# Patient Record
Sex: MALE | Race: WHITE | NOT HISPANIC OR LATINO | Employment: UNEMPLOYED | URBAN - METROPOLITAN AREA
[De-identification: names, ages, dates, MRNs, and addresses within clinical notes are randomized per-mention and may not be internally consistent; named-entity substitution may affect disease eponyms.]

---

## 2017-03-07 ENCOUNTER — TRANSCRIBE ORDERS (OUTPATIENT)
Dept: LAB | Facility: CLINIC | Age: 57
End: 2017-03-07

## 2017-03-07 ENCOUNTER — APPOINTMENT (OUTPATIENT)
Dept: LAB | Facility: CLINIC | Age: 57
End: 2017-03-07
Payer: COMMERCIAL

## 2017-03-07 DIAGNOSIS — R73.01 IMPAIRED FASTING GLUCOSE: ICD-10-CM

## 2017-03-07 DIAGNOSIS — I10 UNSPECIFIED ESSENTIAL HYPERTENSION: ICD-10-CM

## 2017-03-07 DIAGNOSIS — E78.5 HYPERLIPIDEMIA, UNSPECIFIED HYPERLIPIDEMIA TYPE: ICD-10-CM

## 2017-03-07 DIAGNOSIS — E78.5 HYPERLIPIDEMIA, UNSPECIFIED HYPERLIPIDEMIA TYPE: Primary | ICD-10-CM

## 2017-03-07 LAB
ALBUMIN SERPL BCP-MCNC: 3.6 G/DL (ref 3.5–5)
ALP SERPL-CCNC: 51 U/L (ref 46–116)
ALT SERPL W P-5'-P-CCNC: 33 U/L (ref 12–78)
ANION GAP SERPL CALCULATED.3IONS-SCNC: 8 MMOL/L (ref 4–13)
AST SERPL W P-5'-P-CCNC: 28 U/L (ref 5–45)
BILIRUB SERPL-MCNC: 0.56 MG/DL (ref 0.2–1)
BUN SERPL-MCNC: 14 MG/DL (ref 5–25)
CALCIUM SERPL-MCNC: 9.4 MG/DL (ref 8.3–10.1)
CHLORIDE SERPL-SCNC: 103 MMOL/L (ref 100–108)
CHOLEST SERPL-MCNC: 139 MG/DL (ref 50–200)
CO2 SERPL-SCNC: 31 MMOL/L (ref 21–32)
CREAT SERPL-MCNC: 0.99 MG/DL (ref 0.6–1.3)
GFR SERPL CREATININE-BSD FRML MDRD: >60 ML/MIN/1.73SQ M
GLUCOSE SERPL-MCNC: 99 MG/DL (ref 65–140)
HDLC SERPL-MCNC: 62 MG/DL (ref 40–60)
LDLC SERPL CALC-MCNC: 48 MG/DL (ref 0–100)
POTASSIUM SERPL-SCNC: 4.1 MMOL/L (ref 3.5–5.3)
PROT SERPL-MCNC: 7.2 G/DL (ref 6.4–8.2)
SODIUM SERPL-SCNC: 142 MMOL/L (ref 136–145)
TRIGL SERPL-MCNC: 147 MG/DL

## 2017-03-07 PROCEDURE — 36415 COLL VENOUS BLD VENIPUNCTURE: CPT

## 2017-03-07 PROCEDURE — 80053 COMPREHEN METABOLIC PANEL: CPT

## 2017-03-07 PROCEDURE — 80061 LIPID PANEL: CPT

## 2017-03-08 ENCOUNTER — GENERIC CONVERSION - ENCOUNTER (OUTPATIENT)
Dept: OTHER | Facility: OTHER | Age: 57
End: 2017-03-08

## 2017-03-22 ENCOUNTER — ALLSCRIPTS OFFICE VISIT (OUTPATIENT)
Dept: OTHER | Facility: OTHER | Age: 57
End: 2017-03-22

## 2017-09-07 ENCOUNTER — TRANSCRIBE ORDERS (OUTPATIENT)
Dept: LAB | Facility: CLINIC | Age: 57
End: 2017-09-07

## 2017-09-07 ENCOUNTER — APPOINTMENT (OUTPATIENT)
Dept: LAB | Facility: CLINIC | Age: 57
End: 2017-09-07
Payer: COMMERCIAL

## 2017-09-07 DIAGNOSIS — E78.1 PURE HYPERGLYCERIDEMIA: ICD-10-CM

## 2017-09-07 DIAGNOSIS — I10 UNSPECIFIED ESSENTIAL HYPERTENSION: ICD-10-CM

## 2017-09-07 DIAGNOSIS — I10 UNSPECIFIED ESSENTIAL HYPERTENSION: Primary | ICD-10-CM

## 2017-09-07 LAB
ALBUMIN SERPL BCP-MCNC: 3.4 G/DL (ref 3.5–5)
ALP SERPL-CCNC: 49 U/L (ref 46–116)
ALT SERPL W P-5'-P-CCNC: 35 U/L (ref 12–78)
ANION GAP SERPL CALCULATED.3IONS-SCNC: 9 MMOL/L (ref 4–13)
AST SERPL W P-5'-P-CCNC: 36 U/L (ref 5–45)
BASOPHILS # BLD AUTO: 0.02 THOUSANDS/ΜL (ref 0–0.1)
BASOPHILS NFR BLD AUTO: 0 % (ref 0–1)
BILIRUB SERPL-MCNC: 0.67 MG/DL (ref 0.2–1)
BUN SERPL-MCNC: 12 MG/DL (ref 5–25)
CALCIUM SERPL-MCNC: 8.2 MG/DL (ref 8.3–10.1)
CHLORIDE SERPL-SCNC: 105 MMOL/L (ref 100–108)
CO2 SERPL-SCNC: 28 MMOL/L (ref 21–32)
CREAT SERPL-MCNC: 0.78 MG/DL (ref 0.6–1.3)
EOSINOPHIL # BLD AUTO: 0.06 THOUSAND/ΜL (ref 0–0.61)
EOSINOPHIL NFR BLD AUTO: 1 % (ref 0–6)
ERYTHROCYTE [DISTWIDTH] IN BLOOD BY AUTOMATED COUNT: 13.4 % (ref 11.6–15.1)
GFR SERPL CREATININE-BSD FRML MDRD: 99 ML/MIN/1.73SQ M
GLUCOSE P FAST SERPL-MCNC: 96 MG/DL (ref 65–99)
HCT VFR BLD AUTO: 43.2 % (ref 36.5–49.3)
HGB BLD-MCNC: 14.5 G/DL (ref 12–17)
LYMPHOCYTES # BLD AUTO: 0.9 THOUSANDS/ΜL (ref 0.6–4.47)
LYMPHOCYTES NFR BLD AUTO: 20 % (ref 14–44)
MCH RBC QN AUTO: 33.4 PG (ref 26.8–34.3)
MCHC RBC AUTO-ENTMCNC: 33.6 G/DL (ref 31.4–37.4)
MCV RBC AUTO: 100 FL (ref 82–98)
MONOCYTES # BLD AUTO: 0.39 THOUSAND/ΜL (ref 0.17–1.22)
MONOCYTES NFR BLD AUTO: 8 % (ref 4–12)
NEUTROPHILS # BLD AUTO: 3.24 THOUSANDS/ΜL (ref 1.85–7.62)
NEUTS SEG NFR BLD AUTO: 71 % (ref 43–75)
NRBC BLD AUTO-RTO: 0 /100 WBCS
PLATELET # BLD AUTO: 146 THOUSANDS/UL (ref 149–390)
PMV BLD AUTO: 10.9 FL (ref 8.9–12.7)
POTASSIUM SERPL-SCNC: 3.6 MMOL/L (ref 3.5–5.3)
PROT SERPL-MCNC: 6.6 G/DL (ref 6.4–8.2)
RBC # BLD AUTO: 4.34 MILLION/UL (ref 3.88–5.62)
SODIUM SERPL-SCNC: 142 MMOL/L (ref 136–145)
TSH SERPL DL<=0.05 MIU/L-ACNC: 2.59 UIU/ML (ref 0.36–3.74)
VIT B12 SERPL-MCNC: 792 PG/ML (ref 100–900)
WBC # BLD AUTO: 4.62 THOUSAND/UL (ref 4.31–10.16)

## 2017-09-07 PROCEDURE — 36415 COLL VENOUS BLD VENIPUNCTURE: CPT

## 2017-09-07 PROCEDURE — 85025 COMPLETE CBC W/AUTO DIFF WBC: CPT

## 2017-09-07 PROCEDURE — 80053 COMPREHEN METABOLIC PANEL: CPT

## 2017-09-07 PROCEDURE — 84443 ASSAY THYROID STIM HORMONE: CPT

## 2017-09-07 PROCEDURE — 82607 VITAMIN B-12: CPT

## 2017-09-08 ENCOUNTER — GENERIC CONVERSION - ENCOUNTER (OUTPATIENT)
Dept: OTHER | Facility: OTHER | Age: 57
End: 2017-09-08

## 2017-09-22 ENCOUNTER — ALLSCRIPTS OFFICE VISIT (OUTPATIENT)
Dept: OTHER | Facility: OTHER | Age: 57
End: 2017-09-22

## 2017-10-03 ENCOUNTER — APPOINTMENT (OUTPATIENT)
Dept: LAB | Facility: CLINIC | Age: 57
End: 2017-10-03
Payer: COMMERCIAL

## 2017-10-03 ENCOUNTER — TRANSCRIBE ORDERS (OUTPATIENT)
Dept: LAB | Facility: CLINIC | Age: 57
End: 2017-10-03

## 2017-10-03 DIAGNOSIS — Z12.5 SPECIAL SCREENING FOR MALIGNANT NEOPLASM OF PROSTATE: ICD-10-CM

## 2017-10-03 DIAGNOSIS — R73.01 IMPAIRED FASTING GLUCOSE: ICD-10-CM

## 2017-10-03 DIAGNOSIS — D69.6 THROMBOCYTOPENIA, UNSPECIFIED (HCC): ICD-10-CM

## 2017-10-03 DIAGNOSIS — D69.6 THROMBOCYTOPENIA, UNSPECIFIED (HCC): Primary | ICD-10-CM

## 2017-10-03 LAB
EST. AVERAGE GLUCOSE BLD GHB EST-MCNC: 105 MG/DL
HBA1C MFR BLD: 5.3 % (ref 4.2–6.3)
PLATELET # BLD AUTO: 194 THOUSANDS/UL (ref 149–390)
PMV BLD AUTO: 10.7 FL (ref 8.9–12.7)
PSA SERPL-MCNC: 0.8 NG/ML (ref 0–4)

## 2017-10-03 PROCEDURE — 83516 IMMUNOASSAY NONANTIBODY: CPT

## 2017-10-03 PROCEDURE — 85049 AUTOMATED PLATELET COUNT: CPT

## 2017-10-03 PROCEDURE — 83036 HEMOGLOBIN GLYCOSYLATED A1C: CPT

## 2017-10-03 PROCEDURE — 82784 ASSAY IGA/IGD/IGG/IGM EACH: CPT

## 2017-10-03 PROCEDURE — 84153 ASSAY OF PSA TOTAL: CPT

## 2017-10-03 PROCEDURE — 36415 COLL VENOUS BLD VENIPUNCTURE: CPT

## 2017-10-03 PROCEDURE — 86255 FLUORESCENT ANTIBODY SCREEN: CPT

## 2017-10-04 ENCOUNTER — GENERIC CONVERSION - ENCOUNTER (OUTPATIENT)
Dept: OTHER | Facility: OTHER | Age: 57
End: 2017-10-04

## 2017-10-05 LAB
ENDOMYSIUM IGA SER QL: NEGATIVE
GLIADIN PEPTIDE IGA SER-ACNC: 4 UNITS (ref 0–19)
GLIADIN PEPTIDE IGG SER-ACNC: 2 UNITS (ref 0–19)
IGA SERPL-MCNC: 110 MG/DL (ref 90–386)
TTG IGA SER-ACNC: <2 U/ML (ref 0–3)
TTG IGG SER-ACNC: <2 U/ML (ref 0–5)

## 2017-10-06 ENCOUNTER — GENERIC CONVERSION - ENCOUNTER (OUTPATIENT)
Dept: OTHER | Facility: OTHER | Age: 57
End: 2017-10-06

## 2018-01-11 NOTE — RESULT NOTES
Verified Results  (1) CBC/PLT/DIFF [id number] 08:30AM Sarah Fiore     Test Name Result Flag Reference   WBC COUNT 4 62 Thousand/uL  4 31-10 16   RBC COUNT 4 34 Million/uL  3 88-5 62   HEMOGLOBIN 14 5 g/dL  12 0-17 0   HEMATOCRIT 43 2 %  36 5-49 3    fL H 82-98   MCH 33 4 pg  26 8-34 3   MCHC 33 6 g/dL  31 4-37 4   RDW 13 4 %  11 6-15 1   MPV 10 9 fL  8 9-12 7   PLATELET COUNT 035 Thousands/uL L 149-390   nRBC AUTOMATED 0 /100 WBCs     NEUTROPHILS RELATIVE PERCENT 71 %  43-75   LYMPHOCYTES RELATIVE PERCENT 20 %  14-44   MONOCYTES RELATIVE PERCENT 8 %  4-12   EOSINOPHILS RELATIVE PERCENT 1 %  0-6   BASOPHILS RELATIVE PERCENT 0 %  0-1   NEUTROPHILS ABSOLUTE COUNT 3 24 Thousands/? ??L  1 85-7 62   LYMPHOCYTES ABSOLUTE COUNT 0 90 Thousands/? ??L  0 60-4 47   MONOCYTES ABSOLUTE COUNT 0 39 Thousand/? ??L  0 17-1 22   EOSINOPHILS ABSOLUTE COUNT 0 06 Thousand/? ??L  0 00-0 61   BASOPHILS ABSOLUTE COUNT 0 02 Thousands/? ??L  0 00-0 10   This is a patient instruction: This test is non-fasting  Please drink two glasses of water morning of bloodwork  (1) COMPREHENSIVE METABOLIC PANEL [id number] 98:72EW Sarah Fiore     Test Name Result Flag Reference   SODIUM 142 mmol/L  136-145   POTASSIUM 3 6 mmol/L  3 5-5 3   CHLORIDE 105 mmol/L  100-108   CARBON DIOXIDE 28 mmol/L  21-32   ANION GAP (CALC) 9 mmol/L  4-13   BLOOD UREA NITROGEN 12 mg/dL  5-25   CREATININE 0 78 mg/dL  0 60-1 30   Standardized to IDMS reference method   CALCIUM 8 2 mg/dL L 8 3-10 1   BILI, TOTAL 0 67 mg/dL  0 20-1 00   ALK PHOSPHATAS 49 U/L     ALT (SGPT) 35 U/L  12-78   Specimen collection should occur prior to Sulfasalazine and/or Sulfapyridine administration due to the potential for falsely depressed results  AST(SGOT) 36 U/L  5-45   Specimen collection should occur prior to Sulfasalazine administration due to the potential for falsely depressed results     ALBUMIN 3 4 g/dL L 3 5-5 0   TOTAL PROTEIN 6 6 g/dL  6 4-8 2   eGFR 99 ml/min/1 73sq valeria [place] [employer] Disease Education Program recommendations are as follows:  GFR calculation is accurate only with a steady state creatinine  Chronic Kidney disease less than 60 ml/min/1 73 sq  meters  Kidney failure less than 15 ml/min/1 73 sq  meters  GLUCOSE FASTING 96 mg/dL  65-99   Specimen collection should occur prior to Sulfasalazine administration due to the potential for falsely depressed results  Specimen collection should occur prior to Sulfapyridine administration due to the potential for falsely elevated results  (1) TSH WITH FT4 REFLEX [id number] 08:30AM Sarah Fiore     Test Name Result Flag Reference   TSH 2 590 uIU/mL  0 358-3 740   Patients undergoing fluorescein dye angiography may retain small amounts of fluorescein in the body for 48-72 hours post procedure  Samples containing fluorescein can produce falsely depressed TSH values  If the patient had this procedure,a specimen should be resubmitted post fluorescein clearance       (1) VITAMIN B12 [id number] 08:30AM Angel Sarah Holloway     Test Name Result Flag Reference   VITAMIN B12 792 pg/mL  100-900

## 2018-01-12 NOTE — RESULT NOTES
Message   please have dr Naman Vallejo f/u with results  thanks     Verified Results  (1) LIPID PANEL, FASTING [id number] 07:23AM Vishnu Deluca     Test Name Result Flag Reference   CHOLESTEROL, TOTAL 154 mg/dL  125-200   HDL CHOLESTEROL 44 mg/dL  > OR = 40   TRIGLICERIDES 409 mg/dL H <150   LDL-CHOLESTEROL 35 mg/dL (calc)  <130   Desirable range <100 mg/dL for patients with CHD or  diabetes and <70 mg/dL for diabetic patients with  known heart disease  CHOL/HDLC RATIO 3 5 (calc)  < OR = 5 0   NON HDL CHOLESTEROL 110 mg/dL (calc)     Target for non-HDL cholesterol is 30 mg/dL higher than   LDL cholesterol target

## 2018-01-13 VITALS
TEMPERATURE: 97.4 F | HEART RATE: 108 BPM | SYSTOLIC BLOOD PRESSURE: 150 MMHG | WEIGHT: 230.25 LBS | RESPIRATION RATE: 15 BRPM | DIASTOLIC BLOOD PRESSURE: 80 MMHG | BODY MASS INDEX: 34.9 KG/M2 | HEIGHT: 68 IN

## 2018-01-13 VITALS
SYSTOLIC BLOOD PRESSURE: 142 MMHG | WEIGHT: 214 LBS | HEART RATE: 84 BPM | BODY MASS INDEX: 32.43 KG/M2 | DIASTOLIC BLOOD PRESSURE: 90 MMHG | HEIGHT: 68 IN | TEMPERATURE: 97.6 F | RESPIRATION RATE: 18 BRPM

## 2018-01-15 NOTE — RESULT NOTES
Verified Results  (1) CELIAC DISEASE AB PROFILE [id number] 11:53AM Adebayo Sarah     Test Name Result Flag Reference   tTG IGG <2 U/mL  0 - 5   Negative        0 - 5                                Weak Positive   6 - 9                                Positive           >9   tTG IGA <2 U/mL  0 - 3   Negative        0 -  3                                Weak Positive   4 - 10                                Positive           >10   Tissue Transglutaminase (tTG) has been identified   as the endomysial antigen  Studies have demonstr-   ated that endomysial IgA antibodies have over 99%   specificity for gluten sensitive enteropathy     GLIADA 4 units  0 - 19   Negative                   0 - 19                     Weak Positive             20 - 30                     Moderate to Strong Positive   >30   GLIADG 2 units  0 - 19   Negative                   0 - 19                     Weak Positive             20 - 30                     Moderate to Strong Positive   >30   ENDOMYSIAL AB IGA Negative  Negative   Performed at:  [id number] [address]  [id number]  : Jr Sampson MD, Phone:  [phone]    mg/dL  90 - 386

## 2018-01-15 NOTE — RESULT NOTES
Verified Results  (1) PLATELET COUNT [id number] 11:53AM Sarah Fiore     Test Name Result Flag Reference   PLATELET COUNT 496 Thousands/uL  149-390   MPV 10 7 fL  8 9-12 7     (1) PSA, DIAGNOSTIC (FOLLOW-UP) 03Oct2017 11:53AM Sarah Fiore     Test Name Result Flag Reference   PSA 0 8 ng/mL  0 0-4 0   American Urological Association Guidelines define biochemical recurrence of prostate cancer as a detectable or rising PSA value post-radical prostatectomy that is greater than or equal to 0 2 ng/mL with a second confirmatory level of greater than or equal to 0 2 ng/mL  (1) HEMOGLOBIN A1C 03Oct2017 11:53AM Sarah Fiore     Test Name Result Flag Reference   HEMOGLOBIN A1C 5 3 %  4 2-6 3   EST  AVG   GLUCOSE 105 mg/dl

## 2018-01-18 NOTE — RESULT NOTES
Message   Can you please a patient know that his blood work including blood sugar, liver, kidneys were within a normal range  His total cholesterol improved from 154 to 139, his triglycerides also improved from 377 to 147 and his good cholesterol also improved from 44 to 62  Thank you     Verified Results  (1) COMPREHENSIVE METABOLIC PANEL [id number] 73:67AB Sarah Fiore     Test Name Result Flag Reference   GLUCOSE,RANDM 99 mg/dL     If the patient is fasting, the ADA then defines impaired fasting glucose as > 100 mg/dL and diabetes as > or equal to 123 mg/dL  SODIUM 142 mmol/L  136-145   POTASSIUM 4 1 mmol/L  3 5-5 3   CHLORIDE 103 mmol/L  100-108   CARBON DIOXIDE 31 mmol/L  21-32   ANION GAP (CALC) 8 mmol/L  4-13   BLOOD UREA NITROGEN 14 mg/dL  5-25   CREATININE 0 99 mg/dL  0 60-1 30   Standardized to IDMS reference method   CALCIUM 9 4 mg/dL  8 3-10 1   BILI, TOTAL 0 56 mg/dL  0 20-1 00   ALK PHOSPHATAS 51 U/L     ALT (SGPT) 33 U/L  12-78   AST(SGOT) 28 U/L  5-45   ALBUMIN 3 6 g/dL  3 5-5 0   TOTAL PROTEIN 7 2 g/dL  6 4-8 2   eGFR Non-African American      >60 0 ml/min/1 73sq [facility] [employer] Disease Education Program recommendations are as follows:  GFR calculation is accurate only with a steady state creatinine  Chronic Kidney disease less than 60 ml/min/1 73 sq  meters  Kidney failure less than 15 ml/min/1 73 sq  meters       (1) LIPID PANEL FASTING W DIRECT LDL REFLEX [id number] 07:16AM Sarah Fiore     Test Name Result Flag Reference   CHOLESTEROL 139 mg/dL     LDL CHOLESTEROL CALCULATED 48 mg/dL  0-100   Triglyceride:         Normal              <150 mg/dl       Borderline High    150-199 mg/dl       High               200-499 mg/dl       Very High          >499 mg/dl  Cholesterol:         Desirable        <200 mg/dl      Borderline High  200-239 mg/dl      High             >239 mg/dl  HDL Cholesterol:        High    >59 mg/dL      Low     <41 mg/dL  LDL Cholesterol: Optimal          <100 mg/dl        Near Optimal     100-129 mg/dl        Above Optimal          Borderline High   130-159 mg/dl          High              160-189 mg/dl          Very High        >189 mg/dl  LDL CALCULATED:    This screening LDL is a calculated result  It does not have the accuracy of the Direct Measured LDL in the monitoring of patients with hyperlipidemia and/or statin therapy  Direct Measure LDL (IPS604) must be ordered separately in these patients  TRIGLYCERIDES 147 mg/dL  <=150   Specimen collection should occur prior to N-Acetylcysteine or Metamizole administration due to the potential for falsely depressed results  HDL,DIRECT 62 mg/dL H 40-60   Specimen collection should occur prior to Metamizole administration due to the potential for falsely depressed results

## 2018-01-30 DIAGNOSIS — F32.89 OTHER DEPRESSION: Primary | ICD-10-CM

## 2018-01-30 RX ORDER — FLUOXETINE HYDROCHLORIDE 40 MG/1
CAPSULE ORAL
Qty: 90 CAPSULE | Refills: 1 | Status: SHIPPED | OUTPATIENT
Start: 2018-01-30 | End: 2018-01-31 | Stop reason: SDUPTHER

## 2018-01-31 DIAGNOSIS — F32.89 OTHER DEPRESSION: ICD-10-CM

## 2018-01-31 RX ORDER — FLUOXETINE HYDROCHLORIDE 40 MG/1
CAPSULE ORAL
Qty: 90 CAPSULE | Refills: 1 | Status: SHIPPED | OUTPATIENT
Start: 2018-01-31 | End: 2018-07-27 | Stop reason: SDUPTHER

## 2018-02-01 DIAGNOSIS — E78.5 HYPERLIPIDEMIA, UNSPECIFIED HYPERLIPIDEMIA TYPE: Primary | ICD-10-CM

## 2018-02-01 RX ORDER — FENOFIBRATE 160 MG/1
TABLET ORAL
Qty: 90 TABLET | Refills: 3 | Status: SHIPPED | OUTPATIENT
Start: 2018-02-01 | End: 2019-01-28 | Stop reason: SDUPTHER

## 2018-03-03 DIAGNOSIS — I10 HYPERTENSION, UNSPECIFIED TYPE: Primary | ICD-10-CM

## 2018-03-04 RX ORDER — LISINOPRIL 30 MG/1
TABLET ORAL
Qty: 90 TABLET | Refills: 1 | Status: SHIPPED | OUTPATIENT
Start: 2018-03-04 | End: 2018-05-09 | Stop reason: SDUPTHER

## 2018-03-05 ENCOUNTER — TRANSCRIBE ORDERS (OUTPATIENT)
Dept: LAB | Facility: CLINIC | Age: 58
End: 2018-03-05

## 2018-03-05 ENCOUNTER — APPOINTMENT (OUTPATIENT)
Dept: LAB | Facility: CLINIC | Age: 58
End: 2018-03-05
Payer: COMMERCIAL

## 2018-03-05 DIAGNOSIS — E03.9 HYPOTHYROIDISM, UNSPECIFIED TYPE: ICD-10-CM

## 2018-03-05 DIAGNOSIS — E78.1 PURE HYPERGLYCERIDEMIA: Primary | ICD-10-CM

## 2018-03-05 DIAGNOSIS — E78.1 PURE HYPERGLYCERIDEMIA: ICD-10-CM

## 2018-03-05 LAB
ALBUMIN SERPL BCP-MCNC: 3.6 G/DL (ref 3.5–5)
ALP SERPL-CCNC: 48 U/L (ref 46–116)
ALT SERPL W P-5'-P-CCNC: 30 U/L (ref 12–78)
ANION GAP SERPL CALCULATED.3IONS-SCNC: 8 MMOL/L (ref 4–13)
AST SERPL W P-5'-P-CCNC: 30 U/L (ref 5–45)
BILIRUB SERPL-MCNC: 0.84 MG/DL (ref 0.2–1)
BUN SERPL-MCNC: 14 MG/DL (ref 5–25)
CALCIUM SERPL-MCNC: 8.8 MG/DL (ref 8.3–10.1)
CHLORIDE SERPL-SCNC: 101 MMOL/L (ref 100–108)
CHOLEST SERPL-MCNC: 145 MG/DL (ref 50–200)
CO2 SERPL-SCNC: 31 MMOL/L (ref 21–32)
CREAT SERPL-MCNC: 0.89 MG/DL (ref 0.6–1.3)
GFR SERPL CREATININE-BSD FRML MDRD: 94 ML/MIN/1.73SQ M
GLUCOSE P FAST SERPL-MCNC: 102 MG/DL (ref 65–99)
HDLC SERPL-MCNC: 50 MG/DL (ref 40–60)
LDLC SERPL CALC-MCNC: 19 MG/DL (ref 0–100)
POTASSIUM SERPL-SCNC: 3.5 MMOL/L (ref 3.5–5.3)
PROT SERPL-MCNC: 7.2 G/DL (ref 6.4–8.2)
SODIUM SERPL-SCNC: 140 MMOL/L (ref 136–145)
TRIGL SERPL-MCNC: 381 MG/DL
TSH SERPL DL<=0.05 MIU/L-ACNC: 3.34 UIU/ML (ref 0.36–3.74)

## 2018-03-05 PROCEDURE — 36415 COLL VENOUS BLD VENIPUNCTURE: CPT

## 2018-03-05 PROCEDURE — 80061 LIPID PANEL: CPT

## 2018-03-05 PROCEDURE — 84443 ASSAY THYROID STIM HORMONE: CPT

## 2018-03-05 PROCEDURE — 80053 COMPREHEN METABOLIC PANEL: CPT

## 2018-03-27 ENCOUNTER — OFFICE VISIT (OUTPATIENT)
Dept: FAMILY MEDICINE CLINIC | Facility: CLINIC | Age: 58
End: 2018-03-27
Payer: COMMERCIAL

## 2018-03-27 VITALS
HEART RATE: 96 BPM | BODY MASS INDEX: 33.1 KG/M2 | WEIGHT: 218.4 LBS | SYSTOLIC BLOOD PRESSURE: 140 MMHG | OXYGEN SATURATION: 98 % | HEIGHT: 68 IN | RESPIRATION RATE: 16 BRPM | TEMPERATURE: 97.3 F | DIASTOLIC BLOOD PRESSURE: 84 MMHG

## 2018-03-27 DIAGNOSIS — Z00.00 ROUTINE HEALTH MAINTENANCE: ICD-10-CM

## 2018-03-27 DIAGNOSIS — Z72.89 HABITUAL ALCOHOL USE: ICD-10-CM

## 2018-03-27 DIAGNOSIS — R53.83 OTHER FATIGUE: ICD-10-CM

## 2018-03-27 DIAGNOSIS — I10 HYPERTENSION, UNSPECIFIED TYPE: Primary | ICD-10-CM

## 2018-03-27 DIAGNOSIS — R73.01 IMPAIRED FASTING GLUCOSE: ICD-10-CM

## 2018-03-27 DIAGNOSIS — E03.9 HYPOTHYROIDISM, UNSPECIFIED TYPE: ICD-10-CM

## 2018-03-27 DIAGNOSIS — E78.5 HYPERLIPIDEMIA, UNSPECIFIED HYPERLIPIDEMIA TYPE: ICD-10-CM

## 2018-03-27 DIAGNOSIS — E78.1 HYPERTRIGLYCERIDEMIA: ICD-10-CM

## 2018-03-27 DIAGNOSIS — K21.9 GASTROESOPHAGEAL REFLUX DISEASE WITHOUT ESOPHAGITIS: ICD-10-CM

## 2018-03-27 DIAGNOSIS — Z12.5 SCREENING FOR PROSTATE CANCER: ICD-10-CM

## 2018-03-27 PROCEDURE — 99215 OFFICE O/P EST HI 40 MIN: CPT | Performed by: FAMILY MEDICINE

## 2018-03-27 RX ORDER — ATORVASTATIN CALCIUM 10 MG/1
10 TABLET, FILM COATED ORAL DAILY
Qty: 30 TABLET | Refills: 5 | Status: SHIPPED | OUTPATIENT
Start: 2018-03-27 | End: 2018-04-26

## 2018-03-27 NOTE — PROGRESS NOTES
FAMILY PRACTICE OFFICE VISIT       NAME: Corky Mott  AGE: 59 y o  SEX: male       : 1960        MRN: [id number]    DATE: 3/28/2018  TIME: 7:13 PM    Assessment and Plan     Problem List Items Addressed This Visit     Hypertension - Primary       BP initially elevated however upon recheck, decreased however still higher than I would like to be  Continue with lisinopril 30 mg daily  I would like patient to check his Bp  Return in 2 weeks for BP check  Continue with lifestyle modifications including limiting sodium in the diet, starting routine exercise regimen, limiting alcohol intake to 1-2 drinks nightly  Relevant Orders    Comprehensive metabolic panel    Hypertriglyceridemia      Discussed recent lipid panel which shows elevated triglycerides  Patient is on atorvastatin as well as fenofibrate  I would like patient to decrease atorvastatin by half the amount as LDL is lower  I would like patient to work on lifestyle modifications, limit saturated fried fatty foods from the diet, increase heart healthy fats as well as Mediterranean diet  I would like patient to incorporate fish oil to lower triglycerides as well  Will recheck in 6 months  Relevant Medications    atorvastatin (LIPITOR) 10 mg tablet    Impaired fasting glucose       Will check A1c with next blood work  Continue with limiting carbohydrates in the diet  Relevant Orders    HEMOGLOBIN A1C W/ EAG ESTIMATION    Hypothyroidism       This is overall stable  Continue with levothyroxine 75 mcg daily  Relevant Orders    TSH, 3rd generation with T4 reflex    Habitual alcohol use       Have strongly urged, advised on the importance of limiting alcohol  Have discussed routine alcohol intake and increased blood pressure as well  Patient states he will try  Routine health maintenance       Will check PSA  Patient will call Dr Belen Manuel to schedule screening colonoscopy           Gastroesophageal reflux disease without esophagitis       Patient takes omeprazole only as needed  Continue to avoid triggers that may cause reflux symptoms  Other Visit Diagnoses     Other fatigue        Relevant Orders    CBC and differential    Screening for prostate cancer        Relevant Orders    PSA, total and free            There are no Patient Instructions on file for this visit  Chief Complaint     Chief Complaint   Patient presents with    Follow-up         History of Present Illness     HPI   Patient is here for follow-up of chronic medical conditions and discuss recent blood work results  Patient recently discovered that he is allergic to amoxicillin after having a root canal last week  He developed lip swelling and had trouble breathing  Took lisinopril at 9 am this am  Still has Memeoey with diet gingerale, few drinks nightly  Will halve atorvastatin  Takes omperazole as needed  Will call dr Tr Cleveland to scheudule colonosocpy   Review of Systems   Review of Systems   Constitutional: Negative for unexpected weight change  HENT: Negative  Eyes: Negative for visual disturbance  Respiratory: Negative for shortness of breath  Cardiovascular: Negative  Negative for chest pain, palpitations and leg swelling  Gastrointestinal: Negative for abdominal pain  Genitourinary: Negative for dysuria  Skin: Negative for rash  Neurological: Negative for headaches  Psychiatric/Behavioral: Negative for dysphoric mood         Active Problem List     Patient Active Problem List   Diagnosis    Hypertension    Hypertriglyceridemia    Impaired fasting glucose    Hypothyroidism    Habitual alcohol use    Routine health maintenance    Gastroesophageal reflux disease without esophagitis       Past Medical History:  Past Medical History:   Diagnosis Date    Medical history unknown        Past Surgical History:  Past Surgical History:   Procedure Laterality Date    REPAIR / REINSERT BICEPS TENDON AT ELBOW      Last Assessed: 1/12/2016        Family History:  Family History   Problem Relation Age of Onset    Diabetes Mother        Social History:  Social History     Social History    Marital status: Single     Spouse name: N/A    Number of children: N/A    Years of education: N/A     Occupational History    Not on file  Social History Main Topics    Smoking status: Never Smoker    Smokeless tobacco: Never Used    Alcohol use Yes      Comment: social    Drug use: Yes     Types: Marijuana    Sexual activity: Not on file     Other Topics Concern    Not on file     Social History Narrative    No narrative on file     I have reviewed the patient's medical history in detail; there are no changes to the history as noted in the electronic medical record  Objective     Vitals:    03/27/18 1105   BP: 140/84   Pulse:    Resp:    Temp:    SpO2:      Wt Readings from Last 3 Encounters:   03/27/18 99 1 kg (218 lb 6 4 oz)   09/22/17 97 1 kg (214 lb)   03/22/17 104 kg (230 lb 4 oz)     Vitals:    03/27/18 1027 03/27/18 1105   BP: 152/98 140/84   BP Location: Left arm    Patient Position: Sitting    Cuff Size: Large    Pulse: 96    Resp: 16    Temp: (!) 97 3 °F (36 3 °C)    TempSrc: Tympanic    SpO2: 98%    Weight: 99 1 kg (218 lb 6 4 oz)    Height: 5' 8" (1 727 m)          Physical Exam   Constitutional: He is oriented to person, place, and time  He appears well-developed and well-nourished  HENT:   Head: Normocephalic and atraumatic  Mouth/Throat: Oropharynx is clear and moist      Tms intact and clear   Eyes: Conjunctivae and EOM are normal  Pupils are equal, round, and reactive to light  Neck: Normal range of motion  Neck supple  No thyromegaly present  Cardiovascular: Normal rate and regular rhythm  No murmur heard  Pulmonary/Chest: Effort normal and breath sounds normal    Abdominal: Soft  Bowel sounds are normal  There is no tenderness  There is no rebound     Musculoskeletal: Normal range of motion  He exhibits no edema  Lymphadenopathy:     He has no cervical adenopathy  Neurological: He is alert and oriented to person, place, and time  Skin: No rash noted  Psychiatric: He has a normal mood and affect  Nursing note and vitals reviewed        Pertinent Laboratory/Diagnostic Studies:  Lab Results   Component Value Date    GLUCOSE 99 03/07/2017    BUN 14 03/05/2018    CREATININE 0 89 03/05/2018    CALCIUM 8 8 03/05/2018     03/05/2018    K 3 5 03/05/2018    CO2 31 03/05/2018     03/05/2018     Lab Results   Component Value Date    ALT 30 03/05/2018    AST 30 03/05/2018    ALKPHOS 48 03/05/2018    BILITOT 0 84 03/05/2018       Lab Results   Component Value Date    WBC 4 62 09/07/2017    HGB 14 5 09/07/2017    HCT 43 2 09/07/2017     (H) 09/07/2017     10/03/2017       No results found for: TSH    Lab Results   Component Value Date    CHOL 145 03/05/2018     Lab Results   Component Value Date    TRIG 381 (H) 03/05/2018     Lab Results   Component Value Date    HDL 50 03/05/2018     Lab Results   Component Value Date    LDLCALC 19 03/05/2018     Lab Results   Component Value Date    HGBA1C 5 3 10/03/2017       Results for orders placed or performed in visit on 03/05/18   Lipid Panel with Direct LDL reflex   Result Value Ref Range    Cholesterol 145 50 - 200 mg/dL    Triglycerides 381 (H) <=150 mg/dL    HDL, Direct 50 40 - 60 mg/dL    LDL Calculated 19 0 - 100 mg/dL   TSH, 3rd generation with T4 reflex   Result Value Ref Range    TSH 3RD GENERATON 3 340 0 358 - 3 740 uIU/mL   Comprehensive metabolic panel   Result Value Ref Range    Sodium 140 136 - 145 mmol/L    Potassium 3 5 3 5 - 5 3 mmol/L    Chloride 101 100 - 108 mmol/L    CO2 31 21 - 32 mmol/L    Anion Gap 8 4 - 13 mmol/L    BUN 14 5 - 25 mg/dL    Creatinine 0 89 0 60 - 1 30 mg/dL    Glucose, Fasting 102 (H) 65 - 99 mg/dL    Calcium 8 8 8 3 - 10 1 mg/dL    AST 30 5 - 45 U/L    ALT 30 12 - 78 U/L    Alkaline Phosphatase 48 46 - 116 U/L    Total Protein 7 2 6 4 - 8 2 g/dL    Albumin 3 6 3 5 - 5 0 g/dL    Total Bilirubin 0 84 0 20 - 1 00 mg/dL    eGFR 94 ml/min/1 73sq m       Orders Placed This Encounter   Procedures    CBC and differential    Comprehensive metabolic panel    Lipid Panel with Direct LDL reflex    TSH, 3rd generation with T4 reflex    HEMOGLOBIN A1C W/ EAG ESTIMATION    PSA, total and free       ALLERGIES:  Allergies   Allergen Reactions    Amoxicillin Swelling and Fever     Other reaction(s): vertigo    Escitalopram      Other reaction(s): sweaty palms/hands, felt faint, passed out for a short time, had anxiety/panic attack    Gemfibrozil      Other reaction(s): Nausea and/or vomiting    Milk-Related Compounds      Other reaction(s): Other (See Comments)  abd cramping    Other      Other reaction(s): Other (See Comments)  rhinitis, itchy eyes       Current Medications     Current Outpatient Prescriptions   Medication Sig Dispense Refill    albuterol (PROVENTIL HFA,VENTOLIN HFA) 90 mcg/act inhaler Inhale 1-2 puffs      allopurinol (ZYLOPRIM) 300 mg tablet Take 1 tablet by mouth daily      atorvastatin (LIPITOR) 10 mg tablet Take 1 tablet (10 mg total) by mouth daily for 30 days 30 tablet 5    cetirizine (ZyrTEC) 10 mg tablet Take one tablet by mouth daily as needed for allergies/congestion       fenofibrate (TRIGLIDE) 160 MG tablet TAKE 1 TABLET DAILY  90 tablet 3    FLUoxetine (PROzac) 40 MG capsule TAKE ONE CAPSULE BY MOUTH EVERY DAY 90 capsule 1    levothyroxine 75 mcg tablet Take by mouth Daily      lisinopril (ZESTRIL) 30 mg tablet TAKE 1 TABLET EVERY DAY 90 tablet 1    Multiple Vitamin (DAILY VALUE MULTIVITAMIN) TABS Take 1 tablet by mouth daily      omeprazole (PriLOSEC) 20 mg delayed release capsule Take by mouth Daily       No current facility-administered medications for this visit            Health Maintenance     Health Maintenance   Topic Date Due    HIV SCREENING 1960    Hepatitis C Screening  1960    COLONOSCOPY  1960    DTaP,Tdap,and Td Vaccines (2 - Td) 07/27/2016    Depression Screening PHQ-9  03/27/2019    INFLUENZA VACCINE  Completed     Immunization History   Administered Date(s) Administered    Influenza 01/06/2010, 10/06/2011, 09/11/2012, 11/27/2013, 10/27/2015    Influenza Quadrivalent Preservative Free 3 years and older IM 11/15/2016, 09/22/2017    Tdap 07/27/2006       Will Magana MD

## 2018-03-28 PROBLEM — E78.5 HYPERLIPIDEMIA: Status: ACTIVE | Noted: 2018-03-28

## 2018-03-28 PROBLEM — F10.90 HABITUAL ALCOHOL USE: Status: ACTIVE | Noted: 2018-03-28

## 2018-03-28 PROBLEM — I10 HYPERTENSION: Status: ACTIVE | Noted: 2018-03-28

## 2018-03-28 PROBLEM — R73.01 IMPAIRED FASTING GLUCOSE: Status: ACTIVE | Noted: 2018-03-28

## 2018-03-28 PROBLEM — K21.9 GASTROESOPHAGEAL REFLUX DISEASE WITHOUT ESOPHAGITIS: Status: ACTIVE | Noted: 2018-03-28

## 2018-03-28 PROBLEM — Z72.89 HABITUAL ALCOHOL USE: Status: ACTIVE | Noted: 2018-03-28

## 2018-03-28 PROBLEM — E78.1 HYPERTRIGLYCERIDEMIA: Status: ACTIVE | Noted: 2018-03-28

## 2018-03-28 PROBLEM — Z00.00 ROUTINE HEALTH MAINTENANCE: Status: ACTIVE | Noted: 2018-03-28

## 2018-03-28 PROBLEM — E03.9 HYPOTHYROIDISM: Status: ACTIVE | Noted: 2018-03-28

## 2018-03-28 NOTE — ASSESSMENT & PLAN NOTE
Have strongly urged, advised on the importance of limiting alcohol  Have discussed routine alcohol intake and increased blood pressure as well  Patient states he will try

## 2018-03-28 NOTE — ASSESSMENT & PLAN NOTE
BP initially elevated however upon recheck, decreased however still higher than I would like to be  Continue with lisinopril 30 mg daily  I would like patient to check his Bp  Return in 2 weeks for BP check  Continue with lifestyle modifications including limiting sodium in the diet, starting routine exercise regimen, limiting alcohol intake to 1-2 drinks nightly

## 2018-03-28 NOTE — ASSESSMENT & PLAN NOTE
Discussed recent lipid panel which shows elevated triglycerides  Patient is on atorvastatin as well as fenofibrate  I would like patient to decrease atorvastatin by half the amount as LDL is lower  I would like patient to work on lifestyle modifications, limit saturated fried fatty foods from the diet, increase heart healthy fats as well as Mediterranean diet  I would like patient to incorporate fish oil to lower triglycerides as well  Will recheck in 6 months

## 2018-04-21 DIAGNOSIS — K21.9 GASTROESOPHAGEAL REFLUX DISEASE WITHOUT ESOPHAGITIS: Primary | ICD-10-CM

## 2018-04-22 RX ORDER — OMEPRAZOLE 20 MG/1
CAPSULE, DELAYED RELEASE ORAL
Qty: 30 CAPSULE | Refills: 2 | Status: SHIPPED | OUTPATIENT
Start: 2018-04-22

## 2018-04-30 DIAGNOSIS — E78.5 HYPERLIPIDEMIA, UNSPECIFIED HYPERLIPIDEMIA TYPE: Primary | ICD-10-CM

## 2018-04-30 RX ORDER — ATORVASTATIN CALCIUM 20 MG/1
TABLET, FILM COATED ORAL
Qty: 90 TABLET | Refills: 3 | Status: SHIPPED | OUTPATIENT
Start: 2018-04-30 | End: 2018-10-09

## 2018-05-04 LAB — HBA1C MFR BLD HPLC: 5.1 %

## 2018-05-08 ENCOUNTER — TRANSITIONAL CARE MANAGEMENT (OUTPATIENT)
Dept: FAMILY MEDICINE CLINIC | Facility: CLINIC | Age: 58
End: 2018-05-08

## 2018-05-09 ENCOUNTER — OFFICE VISIT (OUTPATIENT)
Dept: FAMILY MEDICINE CLINIC | Facility: CLINIC | Age: 58
End: 2018-05-09
Payer: COMMERCIAL

## 2018-05-09 VITALS
RESPIRATION RATE: 16 BRPM | DIASTOLIC BLOOD PRESSURE: 80 MMHG | BODY MASS INDEX: 33.62 KG/M2 | SYSTOLIC BLOOD PRESSURE: 120 MMHG | HEIGHT: 68 IN | WEIGHT: 221.8 LBS | HEART RATE: 90 BPM | TEMPERATURE: 97.5 F

## 2018-05-09 DIAGNOSIS — I10 HYPERTENSION, UNSPECIFIED TYPE: ICD-10-CM

## 2018-05-09 DIAGNOSIS — D69.6 PLATELETS DECREASED (HCC): ICD-10-CM

## 2018-05-09 DIAGNOSIS — IMO0001 TRANSITION OF CARE PERFORMED WITH SHARING OF CLINICAL SUMMARY: Primary | ICD-10-CM

## 2018-05-09 DIAGNOSIS — K85.20 ALCOHOL-INDUCED ACUTE PANCREATITIS, UNSPECIFIED COMPLICATION STATUS: ICD-10-CM

## 2018-05-09 DIAGNOSIS — F41.1 GENERALIZED ANXIETY DISORDER: ICD-10-CM

## 2018-05-09 DIAGNOSIS — D72.819 LEUKOPENIA, UNSPECIFIED TYPE: ICD-10-CM

## 2018-05-09 PROCEDURE — 99496 TRANSJ CARE MGMT HIGH F2F 7D: CPT | Performed by: FAMILY MEDICINE

## 2018-05-09 RX ORDER — LORAZEPAM 0.5 MG/1
0.5 TABLET ORAL 2 TIMES DAILY
Qty: 30 TABLET | Refills: 0 | Status: SHIPPED | OUTPATIENT
Start: 2018-05-09 | End: 2018-05-24 | Stop reason: SDUPTHER

## 2018-05-09 RX ORDER — OMEPRAZOLE 20 MG/1
20 CAPSULE, DELAYED RELEASE ORAL EVERY 24 HOURS
COMMUNITY
End: 2018-05-09 | Stop reason: SDUPTHER

## 2018-05-09 RX ORDER — LORAZEPAM 0.5 MG/1
TABLET ORAL
COMMUNITY
Start: 2018-05-06 | End: 2018-05-09 | Stop reason: SDUPTHER

## 2018-05-09 NOTE — PROGRESS NOTES
FAMILY PRACTICE OFFICE VISIT       NAME: Tammie Mccarty  AGE: 59 y o  SEX: male       : 1960        MRN: [id number]    DATE: 2018  TIME: 8:36 AM    Assessment and Plan     Problem List Items Addressed This Visit     Hypertension       BP is overall stable and controlled  Continue lisinopril and lifestyle modifications  Will continue to monitor  Transition of care performed with sharing of clinical summary - Primary    Alcohol-induced acute pancreatitis     Patient presents today for recent hospital admission from  to  for acute pancreatitis  Patient states he was consuming more drinks recently, started experiencing abdominal distention and epigastric pain  Patient states he has had pancreatitis once before approximately 5 years ago and felt similar to his pain 5 years ago  While in the hospital, patient was provided with IV fluids and advanced to clear liquid diet as tolerated  Patient states his pain is improved, does have occasional bloating however is much better  Patient states he is not going to drink anymore  He does have a roommate that he lives with who is supportive  Have discussed AA and have patient could benefit from attending these meetings  Patient states he will consider this  Patient is committed to not drinking anymore  He does experience anxiety however has been taking lorazepam as needed  I would like patient to be further evaluated by Gastroenterology and referral has been provided  Relevant Orders    Comprehensive metabolic panel    Ambulatory referral to Gastroenterology    Leukopenia       Unclear etiology  Will recheck WBC  Platelets decreased ([facility] [address] )       Will monitor platelet count and recheck  This is likely secondary to chronic alcohol use  Relevant Orders    CBC and differential    Generalized anxiety disorder       Patient continues take fluoxetine and lorazepam as needed    I feel he may benefit from cognitive behavioral therapy and have advised to schedule appointment with our therapist Tirston Weiss  Relevant Medications    LORazepam (ATIVAN) 0 5 mg tablet            There are no Patient Instructions on file for this visit  Chief Complaint     Chief Complaint   Patient presents with    Transition of Care Management     Patient presents today with a tcm for pancreatitis  History of Present Illness     HPI  Date and time hospital follow up call was made:  5/8/2018  3:51 PM  Hospital care reviewed:  Records reviewed  Patient was hopsitalized at:  [facility]  Date of admission:  5/4/18  Date of discharge:  5/6/18  Diagnosis:  Acute Pancreatitis  Were the patients medicaitons reviewed and updated:  Yes  Current symptoms:  (Comment: anxiety)  Should patient be enrolled in anticoag monitoring?:  No  Scheduled for follow up?:  Yes  Did you obtain your prescribed medications:  Yes  Do you need help managing your perscriptions or medications:  No  Is transportation to your appointments needed:  No  I have advised the patient to call PCP with any new or worsening symptoms (please type in name along with any credentials):  Chichi Rosales LPN  Living Arrangements:  Friends  Support System:  Friends  The type of support provided:  Physical, Emotional, Financial  Do you have social support:  Yes, as much as I need  Are you recieving outpatient services:  No  Are you recieving home care services:  No  Are you using any community resources:  No  Current waiver service:  No  Have you fallen in the last 12 months:  No  Interperter language line required?:  No  Counseling:  Patient  Counseling topics:  Importance of RX compliance, patient and family education     Patient presents today for recent hospital admission from 05/04 to 5/6 for acute pancreatitis  Patient states he was consuming more drinks recently, started experiencing abdominal distention and epigastric pain    Patient states he has had pancreatitis once before approximately 5 years ago and felt similar to his pain 5 years ago  While in the hospital, patient was provided with IV fluids and advanced to clear liquid diet as tolerated  Patient states his pain is improved, does have occasional bloating however is much better  Patient states he is not going to drink anymore  He does have a roommate that he lives with who is supportive  Review of Systems   Review of Systems   Constitutional: Negative for unexpected weight change  HENT: Negative  Respiratory: Negative for shortness of breath  Cardiovascular: Negative  Gastrointestinal: Negative for abdominal pain  Genitourinary: Negative for dysuria  Psychiatric/Behavioral: Negative for dysphoric mood  Active Problem List     Patient Active Problem List   Diagnosis    Hypertension    Hypertriglyceridemia    Impaired fasting glucose    Hypothyroidism    Habitual alcohol use    Routine health maintenance    Gastroesophageal reflux disease without esophagitis    Transition of care performed with sharing of clinical summary    Alcohol-induced acute pancreatitis    Leukopenia    Platelets decreased ([address] )    Generalized anxiety disorder       Past Medical History:  Past Medical History:   Diagnosis Date    Medical history unknown        Past Surgical History:  Past Surgical History:   Procedure Laterality Date    REPAIR / REINSERT BICEPS TENDON AT ELBOW      Last Assessed: 1/12/2016        Family History:  Family History   Problem Relation Age of Onset    Diabetes Mother        Social History:  Social History     Social History    Marital status: Single     Spouse name: N/A    Number of children: N/A    Years of education: N/A     Occupational History    Not on file       Social History Main Topics    Smoking status: Never Smoker    Smokeless tobacco: Never Used    Alcohol use Yes      Comment: social    Drug use: Yes     Types: Marijuana    Sexual activity: Not on file Other Topics Concern    Not on file     Social History Narrative    No narrative on file     I have reviewed the patient's medical history in detail; there are no changes to the history as noted in the electronic medical record  Objective     Vitals:    05/09/18 0856   BP: 120/80   Pulse: 90   Resp: 16   Temp: 97 5 °F (36 4 °C)     Wt Readings from Last 3 Encounters:   05/09/18 101 kg (221 lb 12 8 oz)   03/27/18 99 1 kg (218 lb 6 4 oz)   09/22/17 97 1 kg (214 lb)       Physical Exam   Constitutional: He is oriented to person, place, and time  He appears well-developed and well-nourished  HENT:   Head: Normocephalic and atraumatic  Mouth/Throat: Oropharynx is clear and moist    Eyes: Conjunctivae and EOM are normal  Pupils are equal, round, and reactive to light  Neck: Normal range of motion  Neck supple  No thyromegaly present  Cardiovascular: Normal rate, regular rhythm and normal heart sounds  Pulmonary/Chest: Effort normal and breath sounds normal    Abdominal: Soft  Bowel sounds are normal  He exhibits no distension  There is no tenderness  Musculoskeletal: Normal range of motion  He exhibits no edema  Lymphadenopathy:     He has no cervical adenopathy  Neurological: He is alert and oriented to person, place, and time  Psychiatric: He has a normal mood and affect  Nursing note and vitals reviewed        Pertinent Laboratory/Diagnostic Studies:  Lab Results   Component Value Date    GLUCOSE 99 03/07/2017    BUN 14 03/05/2018    CREATININE 0 89 03/05/2018    CALCIUM 8 8 03/05/2018     03/05/2018    K 3 5 03/05/2018    CO2 31 03/05/2018     03/05/2018     Lab Results   Component Value Date    ALT 30 03/05/2018    AST 30 03/05/2018    ALKPHOS 48 03/05/2018    BILITOT 0 84 03/05/2018       Lab Results   Component Value Date    WBC 4 62 09/07/2017    HGB 14 5 09/07/2017    HCT 43 2 09/07/2017     (H) 09/07/2017     10/03/2017       No results found for: TSH    Lab Results   Component Value Date    CHOL 145 03/05/2018     Lab Results   Component Value Date    TRIG 381 (H) 03/05/2018     Lab Results   Component Value Date    HDL 50 03/05/2018     Lab Results   Component Value Date    LDLCALC 19 03/05/2018     Lab Results   Component Value Date    HGBA1C 5 3 10/03/2017       Results for orders placed or performed in visit on 03/05/18   Lipid Panel with Direct LDL reflex   Result Value Ref Range    Cholesterol 145 50 - 200 mg/dL    Triglycerides 381 (H) <=150 mg/dL    HDL, Direct 50 40 - 60 mg/dL    LDL Calculated 19 0 - 100 mg/dL   TSH, 3rd generation with T4 reflex   Result Value Ref Range    TSH 3RD GENERATON 3 340 0 358 - 3 740 uIU/mL   Comprehensive metabolic panel   Result Value Ref Range    Sodium 140 136 - 145 mmol/L    Potassium 3 5 3 5 - 5 3 mmol/L    Chloride 101 100 - 108 mmol/L    CO2 31 21 - 32 mmol/L    Anion Gap 8 4 - 13 mmol/L    BUN 14 5 - 25 mg/dL    Creatinine 0 89 0 60 - 1 30 mg/dL    Glucose, Fasting 102 (H) 65 - 99 mg/dL    Calcium 8 8 8 3 - 10 1 mg/dL    AST 30 5 - 45 U/L    ALT 30 12 - 78 U/L    Alkaline Phosphatase 48 46 - 116 U/L    Total Protein 7 2 6 4 - 8 2 g/dL    Albumin 3 6 3 5 - 5 0 g/dL    Total Bilirubin 0 84 0 20 - 1 00 mg/dL    eGFR 94 ml/min/1 73sq m       Orders Placed This Encounter   Procedures    CBC and differential    Comprehensive metabolic panel    Ambulatory referral to Gastroenterology       ALLERGIES:  Allergies   Allergen Reactions    Amoxicillin Swelling and Fever     Other reaction(s): vertigo    Escitalopram      Other reaction(s): sweaty palms/hands, felt faint, passed out for a short time, had anxiety/panic attack    Gemfibrozil      Other reaction(s): Nausea and/or vomiting    Milk-Related Compounds      Other reaction(s): Other (See Comments)  abd cramping    Other      Other reaction(s):  Other (See Comments)  rhinitis, itchy eyes       Current Medications     Current Outpatient Prescriptions   Medication Sig Dispense Refill    albuterol (PROVENTIL HFA,VENTOLIN HFA) 90 mcg/act inhaler Inhale 1-2 puffs      allopurinol (ZYLOPRIM) 300 mg tablet Take 1 tablet by mouth daily      atorvastatin (LIPITOR) 20 mg tablet TAKE 1 TABLET DAILY AS DIRECTED  90 tablet 3    cetirizine (ZyrTEC) 10 mg tablet Take one tablet by mouth daily as needed for allergies/congestion       fenofibrate (TRIGLIDE) 160 MG tablet TAKE 1 TABLET DAILY  90 tablet 3    FLUoxetine (PROzac) 40 MG capsule TAKE ONE CAPSULE BY MOUTH EVERY DAY 90 capsule 1    levothyroxine 75 mcg tablet Take by mouth Daily      lisinopril (ZESTRIL) 30 mg tablet TAKE 1 TABLET EVERY DAY 90 tablet 1    LORazepam (ATIVAN) 0 5 mg tablet Take 1 tablet (0 5 mg total) by mouth 2 (two) times a day Take 1 tab twice daily as needed 30 tablet 0    Multiple Vitamin (DAILY VALUE MULTIVITAMIN) TABS Take 1 tablet by mouth daily      omeprazole (PriLOSEC) 20 mg delayed release capsule TAKE 1 CAPSULE DAILY FOR ACID REFLUX 30 capsule 2    atorvastatin (LIPITOR) 10 mg tablet Take 1 tablet (10 mg total) by mouth daily for 30 days 30 tablet 5     No current facility-administered medications for this visit            Health Maintenance     Health Maintenance   Topic Date Due    HIV SCREENING  1960    Hepatitis C Screening  1960    COLONOSCOPY  1960    DTaP,Tdap,and Td Vaccines (2 - Td) 07/27/2016    INFLUENZA VACCINE  09/01/2018    Depression Screening PHQ-9  03/27/2019     Immunization History   Administered Date(s) Administered    Influenza 01/06/2010, 10/06/2011, 09/11/2012, 11/27/2013, 10/27/2015    Influenza Quadrivalent Preservative Free 3 years and older IM 11/15/2016, 09/22/2017    Tdap 07/27/2006       Danisha Barreto MD

## 2018-05-11 PROBLEM — D69.6 PLATELETS DECREASED (HCC): Status: ACTIVE | Noted: 2018-05-11

## 2018-05-11 PROBLEM — K85.20 ALCOHOL-INDUCED ACUTE PANCREATITIS: Status: ACTIVE | Noted: 2018-05-11

## 2018-05-11 PROBLEM — Z78.9 TRANSITION OF CARE PERFORMED WITH SHARING OF CLINICAL SUMMARY: Status: ACTIVE | Noted: 2018-05-11

## 2018-05-11 PROBLEM — D72.819 LEUKOPENIA: Status: ACTIVE | Noted: 2018-05-11

## 2018-05-11 PROBLEM — F41.1 GENERALIZED ANXIETY DISORDER: Status: ACTIVE | Noted: 2018-05-11

## 2018-05-11 PROBLEM — IMO0001 TRANSITION OF CARE PERFORMED WITH SHARING OF CLINICAL SUMMARY: Status: ACTIVE | Noted: 2018-05-11

## 2018-05-11 RX ORDER — LISINOPRIL 30 MG/1
30 TABLET ORAL DAILY
Qty: 90 TABLET | Refills: 0 | Status: SHIPPED | OUTPATIENT
Start: 2018-05-11 | End: 2018-08-10 | Stop reason: SDUPTHER

## 2018-05-11 NOTE — ASSESSMENT & PLAN NOTE
Patient presents today for recent hospital admission from 05/04 to 5/6 for acute pancreatitis  Patient states he was consuming more drinks recently, started experiencing abdominal distention and epigastric pain  Patient states he has had pancreatitis once before approximately 5 years ago and felt similar to his pain 5 years ago  While in the hospital, patient was provided with IV fluids and advanced to clear liquid diet as tolerated  Patient states his pain is improved, does have occasional bloating however is much better  Patient states he is not going to drink anymore  He does have a roommate that he lives with who is supportive  Have discussed AA and have patient could benefit from attending these meetings  Patient states he will consider this  Patient is committed to not drinking anymore  He does experience anxiety however has been taking lorazepam as needed  I would like patient to be further evaluated by Gastroenterology and referral has been provided

## 2018-05-11 NOTE — ASSESSMENT & PLAN NOTE
Patient continues take fluoxetine and lorazepam as needed  I feel he may benefit from cognitive behavioral therapy and have advised to schedule appointment with our therapist Venice Dewitt

## 2018-05-11 NOTE — ASSESSMENT & PLAN NOTE
BP is overall stable and controlled  Continue lisinopril and lifestyle modifications  Will continue to monitor

## 2018-05-22 ENCOUNTER — LAB (OUTPATIENT)
Dept: LAB | Facility: CLINIC | Age: 58
End: 2018-05-22
Payer: COMMERCIAL

## 2018-05-22 DIAGNOSIS — K85.20 ALCOHOL-INDUCED ACUTE PANCREATITIS, UNSPECIFIED COMPLICATION STATUS: ICD-10-CM

## 2018-05-22 DIAGNOSIS — D69.6 PLATELETS DECREASED (HCC): ICD-10-CM

## 2018-05-22 LAB
ALBUMIN SERPL BCP-MCNC: 4.1 G/DL (ref 3.5–5)
ALP SERPL-CCNC: 48 U/L (ref 46–116)
ALT SERPL W P-5'-P-CCNC: 34 U/L (ref 12–78)
ANION GAP SERPL CALCULATED.3IONS-SCNC: 6 MMOL/L (ref 4–13)
AST SERPL W P-5'-P-CCNC: 21 U/L (ref 5–45)
BASOPHILS # BLD AUTO: 0.03 THOUSANDS/ΜL (ref 0–0.1)
BASOPHILS NFR BLD AUTO: 1 % (ref 0–1)
BILIRUB SERPL-MCNC: 0.49 MG/DL (ref 0.2–1)
BUN SERPL-MCNC: 19 MG/DL (ref 5–25)
CALCIUM SERPL-MCNC: 9.2 MG/DL (ref 8.3–10.1)
CHLORIDE SERPL-SCNC: 104 MMOL/L (ref 100–108)
CO2 SERPL-SCNC: 30 MMOL/L (ref 21–32)
CREAT SERPL-MCNC: 0.93 MG/DL (ref 0.6–1.3)
EOSINOPHIL # BLD AUTO: 0.13 THOUSAND/ΜL (ref 0–0.61)
EOSINOPHIL NFR BLD AUTO: 3 % (ref 0–6)
ERYTHROCYTE [DISTWIDTH] IN BLOOD BY AUTOMATED COUNT: 13.5 % (ref 11.6–15.1)
GFR SERPL CREATININE-BSD FRML MDRD: 89 ML/MIN/1.73SQ M
GLUCOSE P FAST SERPL-MCNC: 81 MG/DL (ref 65–99)
HCT VFR BLD AUTO: 44.6 % (ref 36.5–49.3)
HGB BLD-MCNC: 14.1 G/DL (ref 12–17)
LYMPHOCYTES # BLD AUTO: 1.28 THOUSANDS/ΜL (ref 0.6–4.47)
LYMPHOCYTES NFR BLD AUTO: 32 % (ref 14–44)
MCH RBC QN AUTO: 32.1 PG (ref 26.8–34.3)
MCHC RBC AUTO-ENTMCNC: 31.6 G/DL (ref 31.4–37.4)
MCV RBC AUTO: 102 FL (ref 82–98)
MONOCYTES # BLD AUTO: 0.42 THOUSAND/ΜL (ref 0.17–1.22)
MONOCYTES NFR BLD AUTO: 11 % (ref 4–12)
NEUTROPHILS # BLD AUTO: 2.12 THOUSANDS/ΜL (ref 1.85–7.62)
NEUTS SEG NFR BLD AUTO: 53 % (ref 43–75)
NRBC BLD AUTO-RTO: 0 /100 WBCS
PLATELET # BLD AUTO: 191 THOUSANDS/UL (ref 149–390)
PMV BLD AUTO: 10.5 FL (ref 8.9–12.7)
POTASSIUM SERPL-SCNC: 4 MMOL/L (ref 3.5–5.3)
PROT SERPL-MCNC: 7.3 G/DL (ref 6.4–8.2)
RBC # BLD AUTO: 4.39 MILLION/UL (ref 3.88–5.62)
SODIUM SERPL-SCNC: 140 MMOL/L (ref 136–145)
WBC # BLD AUTO: 3.98 THOUSAND/UL (ref 4.31–10.16)

## 2018-05-22 PROCEDURE — 85025 COMPLETE CBC W/AUTO DIFF WBC: CPT

## 2018-05-22 PROCEDURE — 80053 COMPREHEN METABOLIC PANEL: CPT

## 2018-05-22 PROCEDURE — 36415 COLL VENOUS BLD VENIPUNCTURE: CPT

## 2018-05-24 DIAGNOSIS — F41.1 GENERALIZED ANXIETY DISORDER: ICD-10-CM

## 2018-05-24 RX ORDER — LORAZEPAM 0.5 MG/1
TABLET ORAL
Qty: 60 TABLET | Refills: 0 | Status: SHIPPED | OUTPATIENT
Start: 2018-05-24 | End: 2018-06-12 | Stop reason: SDUPTHER

## 2018-05-25 ENCOUNTER — TELEPHONE (OUTPATIENT)
Dept: FAMILY MEDICINE CLINIC | Facility: CLINIC | Age: 58
End: 2018-05-25

## 2018-05-25 DIAGNOSIS — D72.819 LEUKOPENIA, UNSPECIFIED TYPE: ICD-10-CM

## 2018-05-25 DIAGNOSIS — R71.8 ELEVATED MCV: Primary | ICD-10-CM

## 2018-05-25 NOTE — TELEPHONE ENCOUNTER
----- Message from Luisa Decker MD sent at 5/25/2018 12:47 PM EDT -----  Can you please let patient know that his blood work including blood sugar, liver, kidneys was within normal range  His WBC was minimally decreased and would like to repeat this in 1 month  I will print this out  In addition, his platelet count was within normal range

## 2018-05-25 NOTE — PROGRESS NOTES
Can you please let patient know that his blood work including blood sugar, liver, kidneys was within normal range  His WBC was minimally decreased and would like to repeat this in 1 month  I will print this out  In addition, his platelet count was within normal range

## 2018-05-25 NOTE — TELEPHONE ENCOUNTER
----- Message from King Jackson MD sent at 5/25/2018 12:47 PM EDT -----  Can you please let patient know that his blood work including blood sugar, liver, kidneys was within normal range  His WBC was minimally decreased and would like to repeat this in 1 month  I will print this out  In addition, his platelet count was within normal range

## 2018-05-31 ENCOUNTER — TELEPHONE (OUTPATIENT)
Dept: FAMILY MEDICINE CLINIC | Facility: CLINIC | Age: 58
End: 2018-05-31

## 2018-05-31 NOTE — TELEPHONE ENCOUNTER
----- Message from Bela Christine MD sent at 5/31/2018  3:18 PM EDT -----  Would you mind reaching out to the patient 1 more time    Thank you

## 2018-06-01 ENCOUNTER — TELEPHONE (OUTPATIENT)
Dept: FAMILY MEDICINE CLINIC | Facility: CLINIC | Age: 58
End: 2018-06-01

## 2018-06-01 NOTE — TELEPHONE ENCOUNTER
----- Message from Dary Nance MD sent at 5/31/2018  3:18 PM EDT -----  Would you mind reaching out to the patient 1 more time    Thank you

## 2018-06-12 DIAGNOSIS — F41.1 GENERALIZED ANXIETY DISORDER: ICD-10-CM

## 2018-06-12 RX ORDER — LORAZEPAM 0.5 MG/1
TABLET ORAL
Qty: 60 TABLET | Refills: 0 | Status: SHIPPED | OUTPATIENT
Start: 2018-06-12 | End: 2018-08-20 | Stop reason: SDUPTHER

## 2018-06-12 NOTE — TELEPHONE ENCOUNTER
Spoke with Selvin Ag at [facility], verbal order given for this medication to be filled early

## 2018-06-12 NOTE — TELEPHONE ENCOUNTER
I want to authorize this medication for the patient but it cannot be filled until 6/22 as he filled on 05/24  Would you be able to call this in    Do I have to approve it first?

## 2018-06-18 DIAGNOSIS — E03.9 HYPOTHYROIDISM, UNSPECIFIED TYPE: Primary | ICD-10-CM

## 2018-06-18 RX ORDER — LEVOTHYROXINE SODIUM 0.07 MG/1
TABLET ORAL
Qty: 90 TABLET | Refills: 1 | Status: SHIPPED | OUTPATIENT
Start: 2018-06-18 | End: 2018-12-17 | Stop reason: SDUPTHER

## 2018-06-21 ENCOUNTER — LAB (OUTPATIENT)
Dept: LAB | Facility: CLINIC | Age: 58
End: 2018-06-21
Payer: COMMERCIAL

## 2018-06-21 ENCOUNTER — TRANSCRIBE ORDERS (OUTPATIENT)
Dept: LAB | Facility: CLINIC | Age: 58
End: 2018-06-21

## 2018-06-21 DIAGNOSIS — R71.8 ELEVATED MCV: ICD-10-CM

## 2018-06-21 DIAGNOSIS — D72.819 LEUKOPENIA, UNSPECIFIED TYPE: ICD-10-CM

## 2018-06-21 LAB
BASOPHILS # BLD AUTO: 0.02 THOUSANDS/ΜL (ref 0–0.1)
BASOPHILS NFR BLD AUTO: 1 % (ref 0–1)
EOSINOPHIL # BLD AUTO: 0.08 THOUSAND/ΜL (ref 0–0.61)
EOSINOPHIL NFR BLD AUTO: 2 % (ref 0–6)
ERYTHROCYTE [DISTWIDTH] IN BLOOD BY AUTOMATED COUNT: 13.6 % (ref 11.6–15.1)
HCT VFR BLD AUTO: 40.7 % (ref 36.5–49.3)
HGB BLD-MCNC: 13.2 G/DL (ref 12–17)
IMM GRANULOCYTES # BLD AUTO: 0.01 THOUSAND/UL (ref 0–0.2)
IMM GRANULOCYTES NFR BLD AUTO: 0 % (ref 0–2)
LYMPHOCYTES # BLD AUTO: 1.04 THOUSANDS/ΜL (ref 0.6–4.47)
LYMPHOCYTES NFR BLD AUTO: 26 % (ref 14–44)
MCH RBC QN AUTO: 31.9 PG (ref 26.8–34.3)
MCHC RBC AUTO-ENTMCNC: 32.4 G/DL (ref 31.4–37.4)
MCV RBC AUTO: 98 FL (ref 82–98)
MONOCYTES # BLD AUTO: 0.34 THOUSAND/ΜL (ref 0.17–1.22)
MONOCYTES NFR BLD AUTO: 9 % (ref 4–12)
NEUTROPHILS # BLD AUTO: 2.46 THOUSANDS/ΜL (ref 1.85–7.62)
NEUTS SEG NFR BLD AUTO: 62 % (ref 43–75)
NRBC BLD AUTO-RTO: 0 /100 WBCS
PLATELET # BLD AUTO: 152 THOUSANDS/UL (ref 149–390)
PMV BLD AUTO: 11.1 FL (ref 8.9–12.7)
RBC # BLD AUTO: 4.14 MILLION/UL (ref 3.88–5.62)
VIT B12 SERPL-MCNC: 716 PG/ML (ref 100–900)
WBC # BLD AUTO: 3.95 THOUSAND/UL (ref 4.31–10.16)

## 2018-06-21 PROCEDURE — 85025 COMPLETE CBC W/AUTO DIFF WBC: CPT

## 2018-06-21 PROCEDURE — 82607 VITAMIN B-12: CPT

## 2018-06-21 PROCEDURE — 36415 COLL VENOUS BLD VENIPUNCTURE: CPT

## 2018-06-22 NOTE — PROGRESS NOTES
Can you please let patient know that his vitamin B12 level was within normal range  His WBC remains in the lower side  I would like patient to be further evaluated by a specialist regarding his low white count  Can you please provide patient with number for hematology, Cheyenne Galindo?  Thank you

## 2018-06-25 ENCOUNTER — TELEPHONE (OUTPATIENT)
Dept: FAMILY MEDICINE CLINIC | Facility: CLINIC | Age: 58
End: 2018-06-25

## 2018-06-25 NOTE — TELEPHONE ENCOUNTER
----- Message from Josue Terrell MD sent at 6/22/2018 12:46 PM EDT -----  Can you please let patient know that his vitamin B12 level was within normal range  His WBC remains in the lower side  I would like patient to be further evaluated by a specialist regarding his low white count  Can you please provide patient with number for hematology, Tha Garcia?  Thank you

## 2018-07-27 DIAGNOSIS — F32.89 OTHER DEPRESSION: ICD-10-CM

## 2018-07-27 RX ORDER — FLUOXETINE HYDROCHLORIDE 40 MG/1
CAPSULE ORAL
Qty: 90 CAPSULE | Refills: 1 | Status: SHIPPED | OUTPATIENT
Start: 2018-07-27 | End: 2019-01-28 | Stop reason: SDUPTHER

## 2018-08-10 DIAGNOSIS — I10 HYPERTENSION, UNSPECIFIED TYPE: ICD-10-CM

## 2018-08-10 RX ORDER — LISINOPRIL 30 MG/1
TABLET ORAL
Qty: 90 TABLET | Refills: 1 | Status: SHIPPED | OUTPATIENT
Start: 2018-08-10 | End: 2019-04-27 | Stop reason: SDUPTHER

## 2018-08-19 DIAGNOSIS — M10.9 GOUT, UNSPECIFIED CAUSE, UNSPECIFIED CHRONICITY, UNSPECIFIED SITE: Primary | ICD-10-CM

## 2018-08-19 RX ORDER — ALLOPURINOL 300 MG/1
TABLET ORAL
Qty: 90 TABLET | Refills: 3 | Status: SHIPPED | OUTPATIENT
Start: 2018-08-19 | End: 2019-08-15 | Stop reason: SDUPTHER

## 2018-08-20 DIAGNOSIS — F41.1 GENERALIZED ANXIETY DISORDER: ICD-10-CM

## 2018-08-20 RX ORDER — LORAZEPAM 0.5 MG/1
TABLET ORAL
Qty: 60 TABLET | Refills: 0 | Status: SHIPPED | OUTPATIENT
Start: 2018-08-20 | End: 2018-11-02 | Stop reason: SDUPTHER

## 2018-10-01 ENCOUNTER — TRANSCRIBE ORDERS (OUTPATIENT)
Dept: LAB | Facility: CLINIC | Age: 58
End: 2018-10-01

## 2018-10-01 ENCOUNTER — LAB (OUTPATIENT)
Dept: LAB | Facility: CLINIC | Age: 58
End: 2018-10-01
Payer: COMMERCIAL

## 2018-10-01 DIAGNOSIS — I10 HYPERTENSION, UNSPECIFIED TYPE: ICD-10-CM

## 2018-10-01 DIAGNOSIS — Z12.5 SCREENING FOR PROSTATE CANCER: ICD-10-CM

## 2018-10-01 DIAGNOSIS — E03.9 HYPOTHYROIDISM, UNSPECIFIED TYPE: ICD-10-CM

## 2018-10-01 DIAGNOSIS — E78.5 HYPERLIPIDEMIA, UNSPECIFIED HYPERLIPIDEMIA TYPE: ICD-10-CM

## 2018-10-01 DIAGNOSIS — R53.83 OTHER FATIGUE: ICD-10-CM

## 2018-10-01 DIAGNOSIS — R73.01 IMPAIRED FASTING GLUCOSE: ICD-10-CM

## 2018-10-01 LAB
ALBUMIN SERPL BCP-MCNC: 3.7 G/DL (ref 3.5–5)
ALP SERPL-CCNC: 45 U/L (ref 46–116)
ALT SERPL W P-5'-P-CCNC: 29 U/L (ref 12–78)
ANION GAP SERPL CALCULATED.3IONS-SCNC: 5 MMOL/L (ref 4–13)
AST SERPL W P-5'-P-CCNC: 22 U/L (ref 5–45)
BASOPHILS # BLD AUTO: 0.03 THOUSANDS/ΜL (ref 0–0.1)
BASOPHILS NFR BLD AUTO: 1 % (ref 0–1)
BILIRUB SERPL-MCNC: 0.72 MG/DL (ref 0.2–1)
BUN SERPL-MCNC: 18 MG/DL (ref 5–25)
CALCIUM SERPL-MCNC: 8.6 MG/DL (ref 8.3–10.1)
CHLORIDE SERPL-SCNC: 103 MMOL/L (ref 100–108)
CHOLEST SERPL-MCNC: 127 MG/DL (ref 50–200)
CO2 SERPL-SCNC: 27 MMOL/L (ref 21–32)
CREAT SERPL-MCNC: 0.98 MG/DL (ref 0.6–1.3)
EOSINOPHIL # BLD AUTO: 0.16 THOUSAND/ΜL (ref 0–0.61)
EOSINOPHIL NFR BLD AUTO: 3 % (ref 0–6)
ERYTHROCYTE [DISTWIDTH] IN BLOOD BY AUTOMATED COUNT: 13.4 % (ref 11.6–15.1)
EST. AVERAGE GLUCOSE BLD GHB EST-MCNC: 105 MG/DL
GFR SERPL CREATININE-BSD FRML MDRD: 83 ML/MIN/1.73SQ M
GLUCOSE P FAST SERPL-MCNC: 97 MG/DL (ref 65–99)
HBA1C MFR BLD: 5.3 % (ref 4.2–6.3)
HCT VFR BLD AUTO: 46.2 % (ref 36.5–49.3)
HDLC SERPL-MCNC: 54 MG/DL (ref 40–60)
HGB BLD-MCNC: 15.3 G/DL (ref 12–17)
IMM GRANULOCYTES # BLD AUTO: 0.03 THOUSAND/UL (ref 0–0.2)
IMM GRANULOCYTES NFR BLD AUTO: 1 % (ref 0–2)
LDLC SERPL CALC-MCNC: 57 MG/DL (ref 0–100)
LYMPHOCYTES # BLD AUTO: 1.29 THOUSANDS/ΜL (ref 0.6–4.47)
LYMPHOCYTES NFR BLD AUTO: 22 % (ref 14–44)
MCH RBC QN AUTO: 31.9 PG (ref 26.8–34.3)
MCHC RBC AUTO-ENTMCNC: 33.1 G/DL (ref 31.4–37.4)
MCV RBC AUTO: 96 FL (ref 82–98)
MONOCYTES # BLD AUTO: 0.36 THOUSAND/ΜL (ref 0.17–1.22)
MONOCYTES NFR BLD AUTO: 6 % (ref 4–12)
NEUTROPHILS # BLD AUTO: 3.89 THOUSANDS/ΜL (ref 1.85–7.62)
NEUTS SEG NFR BLD AUTO: 67 % (ref 43–75)
NRBC BLD AUTO-RTO: 0 /100 WBCS
PLATELET # BLD AUTO: 166 THOUSANDS/UL (ref 149–390)
PMV BLD AUTO: 10.6 FL (ref 8.9–12.7)
POTASSIUM SERPL-SCNC: 4.1 MMOL/L (ref 3.5–5.3)
PROT SERPL-MCNC: 6.9 G/DL (ref 6.4–8.2)
RBC # BLD AUTO: 4.8 MILLION/UL (ref 3.88–5.62)
SODIUM SERPL-SCNC: 135 MMOL/L (ref 136–145)
TRIGL SERPL-MCNC: 79 MG/DL
TSH SERPL DL<=0.05 MIU/L-ACNC: 1.64 UIU/ML (ref 0.36–3.74)
WBC # BLD AUTO: 5.76 THOUSAND/UL (ref 4.31–10.16)

## 2018-10-01 PROCEDURE — 80061 LIPID PANEL: CPT

## 2018-10-01 PROCEDURE — 85025 COMPLETE CBC W/AUTO DIFF WBC: CPT

## 2018-10-01 PROCEDURE — 80053 COMPREHEN METABOLIC PANEL: CPT

## 2018-10-01 PROCEDURE — 84154 ASSAY OF PSA FREE: CPT

## 2018-10-01 PROCEDURE — 36415 COLL VENOUS BLD VENIPUNCTURE: CPT

## 2018-10-01 PROCEDURE — 84153 ASSAY OF PSA TOTAL: CPT

## 2018-10-01 PROCEDURE — 84443 ASSAY THYROID STIM HORMONE: CPT

## 2018-10-01 PROCEDURE — 83036 HEMOGLOBIN GLYCOSYLATED A1C: CPT

## 2018-10-02 LAB
PSA FREE MFR SERPL: 16.3 %
PSA FREE SERPL-MCNC: 0.13 NG/ML
PSA SERPL-MCNC: 0.8 NG/ML (ref 0–4)

## 2018-10-09 ENCOUNTER — OFFICE VISIT (OUTPATIENT)
Dept: FAMILY MEDICINE CLINIC | Facility: CLINIC | Age: 58
End: 2018-10-09
Payer: COMMERCIAL

## 2018-10-09 VITALS
WEIGHT: 225.8 LBS | BODY MASS INDEX: 34.22 KG/M2 | HEIGHT: 68 IN | RESPIRATION RATE: 16 BRPM | DIASTOLIC BLOOD PRESSURE: 80 MMHG | SYSTOLIC BLOOD PRESSURE: 130 MMHG | HEART RATE: 90 BPM | TEMPERATURE: 98.4 F

## 2018-10-09 DIAGNOSIS — E03.9 HYPOTHYROIDISM, UNSPECIFIED TYPE: ICD-10-CM

## 2018-10-09 DIAGNOSIS — Z23 NEED FOR INFLUENZA VACCINATION: ICD-10-CM

## 2018-10-09 DIAGNOSIS — F41.1 GENERALIZED ANXIETY DISORDER: ICD-10-CM

## 2018-10-09 DIAGNOSIS — Z00.00 ROUTINE HEALTH MAINTENANCE: ICD-10-CM

## 2018-10-09 DIAGNOSIS — K21.9 GASTROESOPHAGEAL REFLUX DISEASE WITHOUT ESOPHAGITIS: ICD-10-CM

## 2018-10-09 DIAGNOSIS — I10 HYPERTENSION, UNSPECIFIED TYPE: Primary | ICD-10-CM

## 2018-10-09 DIAGNOSIS — E78.5 HYPERLIPIDEMIA, UNSPECIFIED HYPERLIPIDEMIA TYPE: ICD-10-CM

## 2018-10-09 PROCEDURE — 99214 OFFICE O/P EST MOD 30 MIN: CPT | Performed by: FAMILY MEDICINE

## 2018-10-09 PROCEDURE — 1036F TOBACCO NON-USER: CPT | Performed by: FAMILY MEDICINE

## 2018-10-09 PROCEDURE — 90682 RIV4 VACC RECOMBINANT DNA IM: CPT

## 2018-10-09 PROCEDURE — 90471 IMMUNIZATION ADMIN: CPT

## 2018-10-09 NOTE — PROGRESS NOTES
FAMILY PRACTICE OFFICE VISIT       NAME: Marta Hernandez  AGE: 59 y o  SEX: male       : 1960        MRN: [id number]    DATE: 10/11/2018  TIME: 5:26 PM    Assessment and Plan     Problem List Items Addressed This Visit     Hypertension - Primary    Relevant Orders    Comprehensive metabolic panel    Hyperlipidemia    Relevant Orders    Comprehensive metabolic panel    Hypothyroidism    Relevant Orders    TSH, 3rd generation with Free T4 reflex    Routine health maintenance    Gastroesophageal reflux disease without esophagitis    Generalized anxiety disorder      Other Visit Diagnoses     Need for influenza vaccination        Relevant Orders    influenza vaccine, [id number], quadrivalent, recombinant, PF, 0 5 mL, for patients 18 yr+ (FLUBLOK) (Completed)         Hypertension:   BP overall stable and controlled  Continue lisinopril and lifestyle modifications  Hyperlipidemia:    Lipid panel overall stable  Continue with atorvastatin, fenofibrate and lifestyle modifications  Hypothyroidism:    Stable  Continue levothyroxine 75 mcg daily  GERD:    Takes omeprazole only as needed  Continue to avoid triggers that may cause reflux symptoms  Generalized anxiety disorder:    Mood is overall stable  Patient takes fluoxetine and lorazepam as needed  He does exercise overall routinely  Routine health maintenance: Will call to schedule screening colonoscopy  Flu vaccine administered today  There are no Patient Instructions on file for this visit  Chief Complaint     Chief Complaint   Patient presents with    Follow-up     Patient is here for a follow up visit  History of Present Illness     HPI     49-year-old male with a history of hypertension, hyperlipidemia, hypothyroidism, GERD, anxiety, history of pancreatitis here for routine follow-up of chronic conditions and discuss recent blood work results  Patient states he is doing well overall    He does have an upcoming appointment gastroenterologistChico Liat  Takes omeprazole as needed     No recent alcohol use  Patient does have an occasional drink however this is not regular  Review of Systems   Review of Systems   Constitutional: Negative for unexpected weight change  HENT: Negative  Eyes: Negative for visual disturbance  Respiratory: Negative for shortness of breath  Cardiovascular: Negative  Gastrointestinal: Negative for abdominal pain and constipation  Genitourinary: Negative for dysuria  Psychiatric/Behavioral: Negative for dysphoric mood  Active Problem List     Patient Active Problem List   Diagnosis    Hypertension    Hyperlipidemia    Impaired fasting glucose    Hypothyroidism    Habitual alcohol use    Routine health maintenance    Gastroesophageal reflux disease without esophagitis    Transition of care performed with sharing of clinical summary    Alcohol-induced acute pancreatitis    Leukopenia    Platelets decreased ([address] )    Generalized anxiety disorder       Past Medical History:  Past Medical History:   Diagnosis Date    Medical history unknown        Past Surgical History:  Past Surgical History:   Procedure Laterality Date    REPAIR / REINSERT BICEPS TENDON AT ELBOW      Last Assessed: 1/12/2016        Family History:  Family History   Problem Relation Age of Onset    Diabetes Mother        Social History:  Social History     Social History    Marital status: Single     Spouse name: N/A    Number of children: N/A    Years of education: N/A     Occupational History    Not on file       Social History Main Topics    Smoking status: Never Smoker    Smokeless tobacco: Never Used    Alcohol use Yes      Comment: social    Drug use: Yes     Types: Marijuana    Sexual activity: Not on file     Other Topics Concern    Not on file     Social History Narrative    No narrative on file     I have reviewed the patient's medical history in detail; there are no changes to the history as noted in the electronic medical record  Objective     Vitals:    10/09/18 1004   BP: 130/80   Pulse: 90   Resp: 16   Temp: 98 4 °F (36 9 °C)     Wt Readings from Last 3 Encounters:   10/09/18 102 kg (225 lb 12 8 oz)   05/09/18 101 kg (221 lb 12 8 oz)   03/27/18 99 1 kg (218 lb 6 4 oz)         Physical Exam   Constitutional: He is oriented to person, place, and time  He appears well-developed and well-nourished  HENT:   Head: Normocephalic and atraumatic  Mouth/Throat: Oropharynx is clear and moist      TMs intact and clear   Eyes: Pupils are equal, round, and reactive to light  Conjunctivae and EOM are normal    Neck: Normal range of motion  Neck supple  No thyromegaly present  Cardiovascular: Normal rate, regular rhythm and normal heart sounds  No carotid bruits auscultated   Pulmonary/Chest: Effort normal and breath sounds normal    Abdominal: Soft  Bowel sounds are normal  He exhibits no distension  There is no tenderness  Musculoskeletal: Normal range of motion  He exhibits no edema  Lymphadenopathy:     He has no cervical adenopathy  Neurological: He is alert and oriented to person, place, and time  Psychiatric: He has a normal mood and affect  Nursing note and vitals reviewed        Pertinent Laboratory/Diagnostic Studies:  Lab Results   Component Value Date    BUN 18 10/01/2018    CREATININE 0 98 10/01/2018    CALCIUM 8 6 10/01/2018     (L) 10/01/2018    K 4 1 10/01/2018    CO2 27 10/01/2018     10/01/2018     Lab Results   Component Value Date    ALT 29 10/01/2018    AST 22 10/01/2018    ALKPHOS 45 (L) 10/01/2018    BILITOT 0 5 11/09/2016       Lab Results   Component Value Date    WBC 5 76 10/01/2018    HGB 15 3 10/01/2018    HCT 46 2 10/01/2018    MCV 96 10/01/2018     10/01/2018       No results found for: TSH    Lab Results   Component Value Date    CHOL 154 11/09/2016     Lab Results   Component Value Date    TRIG 79 10/01/2018     Lab Results   Component Value Date    HDL 54 10/01/2018     Lab Results   Component Value Date    LDLCALC 57 10/01/2018     Lab Results   Component Value Date    HGBA1C 5 3 10/01/2018       Results for orders placed or performed in visit on 10/01/18   CBC and differential   Result Value Ref Range    WBC 5 76 4 31 - 10 16 Thousand/uL    RBC 4 80 3 88 - 5 62 Million/uL    Hemoglobin 15 3 12 0 - 17 0 g/dL    Hematocrit 46 2 36 5 - 49 3 %    MCV 96 82 - 98 fL    MCH 31 9 26 8 - 34 3 pg    MCHC 33 1 31 4 - 37 4 g/dL    RDW 13 4 11 6 - 15 1 %    MPV 10 6 8 9 - 12 7 fL    Platelets 524 981 - 767 Thousands/uL    nRBC 0 /100 WBCs    Neutrophils Relative 67 43 - 75 %    Immat GRANS % 1 0 - 2 %    Lymphocytes Relative 22 14 - 44 %    Monocytes Relative 6 4 - 12 %    Eosinophils Relative 3 0 - 6 %    Basophils Relative 1 0 - 1 %    Neutrophils Absolute 3 89 1 85 - 7 62 Thousands/µL    Immature Grans Absolute 0 03 0 00 - 0 20 Thousand/uL    Lymphocytes Absolute 1 29 0 60 - 4 47 Thousands/µL    Monocytes Absolute 0 36 0 17 - 1 22 Thousand/µL    Eosinophils Absolute 0 16 0 00 - 0 61 Thousand/µL    Basophils Absolute 0 03 0 00 - 0 10 Thousands/µL   Comprehensive metabolic panel   Result Value Ref Range    Sodium 135 (L) 136 - 145 mmol/L    Potassium 4 1 3 5 - 5 3 mmol/L    Chloride 103 100 - 108 mmol/L    CO2 27 21 - 32 mmol/L    ANION GAP 5 4 - 13 mmol/L    BUN 18 5 - 25 mg/dL    Creatinine 0 98 0 60 - 1 30 mg/dL    Glucose, Fasting 97 65 - 99 mg/dL    Calcium 8 6 8 3 - 10 1 mg/dL    AST 22 5 - 45 U/L    ALT 29 12 - 78 U/L    Alkaline Phosphatase 45 (L) 46 - 116 U/L    Total Protein 6 9 6 4 - 8 2 g/dL    Albumin 3 7 3 5 - 5 0 g/dL    Total Bilirubin 0 72 0 20 - 1 00 mg/dL    eGFR 83 ml/min/1 73sq m   Lipid Panel with Direct LDL reflex   Result Value Ref Range    Cholesterol 127 50 - 200 mg/dL    Triglycerides 79 <=150 mg/dL    HDL, Direct 54 40 - 60 mg/dL    LDL Calculated 57 0 - 100 mg/dL   TSH, 3rd generation with T4 reflex   Result Value Ref Range    TSH 3RD GENERATON 1 640 0 358 - 3 740 uIU/mL   HEMOGLOBIN A1C W/ EAG ESTIMATION   Result Value Ref Range    Hemoglobin A1C 5 3 4 2 - 6 3 %     mg/dl   PSA, total and free   Result Value Ref Range    Prostate Specific Antigen Total 0 8 0 0 - 4 0 ng/mL    PSA, Free 0 13 N/A ng/mL    PSA, Free Pct 16 3 %       Orders Placed This Encounter   Procedures    influenza vaccine, [id number], quadrivalent, recombinant, PF, 0 5 mL, for patients 18 yr+ (FLUBLOK)    Comprehensive metabolic panel    TSH, 3rd generation with Free T4 reflex       ALLERGIES:  Allergies   Allergen Reactions    Amoxicillin Swelling and Fever     Other reaction(s): vertigo    Escitalopram      Other reaction(s): sweaty palms/hands, felt faint, passed out for a short time, had anxiety/panic attack    Gemfibrozil      Other reaction(s): Nausea and/or vomiting  Other reaction(s): Nausea and/or vomiting    Milk-Related Compounds Other (See Comments)     abd cramping  Other reaction(s): Other (See Comments)  abd cramping    Other      Other reaction(s): Other (See Comments)  rhinitis, itchy eyes       Current Medications     Current Outpatient Prescriptions   Medication Sig Dispense Refill    albuterol (PROVENTIL HFA,VENTOLIN HFA) 90 mcg/act inhaler Inhale 1-2 puffs      allopurinol (ZYLOPRIM) 300 mg tablet TAKE 1 TABLET EVERY DAY 90 tablet 3    cetirizine (ZyrTEC) 10 mg tablet Take one tablet by mouth daily as needed for allergies/congestion       fenofibrate (TRIGLIDE) 160 MG tablet TAKE 1 TABLET DAILY   90 tablet 3    FLUoxetine (PROzac) 40 MG capsule TAKE ONE CAPSULE BY MOUTH EVERY DAY 90 capsule 1    levothyroxine 75 mcg tablet TAKE 1 TABLET IN THE MORNING ON AN EMPTY STOMACH FOR THYROID 90 tablet 1    lisinopril (ZESTRIL) 30 mg tablet TAKE 1 TABLET BY MOUTH EVERY DAY 90 tablet 1    LORazepam (ATIVAN) 0 5 mg tablet TAKE 1 TAB BY MOUTH TWICE A DAY 60 tablet 0    Multiple Vitamin (DAILY VALUE MULTIVITAMIN) TABS Take 1 tablet by mouth daily      omeprazole (PriLOSEC) 20 mg delayed release capsule TAKE 1 CAPSULE DAILY FOR ACID REFLUX 30 capsule 2    atorvastatin (LIPITOR) 10 mg tablet Take 1 tablet (10 mg total) by mouth daily for 30 days 30 tablet 5     No current facility-administered medications for this visit            Health Maintenance     Health Maintenance   Topic Date Due    CRC Screening: Colonoscopy  1960    DTaP,Tdap,and Td Vaccines (2 - Td) 07/27/2016    INFLUENZA VACCINE  Completed     Immunization History   Administered Date(s) Administered    Influenza 01/06/2010, 10/06/2011, 09/11/2012, 11/27/2013, 10/27/2015    Influenza Quadrivalent Preservative Free 3 years and older IM 11/15/2016, 09/22/2017    Influenza, recombinant, quadrivalent,injectable, preservative free 10/09/2018    Tdap 07/27/2006       Jose Antonio Ham MD

## 2018-11-02 DIAGNOSIS — F41.1 GENERALIZED ANXIETY DISORDER: ICD-10-CM

## 2018-11-02 RX ORDER — LORAZEPAM 0.5 MG/1
TABLET ORAL
Qty: 60 TABLET | Refills: 0 | Status: SHIPPED | OUTPATIENT
Start: 2018-11-02 | End: 2018-12-07 | Stop reason: SDUPTHER

## 2018-12-07 DIAGNOSIS — F41.1 GENERALIZED ANXIETY DISORDER: ICD-10-CM

## 2018-12-07 RX ORDER — LORAZEPAM 0.5 MG/1
TABLET ORAL
Qty: 60 TABLET | Refills: 0 | Status: SHIPPED | OUTPATIENT
Start: 2018-12-07 | End: 2019-01-17 | Stop reason: SDUPTHER

## 2018-12-17 DIAGNOSIS — E03.9 HYPOTHYROIDISM, UNSPECIFIED TYPE: ICD-10-CM

## 2018-12-17 RX ORDER — LEVOTHYROXINE SODIUM 0.07 MG/1
TABLET ORAL
Qty: 90 TABLET | Refills: 1 | Status: SHIPPED | OUTPATIENT
Start: 2018-12-17 | End: 2019-06-08 | Stop reason: SDUPTHER

## 2019-01-17 DIAGNOSIS — F41.1 GENERALIZED ANXIETY DISORDER: ICD-10-CM

## 2019-01-18 RX ORDER — LORAZEPAM 0.5 MG/1
TABLET ORAL
Qty: 60 TABLET | Refills: 0 | Status: SHIPPED | OUTPATIENT
Start: 2019-01-18 | End: 2019-02-27 | Stop reason: SDUPTHER

## 2019-01-28 DIAGNOSIS — E78.5 HYPERLIPIDEMIA, UNSPECIFIED HYPERLIPIDEMIA TYPE: ICD-10-CM

## 2019-01-28 DIAGNOSIS — F32.89 OTHER DEPRESSION: ICD-10-CM

## 2019-01-28 RX ORDER — FENOFIBRATE 160 MG/1
TABLET ORAL
Qty: 90 TABLET | Refills: 3 | Status: SHIPPED | OUTPATIENT
Start: 2019-01-28

## 2019-01-28 RX ORDER — FLUOXETINE HYDROCHLORIDE 40 MG/1
CAPSULE ORAL
Qty: 90 CAPSULE | Refills: 1 | Status: SHIPPED | OUTPATIENT
Start: 2019-01-28 | End: 2019-07-22 | Stop reason: SDUPTHER

## 2019-02-27 DIAGNOSIS — F41.1 GENERALIZED ANXIETY DISORDER: ICD-10-CM

## 2019-02-27 RX ORDER — LORAZEPAM 0.5 MG/1
TABLET ORAL
Qty: 60 TABLET | Refills: 0 | Status: SHIPPED | OUTPATIENT
Start: 2019-02-27 | End: 2019-03-17 | Stop reason: SDUPTHER

## 2019-03-01 DIAGNOSIS — F41.1 GENERALIZED ANXIETY DISORDER: ICD-10-CM

## 2019-03-04 RX ORDER — LORAZEPAM 0.5 MG/1
TABLET ORAL
Qty: 60 TABLET | Refills: 0 | OUTPATIENT
Start: 2019-03-04

## 2019-03-04 NOTE — TELEPHONE ENCOUNTER
I received a refill request for patient's lorazepam however I did fill it on 02/27  Can you please notify pharmacy    Thank you

## 2019-03-17 DIAGNOSIS — F41.1 GENERALIZED ANXIETY DISORDER: ICD-10-CM

## 2019-03-25 RX ORDER — LORAZEPAM 0.5 MG/1
TABLET ORAL
Qty: 60 TABLET | Refills: 0 | Status: SHIPPED | OUTPATIENT
Start: 2019-03-25 | End: 2019-05-13 | Stop reason: SDUPTHER

## 2019-04-27 DIAGNOSIS — I10 HYPERTENSION, UNSPECIFIED TYPE: ICD-10-CM

## 2019-04-27 DIAGNOSIS — E78.5 HYPERLIPIDEMIA, UNSPECIFIED HYPERLIPIDEMIA TYPE: ICD-10-CM

## 2019-04-28 RX ORDER — ATORVASTATIN CALCIUM 20 MG/1
TABLET, FILM COATED ORAL
Qty: 90 TABLET | Refills: 3 | Status: SHIPPED | OUTPATIENT
Start: 2019-04-28

## 2019-04-28 RX ORDER — LISINOPRIL 30 MG/1
TABLET ORAL
Qty: 90 TABLET | Refills: 1 | Status: SHIPPED | OUTPATIENT
Start: 2019-04-28 | End: 2019-10-26 | Stop reason: SDUPTHER

## 2019-05-13 DIAGNOSIS — F41.1 GENERALIZED ANXIETY DISORDER: ICD-10-CM

## 2019-05-13 RX ORDER — LORAZEPAM 0.5 MG/1
TABLET ORAL
Qty: 60 TABLET | Refills: 0 | Status: SHIPPED | OUTPATIENT
Start: 2019-05-13 | End: 2019-06-20 | Stop reason: SDUPTHER

## 2019-05-22 ENCOUNTER — TRANSITIONAL CARE MANAGEMENT (OUTPATIENT)
Dept: FAMILY MEDICINE CLINIC | Facility: CLINIC | Age: 59
End: 2019-05-22

## 2019-06-08 DIAGNOSIS — E03.9 HYPOTHYROIDISM, UNSPECIFIED TYPE: ICD-10-CM

## 2019-06-09 RX ORDER — LEVOTHYROXINE SODIUM 0.07 MG/1
TABLET ORAL
Qty: 90 TABLET | Refills: 1 | Status: SHIPPED | OUTPATIENT
Start: 2019-06-09

## 2019-06-20 DIAGNOSIS — F41.1 GENERALIZED ANXIETY DISORDER: ICD-10-CM

## 2019-06-20 RX ORDER — LORAZEPAM 0.5 MG/1
TABLET ORAL
Qty: 60 TABLET | Refills: 0 | Status: SHIPPED | OUTPATIENT
Start: 2019-06-20 | End: 2019-07-24 | Stop reason: SDUPTHER

## 2019-07-22 DIAGNOSIS — F32.89 OTHER DEPRESSION: ICD-10-CM

## 2019-07-22 RX ORDER — FLUOXETINE HYDROCHLORIDE 40 MG/1
CAPSULE ORAL
Qty: 90 CAPSULE | Refills: 1 | Status: SHIPPED | OUTPATIENT
Start: 2019-07-22

## 2019-07-24 DIAGNOSIS — F41.1 GENERALIZED ANXIETY DISORDER: ICD-10-CM

## 2019-07-24 RX ORDER — LORAZEPAM 0.5 MG/1
TABLET ORAL
Qty: 60 TABLET | Refills: 0 | Status: SHIPPED | OUTPATIENT
Start: 2019-07-24 | End: 2019-08-31 | Stop reason: SDUPTHER

## 2019-07-27 NOTE — MISCELLANEOUS
Message   Recorded as Task   Date: 11/10/2016 12:20 PM, Created By: Marilyn Rodriguez   Task Name: Call Patient with results   Assigned To: Florentino Montana   Regarding Patient: Iban Jernigan, Status: Active   CommentMaggy [facility] - 10 Nov 2016 12:20 PM     Patient Phone: [phone]      please have dr Vern Spears f/u with results   thanks   Tosha Nunes - 10 Nov 2016 1:21 PM     TASK REASSIGNED: Previously Assigned To Jessica Montiel - 10 Nov 2016 1:22 PM     TASK EDITED                 Pt has appt scheduled for 11/15/16 with Dr Delilah Love   Electronically signed by : Markel Caruso MD; Nov 10 2016  2:05PM EST                       (Author) negative - no fever

## 2019-08-15 DIAGNOSIS — M10.9 GOUT, UNSPECIFIED CAUSE, UNSPECIFIED CHRONICITY, UNSPECIFIED SITE: ICD-10-CM

## 2019-08-15 RX ORDER — ALLOPURINOL 300 MG/1
TABLET ORAL
Qty: 90 TABLET | Refills: 3 | Status: SHIPPED | OUTPATIENT
Start: 2019-08-15

## 2019-08-31 DIAGNOSIS — F41.1 GENERALIZED ANXIETY DISORDER: ICD-10-CM

## 2019-09-02 RX ORDER — LORAZEPAM 0.5 MG/1
TABLET ORAL
Qty: 60 TABLET | Refills: 0 | Status: SHIPPED | OUTPATIENT
Start: 2019-09-02 | End: 2019-10-03 | Stop reason: SDUPTHER

## 2019-10-03 DIAGNOSIS — F41.1 GENERALIZED ANXIETY DISORDER: ICD-10-CM

## 2019-10-03 RX ORDER — LORAZEPAM 0.5 MG/1
TABLET ORAL
Qty: 60 TABLET | Refills: 0 | Status: SHIPPED | OUTPATIENT
Start: 2019-10-03 | End: 2019-11-06 | Stop reason: SDUPTHER

## 2019-10-26 DIAGNOSIS — I10 HYPERTENSION, UNSPECIFIED TYPE: ICD-10-CM

## 2019-10-26 RX ORDER — LISINOPRIL 30 MG/1
TABLET ORAL
Qty: 90 TABLET | Refills: 1 | Status: SHIPPED | OUTPATIENT
Start: 2019-10-26

## 2019-11-06 DIAGNOSIS — F41.1 GENERALIZED ANXIETY DISORDER: ICD-10-CM

## 2019-11-06 RX ORDER — LORAZEPAM 0.5 MG/1
TABLET ORAL
Qty: 60 TABLET | Refills: 0 | Status: SHIPPED | OUTPATIENT
Start: 2019-11-06

## 2019-12-10 DIAGNOSIS — E03.9 HYPOTHYROIDISM, UNSPECIFIED TYPE: ICD-10-CM

## 2019-12-10 RX ORDER — LEVOTHYROXINE SODIUM 0.07 MG/1
TABLET ORAL
Qty: 90 TABLET | Refills: 1 | OUTPATIENT
Start: 2019-12-10

## 2019-12-13 DIAGNOSIS — F41.1 GENERALIZED ANXIETY DISORDER: ICD-10-CM

## 2019-12-14 RX ORDER — LORAZEPAM 0.5 MG/1
TABLET ORAL
Qty: 60 TABLET | Refills: 0 | OUTPATIENT
Start: 2019-12-14

## 2019-12-25 DIAGNOSIS — E03.9 HYPOTHYROIDISM, UNSPECIFIED TYPE: ICD-10-CM

## 2019-12-26 DIAGNOSIS — E03.9 HYPOTHYROIDISM, UNSPECIFIED TYPE: ICD-10-CM

## 2019-12-26 RX ORDER — LEVOTHYROXINE SODIUM 0.07 MG/1
TABLET ORAL
Qty: 90 TABLET | Refills: 1 | OUTPATIENT
Start: 2019-12-26

## 2019-12-30 DIAGNOSIS — E03.9 HYPOTHYROIDISM, UNSPECIFIED TYPE: ICD-10-CM

## 2019-12-30 RX ORDER — LEVOTHYROXINE SODIUM 0.07 MG/1
TABLET ORAL
Qty: 90 TABLET | Refills: 1 | OUTPATIENT
Start: 2019-12-30

## 2019-12-31 DIAGNOSIS — E03.9 HYPOTHYROIDISM, UNSPECIFIED TYPE: ICD-10-CM

## 2019-12-31 RX ORDER — LEVOTHYROXINE SODIUM 0.07 MG/1
TABLET ORAL
Qty: 90 TABLET | Refills: 0 | OUTPATIENT
Start: 2019-12-31

## 2019-12-31 NOTE — TELEPHONE ENCOUNTER
Can you please notify pharmacy that the patient is not part our care, he sees Dr Amy Garsia at [facility]    Thank you

## 2020-01-21 DIAGNOSIS — F32.89 OTHER DEPRESSION: ICD-10-CM

## 2020-01-21 RX ORDER — FLUOXETINE HYDROCHLORIDE 40 MG/1
CAPSULE ORAL
Qty: 90 CAPSULE | Refills: 0 | OUTPATIENT
Start: 2020-01-21

## 2020-05-01 DIAGNOSIS — F41.1 GENERALIZED ANXIETY DISORDER: ICD-10-CM

## 2020-05-01 RX ORDER — LORAZEPAM 0.5 MG/1
TABLET ORAL
Qty: 60 TABLET | Refills: 0 | OUTPATIENT
Start: 2020-05-01

## 2020-08-13 DIAGNOSIS — M10.9 GOUT, UNSPECIFIED CAUSE, UNSPECIFIED CHRONICITY, UNSPECIFIED SITE: ICD-10-CM

## 2020-08-13 RX ORDER — ALLOPURINOL 300 MG/1
TABLET ORAL
Qty: 90 TABLET | Refills: 3 | OUTPATIENT
Start: 2020-08-13

## 2020-09-10 DIAGNOSIS — M10.9 GOUT, UNSPECIFIED CAUSE, UNSPECIFIED CHRONICITY, UNSPECIFIED SITE: ICD-10-CM

## 2020-09-10 RX ORDER — ALLOPURINOL 300 MG/1
TABLET ORAL
Qty: 90 TABLET | Refills: 3 | OUTPATIENT
Start: 2020-09-10

## 2023-07-09 ENCOUNTER — HOSPITAL ENCOUNTER (INPATIENT)
Facility: HOSPITAL | Age: 63
LOS: 4 days | Discharge: HOME | DRG: 368 | End: 2023-07-13
Attending: EMERGENCY MEDICINE | Admitting: INTERNAL MEDICINE
Payer: MEDICARE

## 2023-07-09 ENCOUNTER — APPOINTMENT (INPATIENT)
Dept: RADIOLOGY | Facility: HOSPITAL | Age: 63
DRG: 368 | End: 2023-07-09
Payer: MEDICARE

## 2023-07-09 DIAGNOSIS — K21.9 GASTROESOPHAGEAL REFLUX DISEASE WITHOUT ESOPHAGITIS: ICD-10-CM

## 2023-07-09 DIAGNOSIS — K92.2 UPPER GI BLEED: Primary | ICD-10-CM

## 2023-07-09 DIAGNOSIS — D62 ACUTE BLOOD LOSS ANEMIA: ICD-10-CM

## 2023-07-09 DIAGNOSIS — E83.42 HYPOMAGNESEMIA: ICD-10-CM

## 2023-07-09 PROBLEM — R74.01 TRANSAMINITIS: Status: ACTIVE | Noted: 2023-07-09

## 2023-07-09 PROBLEM — F10.10 ALCOHOL ABUSE: Status: ACTIVE | Noted: 2023-07-09

## 2023-07-09 PROBLEM — R65.10 SIRS (SYSTEMIC INFLAMMATORY RESPONSE SYNDROME) (HCC): Status: ACTIVE | Noted: 2023-07-09

## 2023-07-09 LAB
ABO GROUP BLD: NORMAL
ABO GROUP BLD: NORMAL
ALBUMIN SERPL BCP-MCNC: 3.1 G/DL (ref 3.5–5)
ALP SERPL-CCNC: 92 U/L (ref 46–116)
ALT SERPL W P-5'-P-CCNC: 99 U/L (ref 12–78)
ANION GAP SERPL CALCULATED.3IONS-SCNC: 8 MMOL/L
APTT PPP: 24 SECONDS (ref 23–37)
AST SERPL W P-5'-P-CCNC: 137 U/L (ref 5–45)
ATRIAL RATE: 110 BPM
BASOPHILS # BLD AUTO: 0.1 THOUSANDS/ÂΜL (ref 0–0.1)
BASOPHILS NFR BLD AUTO: 1 % (ref 0–1)
BILIRUB SERPL-MCNC: 0.77 MG/DL (ref 0.2–1)
BLD GP AB SCN SERPL QL: NEGATIVE
BUN SERPL-MCNC: 18 MG/DL (ref 5–25)
CALCIUM ALBUM COR SERPL-MCNC: 9.6 MG/DL (ref 8.3–10.1)
CALCIUM SERPL-MCNC: 8.9 MG/DL (ref 8.3–10.1)
CHLORIDE SERPL-SCNC: 107 MMOL/L (ref 96–108)
CO2 SERPL-SCNC: 25 MMOL/L (ref 21–32)
CREAT SERPL-MCNC: 0.83 MG/DL (ref 0.6–1.3)
EOSINOPHIL # BLD AUTO: 0.06 THOUSAND/ÂΜL (ref 0–0.61)
EOSINOPHIL NFR BLD AUTO: 1 % (ref 0–6)
ERYTHROCYTE [DISTWIDTH] IN BLOOD BY AUTOMATED COUNT: 13.4 % (ref 11.6–15.1)
GFR SERPL CREATININE-BSD FRML MDRD: 92 ML/MIN/1.73SQ M
GLUCOSE SERPL-MCNC: 205 MG/DL (ref 65–140)
HCT VFR BLD AUTO: 28.5 % (ref 36.5–49.3)
HCT VFR BLD AUTO: 28.9 % (ref 36.5–49.3)
HCT VFR BLD AUTO: 29.7 % (ref 36.5–49.3)
HCT VFR BLD AUTO: 38.2 % (ref 36.5–49.3)
HGB BLD-MCNC: 12.9 G/DL (ref 12–17)
HGB BLD-MCNC: 8.9 G/DL (ref 12–17)
HGB BLD-MCNC: 9.3 G/DL (ref 12–17)
HGB BLD-MCNC: 9.8 G/DL (ref 12–17)
IMM GRANULOCYTES # BLD AUTO: 0.08 THOUSAND/UL (ref 0–0.2)
IMM GRANULOCYTES NFR BLD AUTO: 1 % (ref 0–2)
INR PPP: 1.1 (ref 0.84–1.19)
LYMPHOCYTES # BLD AUTO: 1.32 THOUSANDS/ÂΜL (ref 0.6–4.47)
LYMPHOCYTES NFR BLD AUTO: 15 % (ref 14–44)
MCH RBC QN AUTO: 34.1 PG (ref 26.8–34.3)
MCHC RBC AUTO-ENTMCNC: 33.8 G/DL (ref 31.4–37.4)
MCV RBC AUTO: 101 FL (ref 82–98)
MONOCYTES # BLD AUTO: 0.65 THOUSAND/ÂΜL (ref 0.17–1.22)
MONOCYTES NFR BLD AUTO: 7 % (ref 4–12)
NEUTROPHILS # BLD AUTO: 6.92 THOUSANDS/ÂΜL (ref 1.85–7.62)
NEUTS SEG NFR BLD AUTO: 75 % (ref 43–75)
NRBC BLD AUTO-RTO: 0 /100 WBCS
P AXIS: 36 DEGREES
PLATELET # BLD AUTO: 207 THOUSANDS/UL (ref 149–390)
PMV BLD AUTO: 10.1 FL (ref 8.9–12.7)
POTASSIUM SERPL-SCNC: 4.8 MMOL/L (ref 3.5–5.3)
PR INTERVAL: 162 MS
PROT SERPL-MCNC: 6.6 G/DL (ref 6.4–8.4)
PROTHROMBIN TIME: 14.4 SECONDS (ref 11.6–14.5)
QRS AXIS: 76 DEGREES
QRSD INTERVAL: 76 MS
QT INTERVAL: 316 MS
QTC INTERVAL: 427 MS
RBC # BLD AUTO: 3.78 MILLION/UL (ref 3.88–5.62)
RH BLD: POSITIVE
RH BLD: POSITIVE
SODIUM SERPL-SCNC: 140 MMOL/L (ref 135–147)
SPECIMEN EXPIRATION DATE: NORMAL
T WAVE AXIS: 40 DEGREES
VENTRICULAR RATE: 110 BPM
WBC # BLD AUTO: 9.13 THOUSAND/UL (ref 4.31–10.16)

## 2023-07-09 PROCEDURE — 93005 ELECTROCARDIOGRAM TRACING: CPT

## 2023-07-09 PROCEDURE — 85014 HEMATOCRIT: CPT | Performed by: INTERNAL MEDICINE

## 2023-07-09 PROCEDURE — G1004 CDSM NDSC: HCPCS

## 2023-07-09 PROCEDURE — 99223 1ST HOSP IP/OBS HIGH 75: CPT | Performed by: INTERNAL MEDICINE

## 2023-07-09 PROCEDURE — 74177 CT ABD & PELVIS W/CONTRAST: CPT

## 2023-07-09 PROCEDURE — C9113 INJ PANTOPRAZOLE SODIUM, VIA: HCPCS | Performed by: PHYSICIAN ASSISTANT

## 2023-07-09 PROCEDURE — 93010 ELECTROCARDIOGRAM REPORT: CPT | Performed by: INTERNAL MEDICINE

## 2023-07-09 PROCEDURE — 85610 PROTHROMBIN TIME: CPT | Performed by: PHYSICIAN ASSISTANT

## 2023-07-09 PROCEDURE — 80074 ACUTE HEPATITIS PANEL: CPT | Performed by: INTERNAL MEDICINE

## 2023-07-09 PROCEDURE — 85730 THROMBOPLASTIN TIME PARTIAL: CPT | Performed by: PHYSICIAN ASSISTANT

## 2023-07-09 PROCEDURE — 96367 TX/PROPH/DG ADDL SEQ IV INF: CPT

## 2023-07-09 PROCEDURE — 85018 HEMOGLOBIN: CPT | Performed by: INTERNAL MEDICINE

## 2023-07-09 PROCEDURE — 86850 RBC ANTIBODY SCREEN: CPT | Performed by: EMERGENCY MEDICINE

## 2023-07-09 PROCEDURE — 36415 COLL VENOUS BLD VENIPUNCTURE: CPT | Performed by: PHYSICIAN ASSISTANT

## 2023-07-09 PROCEDURE — 86900 BLOOD TYPING SEROLOGIC ABO: CPT | Performed by: EMERGENCY MEDICINE

## 2023-07-09 PROCEDURE — 96365 THER/PROPH/DIAG IV INF INIT: CPT

## 2023-07-09 PROCEDURE — 96376 TX/PRO/DX INJ SAME DRUG ADON: CPT

## 2023-07-09 PROCEDURE — 99222 1ST HOSP IP/OBS MODERATE 55: CPT | Performed by: INTERNAL MEDICINE

## 2023-07-09 PROCEDURE — 99285 EMERGENCY DEPT VISIT HI MDM: CPT | Performed by: EMERGENCY MEDICINE

## 2023-07-09 PROCEDURE — 86901 BLOOD TYPING SEROLOGIC RH(D): CPT | Performed by: EMERGENCY MEDICINE

## 2023-07-09 PROCEDURE — 85025 COMPLETE CBC W/AUTO DIFF WBC: CPT | Performed by: PHYSICIAN ASSISTANT

## 2023-07-09 PROCEDURE — 99285 EMERGENCY DEPT VISIT HI MDM: CPT

## 2023-07-09 PROCEDURE — 80053 COMPREHEN METABOLIC PANEL: CPT | Performed by: PHYSICIAN ASSISTANT

## 2023-07-09 RX ORDER — ONDANSETRON 2 MG/ML
4 INJECTION INTRAMUSCULAR; INTRAVENOUS EVERY 4 HOURS PRN
Status: DISCONTINUED | OUTPATIENT
Start: 2023-07-09 | End: 2023-07-13 | Stop reason: HOSPADM

## 2023-07-09 RX ORDER — FENOFIBRATE 145 MG/1
145 TABLET, COATED ORAL DAILY
Status: DISCONTINUED | OUTPATIENT
Start: 2023-07-10 | End: 2023-07-13 | Stop reason: HOSPADM

## 2023-07-09 RX ORDER — PANTOPRAZOLE SODIUM 40 MG/10ML
40 INJECTION, POWDER, LYOPHILIZED, FOR SOLUTION INTRAVENOUS ONCE
Status: COMPLETED | OUTPATIENT
Start: 2023-07-09 | End: 2023-07-09

## 2023-07-09 RX ORDER — ACETAMINOPHEN 325 MG/1
650 TABLET ORAL EVERY 6 HOURS PRN
Status: DISCONTINUED | OUTPATIENT
Start: 2023-07-09 | End: 2023-07-13 | Stop reason: HOSPADM

## 2023-07-09 RX ORDER — ATORVASTATIN CALCIUM 20 MG/1
20 TABLET, FILM COATED ORAL
Status: DISCONTINUED | OUTPATIENT
Start: 2023-07-10 | End: 2023-07-09

## 2023-07-09 RX ORDER — LORATADINE 10 MG/1
10 TABLET ORAL DAILY
Status: DISCONTINUED | OUTPATIENT
Start: 2023-07-10 | End: 2023-07-13 | Stop reason: HOSPADM

## 2023-07-09 RX ORDER — LEVOTHYROXINE SODIUM 0.07 MG/1
75 TABLET ORAL
Status: DISCONTINUED | OUTPATIENT
Start: 2023-07-10 | End: 2023-07-13 | Stop reason: HOSPADM

## 2023-07-09 RX ORDER — FLUOXETINE HYDROCHLORIDE 20 MG/1
40 CAPSULE ORAL DAILY
Status: DISCONTINUED | OUTPATIENT
Start: 2023-07-10 | End: 2023-07-13 | Stop reason: HOSPADM

## 2023-07-09 RX ORDER — LANOLIN ALCOHOL/MO/W.PET/CERES
3 CREAM (GRAM) TOPICAL
Status: DISCONTINUED | OUTPATIENT
Start: 2023-07-09 | End: 2023-07-13 | Stop reason: HOSPADM

## 2023-07-09 RX ORDER — SODIUM CHLORIDE 9 MG/ML
100 INJECTION, SOLUTION INTRAVENOUS CONTINUOUS
Status: DISCONTINUED | OUTPATIENT
Start: 2023-07-09 | End: 2023-07-10

## 2023-07-09 RX ORDER — LORAZEPAM 2 MG/ML
0.5 INJECTION INTRAMUSCULAR EVERY 12 HOURS
Status: DISCONTINUED | OUTPATIENT
Start: 2023-07-09 | End: 2023-07-13 | Stop reason: HOSPADM

## 2023-07-09 RX ORDER — ALBUTEROL SULFATE 90 UG/1
1 AEROSOL, METERED RESPIRATORY (INHALATION) EVERY 4 HOURS PRN
Status: DISCONTINUED | OUTPATIENT
Start: 2023-07-09 | End: 2023-07-13 | Stop reason: HOSPADM

## 2023-07-09 RX ADMIN — ACETAMINOPHEN 650 MG: 325 TABLET, FILM COATED ORAL at 12:24

## 2023-07-09 RX ADMIN — PANTOPRAZOLE SODIUM 40 MG: 40 INJECTION, POWDER, FOR SOLUTION INTRAVENOUS at 09:17

## 2023-07-09 RX ADMIN — PANTOPRAZOLE SODIUM 8 MG/HR: 40 INJECTION, POWDER, FOR SOLUTION INTRAVENOUS at 10:14

## 2023-07-09 RX ADMIN — PANTOPRAZOLE SODIUM 8 MG/HR: 40 INJECTION, POWDER, FOR SOLUTION INTRAVENOUS at 17:44

## 2023-07-09 RX ADMIN — SODIUM CHLORIDE 1000 ML: 0.9 INJECTION, SOLUTION INTRAVENOUS at 09:20

## 2023-07-09 RX ADMIN — CEFTRIAXONE SODIUM 1000 MG: 10 INJECTION, POWDER, FOR SOLUTION INTRAVENOUS at 09:26

## 2023-07-09 RX ADMIN — MELATONIN TAB 3 MG 3 MG: 3 TAB at 20:34

## 2023-07-09 RX ADMIN — SODIUM CHLORIDE 100 ML/HR: 0.9 INJECTION, SOLUTION INTRAVENOUS at 13:01

## 2023-07-09 RX ADMIN — IOHEXOL 100 ML: 350 INJECTION, SOLUTION INTRAVENOUS at 12:50

## 2023-07-09 RX ADMIN — LORAZEPAM 0.5 MG: 2 INJECTION INTRAMUSCULAR; INTRAVENOUS at 12:23

## 2023-07-09 RX ADMIN — FOLIC ACID: 5 INJECTION, SOLUTION INTRAMUSCULAR; INTRAVENOUS; SUBCUTANEOUS at 17:43

## 2023-07-09 NOTE — ASSESSMENT & PLAN NOTE
AST of 137 and ALT of 99 on presentation  No prior known history of cirrhosis -> await CT imaging  Encourage alcohol cessation  Limit/avoid hepatotoxins as possible - holding statin  Check hepatitis panel (less likely clinically)  Prior history of CBD stricture status post MRCP/ERCP with stenting and multiple revisions noted (see gastroenterology excerpt above and plan for GI bleed)

## 2023-07-09 NOTE — ED ATTENDING ATTESTATION
7/9/2023  ICara MD, saw and evaluated the patient. I have discussed the patient with the resident/non-physician practitioner and agree with the resident's/non-physician practitioner's findings, Plan of Care, and MDM as documented in the resident's/non-physician practitioner's note, except where noted. All available labs and Radiology studies were reviewed. I was present for key portions of any procedure(s) performed by the resident/non-physician practitioner and I was immediately available to provide assistance. At this point I agree with the current assessment done in the Emergency Department. I have conducted an independent evaluation of this patient a history and physical is as follows:    ED Course     25-year-old male presenting to the emergency department for evaluation of hematemesis and tarry stools. Patient has a history of alcohol use disorder, has had previous endoscopies, denies ever being told that he has esophageal varices. Patient denies significant abdominal pain, lightheadedness, chest pain, or shortness of breath. The patient is resting comfortably on a stretcher in no acute respiratory distress. The patient appears nontoxic. HEENT reveals moist mucous membranes. Head is normocephalic and atraumatic. Conjunctiva and sclera are normal. Neck is nontender and supple with full range of motion to flexion, extension, lateral rotation. No meningismus appreciated. No masses are appreciated. Lungs are clear to auscultation bilaterally without any wheezes, rales or rhonchi. Heart is tachycardic and regular without any murmurs, rubs or gallops. Abdomen is soft and nontender without any rebound or guarding. Extremities appear grossly normal without any significant arthropathy. Patient is awake, alert, and oriented x3. The patient has normal interaction. Cranial nerves grossly intact. Motor is 5 out of 5 bilateral upper and lower extremities.     MEDICAL DECISION MAKING    Number and Complexity of Problems  • Differential diagnosis: Upper GI bleed secondary to gastric ulcer, gastritis, gastric AVM. Less likely esophageal variceal bleed because the patient does not have any known previous varices. Medical Decision Making Data  • External documents reviewed: Last office visit available for review was from 2018. There are no endoscopy reports available for review. • My EKG interpretation: Sinus tachycardia. No acute ST or T wave changes. No orders to display       Labs Reviewed   CBC AND DIFFERENTIAL - Abnormal       Result Value Ref Range Status    WBC 9.13  4.31 - 10.16 Thousand/uL Final    RBC 3.78 (*) 3.88 - 5.62 Million/uL Final    Hemoglobin 12.9  12.0 - 17.0 g/dL Final    Hematocrit 38.2  36.5 - 49.3 % Final     (*) 82 - 98 fL Final    MCH 34.1  26.8 - 34.3 pg Final    MCHC 33.8  31.4 - 37.4 g/dL Final    RDW 13.4  11.6 - 15.1 % Final    MPV 10.1  8.9 - 12.7 fL Final    Platelets 337  313 - 390 Thousands/uL Final    nRBC 0  /100 WBCs Final    Neutrophils Relative 75  43 - 75 % Final    Immat GRANS % 1  0 - 2 % Final    Lymphocytes Relative 15  14 - 44 % Final    Monocytes Relative 7  4 - 12 % Final    Eosinophils Relative 1  0 - 6 % Final    Basophils Relative 1  0 - 1 % Final    Neutrophils Absolute 6.92  1.85 - 7.62 Thousands/µL Final    Immature Grans Absolute 0.08  0.00 - 0.20 Thousand/uL Final    Lymphocytes Absolute 1.32  0.60 - 4.47 Thousands/µL Final    Monocytes Absolute 0.65  0.17 - 1.22 Thousand/µL Final    Eosinophils Absolute 0.06  0.00 - 0.61 Thousand/µL Final    Basophils Absolute 0.10  0.00 - 0.10 Thousands/µL Final   COMPREHENSIVE METABOLIC PANEL - Abnormal    Sodium 140  135 - 147 mmol/L Final    Potassium 4.8  3.5 - 5.3 mmol/L Final    Comment: Slightly Hemolyzed;  Results May be Affected    Chloride 107  96 - 108 mmol/L Final    CO2 25  21 - 32 mmol/L Final    ANION GAP 8  mmol/L Final    BUN 18  5 - 25 mg/dL Final    Creatinine 0.83  0.60 - 1.30 mg/dL Final    Comment: Standardized to IDMS reference method    Glucose 205 (*) 65 - 140 mg/dL Final    Comment: If the patient is fasting, the ADA then defines impaired fasting glucose as > 100 mg/dL and diabetes as > or equal to 123 mg/dL. Specimen collection should occur prior to Sulfasalazine administration due to the potential for falsely depressed results. Specimen collection should occur prior to Sulfapyridine administration due to the potential for falsely elevated results. Calcium 8.9  8.3 - 10.1 mg/dL Final    Corrected Calcium 9.6  8.3 - 10.1 mg/dL Final     (*) 5 - 45 U/L Final    Comment: Slightly Hemolyzed; Results May be Affected  Specimen collection should occur prior to Sulfasalazine administration due to the potential for falsely depressed results. ALT 99 (*) 12 - 78 U/L Final    Comment: Specimen collection should occur prior to Sulfasalazine and/or Sulfapyridine administration due to the potential for falsely depressed results. Alkaline Phosphatase 92  46 - 116 U/L Final    Total Protein 6.6  6.4 - 8.4 g/dL Final    Albumin 3.1 (*) 3.5 - 5.0 g/dL Final    Total Bilirubin 0.77  0.20 - 1.00 mg/dL Final    Comment: Use of this assay is not recommended for patients undergoing treatment with eltrombopag due to the potential for falsely elevated results.     eGFR 92  ml/min/1.73sq m Final    Narrative:     [facility] guidelines for Chronic Kidney Disease (CKD):   •  Stage 1 with normal or high GFR (GFR > 90 mL/min/1.73 square meters)  •  Stage 2 Mild CKD (GFR = 60-89 mL/min/1.73 square meters)  •  Stage 3A Moderate CKD (GFR = 45-59 mL/min/1.73 square meters)  •  Stage 3B Moderate CKD (GFR = 30-44 mL/min/1.73 square meters)  •  Stage 4 Severe CKD (GFR = 15-29 mL/min/1.73 square meters)  •  Stage 5 End Stage CKD (GFR <15 mL/min/1.73 square meters)  Note: GFR calculation is accurate only with a steady state creatinine   PROTIME-INR - Normal    Protime 14.4  11.6 - 14.5 seconds Final    INR 1.10  0.84 - 1.19 Final   APTT - Normal    PTT 24  23 - 37 seconds Final    Comment: Therapeutic Heparin Range =  60-90 seconds   ABORH RECHECK    ABO Grouping A   Final    Rh Factor Positive   Final   TYPE AND SCREEN    ABO Grouping A   Final    Rh Factor Positive   Final    Antibody Screen Negative   Final    Specimen Expiration Date [id number]   Final       • Labs reviewed by me are significant for: Hemoglobin 12.9. INR 1.1.    • Discussed case with: Gastroenterology who saw the patient at the bedside. Plan is for endoscopy. • Considered admission for: Upper GI bleed. Treatment and Disposition  ED course: Patient had 2 large bore IVs established. Patient was placed on the monitor with continuous cardiac monitoring. Patient had a second episode of hematemesis in the emergency department. Shared decision making: Patient is agreeable to admission and endoscopy. Code status: Full code. Critical Care Time  CriticalCare Time    Date/Time: 7/9/2023 9:40 AM    Performed by: Carina Lira MD  Authorized by: Carina Lira MD    Critical care provider statement:     Critical care time (minutes):  32    Critical care time was exclusive of:  Separately billable procedures and treating other patients and teaching time    Critical care was necessary to treat or prevent imminent or life-threatening deterioration of the following conditions: Upper GI bleed.     Critical care was time spent personally by me on the following activities:  Obtaining history from patient or surrogate, development of treatment plan with patient or surrogate, discussions with consultants, examination of patient, ordering and performing treatments and interventions, ordering and review of laboratory studies, ordering and review of radiographic studies, re-evaluation of patient's condition and review of old charts

## 2023-07-09 NOTE — ASSESSMENT & PLAN NOTE
Acknowledges daily consumption of "a pint" of scotch for several months -> suspect even higher volume daily  Cessation counseling, although seems unlikely to comply -> not currently interested in alcohol rehab  CIWA protocol for withdrawal ordered  Initiate thiamine/multivitamin/folic acid supplementation  On a PPI infusion for presenting issue

## 2023-07-09 NOTE — ED PROVIDER NOTES
History  Chief Complaint   Patient presents with   • GI Bleeding     Pt arrived via EMS. Pt c/o tarry stools, hematemesis, dizziness and weakness starting this morning. Hx of GI bleed. -deandra Quick is a 69 yo who presents to the ED today with vomiting bright red blood and diarrhea with black tarry stools, that started this morning. Had something similar a few years ago, after having gallbladder stent. Patient had many endoscopies and colonoscopies, has hx of diverticulitis, no hx of esophageal varices. States might have liver damage from alcohol use. Patient states last few days has been drinking more as his sister has stage 4 cancer. Patient is not on any blood thinners. No known bleeding disorders. No recent falls. No fevers. Feels thirsty. No syncope but feels light headed. Denies abdominal pain. Denies rectal pain. No throat pain. No chest pain. Does feel heart racing. No sob. Prior to Admission Medications   Prescriptions Last Dose Informant Patient Reported? Taking? FLUoxetine (PROzac) 40 MG capsule   No No   Sig: TAKE ONE CAPSULE BY MOUTH EVERY DAY   LORazepam (ATIVAN) 0.5 mg tablet   No No   Sig: TAKE 1 TABLET BY MOUTH TWICE A DAY   Multiple Vitamin (DAILY VALUE MULTIVITAMIN) TABS   Yes No   Sig: Take 1 tablet by mouth daily   albuterol (PROVENTIL HFA,VENTOLIN HFA) 90 mcg/act inhaler   Yes No   Sig: Inhale 1-2 puffs   allopurinol (ZYLOPRIM) 300 mg tablet   No No   Sig: TAKE 1 TABLET EVERY DAY   atorvastatin (LIPITOR) 20 mg tablet   No No   Sig: TAKE 1 TABLET DAILY AS DIRECTED. cetirizine (ZyrTEC) 10 mg tablet   Yes No   Sig: Take one tablet by mouth daily as needed for allergies/congestion    fenofibrate (TRIGLIDE) 160 MG tablet   No No   Sig: TAKE 1 TABLET DAILY.    levothyroxine 75 mcg tablet   No No   Sig: TAKE 1 TABLET IN THE MORNING ON AN EMPTY STOMACH FOR THYROID   lisinopril (ZESTRIL) 30 mg tablet   No No   Sig: TAKE 1 TABLET BY MOUTH EVERY DAY   omeprazole (PriLOSEC) 20 mg delayed release capsule   No No   Sig: TAKE 1 CAPSULE DAILY FOR ACID REFLUX      Facility-Administered Medications: None       Past Medical History:   Diagnosis Date   • Medical history unknown        Past Surgical History:   Procedure Laterality Date   • REPAIR / REINSERT BICEPS TENDON AT ELBOW      Last Assessed: 1/12/2016        Family History   Problem Relation Age of Onset   • Diabetes Mother      I have reviewed and agree with the history as documented. E-Cigarette/Vaping     E-Cigarette/Vaping Substances     Social History     Tobacco Use   • Smoking status: Never   • Smokeless tobacco: Never   Substance Use Topics   • Alcohol use: Yes     Comment: social   • Drug use: Yes     Types: Marijuana       Review of Systems   Constitutional: Negative for fatigue and fever. Respiratory: Negative for shortness of breath. Cardiovascular: Positive for palpitations. Negative for chest pain. Gastrointestinal: Positive for blood in stool, diarrhea and vomiting. Negative for abdominal distention, abdominal pain and rectal pain. Musculoskeletal: Negative for back pain. Skin: Negative for rash and wound. Allergic/Immunologic: Negative for immunocompromised state. Neurological: Positive for dizziness and light-headedness. Negative for seizures and syncope. Hematological: Does not bruise/bleed easily. Psychiatric/Behavioral: Negative for confusion. All other systems reviewed and are negative. Physical Exam  Physical Exam  Vitals and nursing note reviewed. Constitutional:       General: He is in acute distress. Appearance: He is well-developed. He is not toxic-appearing or diaphoretic. HENT:      Head: Normocephalic and atraumatic. Nose: Nose normal.      Mouth/Throat:      Mouth: Mucous membranes are dry. Eyes:      Conjunctiva/sclera: Conjunctivae normal.      Pupils: Pupils are equal, round, and reactive to light. Cardiovascular:      Rate and Rhythm: Regular rhythm.  Tachycardia present. Heart sounds: Normal heart sounds. Pulmonary:      Effort: Pulmonary effort is normal. No respiratory distress. Breath sounds: Normal breath sounds. No wheezing. Abdominal:      General: Bowel sounds are normal.      Palpations: Abdomen is soft. There is no mass. Tenderness: There is no abdominal tenderness. There is no right CVA tenderness, left CVA tenderness, guarding or rebound. Hernia: No hernia is present. Comments: +abdominal rectus diastasis; +prior abdominal surgical scar vertical (remote, healed); bright red blood emesis. +hemoccult, black stools   Musculoskeletal:      Cervical back: Normal range of motion and neck supple. Skin:     General: Skin is warm and dry. Neurological:      Mental Status: He is alert and oriented to person, place, and time. Cranial Nerves: No cranial nerve deficit. Motor: No weakness.       Gait: Gait normal.      Comments: tremors   Psychiatric:         Mood and Affect: Mood normal.         Behavior: Behavior normal.         Vital Signs  ED Triage Vitals   Temperature Pulse Respirations Blood Pressure SpO2   07/09/23 0836 07/09/23 0836 07/09/23 0836 07/09/23 0836 07/09/23 0836   97.7 °F (36.5 °C) (!) 118 17 154/89 94 %      Temp Source Heart Rate Source Patient Position - Orthostatic VS BP Location FiO2 (%)   07/09/23 0836 07/09/23 0836 07/09/23 1000 07/09/23 1000 --   Oral Monitor Lying Left arm       Pain Score       --                  Vitals:    07/09/23 0836 07/09/23 0900 07/09/23 1000   BP: 154/89 140/82 150/88   Pulse: (!) 118 (!) 116 (!) 110   Patient Position - Orthostatic VS:   Lying         Visual Acuity      ED Medications  Medications   pantoprazole (PROTONIX) 80 mg in sodium chloride 0.9 % 100 mL infusion (8 mg/hr Intravenous New Bag 7/9/23 1014)   sodium chloride 0.9 % bolus 1,000 mL (1,000 mL Intravenous New Bag 7/9/23 0920)   pantoprazole (PROTONIX) injection 40 mg (40 mg Intravenous Given 7/9/23 0917) cefTRIAXone (ROCEPHIN) 1,000 mg in dextrose 5 % 50 mL IVPB (0 mg Intravenous Stopped 7/9/23 0956)       Diagnostic Studies  Results Reviewed     Procedure Component Value Units Date/Time    Comprehensive metabolic panel [[id number]]  (Abnormal) Collected: 07/09/23 0906    Lab Status: Final result Specimen: Blood from Arm, Right Updated: 07/09/23 0938     Sodium 140 mmol/L      Potassium 4.8 mmol/L      Chloride 107 mmol/L      CO2 25 mmol/L      ANION GAP 8 mmol/L      BUN 18 mg/dL      Creatinine 0.83 mg/dL      Glucose 205 mg/dL      Calcium 8.9 mg/dL      Corrected Calcium 9.6 mg/dL       U/L      ALT 99 U/L      Alkaline Phosphatase 92 U/L      Total Protein 6.6 g/dL      Albumin 3.1 g/dL      Total Bilirubin 0.77 mg/dL      eGFR 92 ml/min/1.73sq m     Narrative:      [place] guidelines for Chronic Kidney Disease (CKD):   •  Stage 1 with normal or high GFR (GFR > 90 mL/min/1.73 square meters)  •  Stage 2 Mild CKD (GFR = 60-89 mL/min/1.73 square meters)  •  Stage 3A Moderate CKD (GFR = 45-59 mL/min/1.73 square meters)  •  Stage 3B Moderate CKD (GFR = 30-44 mL/min/1.73 square meters)  •  Stage 4 Severe CKD (GFR = 15-29 mL/min/1.73 square meters)  •  Stage 5 End Stage CKD (GFR <15 mL/min/1.73 square meters)  Note: GFR calculation is accurate only with a steady state creatinine    Protime-INR [[id number]]  (Normal) Collected: 07/09/23 0906    Lab Status: Final result Specimen: Blood from Arm, Right Updated: 07/09/23 0932     Protime 14.4 seconds      INR 1.10    APTT [[id number]]  (Normal) Collected: 07/09/23 0906    Lab Status: Final result Specimen: Blood from Arm, Right Updated: 07/09/23 0932     PTT 24 seconds     CBC and differential [[id number]]  (Abnormal) Collected: 07/09/23 0906    Lab Status: Final result Specimen: Blood from Arm, Right Updated: 07/09/23 0920     WBC 9.13 Thousand/uL      RBC 3.78 Million/uL      Hemoglobin 12.9 g/dL      Hematocrit 38.2 %       fL MCH 34.1 pg      MCHC 33.8 g/dL      RDW 13.4 %      MPV 10.1 fL      Platelets 139 Thousands/uL      nRBC 0 /100 WBCs      Neutrophils Relative 75 %      Immat GRANS % 1 %      Lymphocytes Relative 15 %      Monocytes Relative 7 %      Eosinophils Relative 1 %      Basophils Relative 1 %      Neutrophils Absolute 6.92 Thousands/µL      Immature Grans Absolute 0.08 Thousand/uL      Lymphocytes Absolute 1.32 Thousands/µL      Monocytes Absolute 0.65 Thousand/µL      Eosinophils Absolute 0.06 Thousand/µL      Basophils Absolute 0.10 Thousands/µL                  No orders to display              Procedures  Procedures         ED Course  ED Course as of 07/09/23 1037   Sun Jul 09, 2023   1036 Patient only had 1 episode of bright red emesis while in ED, after which I called GI for eval. Has been stable the last 1 hr with good bp, HR better, feeling better. Will admit to step down. GI will scope later today. Patient aware and okay with this plan. Dr. Alyx Finney () saw patient, will get scoped today. Will monitor patient. SBIRT 22yo+    Flowsheet Row Most Recent Value   Initial Alcohol Screen: US AUDIT-C     1. How often do you have a drink containing alcohol? 0 Filed at: 07/09/2023 0904   2. How many drinks containing alcohol do you have on a typical day you are drinking? 0 Filed at: 07/09/2023 0904   3b. FEMALE Any Age, or MALE 65+: How often do you have 4 or more drinks on one occassion? 0 Filed at: 07/09/2023 0904   Audit-C Score 0 Filed at: 07/09/2023 6121   JAZZ: How many times in the past year have you. .. Used an illegal drug or used a prescription medication for non-medical reasons?  Never Filed at: 07/09/2023 0904                    MDM    Disposition  Final diagnoses:   Upper GI bleed     Time reflects when diagnosis was documented in both MDM as applicable and the Disposition within this note     Time User Action Codes Description Comment    7/9/2023  9:30 AM [facility] Add [K92.2] Upper GI bleed       ED Disposition     ED Disposition   Admit    Condition   Stable    Date/Time   Sun Jul 9, 2023 10:35 AM    Comment   Case was discussed with dr. Jacoby Pérez, sd2         Follow-up Information    None         Patient's Medications   Discharge Prescriptions    No medications on file       No discharge procedures on file.     PDMP Review     None          ED Provider  Electronically Signed by           Santiago Rainey PA-C  07/09/23 1037

## 2023-07-09 NOTE — ASSESSMENT & PLAN NOTE
Hemoglobin dropped from 12.9 -> 9.3 in the ED although given a liter of IV fluids (contributory dilutional effect) -> patient had another episode of hematemesis in the ED  Continue H/H monitoring Q6h -> transfuse if necessary  Monitor vitals and maintain hemodynamics  Not on chronic antiplatelet agents or anticoagulation

## 2023-07-09 NOTE — ASSESSMENT & PLAN NOTE
Presents with persistent tachycardia and intermittent tachypnea -> remains afebrile without leukocytosis, however  Likely reactive secondary to GI bleed along with possible early alcohol withdrawal  Monitor vitals and maintain hemodynamics  Continue IV fluids

## 2023-07-09 NOTE — CONSULTS
CONSULT: GASTROENTEROLOGY    Inpatient consult to gastroenterology  Consult performed by: Jimmie Ferrara MD  Consult ordered by: Ashlee Boswell PA-C        PATIENT INFORMATION      Anay Augustine 70 y.o. male MRN: [id number]  Unit/Bed#: ED 23 Encounter: [id number]  PCP: Abby Reed DO  Date of Admission:  7/9/2023  Date of Consultation: 07/09/23  Requesting Physician: Philip Lauren MD    ASSESSMENTS & PLAN   Anay Augustine is a 70 y.o. old male with PMH of alcohol use, CBD stricture requiring CBD stents in the past with last ERCP in May 2022, hypertension, obesity who presents with hematemesis and melena. Gastroenterology team has been consulted for assistance with management of hematemesis and melena. 1.  Hematemesis  2. Melena  Tachycardic but otherwise blood pressure stable. Hemoglobin 12.9, BUN/creatinine normal.  No blood thinner or NSAID use reported. No prior belly surgeries except for multiple ERCPs and prior cholecystectomy in the past.  Does have heavy alcohol use history but no documented diagnosis of cirrhosis. Blood work showing normal PC and normal INR as well. AST/ALT mildly elevated but bilirubin normal.  No clear appreciable fluid thrill on exam.  --Agree with IV PPI drip, empiric antibiotics  --Recheck hemoglobin now, will decide on timing of endoscopy, today or early tomorrow depending on his clinical progress  --will consider CT abdomen as no recent abdominal imaging available  --Keep n.p.o., okay for ice chips  --If patient has further witnessed hematemesis or any hemodynamic compromise we will plan for urgent endoscopy    3. CBD stricture  Known history underwent multiple stent placement and brushing with ERCPs from 2019 through 2022. Brushings have not shown malignancy in the past.    4. Screening colonoscopy  Outpatient GI follow-up. GI will follow. HISTORY OF PRESENT ILLNESS      Anay Augustine is a 70 y.o. male who is originally admitted for hematemesis on 7/9/2023. GI team is consulted for the same. 25-year-old male with past medical history including but not limited to obesity, prior CBD stricture requiring ERCPs with [facility]; prior cholecystectomy, heavy alcohol use history who presents today with melena and few episodes of hematemesis which started this morning. Patient reports that yesterday was in usual state of health without any issues. This morning woke up with tarry black-colored bowel movements which was soon followed by nausea and having bright red blood in vomitus. He came to the ER thereafter. Patient was noted to have 1 episode of bright red blood in vomitus in the ER. Patient was evaluated by myself in the ER. Hemodynamically tachycardic, blood pressure within acceptable range. Blood work suggestive of normal WBC, hemoglobin 12.9, platelets 262, INR 1.1, , ALT 99, T. bili 0.7, BUN 18, creatinine 0.8. No recent abdominal imaging available. Patient's last EGD upon chart review noted to be on 5/28/21 which was done for hematemesis, EGD showed thickened gastric folds in the cardia and the fundus, small patchy snake like skin in proximal gastric body consistent with possible portal hypertensive gastropathy, in August 2019 he was admitted to [facility] with hyperbilirubinemia, intrahepatic ductal dilation, for which she underwent MRCP followed by ERCP which showed 3 cm stricture in the distal CBD which was brushed and stented, on brushing negative for malignancy. He underwent repeat ERCP on 8/12/2021 with removal of previous stent, repeat brushings were obtained, sluggish flow was seen and subsequently another CBD plastic stent was placed. This was repeated on 10/20/2021. The most recent ERCP performed was on 5/26/2022 with removal of previously placed PD stent. REVIEW OF SYSTEMS     A thorough 12-point review of systems has been conducted. Pertinent positives and negatives are mentioned in the history of present illness.      PAST MEDICAL & SURGICAL HISTORY      Past Medical History:   Diagnosis Date   • Medical history unknown        Past Surgical History:   Procedure Laterality Date   • REPAIR / REINSERT BICEPS TENDON AT ELBOW      Last Assessed: 1/12/2016        MEDICATIONS & ALLERGIES       Medications:   Prior to Admission medications    Medication Sig Start Date End Date Taking? Authorizing Provider   albuterol (PROVENTIL HFA,VENTOLIN HFA) 90 mcg/act inhaler Inhale 1-2 puffs    Historical Provider, MD   allopurinol (ZYLOPRIM) 300 mg tablet TAKE 1 TABLET EVERY DAY 8/15/19   Clayton Roach MD   atorvastatin (LIPITOR) 20 mg tablet TAKE 1 TABLET DAILY AS DIRECTED. 4/28/19   Clayton Roach MD   cetirizine (ZyrTEC) 10 mg tablet Take one tablet by mouth daily as needed for allergies/congestion  5/2/14   Historical Provider, MD   fenofibrate (TRIGLIDE) 160 MG tablet TAKE 1 TABLET DAILY. 1/28/19   Clayton Roach MD   FLUoxetine (PROzac) 40 MG capsule TAKE ONE CAPSULE BY MOUTH EVERY DAY 7/22/19   Clayton Roach MD   levothyroxine 75 mcg tablet TAKE 1 TABLET IN THE MORNING ON AN EMPTY STOMACH FOR THYROID 6/9/19   Clayton Roach MD   lisinopril (ZESTRIL) 30 mg tablet TAKE 1 TABLET BY MOUTH EVERY DAY 10/26/19   Clayton Roach MD   LORazepam (ATIVAN) 0.5 mg tablet TAKE 1 TABLET BY MOUTH TWICE A DAY 11/6/19   Clayton Roach MD   Multiple Vitamin (DAILY VALUE MULTIVITAMIN) TABS Take 1 tablet by mouth daily    Historical Provider, MD   omeprazole (PriLOSEC) 20 mg delayed release capsule TAKE 1 CAPSULE DAILY FOR ACID REFLUX 4/22/18   Clayton Roach MD       Allergies:    Allergies   Allergen Reactions   • Amoxicillin Swelling and Fever     Other reaction(s): vertigo   • Escitalopram      Other reaction(s): sweaty palms/hands, felt faint, passed out for a short time, had anxiety/panic attack   • Gemfibrozil      Other reaction(s): Nausea and/or vomiting  Other reaction(s): Nausea and/or vomiting   • Milk-Related Compounds - Food Allergy Other (See Comments) abd cramping  Other reaction(s): Other (See Comments)  abd cramping   • Other      Other reaction(s): Other (See Comments)  rhinitis, itchy eyes       SOCIAL HISTORY      Substance Use History:   Social History     Substance and Sexual Activity   Alcohol Use Yes    Comment: social     Social History     Tobacco Use   Smoking Status Never   Smokeless Tobacco Never     Social History     Substance and Sexual Activity   Drug Use Yes   • Types: Marijuana       FAMILY HISTORY      As in the HPI. PHYSICAL EXAM     Vitals:   Blood Pressure: 158/79 (07/09/23 1030)  Pulse: (!) 112 (07/09/23 1030)  Temperature: 97.7 °F (36.5 °C) (07/09/23 0836)  Temp Source: Oral (07/09/23 0836)  Respirations: (!) 24 (07/09/23 1030)  SpO2: 97 % (07/09/23 1030)    Physical Exam:   GENERAL: NAD  HEENT:  Atraumatic  CARDIAC:  RRR on palpation  PULMONARY:  non-labored breathing  ABDOMEN: Non tender  RECTAL:  deferred  NEUROLOGIC:  Alert/oriented x3. EXTREMITIES: chronic changes  SKIN:  chronic changes    ADDITIONAL DATA     Lab Results:     Results from last 7 days   Lab Units 07/09/23  0906   WBC Thousand/uL 9.13   HEMOGLOBIN g/dL 12.9   HEMATOCRIT % 38.2   PLATELETS Thousands/uL 207   NEUTROS PCT % 75   LYMPHS PCT % 15   MONOS PCT % 7   EOS PCT % 1     Results from last 7 days   Lab Units 07/09/23  0906   POTASSIUM mmol/L 4.8   CHLORIDE mmol/L 107   CO2 mmol/L 25   BUN mg/dL 18   CREATININE mg/dL 0.83   CALCIUM mg/dL 8.9   ALK PHOS U/L 92   ALT U/L 99*   AST U/L 137*     Results from last 7 days   Lab Units 07/09/23  0906   INR  1.10       Imaging:    No results found. EKG, Pathology, and Other Studies Reviewed on Admission:   · EKG: Reviewed    Counseling / Coordination of Care Time: 30 total mins spent n consult. Greater than 50% of total time spent on patient counseling and coordination of care.     Kaiser Liao MD   PGY-4, Department of Gastroenterology. ...............................................................................................................................................  ** Please Note: This note is constructed using a voice recognition dictation system.  **

## 2023-07-09 NOTE — PLAN OF CARE
Problem: PAIN - ADULT  Goal: Verbalizes/displays adequate comfort level or baseline comfort level  Description: Interventions:  - Encourage patient to monitor pain and request assistance  - Assess pain using appropriate pain scale  - Administer analgesics based on type and severity of pain and evaluate response  - Implement non-pharmacological measures as appropriate and evaluate response  - Consider cultural and social influences on pain and pain management  - Notify physician/advanced practitioner if interventions unsuccessful or patient reports new pain  Outcome: Progressing     Problem: INFECTION - ADULT  Goal: Absence or prevention of progression during hospitalization  Description: INTERVENTIONS:  - Assess and monitor for signs and symptoms of infection  - Monitor lab/diagnostic results  - Monitor all insertion sites, i.e. indwelling lines, tubes, and drains  - Monitor endotracheal if appropriate and nasal secretions for changes in amount and color  - [place] appropriate cooling/warming therapies per order  - Administer medications as ordered  - Instruct and encourage patient and family to use good hand hygiene technique  - Identify and instruct in appropriate isolation precautions for identified infection/condition  Outcome: Progressing  Goal: Absence of fever/infection during neutropenic period  Description: INTERVENTIONS:  - Monitor WBC    Outcome: Progressing

## 2023-07-09 NOTE — H&P
[address] [facility]  H&P  Name: Brianna Sainz 70 y.o. male I MRN: [id number]  Unit/Bed#: [facility] 815-01 I Date of Admission: 7/9/2023   Date of Service: 7/9/2023 I Hospital Day: 0      Assessment & Plan:     * GI bleed  Assessment & Plan  Mixed melena and hematemesis since morning -> had an episode of black tarry stools earlier accompanied by an episode of hematemesis prior to presentation, and another episode of hematemesis in the ED -> denies prior episodes before this morning  Denies antiplatelet agents or anticoagulant use - denies recent use of bismuth containing agents (i.e. Pepto-Bismol) or chronic iron supplementation  Longstanding history of alcohol abuse noted   Excerpt from gastroenterology consultation note today: "Patient's last EGD upon chart review noted to be on 5/28/21 which was done for hematemesis, EGD showed thickened gastric folds in the cardia and the fundus, small patchy snake like skin in proximal gastric body consistent with possible portal hypertensive gastropathy, in August 2019 he was admitted to [facility] with hyperbilirubinemia, intrahepatic ductal dilation, for which she underwent MRCP followed by ERCP which showed 3 cm stricture in the distal CBD which was brushed and stented, on brushing negative for malignancy. He underwent repeat ERCP on 8/12/2021 with removal of previous stent, repeat brushings were obtained, sluggish flow was seen and subsequently another CBD plastic stent was placed. This was repeated on 10/20/2021.   The most recent ERCP performed was on 5/26/2022 with removal of previously placed PD stent."  Remains NPO on a continuous IV PPI infusion and IV fluids   Monitor vitals and maintain hemodynamics  Monitor hemoglobin and transfuse if necessary  Await CT of abdomen/pelvis  Initiated on empiric IV Rocephin in the ED -> will continue contingent on results of EGD and/or colonoscopy -> timing of scoping to be determined by gastroenterology    Acute blood loss anemia  Assessment & Plan  Hemoglobin dropped from 12.9 -> 9.3 in the ED although given a liter of IV fluids (contributory dilutional effect) -> patient had another episode of hematemesis in the ED  Continue H/H monitoring Q6h -> transfuse if necessary  Monitor vitals and maintain hemodynamics  Not on chronic antiplatelet agents or anticoagulation    SIRS (systemic inflammatory response syndrome)  Assessment & Plan  Presents with persistent tachycardia and intermittent tachypnea -> remains afebrile without leukocytosis, however  Likely reactive secondary to GI bleed along with possible early alcohol withdrawal  Monitor vitals and maintain hemodynamics  Continue IV fluids    Alcohol abuse  Assessment & Plan  Acknowledges daily consumption of "a pint" of scotch for several months -> suspect even higher volume daily  Cessation counseling, although seems unlikely to comply -> not currently interested in alcohol rehab  [facility] protocol for withdrawal ordered  Initiate thiamine/multivitamin/folic acid supplementation  On a PPI infusion for presenting issue    Transaminitis  Assessment & Plan  AST of 137 and ALT of 99 on presentation  No prior known history of cirrhosis -> await CT imaging  Encourage alcohol cessation  Limit/avoid hepatotoxins as possible - holding statin  Check hepatitis panel (less likely clinically)  Prior history of CBD stricture status post MRCP/ERCP with stenting and multiple revisions noted (see gastroenterology excerpt above and plan for GI bleed)    Hyperlipidemia  Assessment & Plan  On Tricor/Lipitor at home  Hold statin in the setting of transaminitis    Hypothyroidism  Assessment & Plan  Continue Synthroid      DVT Prophylaxis: SCDs    Code Status:  Full code    Discussion with:  Patient at bedside    Anticipated Length of Stay:  Patient will be admitted on an Inpatient basis with an anticipated length of stay of greater than 2 midnights.    Justification for Hospital Stay: Acute blood loss anemia secondary to combined melena/hematemesis requiring gastroenterology evaluation/intervention, IV fluid hydration, and hemodynamic/hemoglobin monitoring. Total Time for Visit, including Counseling / Coordination of Care: 75 minutes. More than 50% of total time spent on counseling and coordination of care, on one or more of the following: performing physical exam; counseling and coordination of care; obtaining or reviewing history; documenting in the medical record; reviewing/ordering tests, medications or procedures; communicating with other healthcare professionals and discussing with patient's family/caregivers. Chief Complaint:  Bloody vomit and black stools      History of Present Illness:    Cholo Sumner is a 70 y.o. male who presents with complaints of dark tarry stools shortly upon waking up accompanied by bright red bloody vomiting, this morning. Of note, patient denied any recent history of similar episodes, however records indicate back in May 2021, he had an episode of hematemesis, and underwent EGD which was negative for evidence of acute bleeding, but did reveal signs of portal hypertensive gastropathy. He states he was in his usual state of health up until this morning when the symptoms arose. He has a longstanding history of alcohol abuse, stating that he drinks approximately a pint of scotch daily over the last several months, and initially started drinking while still in high school. He currently denies any abdominal pain at this time. Denies any antiplatelet agents or anticoagulant use. Denies any recent use of bismuth containing compounds or chronic iron supplementation. At the time of my encounter, he is resting in bed mildly anxious due to current symptoms, but overall remains in hopeful spirits. Review of Systems:    Review of Systems - A thorough 12 point review systems was conducted.   Pertinent positives and negatives are mentioned in the history of present illness. Past Medical and Surgical History:     Past Medical History:   Diagnosis Date   • Medical history unknown        Past Surgical History:   Procedure Laterality Date   • REPAIR / REINSERT BICEPS TENDON AT ELBOW      Last Assessed: 1/12/2016          Medications & Allergies:    Prior to Admission medications    Medication Sig Start Date End Date Taking? Authorizing Provider   albuterol (PROVENTIL HFA,VENTOLIN HFA) 90 mcg/act inhaler Inhale 1-2 puffs    Historical Provider, MD   allopurinol (ZYLOPRIM) 300 mg tablet TAKE 1 TABLET EVERY DAY 8/15/19   Cecilia Stephens MD   atorvastatin (LIPITOR) 20 mg tablet TAKE 1 TABLET DAILY AS DIRECTED. 4/28/19   Cecilia Stephens MD   cetirizine (ZyrTEC) 10 mg tablet Take one tablet by mouth daily as needed for allergies/congestion  5/2/14   Historical Provider, MD   fenofibrate (TRIGLIDE) 160 MG tablet TAKE 1 TABLET DAILY. 1/28/19   Cecilia Stephens MD   FLUoxetine (PROzac) 40 MG capsule TAKE ONE CAPSULE BY MOUTH EVERY DAY 7/22/19   Cecilia Stephens MD   levothyroxine 75 mcg tablet TAKE 1 TABLET IN THE MORNING ON AN EMPTY STOMACH FOR THYROID 6/9/19   Cecilia Stephens MD   lisinopril (ZESTRIL) 30 mg tablet TAKE 1 TABLET BY MOUTH EVERY DAY 10/26/19   Cecilia Stephens MD   LORazepam (ATIVAN) 0.5 mg tablet TAKE 1 TABLET BY MOUTH TWICE A DAY 11/6/19   Cecilia Stephens MD   Multiple Vitamin (DAILY VALUE MULTIVITAMIN) TABS Take 1 tablet by mouth daily    Historical Provider, MD   omeprazole (PriLOSEC) 20 mg delayed release capsule TAKE 1 CAPSULE DAILY FOR ACID REFLUX 4/22/18   Cecilia Stephens MD         Allergies:    Allergies   Allergen Reactions   • Amoxicillin Swelling and Fever     Other reaction(s): vertigo   • Escitalopram      Other reaction(s): sweaty palms/hands, felt faint, passed out for a short time, had anxiety/panic attack   • Gemfibrozil      Other reaction(s): Nausea and/or vomiting  Other reaction(s): Nausea and/or vomiting   • Milk-Related Compounds - Food Allergy Other (See Comments)     abd cramping  Other reaction(s): Other (See Comments)  abd cramping   • Other      Other reaction(s): Other (See Comments)  rhinitis, itchy eyes         Social History:    Substance Use History:   Social History     Substance and Sexual Activity   Alcohol Use Yes    Comment: social     Social History     Tobacco Use   Smoking Status Never   Smokeless Tobacco Never     Social History     Substance and Sexual Activity   Drug Use Yes   • Types: Marijuana         Family History:    Per medical chart, significant for diabetes mellitus in mother.       Physical Exam:     Vitals:   Blood Pressure: 136/89 (07/09/23 1131)  Pulse: 105 (07/09/23 1220)  Temperature: 99.8 °F (37.7 °C) (07/09/23 1220)  Temp Source: Oral (07/09/23 1131)  Respirations: 16 (07/09/23 1220)  Height: 5' 8" (172.7 cm) (07/09/23 1125)  Weight - Scale: 101 kg (223 lb 12.3 oz) (07/09/23 1125)  SpO2: 96 % (07/09/23 1220)      GENERAL  mildly weak/fatigued   HEAD   Normocephalic - atraumatic   MOUTH   Mucosa moist   NECK   Supple - full range of motion   CARDIAC  tachycardic- S1/S2 positive   PULMONARY    Clear breath sounds bilaterally - nonlabored respirations   ABDOMEN   Soft - currently nontender but mildly distended - active bowel sounds   MUSCULOSKELETAL   Motor strength/range of motion intact   NEUROLOGIC   Alert/oriented at baseline - fine bilateral hand extension tremors   SKIN   Chronic wrinkles/blemishes - no current diaphoresis   PSYCHIATRIC   Mood/affect mildly anxious         Additional Data:     Labs & Recent Cultures:    Results from last 7 days   Lab Units 07/09/23  1057 07/09/23  0906   WBC Thousand/uL  --  9.13   HEMOGLOBIN g/dL 9.3* 12.9   HEMATOCRIT % 28.5* 38.2   PLATELETS Thousands/uL  --  207   NEUTROS PCT %  --  75   LYMPHS PCT %  --  15   MONOS PCT %  --  7   EOS PCT %  --  1     Results from last 7 days   Lab Units 07/09/23  0906   SODIUM mmol/L 140   POTASSIUM mmol/L 4.8   CHLORIDE mmol/L 107   CO2 mmol/L 25   BUN mg/dL 18   CREATININE mg/dL 0.83   ANION GAP mmol/L 8   CALCIUM mg/dL 8.9   ALBUMIN g/dL 3.1*   TOTAL BILIRUBIN mg/dL 0.77   ALK PHOS U/L 92   ALT U/L 99*   AST U/L 137*   GLUCOSE RANDOM mg/dL 205*     Results from last 7 days   Lab Units 07/09/23  0906   INR  1.10                             Lines/Drains:  Invasive Devices     Peripheral Intravenous Line  Duration           Peripheral IV 07/09/23 Left Antecubital <1 day    Peripheral IV 07/09/23 Right Antecubital <1 day                            ** Please Note: This note is constructed using a voice recognition dictation system. An occasional wrong word/phrase or “sound-a-like” substitution may have been picked up by dictation device due to the inherent limitations of voice recognition software. Read the chart carefully and recognize, using reasonable context, where substitutions may have occurred. **

## 2023-07-09 NOTE — ED NOTES
Dr. Kennedy Means from GI at bedside as pt is having active hematemesis.              Malinda Meza RN  07/09/23 8882

## 2023-07-09 NOTE — ASSESSMENT & PLAN NOTE
Mixed melena and hematemesis since morning -> had an episode of black tarry stools earlier accompanied by an episode of hematemesis prior to presentation, and another episode of hematemesis in the ED -> denies prior episodes before this morning  Denies antiplatelet agents or anticoagulant use - denies recent use of bismuth containing agents (i.e. Pepto-Bismol) or chronic iron supplementation  Longstanding history of alcohol abuse noted   Excerpt from gastroenterology consultation note today: "Patient's last EGD upon chart review noted to be on 5/28/21 which was done for hematemesis, EGD showed thickened gastric folds in the cardia and the fundus, small patchy snake like skin in proximal gastric body consistent with possible portal hypertensive gastropathy, in August 2019 he was admitted to [facility] with hyperbilirubinemia, intrahepatic ductal dilation, for which she underwent MRCP followed by ERCP which showed 3 cm stricture in the distal CBD which was brushed and stented, on brushing negative for malignancy. He underwent repeat ERCP on 8/12/2021 with removal of previous stent, repeat brushings were obtained, sluggish flow was seen and subsequently another CBD plastic stent was placed. This was repeated on 10/20/2021.   The most recent ERCP performed was on 5/26/2022 with removal of previously placed PD stent."  Remains NPO on a continuous IV PPI infusion and IV fluids   Monitor vitals and maintain hemodynamics  Monitor hemoglobin and transfuse if necessary  Await CT of abdomen/pelvis  Initiated on empiric IV Rocephin in the ED -> will continue contingent on results of EGD and/or colonoscopy -> timing of scoping to be determined by gastroenterology

## 2023-07-10 ENCOUNTER — ANESTHESIA (INPATIENT)
Dept: GASTROENTEROLOGY | Facility: HOSPITAL | Age: 63
DRG: 368 | End: 2023-07-10
Payer: MEDICARE

## 2023-07-10 ENCOUNTER — ANESTHESIA EVENT (INPATIENT)
Dept: GASTROENTEROLOGY | Facility: HOSPITAL | Age: 63
DRG: 368 | End: 2023-07-10
Payer: MEDICARE

## 2023-07-10 ENCOUNTER — APPOINTMENT (INPATIENT)
Dept: GASTROENTEROLOGY | Facility: HOSPITAL | Age: 63
DRG: 368 | End: 2023-07-10
Payer: MEDICARE

## 2023-07-10 PROBLEM — E87.0 HYPERNATREMIA: Status: ACTIVE | Noted: 2023-07-10

## 2023-07-10 LAB
ALBUMIN SERPL BCP-MCNC: 2.8 G/DL (ref 3.5–5)
ALP SERPL-CCNC: 63 U/L (ref 46–116)
ALT SERPL W P-5'-P-CCNC: 61 U/L (ref 12–78)
ANION GAP SERPL CALCULATED.3IONS-SCNC: 6 MMOL/L
ANION GAP SERPL CALCULATED.3IONS-SCNC: 8 MMOL/L
AST SERPL W P-5'-P-CCNC: 55 U/L (ref 5–45)
BILIRUB SERPL-MCNC: 0.6 MG/DL (ref 0.2–1)
BUN SERPL-MCNC: 18 MG/DL (ref 5–25)
BUN SERPL-MCNC: 24 MG/DL (ref 5–25)
CALCIUM ALBUM COR SERPL-MCNC: 8.9 MG/DL (ref 8.3–10.1)
CALCIUM SERPL-MCNC: 7.9 MG/DL (ref 8.3–10.1)
CALCIUM SERPL-MCNC: 7.9 MG/DL (ref 8.3–10.1)
CHLORIDE SERPL-SCNC: 109 MMOL/L (ref 96–108)
CHLORIDE SERPL-SCNC: 116 MMOL/L (ref 96–108)
CO2 SERPL-SCNC: 24 MMOL/L (ref 21–32)
CO2 SERPL-SCNC: 27 MMOL/L (ref 21–32)
CREAT SERPL-MCNC: 0.67 MG/DL (ref 0.6–1.3)
CREAT SERPL-MCNC: 1.04 MG/DL (ref 0.6–1.3)
GFR SERPL CREATININE-BSD FRML MDRD: 100 ML/MIN/1.73SQ M
GFR SERPL CREATININE-BSD FRML MDRD: 74 ML/MIN/1.73SQ M
GLUCOSE SERPL-MCNC: 134 MG/DL (ref 65–140)
GLUCOSE SERPL-MCNC: 160 MG/DL (ref 65–140)
HAV IGM SER QL: NORMAL
HBV CORE IGM SER QL: NORMAL
HBV SURFACE AG SER QL: NORMAL
HCT VFR BLD AUTO: 24.8 % (ref 36.5–49.3)
HCV AB SER QL: NORMAL
HGB BLD-MCNC: 7.9 G/DL (ref 12–17)
HGB BLD-MCNC: 9.3 G/DL (ref 12–17)
POTASSIUM SERPL-SCNC: 3.2 MMOL/L (ref 3.5–5.3)
POTASSIUM SERPL-SCNC: 3.5 MMOL/L (ref 3.5–5.3)
PROT SERPL-MCNC: 5.5 G/DL (ref 6.4–8.4)
SODIUM SERPL-SCNC: 141 MMOL/L (ref 135–147)
SODIUM SERPL-SCNC: 149 MMOL/L (ref 135–147)

## 2023-07-10 PROCEDURE — 85014 HEMATOCRIT: CPT | Performed by: INTERNAL MEDICINE

## 2023-07-10 PROCEDURE — 80053 COMPREHEN METABOLIC PANEL: CPT | Performed by: INTERNAL MEDICINE

## 2023-07-10 PROCEDURE — 80048 BASIC METABOLIC PNL TOTAL CA: CPT | Performed by: INTERNAL MEDICINE

## 2023-07-10 PROCEDURE — 88305 TISSUE EXAM BY PATHOLOGIST: CPT | Performed by: STUDENT IN AN ORGANIZED HEALTH CARE EDUCATION/TRAINING PROGRAM

## 2023-07-10 PROCEDURE — C9113 INJ PANTOPRAZOLE SODIUM, VIA: HCPCS | Performed by: PHYSICIAN ASSISTANT

## 2023-07-10 PROCEDURE — 85018 HEMOGLOBIN: CPT | Performed by: INTERNAL MEDICINE

## 2023-07-10 PROCEDURE — 0DB78ZX EXCISION OF STOMACH, PYLORUS, VIA NATURAL OR ARTIFICIAL OPENING ENDOSCOPIC, DIAGNOSTIC: ICD-10-PCS | Performed by: INTERNAL MEDICINE

## 2023-07-10 PROCEDURE — 99232 SBSQ HOSP IP/OBS MODERATE 35: CPT | Performed by: INTERNAL MEDICINE

## 2023-07-10 PROCEDURE — 83036 HEMOGLOBIN GLYCOSYLATED A1C: CPT | Performed by: INTERNAL MEDICINE

## 2023-07-10 RX ORDER — POTASSIUM CHLORIDE 14.9 MG/ML
20 INJECTION INTRAVENOUS ONCE
Status: COMPLETED | OUTPATIENT
Start: 2023-07-10 | End: 2023-07-11

## 2023-07-10 RX ORDER — SODIUM CHLORIDE 450 MG/100ML
50 INJECTION, SOLUTION INTRAVENOUS CONTINUOUS
Status: DISCONTINUED | OUTPATIENT
Start: 2023-07-10 | End: 2023-07-11

## 2023-07-10 RX ORDER — METOCLOPRAMIDE HYDROCHLORIDE 5 MG/ML
10 INJECTION INTRAMUSCULAR; INTRAVENOUS ONCE
Status: COMPLETED | OUTPATIENT
Start: 2023-07-10 | End: 2023-07-10

## 2023-07-10 RX ORDER — BISACODYL 5 MG/1
20 TABLET, DELAYED RELEASE ORAL ONCE
Status: COMPLETED | OUTPATIENT
Start: 2023-07-11 | End: 2023-07-11

## 2023-07-10 RX ORDER — PROPOFOL 10 MG/ML
INJECTION, EMULSION INTRAVENOUS CONTINUOUS PRN
Status: DISCONTINUED | OUTPATIENT
Start: 2023-07-10 | End: 2023-07-10

## 2023-07-10 RX ORDER — PANTOPRAZOLE SODIUM 40 MG/1
40 TABLET, DELAYED RELEASE ORAL
Status: DISCONTINUED | OUTPATIENT
Start: 2023-07-10 | End: 2023-07-13 | Stop reason: HOSPADM

## 2023-07-10 RX ORDER — PROPOFOL 10 MG/ML
INJECTION, EMULSION INTRAVENOUS AS NEEDED
Status: DISCONTINUED | OUTPATIENT
Start: 2023-07-10 | End: 2023-07-10

## 2023-07-10 RX ORDER — BISACODYL 5 MG/1
20 TABLET, DELAYED RELEASE ORAL ONCE
Status: COMPLETED | OUTPATIENT
Start: 2023-07-10 | End: 2023-07-10

## 2023-07-10 RX ORDER — LIDOCAINE HYDROCHLORIDE 10 MG/ML
INJECTION, SOLUTION EPIDURAL; INFILTRATION; INTRACAUDAL; PERINEURAL AS NEEDED
Status: DISCONTINUED | OUTPATIENT
Start: 2023-07-10 | End: 2023-07-10

## 2023-07-10 RX ORDER — FENTANYL CITRATE 50 UG/ML
INJECTION, SOLUTION INTRAMUSCULAR; INTRAVENOUS AS NEEDED
Status: DISCONTINUED | OUTPATIENT
Start: 2023-07-10 | End: 2023-07-10

## 2023-07-10 RX ADMIN — PANTOPRAZOLE SODIUM 40 MG: 40 TABLET, DELAYED RELEASE ORAL at 10:31

## 2023-07-10 RX ADMIN — POLYETHYLENE GLYCOL 3350, SODIUM SULFATE ANHYDROUS, SODIUM BICARBONATE, SODIUM CHLORIDE, POTASSIUM CHLORIDE 4000 ML: 236; 22.74; 6.74; 5.86; 2.97 POWDER, FOR SOLUTION ORAL at 15:55

## 2023-07-10 RX ADMIN — LORATADINE 10 MG: 10 TABLET ORAL at 07:51

## 2023-07-10 RX ADMIN — SODIUM CHLORIDE 50 ML/HR: 0.45 INJECTION, SOLUTION INTRAVENOUS at 10:31

## 2023-07-10 RX ADMIN — B-COMPLEX W/ C & FOLIC ACID TAB 1 TABLET: TAB at 07:50

## 2023-07-10 RX ADMIN — PANTOPRAZOLE SODIUM 8 MG/HR: 40 INJECTION, POWDER, FOR SOLUTION INTRAVENOUS at 04:45

## 2023-07-10 RX ADMIN — FOLIC ACID: 5 INJECTION, SOLUTION INTRAMUSCULAR; INTRAVENOUS; SUBCUTANEOUS at 07:49

## 2023-07-10 RX ADMIN — CEFTRIAXONE SODIUM 1000 MG: 10 INJECTION, POWDER, FOR SOLUTION INTRAVENOUS at 07:49

## 2023-07-10 RX ADMIN — LORAZEPAM 0.5 MG: 2 INJECTION INTRAMUSCULAR; INTRAVENOUS at 11:57

## 2023-07-10 RX ADMIN — SODIUM CHLORIDE 100 ML/HR: 0.9 INJECTION, SOLUTION INTRAVENOUS at 07:55

## 2023-07-10 RX ADMIN — LORAZEPAM 0.5 MG: 2 INJECTION INTRAMUSCULAR; INTRAVENOUS at 23:44

## 2023-07-10 RX ADMIN — LISINOPRIL 30 MG: 20 TABLET ORAL at 07:50

## 2023-07-10 RX ADMIN — PROPOFOL 150 MCG/KG/MIN: 10 INJECTION, EMULSION INTRAVENOUS at 09:30

## 2023-07-10 RX ADMIN — POTASSIUM CHLORIDE 20 MEQ: 14.9 INJECTION, SOLUTION INTRAVENOUS at 10:31

## 2023-07-10 RX ADMIN — BISACODYL 20 MG: 5 TABLET, COATED ORAL at 15:52

## 2023-07-10 RX ADMIN — FENTANYL CITRATE 50 MCG: 50 INJECTION INTRAMUSCULAR; INTRAVENOUS at 09:30

## 2023-07-10 RX ADMIN — LIDOCAINE HYDROCHLORIDE 50 MG: 10 INJECTION, SOLUTION EPIDURAL; INFILTRATION; INTRACAUDAL at 09:30

## 2023-07-10 RX ADMIN — METOCLOPRAMIDE 10 MG: 5 INJECTION, SOLUTION INTRAMUSCULAR; INTRAVENOUS at 08:10

## 2023-07-10 RX ADMIN — FENOFIBRATE 145 MG: 145 TABLET ORAL at 07:50

## 2023-07-10 RX ADMIN — LORAZEPAM 0.5 MG: 2 INJECTION INTRAMUSCULAR; INTRAVENOUS at 01:22

## 2023-07-10 RX ADMIN — LEVOTHYROXINE SODIUM 75 MCG: 75 TABLET ORAL at 04:46

## 2023-07-10 RX ADMIN — MELATONIN TAB 3 MG 3 MG: 3 TAB at 22:39

## 2023-07-10 RX ADMIN — PROPOFOL 100 MG: 10 INJECTION, EMULSION INTRAVENOUS at 09:30

## 2023-07-10 RX ADMIN — FLUOXETINE 40 MG: 20 CAPSULE ORAL at 07:50

## 2023-07-10 RX ADMIN — IRON SUCROSE 200 MG: 20 INJECTION, SOLUTION INTRAVENOUS at 11:57

## 2023-07-10 NOTE — PLAN OF CARE
Problem: PAIN - ADULT  Goal: Verbalizes/displays adequate comfort level or baseline comfort level  Description: Interventions:  - Encourage patient to monitor pain and request assistance  - Assess pain using appropriate pain scale  - Administer analgesics based on type and severity of pain and evaluate response  - Implement non-pharmacological measures as appropriate and evaluate response  - Consider cultural and social influences on pain and pain management  - Notify physician/advanced practitioner if interventions unsuccessful or patient reports new pain  Outcome: Progressing     Problem: INFECTION - ADULT  Goal: Absence or prevention of progression during hospitalization  Description: INTERVENTIONS:  - Assess and monitor for signs and symptoms of infection  - Monitor lab/diagnostic results  - Monitor all insertion sites, i.e. indwelling lines, tubes, and drains  - Monitor endotracheal if appropriate and nasal secretions for changes in amount and color  - [place] appropriate cooling/warming therapies per order  - Administer medications as ordered  - Instruct and encourage patient and family to use good hand hygiene technique  - Identify and instruct in appropriate isolation precautions for identified infection/condition  Outcome: Progressing     Problem: DISCHARGE PLANNING  Goal: Discharge to home or other facility with appropriate resources  Description: INTERVENTIONS:  - Identify barriers to discharge w/patient and caregiver  - Arrange for needed discharge resources and transportation as appropriate  - Identify discharge learning needs (meds, wound care, etc.)  - Arrange for interpretive services to assist at discharge as needed  - Refer to Case Management Department for coordinating discharge planning if the patient needs post-hospital services based on physician/advanced practitioner order or complex needs related to functional status, cognitive ability, or social support system  Outcome: Progressing Problem: Knowledge Deficit  Goal: Patient/family/caregiver demonstrates understanding of disease process, treatment plan, medications, and discharge instructions  Description: Complete learning assessment and assess knowledge base.   Interventions:  - Provide teaching at level of understanding  - Provide teaching via preferred learning methods  Outcome: Progressing     Problem: CARDIOVASCULAR - ADULT  Goal: Maintains optimal cardiac output and hemodynamic stability  Description: INTERVENTIONS:  - Monitor I/O, vital signs and rhythm  - Monitor for S/S and trends of decreased cardiac output  - Administer and titrate ordered vasoactive medications to optimize hemodynamic stability  - Assess quality of pulses, skin color and temperature  - Assess for signs of decreased coronary artery perfusion  - Instruct patient to report change in severity of symptoms  Outcome: Progressing  Goal: Absence of cardiac dysrhythmias or at baseline rhythm  Description: INTERVENTIONS:  - Continuous cardiac monitoring, vital signs, obtain 12 lead EKG if ordered  - Administer antiarrhythmic and heart rate control medications as ordered  - Monitor electrolytes and administer replacement therapy as ordered  Outcome: Progressing     Problem: GASTROINTESTINAL - ADULT  Goal: Maintains or returns to baseline bowel function  Description: INTERVENTIONS:  - Assess bowel function  - Encourage oral fluids to ensure adequate hydration  - Administer IV fluids if ordered to ensure adequate hydration  - Administer ordered medications as needed  - Encourage mobilization and activity  - Consider nutritional services referral to assist patient with adequate nutrition and appropriate food choices  Outcome: Progressing  Goal: Maintains adequate nutritional intake  Description: INTERVENTIONS:  - Monitor percentage of each meal consumed  - Identify factors contributing to decreased intake, treat as appropriate  - Assist with meals as needed  - Monitor I&O, weight, and lab values if indicated  - Obtain nutrition services referral as needed  Outcome: Progressing     Problem: METABOLIC, FLUID AND ELECTROLYTES - ADULT  Goal: Electrolytes maintained within normal limits  Description: INTERVENTIONS:  - Monitor labs and assess patient for signs and symptoms of electrolyte imbalances  - Administer electrolyte replacement as ordered  - Monitor response to electrolyte replacements, including repeat lab results as appropriate  - Instruct patient on fluid and nutrition as appropriate  Outcome: Progressing     Problem: HEMATOLOGIC - ADULT  Goal: Maintains hematologic stability  Description: INTERVENTIONS  - Assess for signs and symptoms of bleeding or hemorrhage  - Monitor labs  - Administer supportive blood products/factors as ordered and appropriate  Outcome: Progressing

## 2023-07-10 NOTE — ANESTHESIA POSTPROCEDURE EVALUATION
Post-Op Assessment Note    CV Status:  Stable  Pain Score: 0    Pain management: adequate     Mental Status:  Alert and awake   Hydration Status:  Euvolemic   PONV Controlled:  Controlled   Airway Patency:  Patent      Post Op Vitals Reviewed: Yes      Staff: CRNA         No notable events documented.     BP   117/56   Temp 98.8   Pulse 94   Resp 20   SpO2 97%

## 2023-07-10 NOTE — PROGRESS NOTES
[facility]  Progress Note  Name: Darrell Mitchell  MRN: [id number]  Unit/Bed#: [facility] 509-24 I Date of Admission: 7/9/2023   Date of Service: 7/10/2023 I Hospital Day: 1    Assessment/Plan   * GI bleed  Assessment & Plan  History of GI bleed  Upper endoscopy reveals esophagitis, gastritis  Continue IV pantoprazole  GI plans for colonoscopy noted  Monitor hemoglobin      Hypernatremia  Assessment & Plan  Likely due to poor p.o. intake dehydration  We will provide hypotonic IV fluids  Monitor closely    Transaminitis  Assessment & Plan  Transaminitis noted  Alcohol abstinence encouraged  GI inputs noted    SIRS (systemic inflammatory response syndrome)  Assessment & Plan  SIRS present on admission  Monitor    Alcohol abuse  Assessment & Plan  History of alcohol abuse  Alcohol abstinence encouraged  Transfer protocol  Thiamine folic acid  Supportive cares      Acute blood loss anemia  Assessment & Plan  Acute blood loss anemia likely due to GI blood loss  Monitor hemoglobin  S/p EGD with plans for colonoscopy with GI tomorrow      Hypothyroidism  Assessment & Plan  Continue Levothyroxine    Hyperlipidemia  Assessment & Plan  On Tricor/Lipitor at home  Resume statins when LFTs improve                   VTE Pharmacologic Prophylaxis: VTE Score: 2 Low Risk (Score 0-2) - Encourage Ambulation. Patient Centered Rounds: I performed bedside rounds with nursing staff today. Discussions with Specialists or Other Care Team Provider: GI, case management    Education and Discussions with Family / Patient: Cussed with the patient, reports he is keeping his family updated.      Total Time Spent on Date of Encounter in care of patient: 35 minutes This time was spent on one or more of the following: performing physical exam; counseling and coordination of care; obtaining or reviewing history; documenting in the medical record; reviewing/ordering tests, medications or procedures; communicating with other healthcare professionals and discussing with patient's family/caregivers. Current Length of Stay: 1 day(s)  Current Patient Status: Inpatient   Certification Statement: The patient will continue to require additional inpatient hospital stay due to As outlined  Discharge Plan: Awaiting clinical and symptomatic improvement, GI plans for colonoscopy noted    Code Status: Level 1 - Full Code    Subjective:     Comfortably in bed  Back from GI suite  Reports feeling okay  History chart labs medications reviewed    Objective:     Vitals:   Temp (24hrs), Av.7 °F (37.1 °C), Min:97.6 °F (36.4 °C), Max:99.5 °F (37.5 °C)    Temp:  [97.6 °F (36.4 °C)-99.5 °F (37.5 °C)] 99 °F (37.2 °C)  HR:  [] 87  Resp:  [16-20] 20  BP: (109-152)/(58-85) 111/61  SpO2:  [93 %-98 %] 98 %  Body mass index is 34.02 kg/m². Input and Output Summary (last 24 hours): Intake/Output Summary (Last 24 hours) at 7/10/2023 1715  Last data filed at 7/10/2023 1300  Gross per 24 hour   Intake 2210 ml   Output 351 ml   Net 1859 ml       Physical Exam:   Physical Exam       Comfortably in bed  Obese  Short thick neck  Lungs diminished breath sounds bilateral  Heart sounds S1 and S2 noted  Abdomen soft nontender  Awake obey simple commands  No edema  No rash    Additional Data:     Labs:  Results from last 7 days   Lab Units 07/10/23  0447 23  1057 23  0906   WBC Thousand/uL  --   --  9.13   HEMOGLOBIN g/dL 7.9*   < > 12.9   HEMATOCRIT % 24.8*   < > 38.2   PLATELETS Thousands/uL  --   --  207   NEUTROS PCT %  --   --  75   LYMPHS PCT %  --   --  15   MONOS PCT %  --   --  7   EOS PCT %  --   --  1    < > = values in this interval not displayed.      Results from last 7 days   Lab Units 07/10/23  0447   SODIUM mmol/L 149*   POTASSIUM mmol/L 3.2*   CHLORIDE mmol/L 116*   CO2 mmol/L 27   BUN mg/dL 24   CREATININE mg/dL 0.67   ANION GAP mmol/L 6   CALCIUM mg/dL 7.9*   ALBUMIN g/dL 2.8*   TOTAL BILIRUBIN mg/dL 0.60   ALK PHOS U/L 63   ALT U/L 61   AST U/L 55*   GLUCOSE RANDOM mg/dL 134     Results from last 7 days   Lab Units 07/09/23  0906   INR  1.10                   Lines/Drains:  Invasive Devices     Peripheral Intravenous Line  Duration           Peripheral IV 07/10/23 Dorsal (posterior); Right Hand <1 day    Peripheral IV 07/10/23 Left;Upper;Ventral (anterior) Arm <1 day                      Imaging: Reviewed radiology reports from this admission including: abdominal/pelvic CT and procedure reports    Recent Cultures (last 7 days):         Last 24 Hours Medication List:   Current Facility-Administered Medications   Medication Dose Route Frequency Provider Last Rate   • acetaminophen  650 mg Oral Q6H PRN Larissa Almendarez MD     • albuterol  1 puff Inhalation Q4H PRN Larissa Almendarez MD     • [START ON 7/11/2023] bisacodyl  20 mg Oral Once Sunday MD Lianna     • fenofibrate  145 mg Oral Daily Larissa Almendarez MD     • FLUoxetine  40 mg Oral Daily Larissa Almendarez MD     • folic acid 1 mg, thiamine (VITAMIN B1) 100 mg in sodium chloride 0.9 % 100 mL IV piggyback   Intravenous Daily Larissa Almendarez MD     • iron sucrose  200 mg Intravenous Daily Anneliese Freire MD     • levothyroxine  75 mcg Oral Early Morning Larissa Almendarez MD     • lisinopril  30 mg Oral Daily Larissa Almendarez MD     • loratadine  10 mg Oral Daily Larissa Almendarez MD     • LORazepam  0.5 mg Intravenous Q12H Larissa Almendarez MD     • melatonin  3 mg Oral HS Daisy Martinez PA-C     • multivitamin stress formula  1 tablet Oral Daily Larissa Almendarez MD     • ondansetron  4 mg Intravenous Q4H PRN Larissa Almendarez MD     • pantoprazole  40 mg Oral Early Morning Sunday MD Lianna     • sodium chloride  50 mL/hr Intravenous Continuous Anneliese Freire MD 50 mL/hr (07/10/23 1031)        Today, Patient Was Seen By: Anneliese Freire MD    **Please Note: This note may have been constructed using a voice recognition system. **

## 2023-07-10 NOTE — ASSESSMENT & PLAN NOTE
Acute blood loss anemia likely due to GI blood loss  Monitor hemoglobin  S/p EGD with plans for colonoscopy with GI tomorrow

## 2023-07-10 NOTE — ASSESSMENT & PLAN NOTE
History of alcohol abuse  Alcohol abstinence encouraged  Transfer protocol  Thiamine folic acid  Supportive cares

## 2023-07-10 NOTE — PLAN OF CARE
Problem: Potential for Falls  Goal: Patient will remain free of falls  Description: INTERVENTIONS:  - Educate patient/family on patient safety including physical limitations  - Instruct patient to call for assistance with activity   - Consult OT/PT to assist with strengthening/mobility   - Keep Call bell within reach  - Keep bed low and locked with side rails adjusted as appropriate  - Keep care items and personal belongings within reach  - Initiate and maintain comfort rounds  - Make Fall Risk Sign visible to staff  - Offer Toileting every  Hours, in advance of need  - Initiate/Maintain alarm  - Obtain necessary fall risk management equipment:   - Apply yellow socks and bracelet for high fall risk patients  - Consider moving patient to room near nurses station  Outcome: Progressing     Problem: PAIN - ADULT  Goal: Verbalizes/displays adequate comfort level or baseline comfort level  Description: Interventions:  - Encourage patient to monitor pain and request assistance  - Assess pain using appropriate pain scale  - Administer analgesics based on type and severity of pain and evaluate response  - Implement non-pharmacological measures as appropriate and evaluate response  - Consider cultural and social influences on pain and pain management  - Notify physician/advanced practitioner if interventions unsuccessful or patient reports new pain  Outcome: Progressing     Problem: INFECTION - ADULT  Goal: Absence or prevention of progression during hospitalization  Description: INTERVENTIONS:  - Assess and monitor for signs and symptoms of infection  - Monitor lab/diagnostic results  - Monitor all insertion sites, i.e. indwelling lines, tubes, and drains  - Monitor endotracheal if appropriate and nasal secretions for changes in amount and color  - [place] appropriate cooling/warming therapies per order  - Administer medications as ordered  - Instruct and encourage patient and family to use good hand hygiene technique  - Identify and instruct in appropriate isolation precautions for identified infection/condition  Outcome: Progressing  Goal: Absence of fever/infection during neutropenic period  Description: INTERVENTIONS:  - Monitor WBC    Outcome: Progressing     Problem: SAFETY ADULT  Goal: Patient will remain free of falls  Description: INTERVENTIONS:  - Educate patient/family on patient safety including physical limitations  - Instruct patient to call for assistance with activity   - Consult OT/PT to assist with strengthening/mobility   - Keep Call bell within reach  - Keep bed low and locked with side rails adjusted as appropriate  - Keep care items and personal belongings within reach  - Initiate and maintain comfort rounds  - Make Fall Risk Sign visible to staff  - Offer Toileting every  Hours, in advance of need  - Initiate/Maintain alarm  - Obtain necessary fall risk management equipment:   - Apply yellow socks and bracelet for high fall risk patients  - Consider moving patient to room near nurses station  Outcome: Progressing  Goal: Maintain or return to baseline ADL function  Description: INTERVENTIONS:  -  Assess patient's ability to carry out ADLs; assess patient's baseline for ADL function and identify physical deficits which impact ability to perform ADLs (bathing, care of mouth/teeth, toileting, grooming, dressing, etc.)  - Assess/evaluate cause of self-care deficits   - Assess range of motion  - Assess patient's mobility; develop plan if impaired  - Assess patient's need for assistive devices and provide as appropriate  - Encourage maximum independence but intervene and supervise when necessary  - Involve family in performance of ADLs  - Assess for home care needs following discharge   - Consider OT consult to assist with ADL evaluation and planning for discharge  - Provide patient education as appropriate  Outcome: Progressing  Goal: Maintains/Returns to pre admission functional level  Description: INTERVENTIONS:  - Perform BMAT or MOVE assessment daily.   - Set and communicate daily mobility goal to care team and patient/family/caregiver. - Collaborate with rehabilitation services on mobility goals if consulted  - Perform Range of Motion  times a day. - Reposition patient every  hours. - Dangle patient  times a day  - Stand patient  times a day  - Ambulate patient  times a day  - Out of bed to chair  times a day   - Out of bed for meals times a day  - Out of bed for toileting  - Record patient progress and toleration of activity level   Outcome: Progressing     Problem: DISCHARGE PLANNING  Goal: Discharge to home or other facility with appropriate resources  Description: INTERVENTIONS:  - Identify barriers to discharge w/patient and caregiver  - Arrange for needed discharge resources and transportation as appropriate  - Identify discharge learning needs (meds, wound care, etc.)  - Arrange for interpretive services to assist at discharge as needed  - Refer to Case Management Department for coordinating discharge planning if the patient needs post-hospital services based on physician/advanced practitioner order or complex needs related to functional status, cognitive ability, or social support system  Outcome: Progressing     Problem: Knowledge Deficit  Goal: Patient/family/caregiver demonstrates understanding of disease process, treatment plan, medications, and discharge instructions  Description: Complete learning assessment and assess knowledge base.   Interventions:  - Provide teaching at level of understanding  - Provide teaching via preferred learning methods  Outcome: Progressing

## 2023-07-10 NOTE — CASE MANAGEMENT
Case Management Assessment & Discharge Planning Note    Patient name Sal Damian  [facility] [address] MRN [id number]  : 1960 Date 7/10/2023       Current Admission Date: 2023  Current Admission Diagnosis:GI bleed   Patient Active Problem List    Diagnosis Date Noted   • GI bleed 2023   • Acute blood loss anemia 2023   • Alcohol abuse 2023   • SIRS (systemic inflammatory response syndrome) 2023   • Transaminitis 2023   • Transition of care performed with sharing of clinical summary 2018   • Alcohol-induced acute pancreatitis 2018   • Leukopenia 2018   • Platelets decreased ([address]) 2018   • Generalized anxiety disorder 2018   • Hypertension 2018   • Hyperlipidemia 2018   • Impaired fasting glucose 2018   • Hypothyroidism 2018   • Habitual alcohol use 2018   • Routine health maintenance 2018   • Gastroesophageal reflux disease without esophagitis 2018      LOS (days): 1  Geometric Mean LOS (GMLOS) (days): 3.00  Days to GMLOS:1.9     OBJECTIVE:    Risk of Unplanned Readmission Score: 11.76         Current admission status: Inpatient       Preferred Pharmacy:   [address] [place] [id number]  Phone: [phone] Fax: [phone]    Primary Care Provider: Lili Vela DO    Primary Insurance: MEDICARE  Secondary Insurance: AARP    ASSESSMENT:  Patricia Proxies    There are no active Health Care Proxies on file. Readmission Root Cause  30 Day Readmission: No    Patient Information  Admitted from[de-identified] Home  Mental Status: Alert  During Assessment patient was accompanied by: Not accompanied during assessment  Assessment information provided by[de-identified] Patient  Primary Caregiver: Self  Support Systems: Michael Tillman Degree of Residence: [address] do you live in?: [address] entry access options. Select all that apply.: Stairs  Number of steps to enter home.: 3  Type of Current Residence: [place]  In the last 12 months, was there a time when you were not able to pay the mortgage or rent on time?: No  In the last 12 months, was there a time when you did not have a steady place to sleep or slept in a shelter (including now)?: No  Homeless/housing insecurity resource given?: N/A  Living Arrangements: Lives w/ Friend  Is patient a ?: No    Activities of Daily Living Prior to Admission  Functional Status: Independent  Completes ADLs independently?: Yes  Ambulates independently?: Yes  Does patient use assisted devices?: No  Does patient currently own DME?: No  Does patient have a history of Outpatient Therapy (PT/OT)?: No  Does the patient have a history of Short-Term Rehab?: No  Does patient have a history of HHC?: No  Does patient currently have [address]?: No         Patient Information Continued  Income Source: Unemployed  Does patient have prescription coverage?: Yes  Within the past 12 months, you worried that your food would run out before you got the money to buy more.: Never true  Within the past 12 months, the food you bought just didn't last and you didn't have money to get more.: Never true  Food insecurity resource given?: N/A  Does patient receive dialysis treatments?: No  Does patient have a history of substance abuse?: Yes  Historical substance use preference: Alcohol/ETOH  Is patient currently in treatment for substance abuse?: No. Patient declined treatment information.   Does patient have a history of Mental Health Diagnosis?: No         Means of Transportation  In the past 12 months, has lack of transportation kept you from medical appointments or from getting medications?: No  In the past 12 months, has lack of transportation kept you from meetings, work, or from getting things needed for daily living?: No  Was application for public transport provided?: N/A        DISCHARGE DETAILS:    Discharge planning discussed with[de-identified] patient at bedside  Freedom of Choice: Yes     CM contacted family/caregiver?: No- see comments (patient's significant other is currently in rehab)  Were Treatment Team discharge recommendations reviewed with patient/caregiver?: Yes  Did patient/caregiver verbalize understanding of patient care needs?: Yes  Were patient/caregiver advised of the risks associated with not following Treatment Team discharge recommendations?: Yes    Contacts  Patient Contacts: Enoc Bhatt, friend  Relationship to Patient[de-identified] Family  Contact Method: Phone  Phone Number: [phone]                        Treatment Team Recommendation: Other (TBD)  Discharge Destination Plan[de-identified] Other (TBD -- awaiting recommendations)  Transport at Discharge : [address] Sen received discharge checklist.  Content reviewed. Patient/caregiver encouraged to participate in discharge plan of care prior to discharge home. CM reviewed d/c planning process including the following: identifying help at home, patient preference for d/c planning needs, Discharge Lounge, [facility] Meds to Bed program, availability of treatment team to discuss questions or concerns patient and/or family may have regarding understanding medications and recognizing signs and symptoms once discharged. CM also encouraged patient to follow up with all recommended appointments after discharge. Patient advised of importance for patient and family to participate in managing patient’s medical well being. Additional Comments: Introduced self and role to patient at bedside. Patient independent at baseline, did acknowledge alcohol use but denied the need for substance abuse evaluation or treatment. Patient's partner Adalid Lasbradford is currently at [facility] for rehab, assisted patient in [address] to notify him of his admission to the hospital so Adalid John is able to contact him.   Patient will need a Lyft at discharge. CM to follow.

## 2023-07-10 NOTE — ANESTHESIA PREPROCEDURE EVALUATION
Procedure:  EGD    Relevant Problems   CARDIO   (+) Hyperlipidemia   (+) Hypertension      ENDO   (+) Hypothyroidism      GI/HEPATIC   (+) Alcohol-induced acute pancreatitis   (+) GI bleed   (+) Gastroesophageal reflux disease without esophagitis      HEMATOLOGY   (+) Acute blood loss anemia      NEURO/PSYCH   (+) Generalized anxiety disorder        Physical Exam    Airway    Mallampati score: II  TM Distance: >3 FB  Neck ROM: full     Dental       Cardiovascular      Pulmonary      Other Findings        Anesthesia Plan  ASA Score- 3     Anesthesia Type- IV sedation with anesthesia with ASA Monitors. Additional Monitors:   Airway Plan:     Comment: Saturday last drink. Denies withdrawal symptoms. Denies N/V. Last emesis > 24 hrs. .       Plan Factors-Exercise tolerance (METS): >4 METS. Chart reviewed. Patient is not a current smoker. Patient did not smoke on day of surgery. Obstructive sleep apnea risk education given perioperatively. Induction- intravenous. Postoperative Plan-     Informed Consent- Anesthetic plan and risks discussed with patient. I personally reviewed this patient with the CRNA. Discussed and agreed on the Anesthesia Plan with the CRNA. .        NPO appropriate. Discussed benefits/risks of monitored anesthetic care and discussed providing a dynamic level of mild to deep sedation. Risks include awareness, airway obstruction, aspiration which may necessitate conversion to general anesthesia. All questions answered. Patient understands and wishes to proceed. Anesthesia plan and consent discussed with Lynnea Essex who expressed understanding and agreement. Risks/benefits and alternatives discussed with patient including possible PONV, sore throat, damage to teeth/lips/gums and possibility of rare anesthetic and surgical emergencies.

## 2023-07-10 NOTE — ASSESSMENT & PLAN NOTE
History of GI bleed  Upper endoscopy reveals esophagitis, gastritis  Continue IV pantoprazole  GI plans for colonoscopy noted  Monitor hemoglobin

## 2023-07-11 ENCOUNTER — APPOINTMENT (INPATIENT)
Dept: GASTROENTEROLOGY | Facility: HOSPITAL | Age: 63
DRG: 368 | End: 2023-07-11
Payer: MEDICARE

## 2023-07-11 ENCOUNTER — ANESTHESIA EVENT (INPATIENT)
Dept: GASTROENTEROLOGY | Facility: HOSPITAL | Age: 63
DRG: 368 | End: 2023-07-11
Payer: MEDICARE

## 2023-07-11 ENCOUNTER — ANESTHESIA (INPATIENT)
Dept: GASTROENTEROLOGY | Facility: HOSPITAL | Age: 63
DRG: 368 | End: 2023-07-11
Payer: MEDICARE

## 2023-07-11 PROBLEM — E83.42 HYPOMAGNESEMIA: Status: ACTIVE | Noted: 2023-07-11

## 2023-07-11 PROBLEM — E66.9 OBESE: Status: ACTIVE | Noted: 2023-07-11

## 2023-07-11 PROBLEM — R42 LIGHTHEADED: Status: ACTIVE | Noted: 2023-07-11

## 2023-07-11 LAB
ALBUMIN SERPL BCP-MCNC: 2.5 G/DL (ref 3.5–5)
ALP SERPL-CCNC: 50 U/L (ref 46–116)
ALT SERPL W P-5'-P-CCNC: 47 U/L (ref 12–78)
ANION GAP SERPL CALCULATED.3IONS-SCNC: 5 MMOL/L
ANION GAP SERPL CALCULATED.3IONS-SCNC: 7 MMOL/L
AST SERPL W P-5'-P-CCNC: 55 U/L (ref 5–45)
ATRIAL RATE: 119 BPM
BASOPHILS # BLD AUTO: 0.02 THOUSANDS/ÂΜL (ref 0–0.1)
BASOPHILS NFR BLD AUTO: 1 % (ref 0–1)
BILIRUB SERPL-MCNC: 0.41 MG/DL (ref 0.2–1)
BUN SERPL-MCNC: 15 MG/DL (ref 5–25)
BUN SERPL-MCNC: 19 MG/DL (ref 5–25)
CALCIUM ALBUM COR SERPL-MCNC: 8.2 MG/DL (ref 8.3–10.1)
CALCIUM SERPL-MCNC: 7 MG/DL (ref 8.3–10.1)
CALCIUM SERPL-MCNC: 7.3 MG/DL (ref 8.3–10.1)
CHLORIDE SERPL-SCNC: 109 MMOL/L (ref 96–108)
CHLORIDE SERPL-SCNC: 112 MMOL/L (ref 96–108)
CO2 SERPL-SCNC: 23 MMOL/L (ref 21–32)
CO2 SERPL-SCNC: 25 MMOL/L (ref 21–32)
CREAT SERPL-MCNC: 1.1 MG/DL (ref 0.6–1.3)
CREAT SERPL-MCNC: 1.16 MG/DL (ref 0.6–1.3)
EOSINOPHIL # BLD AUTO: 0.09 THOUSAND/ÂΜL (ref 0–0.61)
EOSINOPHIL NFR BLD AUTO: 3 % (ref 0–6)
ERYTHROCYTE [DISTWIDTH] IN BLOOD BY AUTOMATED COUNT: 13.9 % (ref 11.6–15.1)
EST. AVERAGE GLUCOSE BLD GHB EST-MCNC: 97 MG/DL
FOLATE SERPL-MCNC: >22.3 NG/ML
GFR SERPL CREATININE-BSD FRML MDRD: 65 ML/MIN/1.73SQ M
GFR SERPL CREATININE-BSD FRML MDRD: 70 ML/MIN/1.73SQ M
GLUCOSE SERPL-MCNC: 134 MG/DL (ref 65–140)
GLUCOSE SERPL-MCNC: 180 MG/DL (ref 65–140)
HBA1C MFR BLD: 5 %
HCT VFR BLD AUTO: 22 % (ref 36.5–49.3)
HGB BLD-MCNC: 7 G/DL (ref 12–17)
HGB BLD-MCNC: 7.7 G/DL (ref 12–17)
HGB BLD-MCNC: 8 G/DL (ref 12–17)
IMM GRANULOCYTES # BLD AUTO: 0.02 THOUSAND/UL (ref 0–0.2)
IMM GRANULOCYTES NFR BLD AUTO: 1 % (ref 0–2)
LYMPHOCYTES # BLD AUTO: 0.77 THOUSANDS/ÂΜL (ref 0.6–4.47)
LYMPHOCYTES NFR BLD AUTO: 25 % (ref 14–44)
MAGNESIUM SERPL-MCNC: 1.3 MG/DL (ref 1.6–2.6)
MCH RBC QN AUTO: 33.5 PG (ref 26.8–34.3)
MCHC RBC AUTO-ENTMCNC: 31.8 G/DL (ref 31.4–37.4)
MCV RBC AUTO: 105 FL (ref 82–98)
MONOCYTES # BLD AUTO: 0.34 THOUSAND/ÂΜL (ref 0.17–1.22)
MONOCYTES NFR BLD AUTO: 11 % (ref 4–12)
NEUTROPHILS # BLD AUTO: 1.81 THOUSANDS/ÂΜL (ref 1.85–7.62)
NEUTS SEG NFR BLD AUTO: 59 % (ref 43–75)
NRBC BLD AUTO-RTO: 0 /100 WBCS
P AXIS: 49 DEGREES
PLATELET # BLD AUTO: 89 THOUSANDS/UL (ref 149–390)
PMV BLD AUTO: 10.2 FL (ref 8.9–12.7)
POTASSIUM SERPL-SCNC: 3.3 MMOL/L (ref 3.5–5.3)
POTASSIUM SERPL-SCNC: 3.6 MMOL/L (ref 3.5–5.3)
PR INTERVAL: 152 MS
PROT SERPL-MCNC: 4.8 G/DL (ref 6.4–8.4)
QRS AXIS: 84 DEGREES
QRSD INTERVAL: 80 MS
QT INTERVAL: 288 MS
QTC INTERVAL: 405 MS
RBC # BLD AUTO: 2.09 MILLION/UL (ref 3.88–5.62)
SODIUM SERPL-SCNC: 137 MMOL/L (ref 135–147)
SODIUM SERPL-SCNC: 144 MMOL/L (ref 135–147)
T WAVE AXIS: 20 DEGREES
VENTRICULAR RATE: 119 BPM
VIT B12 SERPL-MCNC: 840 PG/ML (ref 180–914)
WBC # BLD AUTO: 3.05 THOUSAND/UL (ref 4.31–10.16)

## 2023-07-11 PROCEDURE — 85025 COMPLETE CBC W/AUTO DIFF WBC: CPT | Performed by: INTERNAL MEDICINE

## 2023-07-11 PROCEDURE — 82746 ASSAY OF FOLIC ACID SERUM: CPT | Performed by: INTERNAL MEDICINE

## 2023-07-11 PROCEDURE — 45378 DIAGNOSTIC COLONOSCOPY: CPT | Performed by: INTERNAL MEDICINE

## 2023-07-11 PROCEDURE — 88305 TISSUE EXAM BY PATHOLOGIST: CPT | Performed by: STUDENT IN AN ORGANIZED HEALTH CARE EDUCATION/TRAINING PROGRAM

## 2023-07-11 PROCEDURE — 93010 ELECTROCARDIOGRAM REPORT: CPT | Performed by: INTERNAL MEDICINE

## 2023-07-11 PROCEDURE — 99232 SBSQ HOSP IP/OBS MODERATE 35: CPT | Performed by: INTERNAL MEDICINE

## 2023-07-11 PROCEDURE — 80053 COMPREHEN METABOLIC PANEL: CPT | Performed by: INTERNAL MEDICINE

## 2023-07-11 PROCEDURE — 83735 ASSAY OF MAGNESIUM: CPT | Performed by: INTERNAL MEDICINE

## 2023-07-11 PROCEDURE — 0DJD8ZZ INSPECTION OF LOWER INTESTINAL TRACT, VIA NATURAL OR ARTIFICIAL OPENING ENDOSCOPIC: ICD-10-PCS | Performed by: INTERNAL MEDICINE

## 2023-07-11 PROCEDURE — 85018 HEMOGLOBIN: CPT | Performed by: INTERNAL MEDICINE

## 2023-07-11 PROCEDURE — 80048 BASIC METABOLIC PNL TOTAL CA: CPT | Performed by: INTERNAL MEDICINE

## 2023-07-11 PROCEDURE — 82607 VITAMIN B-12: CPT | Performed by: INTERNAL MEDICINE

## 2023-07-11 RX ORDER — SODIUM CHLORIDE 450 MG/100ML
50 INJECTION, SOLUTION INTRAVENOUS CONTINUOUS
Status: DISPENSED | OUTPATIENT
Start: 2023-07-11 | End: 2023-07-11

## 2023-07-11 RX ORDER — PROPOFOL 10 MG/ML
INJECTION, EMULSION INTRAVENOUS AS NEEDED
Status: DISCONTINUED | OUTPATIENT
Start: 2023-07-11 | End: 2023-07-11

## 2023-07-11 RX ORDER — MAGNESIUM SULFATE HEPTAHYDRATE 40 MG/ML
2 INJECTION, SOLUTION INTRAVENOUS ONCE
Status: COMPLETED | OUTPATIENT
Start: 2023-07-11 | End: 2023-07-11

## 2023-07-11 RX ORDER — POTASSIUM CHLORIDE 20 MEQ/1
40 TABLET, EXTENDED RELEASE ORAL ONCE
Status: COMPLETED | OUTPATIENT
Start: 2023-07-11 | End: 2023-07-11

## 2023-07-11 RX ORDER — ATORVASTATIN CALCIUM 20 MG/1
20 TABLET, FILM COATED ORAL
Status: DISCONTINUED | OUTPATIENT
Start: 2023-07-12 | End: 2023-07-13 | Stop reason: HOSPADM

## 2023-07-11 RX ORDER — LANOLIN ALCOHOL/MO/W.PET/CERES
400 CREAM (GRAM) TOPICAL 2 TIMES DAILY
Status: DISCONTINUED | OUTPATIENT
Start: 2023-07-11 | End: 2023-07-13 | Stop reason: HOSPADM

## 2023-07-11 RX ADMIN — IRON SUCROSE 200 MG: 20 INJECTION, SOLUTION INTRAVENOUS at 09:23

## 2023-07-11 RX ADMIN — MAGNESIUM OXIDE TAB 400 MG (241.3 MG ELEMENTAL MG) 400 MG: 400 (241.3 MG) TAB at 09:27

## 2023-07-11 RX ADMIN — PROPOFOL 120 MG: 10 INJECTION, EMULSION INTRAVENOUS at 14:01

## 2023-07-11 RX ADMIN — SODIUM CHLORIDE 50 ML/HR: 0.45 INJECTION, SOLUTION INTRAVENOUS at 05:11

## 2023-07-11 RX ADMIN — LISINOPRIL 30 MG: 20 TABLET ORAL at 08:03

## 2023-07-11 RX ADMIN — PROPOFOL 40 MG: 10 INJECTION, EMULSION INTRAVENOUS at 14:07

## 2023-07-11 RX ADMIN — FLUOXETINE 40 MG: 20 CAPSULE ORAL at 08:03

## 2023-07-11 RX ADMIN — PANTOPRAZOLE SODIUM 40 MG: 40 TABLET, DELAYED RELEASE ORAL at 05:12

## 2023-07-11 RX ADMIN — SODIUM CHLORIDE: 0.45 INJECTION, SOLUTION INTRAVENOUS at 13:56

## 2023-07-11 RX ADMIN — PROPOFOL 40 MG: 10 INJECTION, EMULSION INTRAVENOUS at 14:04

## 2023-07-11 RX ADMIN — POTASSIUM CHLORIDE 40 MEQ: 1500 TABLET, EXTENDED RELEASE ORAL at 08:03

## 2023-07-11 RX ADMIN — MAGNESIUM OXIDE TAB 400 MG (241.3 MG ELEMENTAL MG) 400 MG: 400 (241.3 MG) TAB at 17:26

## 2023-07-11 RX ADMIN — PROPOFOL 40 MG: 10 INJECTION, EMULSION INTRAVENOUS at 14:12

## 2023-07-11 RX ADMIN — ONDANSETRON 4 MG: 2 INJECTION INTRAMUSCULAR; INTRAVENOUS at 07:57

## 2023-07-11 RX ADMIN — B-COMPLEX W/ C & FOLIC ACID TAB 1 TABLET: TAB at 08:03

## 2023-07-11 RX ADMIN — FOLIC ACID: 5 INJECTION, SOLUTION INTRAMUSCULAR; INTRAVENOUS; SUBCUTANEOUS at 09:23

## 2023-07-11 RX ADMIN — LORAZEPAM 0.5 MG: 2 INJECTION INTRAMUSCULAR; INTRAVENOUS at 23:19

## 2023-07-11 RX ADMIN — MAGNESIUM SULFATE HEPTAHYDRATE 2 G: 40 INJECTION, SOLUTION INTRAVENOUS at 08:06

## 2023-07-11 RX ADMIN — LEVOTHYROXINE SODIUM 75 MCG: 75 TABLET ORAL at 05:12

## 2023-07-11 RX ADMIN — MELATONIN TAB 3 MG 3 MG: 3 TAB at 23:19

## 2023-07-11 RX ADMIN — FENOFIBRATE 145 MG: 145 TABLET ORAL at 08:03

## 2023-07-11 RX ADMIN — BISACODYL 20 MG: 5 TABLET, COATED ORAL at 05:12

## 2023-07-11 RX ADMIN — LORATADINE 10 MG: 10 TABLET ORAL at 08:03

## 2023-07-11 NOTE — PROGRESS NOTES
[facility]  Progress Note  Name: Annemarie Gaucher  MRN: [id number]  Unit/Bed#: [facility] 023-67 I Date of Admission: 7/9/2023   Date of Service: 7/11/2023 I Hospital Day: 2    Assessment/Plan   * GI bleed  Assessment & Plan  Presents with GI bleed  Upper endoscopy revealed esophagitis, gastritis  GI plans for colonoscopy today noted  IV pantoprazole  Monitor hemoglobin  GI following      Lightheaded  Assessment & Plan  Reported feeling lightheaded on standing up  We will check orthostatics  Continue IV fluid hydration  Monitor hemoglobin  Encourage hydration when able  Discussed with RN      Hypomagnesemia  Assessment & Plan  Replete  Monitor    Obese  Assessment & Plan  Therapeutic lifestyle modification encouraged  Outpatient sleep study recommended    Hypernatremia  Assessment & Plan  Hyponatremia likely due to poor p.o. intake dehydration  Sodium levels improving with hypotonic IV fluids  Monitor sodium levels with hypotonic fluids  Encourage hydration when able  Monitor sodium levels closely    Transaminitis  Assessment & Plan  Transaminitis noted improving  Alcohol abstinence encouraged  GI following    SIRS (systemic inflammatory response syndrome)  Assessment & Plan  SIRS present on admission  Monitor    Alcohol abuse  Assessment & Plan  History of alcohol abuse  Alcohol abstinence encouraged  CIWA protocol  Thiamine folic acid  Supportive cares        Acute blood loss anemia  Assessment & Plan  Acute blood loss anemia likely due to GI blood loss  Iron supplementation  Monitor hemoglobin      Hypothyroidism  Assessment & Plan  Continue levothyroxine    Hyperlipidemia  Assessment & Plan  On Tricor/Lipitor at home  Resume statin as LFTs improve             VTE Pharmacologic Prophylaxis:  Moderate Risk (Score 3-4) - Pharmacological DVT Prophylaxis Contraindicated. Sequential Compression Devices Ordered.     Patient Centered Rounds: I performed bedside rounds with nursing staff today.  Discussions with Specialists or Other Care Team Provider: GI    Education and Discussions with Family / Patient: Discussed with the patient, reports he is keeping his family updated. Total Time Spent on Date of Encounter in care of patient: 32  min This time was spent on one or more of the following: performing physical exam; counseling and coordination of care; obtaining or reviewing history; documenting in the medical record; reviewing/ordering tests, medications or procedures; communicating with other healthcare professionals and discussing with patient's family/caregivers. Current Length of Stay: 2 day(s)  Current Patient Status: Inpatient   Certification Statement: The patient will continue to require additional inpatient hospital stay due to As outlined  Discharge Plan: For colonoscopy with GI today    Code Status: Level 1 - Full Code    Subjective:     Comfortably in bed  Reports feeling okay  No more episodes of active GI bleed  Discussed with RN patient is scheduled for colonoscopy today  Encourage out of bed into chair as able  He reports feeling lightheaded on standing up discussed with RN to check orthostatics  Continue IV fluid hydration encourage hydration post colonoscopy when able to take orally  Monitor orthostatics, monitor hemoglobin    Objective:     Vitals:   Temp (24hrs), Av.8 °F (37.1 °C), Min:97.6 °F (36.4 °C), Max:99.4 °F (37.4 °C)    Temp:  [97.6 °F (36.4 °C)-99.4 °F (37.4 °C)] 98.9 °F (37.2 °C)  HR:  [83-92] 83  Resp:  [17-20] 17  BP: (101-111)/(56-61) 101/60  SpO2:  [96 %-100 %] 98 %  Body mass index is 34.02 kg/m². Input and Output Summary (last 24 hours):      Intake/Output Summary (Last 24 hours) at 2023 1221  Last data filed at 2023 0511  Gross per 24 hour   Intake 5573.33 ml   Output 200 ml   Net 5373.33 ml       Physical Exam:   Physical Exam     Comfortably sitting up in bed  Obese  Short thick neck  Lungs diminished breath sounds bilateral  Heart sounds S1-S2 noted  Heart sounds are distant  Abdomen soft nontender  Abdominal obesity noted  Awake alert obey simple commands  No pedal edema  No rash    Additional Data:     Labs:  Results from last 7 days   Lab Units 07/11/23  0819 07/11/23  0511   WBC Thousand/uL  --  3.05*   HEMOGLOBIN g/dL 8.0* 7.0*   HEMATOCRIT %  --  22.0*   PLATELETS Thousands/uL  --  89*   NEUTROS PCT %  --  59   LYMPHS PCT %  --  25   MONOS PCT %  --  11   EOS PCT %  --  3     Results from last 7 days   Lab Units 07/11/23  0511   SODIUM mmol/L 144   POTASSIUM mmol/L 3.3*   CHLORIDE mmol/L 112*   CO2 mmol/L 25   BUN mg/dL 19   CREATININE mg/dL 1.16   ANION GAP mmol/L 7   CALCIUM mg/dL 7.0*   ALBUMIN g/dL 2.5*   TOTAL BILIRUBIN mg/dL 0.41   ALK PHOS U/L 50   ALT U/L 47   AST U/L 55*   GLUCOSE RANDOM mg/dL 134     Results from last 7 days   Lab Units 07/09/23  0906   INR  1.10                   Lines/Drains:  Invasive Devices     Peripheral Intravenous Line  Duration           Peripheral IV 07/10/23 Dorsal (posterior); Right Hand 1 day    Peripheral IV 07/10/23 Left;Upper;Ventral (anterior) Arm 1 day                      Imaging: Reviewed radiology reports from this admission including: abdominal/pelvic CT and procedure reports    Recent Cultures (last 7 days):         Last 24 Hours Medication List:   Current Facility-Administered Medications   Medication Dose Route Frequency Provider Last Rate   • acetaminophen  650 mg Oral Q6H PRN Lily Clark MD     • albuterol  1 puff Inhalation Q4H PRN Lily Clark MD     • [START ON 7/12/2023] atorvastatin  20 mg Oral Daily With Naila Peraza MD     • fenofibrate  145 mg Oral Daily Lily Clark MD     • FLUoxetine  40 mg Oral Daily Lily Clark MD     • folic acid 1 mg, thiamine (VITAMIN B1) 100 mg in sodium chloride 0.9 % 100 mL IV piggyback   Intravenous Daily Lily Clark MD     • iron sucrose  200 mg Intravenous Daily Elly Boyd MD     • levothyroxine  75 mcg Oral Early Morning Luana Tuttle MD     • lisinopril  30 mg Oral Daily Luana Tuttle MD     • loratadine  10 mg Oral Daily Luana Tuttle MD     • LORazepam  0.5 mg Intravenous Q12H Luana Tuttle MD     • magnesium Oxide  400 mg Oral BID Elio Barth MD     • melatonin  3 mg Oral HS Daisy Farfan PA-C     • multivitamin stress formula  1 tablet Oral Daily Luana Tuttle MD     • ondansetron  4 mg Intravenous Q4H PRN Luana Tuttle MD     • pantoprazole  40 mg Oral Early Morning Florencio Kulkarni MD     • sodium chloride  50 mL/hr Intravenous Continuous Elio Barth MD          Today, Patient Was Seen By: Elio Barth MD    **Please Note: This note may have been constructed using a voice recognition system. **

## 2023-07-11 NOTE — ASSESSMENT & PLAN NOTE
Hyponatremia likely due to poor p.o. intake dehydration  Sodium levels improving with hypotonic IV fluids  Monitor sodium levels with hypotonic fluids  Encourage hydration when able  Monitor sodium levels closely

## 2023-07-11 NOTE — PLAN OF CARE
Problem: PAIN - ADULT  Goal: Verbalizes/displays adequate comfort level or baseline comfort level  Description: Interventions:  - Encourage patient to monitor pain and request assistance  - Assess pain using appropriate pain scale  - Administer analgesics based on type and severity of pain and evaluate response  - Implement non-pharmacological measures as appropriate and evaluate response  - Consider cultural and social influences on pain and pain management  - Notify physician/advanced practitioner if interventions unsuccessful or patient reports new pain  Outcome: Progressing     Problem: INFECTION - ADULT  Goal: Absence or prevention of progression during hospitalization  Description: INTERVENTIONS:  - Assess and monitor for signs and symptoms of infection  - Monitor lab/diagnostic results  - Monitor all insertion sites, i.e. indwelling lines, tubes, and drains  - Monitor endotracheal if appropriate and nasal secretions for changes in amount and color  - [place] appropriate cooling/warming therapies per order  - Administer medications as ordered  - Instruct and encourage patient and family to use good hand hygiene technique  - Identify and instruct in appropriate isolation precautions for identified infection/condition  Outcome: Progressing     Problem: DISCHARGE PLANNING  Goal: Discharge to home or other facility with appropriate resources  Description: INTERVENTIONS:  - Identify barriers to discharge w/patient and caregiver  - Arrange for needed discharge resources and transportation as appropriate  - Identify discharge learning needs (meds, wound care, etc.)  - Arrange for interpretive services to assist at discharge as needed  - Refer to Case Management Department for coordinating discharge planning if the patient needs post-hospital services based on physician/advanced practitioner order or complex needs related to functional status, cognitive ability, or social support system  Outcome: Progressing Problem: Knowledge Deficit  Goal: Patient/family/caregiver demonstrates understanding of disease process, treatment plan, medications, and discharge instructions  Description: Complete learning assessment and assess knowledge base.   Interventions:  - Provide teaching at level of understanding  - Provide teaching via preferred learning methods  Outcome: Progressing     Problem: CARDIOVASCULAR - ADULT  Goal: Maintains optimal cardiac output and hemodynamic stability  Description: INTERVENTIONS:  - Monitor I/O, vital signs and rhythm  - Monitor for S/S and trends of decreased cardiac output  - Administer and titrate ordered vasoactive medications to optimize hemodynamic stability  - Assess quality of pulses, skin color and temperature  - Assess for signs of decreased coronary artery perfusion  - Instruct patient to report change in severity of symptoms  Outcome: Progressing

## 2023-07-11 NOTE — PLAN OF CARE
Problem: INFECTION - ADULT  Goal: Absence or prevention of progression during hospitalization  Description: INTERVENTIONS:  - Assess and monitor for signs and symptoms of infection  - Monitor lab/diagnostic results  - Monitor all insertion sites, i.e. indwelling lines, tubes, and drains  - Monitor endotracheal if appropriate and nasal secretions for changes in amount and color  - [place] appropriate cooling/warming therapies per order  - Administer medications as ordered  - Instruct and encourage patient and family to use good hand hygiene technique  - Identify and instruct in appropriate isolation precautions for identified infection/condition  Outcome: Progressing

## 2023-07-11 NOTE — ANESTHESIA POSTPROCEDURE EVALUATION
Post-Op Assessment Note    CV Status:  Stable    Pain management: adequate     Mental Status:  Alert and awake   Hydration Status:  Euvolemic   PONV Controlled:  Controlled   Airway Patency:  Patent      Post Op Vitals Reviewed: Yes            No notable events documented.     BP 96/54 (07/11/23 1420)    Temp 97.8 °F (36.6 °C) (07/11/23 1420)    Pulse 88 (07/11/23 1420)   Resp 18 (07/11/23 1420)    SpO2 98 % (07/11/23 1420)

## 2023-07-11 NOTE — ASSESSMENT & PLAN NOTE
History of alcohol abuse  Alcohol abstinence encouraged  [facility] protocol  Thiamine folic acid  Supportive cares

## 2023-07-11 NOTE — RESULT ENCOUNTER NOTE
Dr. Phillip Shell DO,  Bernard Evin Alejandra results were normal and he has been notified via [address] [place] [address].

## 2023-07-11 NOTE — ASSESSMENT & PLAN NOTE
Reported feeling lightheaded on standing up  We will check orthostatics  Continue IV fluid hydration  Monitor hemoglobin  Encourage hydration when able  Discussed with RN

## 2023-07-12 PROBLEM — D61.818 PANCYTOPENIA (HCC): Status: ACTIVE | Noted: 2023-07-12

## 2023-07-12 LAB
ABO GROUP BLD: NORMAL
ALBUMIN SERPL BCP-MCNC: 2.5 G/DL (ref 3.5–5)
ALP SERPL-CCNC: 51 U/L (ref 46–116)
ALT SERPL W P-5'-P-CCNC: 51 U/L (ref 12–78)
ANION GAP SERPL CALCULATED.3IONS-SCNC: 5 MMOL/L
AST SERPL W P-5'-P-CCNC: 69 U/L (ref 5–45)
BASOPHILS # BLD AUTO: 0.02 THOUSANDS/ÂΜL (ref 0–0.1)
BASOPHILS # BLD AUTO: 0.03 THOUSANDS/ÂΜL (ref 0–0.1)
BASOPHILS NFR BLD AUTO: 1 % (ref 0–1)
BASOPHILS NFR BLD AUTO: 1 % (ref 0–1)
BILIRUB SERPL-MCNC: 0.43 MG/DL (ref 0.2–1)
BLD GP AB SCN SERPL QL: NEGATIVE
BUN SERPL-MCNC: 14 MG/DL (ref 5–25)
CALCIUM ALBUM COR SERPL-MCNC: 8.2 MG/DL (ref 8.3–10.1)
CALCIUM SERPL-MCNC: 7 MG/DL (ref 8.3–10.1)
CHLORIDE SERPL-SCNC: 112 MMOL/L (ref 96–108)
CO2 SERPL-SCNC: 26 MMOL/L (ref 21–32)
CREAT SERPL-MCNC: 0.85 MG/DL (ref 0.6–1.3)
EOSINOPHIL # BLD AUTO: 0.06 THOUSAND/ÂΜL (ref 0–0.61)
EOSINOPHIL # BLD AUTO: 0.07 THOUSAND/ÂΜL (ref 0–0.61)
EOSINOPHIL NFR BLD AUTO: 2 % (ref 0–6)
EOSINOPHIL NFR BLD AUTO: 2 % (ref 0–6)
ERYTHROCYTE [DISTWIDTH] IN BLOOD BY AUTOMATED COUNT: 14.1 % (ref 11.6–15.1)
ERYTHROCYTE [DISTWIDTH] IN BLOOD BY AUTOMATED COUNT: 14.5 % (ref 11.6–15.1)
GFR SERPL CREATININE-BSD FRML MDRD: 91 ML/MIN/1.73SQ M
GLUCOSE SERPL-MCNC: 131 MG/DL (ref 65–140)
HCT VFR BLD AUTO: 22.9 % (ref 36.5–49.3)
HCT VFR BLD AUTO: 25.3 % (ref 36.5–49.3)
HGB BLD-MCNC: 7.2 G/DL (ref 12–17)
HGB BLD-MCNC: 7.5 G/DL (ref 12–17)
HGB BLD-MCNC: 8.2 G/DL (ref 12–17)
IMM GRANULOCYTES # BLD AUTO: 0.02 THOUSAND/UL (ref 0–0.2)
IMM GRANULOCYTES # BLD AUTO: 0.03 THOUSAND/UL (ref 0–0.2)
IMM GRANULOCYTES NFR BLD AUTO: 1 % (ref 0–2)
IMM GRANULOCYTES NFR BLD AUTO: 1 % (ref 0–2)
LYMPHOCYTES # BLD AUTO: 0.78 THOUSANDS/ÂΜL (ref 0.6–4.47)
LYMPHOCYTES # BLD AUTO: 0.78 THOUSANDS/ÂΜL (ref 0.6–4.47)
LYMPHOCYTES NFR BLD AUTO: 25 % (ref 14–44)
LYMPHOCYTES NFR BLD AUTO: 27 % (ref 14–44)
MAGNESIUM SERPL-MCNC: 1.7 MG/DL (ref 1.6–2.6)
MCH RBC QN AUTO: 34 PG (ref 26.8–34.3)
MCH RBC QN AUTO: 35 PG (ref 26.8–34.3)
MCHC RBC AUTO-ENTMCNC: 31.4 G/DL (ref 31.4–37.4)
MCHC RBC AUTO-ENTMCNC: 32.4 G/DL (ref 31.4–37.4)
MCV RBC AUTO: 108 FL (ref 82–98)
MCV RBC AUTO: 108 FL (ref 82–98)
MONOCYTES # BLD AUTO: 0.26 THOUSAND/ÂΜL (ref 0.17–1.22)
MONOCYTES # BLD AUTO: 0.31 THOUSAND/ÂΜL (ref 0.17–1.22)
MONOCYTES NFR BLD AUTO: 11 % (ref 4–12)
MONOCYTES NFR BLD AUTO: 8 % (ref 4–12)
NEUTROPHILS # BLD AUTO: 1.71 THOUSANDS/ÂΜL (ref 1.85–7.62)
NEUTROPHILS # BLD AUTO: 2.02 THOUSANDS/ÂΜL (ref 1.85–7.62)
NEUTS SEG NFR BLD AUTO: 58 % (ref 43–75)
NEUTS SEG NFR BLD AUTO: 63 % (ref 43–75)
NRBC BLD AUTO-RTO: 0 /100 WBCS
NRBC BLD AUTO-RTO: 0 /100 WBCS
PLATELET # BLD AUTO: 123 THOUSANDS/UL (ref 149–390)
PLATELET # BLD AUTO: 95 THOUSANDS/UL (ref 149–390)
PMV BLD AUTO: 10.5 FL (ref 8.9–12.7)
PMV BLD AUTO: 10.8 FL (ref 8.9–12.7)
POTASSIUM SERPL-SCNC: 3.6 MMOL/L (ref 3.5–5.3)
PROT SERPL-MCNC: 4.8 G/DL (ref 6.4–8.4)
RBC # BLD AUTO: 2.12 MILLION/UL (ref 3.88–5.62)
RBC # BLD AUTO: 2.34 MILLION/UL (ref 3.88–5.62)
RH BLD: POSITIVE
SODIUM SERPL-SCNC: 143 MMOL/L (ref 135–147)
SPECIMEN EXPIRATION DATE: NORMAL
WBC # BLD AUTO: 2.92 THOUSAND/UL (ref 4.31–10.16)
WBC # BLD AUTO: 3.17 THOUSAND/UL (ref 4.31–10.16)

## 2023-07-12 PROCEDURE — 86850 RBC ANTIBODY SCREEN: CPT | Performed by: PHYSICIAN ASSISTANT

## 2023-07-12 PROCEDURE — 85025 COMPLETE CBC W/AUTO DIFF WBC: CPT | Performed by: INTERNAL MEDICINE

## 2023-07-12 PROCEDURE — 86901 BLOOD TYPING SEROLOGIC RH(D): CPT | Performed by: PHYSICIAN ASSISTANT

## 2023-07-12 PROCEDURE — 80053 COMPREHEN METABOLIC PANEL: CPT | Performed by: INTERNAL MEDICINE

## 2023-07-12 PROCEDURE — 83735 ASSAY OF MAGNESIUM: CPT | Performed by: INTERNAL MEDICINE

## 2023-07-12 PROCEDURE — 85018 HEMOGLOBIN: CPT | Performed by: INTERNAL MEDICINE

## 2023-07-12 PROCEDURE — 85025 COMPLETE CBC W/AUTO DIFF WBC: CPT | Performed by: STUDENT IN AN ORGANIZED HEALTH CARE EDUCATION/TRAINING PROGRAM

## 2023-07-12 PROCEDURE — 99232 SBSQ HOSP IP/OBS MODERATE 35: CPT | Performed by: STUDENT IN AN ORGANIZED HEALTH CARE EDUCATION/TRAINING PROGRAM

## 2023-07-12 PROCEDURE — 86900 BLOOD TYPING SEROLOGIC ABO: CPT | Performed by: PHYSICIAN ASSISTANT

## 2023-07-12 RX ADMIN — LORATADINE 10 MG: 10 TABLET ORAL at 08:00

## 2023-07-12 RX ADMIN — LORAZEPAM 0.5 MG: 2 INJECTION INTRAMUSCULAR; INTRAVENOUS at 12:29

## 2023-07-12 RX ADMIN — FOLIC ACID: 5 INJECTION, SOLUTION INTRAMUSCULAR; INTRAVENOUS; SUBCUTANEOUS at 09:06

## 2023-07-12 RX ADMIN — B-COMPLEX W/ C & FOLIC ACID TAB 1 TABLET: TAB at 08:00

## 2023-07-12 RX ADMIN — PANTOPRAZOLE SODIUM 40 MG: 40 TABLET, DELAYED RELEASE ORAL at 05:47

## 2023-07-12 RX ADMIN — MAGNESIUM OXIDE TAB 400 MG (241.3 MG ELEMENTAL MG) 400 MG: 400 (241.3 MG) TAB at 17:52

## 2023-07-12 RX ADMIN — FENOFIBRATE 145 MG: 145 TABLET ORAL at 08:00

## 2023-07-12 RX ADMIN — ATORVASTATIN CALCIUM 20 MG: 20 TABLET, FILM COATED ORAL at 17:52

## 2023-07-12 RX ADMIN — MELATONIN TAB 3 MG 3 MG: 3 TAB at 21:30

## 2023-07-12 RX ADMIN — MAGNESIUM OXIDE TAB 400 MG (241.3 MG ELEMENTAL MG) 400 MG: 400 (241.3 MG) TAB at 08:00

## 2023-07-12 RX ADMIN — LEVOTHYROXINE SODIUM 75 MCG: 75 TABLET ORAL at 05:47

## 2023-07-12 RX ADMIN — FLUOXETINE 40 MG: 20 CAPSULE ORAL at 08:00

## 2023-07-12 RX ADMIN — IRON SUCROSE 200 MG: 20 INJECTION, SOLUTION INTRAVENOUS at 09:06

## 2023-07-12 NOTE — PLAN OF CARE
Problem: INFECTION - ADULT  Goal: Absence or prevention of progression during hospitalization  Description: INTERVENTIONS:  - Assess and monitor for signs and symptoms of infection  - Monitor lab/diagnostic results  - Monitor all insertion sites, i.e. indwelling lines, tubes, and drains  - Monitor endotracheal if appropriate and nasal secretions for changes in amount and color  - [place] appropriate cooling/warming therapies per order  - Administer medications as ordered  - Instruct and encourage patient and family to use good hand hygiene technique  - Identify and instruct in appropriate isolation precautions for identified infection/condition  Outcome: Progressing

## 2023-07-12 NOTE — ASSESSMENT & PLAN NOTE
· On Tricor/Lipitor at home. · Statins were held initially, that have been resumed as LFTs improved.

## 2023-07-12 NOTE — ASSESSMENT & PLAN NOTE
· Patient noted to have pancytopenia as evidenced by WBC count of 2.92, platelet 95, hemoglobin 7.5. · Likely in setting of underlying liver disease versus medication induced as he had anesthesia twice in the last week. · CT abdomen pelvis remarkable for hepatomegaly and diffuse hepatic steatosis with hypodense lesions. · Continue to monitor. Patient is afebrile.

## 2023-07-12 NOTE — ASSESSMENT & PLAN NOTE
· Transaminitis noted improving. · Alcohol abstinence encouraged. · Will need to follow-up with GI in outpatient setting.

## 2023-07-12 NOTE — PLAN OF CARE
Problem: PAIN - ADULT  Goal: Verbalizes/displays adequate comfort level or baseline comfort level  Description: Interventions:  - Encourage patient to monitor pain and request assistance  - Assess pain using appropriate pain scale  - Administer analgesics based on type and severity of pain and evaluate response  - Implement non-pharmacological measures as appropriate and evaluate response  - Consider cultural and social influences on pain and pain management  - Notify physician/advanced practitioner if interventions unsuccessful or patient reports new pain  Outcome: Progressing     Problem: INFECTION - ADULT  Goal: Absence or prevention of progression during hospitalization  Description: INTERVENTIONS:  - Assess and monitor for signs and symptoms of infection  - Monitor lab/diagnostic results  - Monitor all insertion sites, i.e. indwelling lines, tubes, and drains  - Monitor endotracheal if appropriate and nasal secretions for changes in amount and color  - [place] appropriate cooling/warming therapies per order  - Administer medications as ordered  - Instruct and encourage patient and family to use good hand hygiene technique  - Identify and instruct in appropriate isolation precautions for identified infection/condition  Outcome: Progressing     Problem: DISCHARGE PLANNING  Goal: Discharge to home or other facility with appropriate resources  Description: INTERVENTIONS:  - Identify barriers to discharge w/patient and caregiver  - Arrange for needed discharge resources and transportation as appropriate  - Identify discharge learning needs (meds, wound care, etc.)  - Arrange for interpretive services to assist at discharge as needed  - Refer to Case Management Department for coordinating discharge planning if the patient needs post-hospital services based on physician/advanced practitioner order or complex needs related to functional status, cognitive ability, or social support system  Outcome: Progressing Problem: Knowledge Deficit  Goal: Patient/family/caregiver demonstrates understanding of disease process, treatment plan, medications, and discharge instructions  Description: Complete learning assessment and assess knowledge base.   Interventions:  - Provide teaching at level of understanding  - Provide teaching via preferred learning methods  Outcome: Progressing     Problem: CARDIOVASCULAR - ADULT  Goal: Maintains optimal cardiac output and hemodynamic stability  Description: INTERVENTIONS:  - Monitor I/O, vital signs and rhythm  - Monitor for S/S and trends of decreased cardiac output  - Administer and titrate ordered vasoactive medications to optimize hemodynamic stability  - Assess quality of pulses, skin color and temperature  - Assess for signs of decreased coronary artery perfusion  - Instruct patient to report change in severity of symptoms  Outcome: Progressing  Goal: Absence of cardiac dysrhythmias or at baseline rhythm  Description: INTERVENTIONS:  - Continuous cardiac monitoring, vital signs, obtain 12 lead EKG if ordered  - Administer antiarrhythmic and heart rate control medications as ordered  - Monitor electrolytes and administer replacement therapy as ordered  Outcome: Progressing     Problem: GASTROINTESTINAL - ADULT  Goal: Maintains or returns to baseline bowel function  Description: INTERVENTIONS:  - Assess bowel function  - Encourage oral fluids to ensure adequate hydration  - Administer IV fluids if ordered to ensure adequate hydration  - Administer ordered medications as needed  - Encourage mobilization and activity  - Consider nutritional services referral to assist patient with adequate nutrition and appropriate food choices  Outcome: Progressing  Goal: Maintains adequate nutritional intake  Description: INTERVENTIONS:  - Monitor percentage of each meal consumed  - Identify factors contributing to decreased intake, treat as appropriate  - Assist with meals as needed  - Monitor I&O, weight, and lab values if indicated  - Obtain nutrition services referral as needed  Outcome: Progressing     Problem: METABOLIC, FLUID AND ELECTROLYTES - ADULT  Goal: Electrolytes maintained within normal limits  Description: INTERVENTIONS:  - Monitor labs and assess patient for signs and symptoms of electrolyte imbalances  - Administer electrolyte replacement as ordered  - Monitor response to electrolyte replacements, including repeat lab results as appropriate  - Instruct patient on fluid and nutrition as appropriate  Outcome: Progressing     Problem: HEMATOLOGIC - ADULT  Goal: Maintains hematologic stability  Description: INTERVENTIONS  - Assess for signs and symptoms of bleeding or hemorrhage  - Monitor labs  - Administer supportive blood products/factors as ordered and appropriate  Outcome: Progressing     Problem: Nutrition/Hydration-ADULT  Goal: Nutrient/Hydration intake appropriate for improving, restoring or maintaining nutritional needs  Description: Monitor and assess patient's nutrition/hydration status for malnutrition. Collaborate with interdisciplinary team and initiate plan and interventions as ordered. Monitor patient's weight and dietary intake as ordered or per policy. Utilize nutrition screening tool and intervene as necessary. Determine patient's food preferences and provide high-protein, high-caloric foods as appropriate.      INTERVENTIONS:  - Monitor oral intake, urinary output, labs, and treatment plans  - Assess nutrition and hydration status and recommend course of action  - Evaluate amount of meals eaten  - Assist patient with eating if necessary   - Allow adequate time for meals  - Recommend/ encourage appropriate diets, oral nutritional supplements, and vitamin/mineral supplements  - Order, calculate, and assess calorie counts as needed  - Recommend, monitor, and adjust tube feedings and TPN/PPN based on assessed needs  - Assess need for intravenous fluids  - Provide specific nutrition/hydration education as appropriate  - Include patient/family/caregiver in decisions related to nutrition  Outcome: Progressing

## 2023-07-12 NOTE — ASSESSMENT & PLAN NOTE
· Presents with GI bleed  · Upper endoscopy revealed esophagitis, gastritis  · Patient had colonoscopy on 7/11, noted to have polyps but they were not removed. · Discussed with GI, patient will continue pantoprazole and follow-up with GI in outpatient setting. · No further GI bleed noted.

## 2023-07-12 NOTE — CASE MANAGEMENT
Case Management Discharge Planning Note    Patient name Maddi Hernandes  Location [address] [place], [place] [address]/[facility] [id number] MRN [id number]  : 1960 Date 2023       Current Admission Date: 2023  Current Admission Diagnosis:GI bleed   Patient Active Problem List    Diagnosis Date Noted   • Pancytopenia ([address]) 2023   • Obese 2023   • Hypomagnesemia 2023   • Lightheaded 2023   • Hypernatremia 07/10/2023   • GI bleed 2023   • Acute blood loss anemia 2023   • Alcohol abuse 2023   • SIRS (systemic inflammatory response syndrome) 2023   • Transaminitis 2023   • Transition of care performed with sharing of clinical summary 2018   • Alcohol-induced acute pancreatitis 2018   • Leukopenia 2018   • Platelets decreased ([address]) 2018   • Generalized anxiety disorder 2018   • Hypertension 2018   • Hyperlipidemia 2018   • Impaired fasting glucose 2018   • Hypothyroidism 2018   • Habitual alcohol use 2018   • Routine health maintenance 2018   • Gastroesophageal reflux disease without esophagitis 2018      LOS (days): 3  Geometric Mean LOS (GMLOS) (days): 3.00  Days to GMLOS:-0.2     OBJECTIVE:  Risk of Unplanned Readmission Score: 12.95         Current admission status: Inpatient   Preferred Pharmacy:   [facility]/pharmacy #[id number] [address] [address]  Phone: [phone] Fax: [phone]    Primary Care Provider: Mariluz Gann DO    Primary Insurance: MEDICARE  Secondary Insurance: AARP    DISCHARGE DETAILS:                    Treatment Team Recommendation: Home  Discharge Destination Plan[de-identified] Home                  IMM Given (Date):: 23  IMM Given to[de-identified] Patient     Additional Comments: Patient may be cleared for discharge later today or tomorrow. Reviewed IMM, patient signed [employer] waiver, as he will need transportation at discharge.

## 2023-07-12 NOTE — ASSESSMENT & PLAN NOTE
· Acute blood loss anemia likely due to GI blood loss. · Patient is s/p 3 doses of IV Venofer, last dose on 7/12. · Hemoglobin has been fluctuating, 7.2 this morning, repeat 7.5. · Continue to monitor CBC.

## 2023-07-12 NOTE — ASSESSMENT & PLAN NOTE
· Reported feeling lightheaded, dehydration versus anesthesia effect. · Orthostatic vitals negative. · Reports improvement with IV fluids. · Will stop IV fluids, encouraged p.o. intake.

## 2023-07-12 NOTE — PROGRESS NOTES
[facility]  Progress Note  Name: Denise Collado  MRN: [id number]  Unit/Bed#: [facility] 388-73 I Date of Admission: 7/9/2023   Date of Service: 7/12/2023 I Hospital Day: 3    Assessment/Plan   Acute blood loss anemia  Assessment & Plan  · Acute blood loss anemia likely due to GI blood loss. · Patient is s/p 3 doses of IV Venofer, last dose on 7/12. · Hemoglobin has been fluctuating, 7.2 this morning, repeat 7.5. · Continue to monitor CBC. Pancytopenia ([address])  Assessment & Plan  · Patient noted to have pancytopenia as evidenced by WBC count of 2.92, platelet 95, hemoglobin 7.5. · Likely in setting of underlying liver disease versus medication induced as he had anesthesia twice in the last week. · CT abdomen pelvis remarkable for hepatomegaly and diffuse hepatic steatosis with hypodense lesions. · Continue to monitor. Patient is afebrile. Transaminitis  Assessment & Plan  · Transaminitis noted improving. · Alcohol abstinence encouraged. · Will need to follow-up with GI in outpatient setting. Lightheaded  Assessment & Plan  · Reported feeling lightheaded, dehydration versus anesthesia effect. · Orthostatic vitals negative. · Reports improvement with IV fluids. · Will stop IV fluids, encouraged p.o. intake. Obese  Assessment & Plan  Therapeutic lifestyle modification encouraged  Outpatient sleep study recommended    SIRS (systemic inflammatory response syndrome)  Assessment & Plan  SIRS present on admission  Monitor    Alcohol abuse  Assessment & Plan  · History of alcohol abuse. Encouraged on abstinence. · Continue thiamine and folate supplements. Hypothyroidism  Assessment & Plan  Continue levothyroxine at home dosage. Hyperlipidemia  Assessment & Plan  · On Tricor/Lipitor at home. · Statins were held initially, that have been resumed as LFTs improved.     * GI bleed  Assessment & Plan  · Presents with GI bleed  · Upper endoscopy revealed esophagitis, gastritis  · Patient had colonoscopy on , noted to have polyps but they were not removed. · Discussed with GI, patient will continue pantoprazole and follow-up with GI in outpatient setting. · No further GI bleed noted. VTE Pharmacologic Prophylaxis: VTE Score: 2 Low Risk (Score 0-2) - Encourage Ambulation. Patient Centered Rounds: I performed bedside rounds with nursing staff today. Discussions with Specialists or Other Care Team Provider: GI, CM    Education and Discussions with Family / Patient: Patient declined call to . Total Time Spent on Date of Encounter in care of patient: 35 minutes This time was spent on one or more of the following: performing physical exam; counseling and coordination of care; obtaining or reviewing history; documenting in the medical record; reviewing/ordering tests, medications or procedures; communicating with other healthcare professionals and discussing with patient's family/caregivers. Current Length of Stay: 3 day(s)  Current Patient Status: Inpatient   Certification Statement: The patient will continue to require additional inpatient hospital stay due to Ongoing pancytopenia and worsening anemia. Discharge Plan: Anticipate discharge in 24-48 hrs to home. Code Status: Level 1 - Full Code    Subjective:   Patient examined at bedside, reports improvement lightheadedness but still not back to baseline. Discussed lab results with him, given concerns for pancytopenia and worsening anemia, will need to monitor his hemoglobin closely. Also discussed about transfusion if needed, patient agreeable for same. Objective:     Vitals:   Temp (24hrs), Av °F (37.2 °C), Min:97.9 °F (36.6 °C), Max:99.6 °F (37.6 °C)    Temp:  [97.9 °F (36.6 °C)-99.6 °F (37.6 °C)] 98.8 °F (37.1 °C)  HR:  [] 84  Resp:  [16-18] 18  BP: ()/(51-81) 112/67  SpO2:  [95 %-99 %] 97 %  Body mass index is 34.02 kg/m².      Input and Output Summary (last 24 hours): Intake/Output Summary (Last 24 hours) at 7/12/2023 1555  Last data filed at 7/12/2023 1233  Gross per 24 hour   Intake 960 ml   Output --   Net 960 ml       Physical Exam:   Physical Exam  Constitutional:       Appearance: Normal appearance. HENT:      Head: Normocephalic and atraumatic. Mouth/Throat:      Mouth: Mucous membranes are moist.      Pharynx: Oropharynx is clear. Eyes:      Conjunctiva/sclera: Conjunctivae normal.      Pupils: Pupils are equal, round, and reactive to light. Cardiovascular:      Rate and Rhythm: Normal rate. Pulses: Normal pulses. Heart sounds: Normal heart sounds. Pulmonary:      Effort: Pulmonary effort is normal.      Breath sounds: Normal breath sounds. Abdominal:      General: Abdomen is flat. Bowel sounds are normal.   Musculoskeletal:         General: Normal range of motion. Skin:     Coloration: Skin is pale. Neurological:      General: No focal deficit present. Mental Status: He is alert and oriented to person, place, and time.    Psychiatric:         Behavior: Behavior normal.          Additional Data:     Labs:  Results from last 7 days   Lab Units 07/12/23  0843 07/12/23  0511   WBC Thousand/uL  --  2.92*   HEMOGLOBIN g/dL 7.5* 7.2*   HEMATOCRIT %  --  22.9*   PLATELETS Thousands/uL  --  95*   NEUTROS PCT %  --  58   LYMPHS PCT %  --  27   MONOS PCT %  --  11   EOS PCT %  --  2     Results from last 7 days   Lab Units 07/12/23  0511   SODIUM mmol/L 143   POTASSIUM mmol/L 3.6   CHLORIDE mmol/L 112*   CO2 mmol/L 26   BUN mg/dL 14   CREATININE mg/dL 0.85   ANION GAP mmol/L 5   CALCIUM mg/dL 7.0*   ALBUMIN g/dL 2.5*   TOTAL BILIRUBIN mg/dL 0.43   ALK PHOS U/L 51   ALT U/L 51   AST U/L 69*   GLUCOSE RANDOM mg/dL 131     Results from last 7 days   Lab Units 07/09/23  0906   INR  1.10         Results from last 7 days   Lab Units 07/10/23  0447   HEMOGLOBIN A1C % 5.0           Lines/Drains:  Invasive Devices     Peripheral Intravenous Line  Duration           Peripheral IV 07/10/23 Dorsal (posterior); Right Hand 2 days    Peripheral IV 07/10/23 Left;Upper;Ventral (anterior) Arm 2 days                      Imaging: Reviewed radiology reports from this admission including: procedure reports    Recent Cultures (last 7 days):         Last 24 Hours Medication List:   Current Facility-Administered Medications   Medication Dose Route Frequency Provider Last Rate   • acetaminophen  650 mg Oral Q6H PRN Vini Bautista MD     • albuterol  1 puff Inhalation Q4H PRN Vini Bautista MD     • atorvastatin  20 mg Oral Daily With Jer Garcia MD     • fenofibrate  145 mg Oral Daily Vini Bautista MD     • FLUoxetine  40 mg Oral Daily Vini Bautista MD     • folic acid 1 mg, thiamine (VITAMIN B1) 100 mg in sodium chloride 0.9 % 100 mL IV piggyback   Intravenous Daily Vini Bautista MD     • levothyroxine  75 mcg Oral Early Morning Vini Bautista MD     • lisinopril  30 mg Oral Daily Vini Bautista MD     • loratadine  10 mg Oral Daily Vini Bautista MD     • LORazepam  0.5 mg Intravenous Q12H Vini Bautista MD     • magnesium Oxide  400 mg Oral BID Adi Tillman MD     • melatonin  3 mg Oral HS Daisy Martinez PA-C     • multivitamin stress formula  1 tablet Oral Daily Vini Bautista MD     • ondansetron  4 mg Intravenous Q4H PRN Vini Bautista MD     • pantoprazole  40 mg Oral Early Morning Lyndsay Dowd MD          Today, Patient Was Seen By: Mary Mi MD    **Please Note: This note may have been constructed using a voice recognition system. **

## 2023-07-13 VITALS
HEIGHT: 68 IN | OXYGEN SATURATION: 97 % | DIASTOLIC BLOOD PRESSURE: 63 MMHG | SYSTOLIC BLOOD PRESSURE: 120 MMHG | TEMPERATURE: 98.5 F | HEART RATE: 80 BPM | WEIGHT: 223.77 LBS | RESPIRATION RATE: 18 BRPM | BODY MASS INDEX: 33.91 KG/M2

## 2023-07-13 PROBLEM — E83.51 HYPOCALCEMIA: Status: ACTIVE | Noted: 2023-07-13

## 2023-07-13 LAB
ANION GAP SERPL CALCULATED.3IONS-SCNC: 3 MMOL/L
BUN SERPL-MCNC: 9 MG/DL (ref 5–25)
CALCIUM SERPL-MCNC: 7.2 MG/DL (ref 8.3–10.1)
CHLORIDE SERPL-SCNC: 114 MMOL/L (ref 96–108)
CO2 SERPL-SCNC: 21 MMOL/L (ref 21–32)
CREAT SERPL-MCNC: 0.67 MG/DL (ref 0.6–1.3)
GFR SERPL CREATININE-BSD FRML MDRD: 100 ML/MIN/1.73SQ M
GLUCOSE SERPL-MCNC: 128 MG/DL (ref 65–140)
MAGNESIUM SERPL-MCNC: 2.2 MG/DL (ref 1.6–2.6)
POTASSIUM SERPL-SCNC: 4.1 MMOL/L (ref 3.5–5.3)
SODIUM SERPL-SCNC: 138 MMOL/L (ref 135–147)

## 2023-07-13 PROCEDURE — 83735 ASSAY OF MAGNESIUM: CPT | Performed by: STUDENT IN AN ORGANIZED HEALTH CARE EDUCATION/TRAINING PROGRAM

## 2023-07-13 PROCEDURE — 80048 BASIC METABOLIC PNL TOTAL CA: CPT | Performed by: STUDENT IN AN ORGANIZED HEALTH CARE EDUCATION/TRAINING PROGRAM

## 2023-07-13 PROCEDURE — 99239 HOSP IP/OBS DSCHRG MGMT >30: CPT | Performed by: STUDENT IN AN ORGANIZED HEALTH CARE EDUCATION/TRAINING PROGRAM

## 2023-07-13 RX ORDER — CALCIUM GLUCONATE 20 MG/ML
1 INJECTION, SOLUTION INTRAVENOUS ONCE
Status: COMPLETED | OUTPATIENT
Start: 2023-07-13 | End: 2023-07-13

## 2023-07-13 RX ORDER — PANTOPRAZOLE SODIUM 40 MG/1
40 TABLET, DELAYED RELEASE ORAL
Qty: 30 TABLET | Refills: 0 | Status: SHIPPED | OUTPATIENT
Start: 2023-07-14 | End: 2023-08-13

## 2023-07-13 RX ORDER — LANOLIN ALCOHOL/MO/W.PET/CERES
400 CREAM (GRAM) TOPICAL DAILY
Qty: 15 TABLET | Refills: 0 | Status: SHIPPED | OUTPATIENT
Start: 2023-07-13 | End: 2023-07-28

## 2023-07-13 RX ADMIN — CALCIUM GLUCONATE 1 G: 20 INJECTION, SOLUTION INTRAVENOUS at 13:09

## 2023-07-13 RX ADMIN — MAGNESIUM OXIDE TAB 400 MG (241.3 MG ELEMENTAL MG) 400 MG: 400 (241.3 MG) TAB at 08:29

## 2023-07-13 RX ADMIN — FLUOXETINE 40 MG: 20 CAPSULE ORAL at 08:29

## 2023-07-13 RX ADMIN — LORAZEPAM 0.5 MG: 2 INJECTION INTRAMUSCULAR; INTRAVENOUS at 00:01

## 2023-07-13 RX ADMIN — B-COMPLEX W/ C & FOLIC ACID TAB 1 TABLET: TAB at 08:29

## 2023-07-13 RX ADMIN — FENOFIBRATE 145 MG: 145 TABLET ORAL at 08:29

## 2023-07-13 RX ADMIN — FOLIC ACID: 5 INJECTION, SOLUTION INTRAMUSCULAR; INTRAVENOUS; SUBCUTANEOUS at 10:27

## 2023-07-13 RX ADMIN — LORATADINE 10 MG: 10 TABLET ORAL at 08:29

## 2023-07-13 RX ADMIN — PANTOPRAZOLE SODIUM 40 MG: 40 TABLET, DELAYED RELEASE ORAL at 05:06

## 2023-07-13 RX ADMIN — LEVOTHYROXINE SODIUM 75 MCG: 75 TABLET ORAL at 05:05

## 2023-07-13 RX ADMIN — LORAZEPAM 0.5 MG: 2 INJECTION INTRAMUSCULAR; INTRAVENOUS at 13:09

## 2023-07-13 NOTE — DISCHARGE SUMMARY
[address] [facility]  Discharge- Ernst Leaver 1960, 70 y.o. male MRN: [id number]  Unit/Bed#: [facility] 815-01 Encounter: [id number]  Primary Care Provider: Kitty Fabry, DO   Date and time admitted to hospital: 7/9/2023  8:26 AM    Acute blood loss anemia  Assessment & Plan  · Acute blood loss anemia likely due to GI blood loss. · Patient is s/p 3 doses of IV Venofer, last dose on 7/12. · Hemoglobin has been fluctuating, 7.2 this morning, repeat 7.5. · Hemoglobin stable at 8.2 on discharge. Patient to get CBC in 1 week at PCPs office. Pancytopenia ([address])  Assessment & Plan  · Patient noted to have pancytopenia as evidenced by WBC count of 2.92, platelet 95, hemoglobin 7.5. · Likely in setting of underlying liver disease versus medication induced as he had anesthesia twice in the last week. · CT abdomen pelvis remarkable for hepatomegaly and diffuse hepatic steatosis with hypodense lesions. · Continue to monitor. Patient is afebrile. · Counts improving. · Recommended to get repeat CBC in 1 week postdischarge. Transaminitis  Assessment & Plan  · Transaminitis noted improving. · Alcohol abstinence encouraged. · Will need to follow-up with GI in outpatient setting. Hypocalcemia  Assessment & Plan  Calcium of 7.2, repleted with calcium gluconate. Patient was encouraged to get CMP in 1 week postdischarge. Lightheaded  Assessment & Plan  · Reported feeling lightheaded, dehydration versus anesthesia effect. · Orthostatic vitals negative. · Reports improvement with IV fluids, IV fluids stopped on 7/12. · This morning reports feeling much better. Obese  Assessment & Plan  Therapeutic lifestyle modification encouraged  Outpatient sleep study recommended    SIRS (systemic inflammatory response syndrome)  Assessment & Plan  SIRS present on admission  Remained afebrile. Alcohol abuse  Assessment & Plan  · History of alcohol abuse.   Encouraged on abstinence. · Continue thiamine and folate supplements. Hypothyroidism  Assessment & Plan  Continue levothyroxine at home dosage. Hyperlipidemia  Assessment & Plan  · On Tricor/Lipitor at home. · Statins were held initially, that have been resumed as LFTs improved. * GI bleed  Assessment & Plan  · Presents with GI bleed  · Upper endoscopy revealed esophagitis, gastritis  · Patient had colonoscopy on 7/11, noted to have polyps but they were not removed. · Discussed with GI, patient will continue pantoprazole and follow-up with GI in outpatient setting. · No further GI bleed noted. Medical Problems     Resolved Problems  Date Reviewed: 7/13/2023   None       Discharging Physician / Practitioner: Moi Garvin MD  PCP: Kitty Fabry, DO  Admission Date:   Admission Orders (From admission, onward)     Ordered        07/09/23 1036  Inpatient Admission  Once                      Discharge Date: 07/13/23    Consultations During Hospital Stay:  · GI    Procedures Performed:   · EGD and colonoscopy. Significant Findings / Test Results:    CT abdomen pelvis, 7/9/23: IMPRESSION:     1.  Multiple gastric varices secondary to portal hypertension. This could be related to the presentation of GI bleed.     2. Hepatomegaly and diffuse hepatic steatosis with hypodense lesions, one of which appears to represent an involuting cyst with another either new or enlarged from the prior study. While this may represent more focal fatty change, given underlying   hepatocellular disease, further evaluation with MRI is recommended.     3.  Long segment of pancreatic duct dilatation with abrupt cut off though no visualized pancreatic head mass and no common bile duct dilatation. This is stable from 2020, mildly increased from 2019 and likely secondary to a stricture. No mass is   visualized and there is no common bile duct dilatation.     4.  Stable hypodense lesion in the pancreatic tail, likely cystic.  This is stable from 2020, not present in 2019. Continued annual MRI/MRCP follow-up is recommended     5. Diverticulosis coli. Incidental Findings:   · CT abdomen finding for gastroparesis and hepatomegaly, will need to followup with GI. · I reviewed the above mentioned incidental findings with the patient and/or family and they expressed understanding. Test Results Pending at Discharge (will require follow up):   · no     Outpatient Tests Requested:  · no    Complications:  no    Reason for Admission: GI bleed. Hospital Course:   Dino Noble is a 70 y.o. male patient who originally presented to the hospital on 7/9/2023 due to GI bleed as he presented with bright red vomiting. Patient evaluated by GI, underwent EGD found to have gastritis. He also underwent colonoscopy that was remarkable for multiple polyps but none of them was removed. Post colonoscopy he complained of lightheadedness, and was found to be hypotensive that improved with IV fluid resuscitation. Patient's hemoglobin remained stable, patient started on pantoprazole 40 mg and recommended to continue on discharge, he will need to follow-up with GI in outpatient setting. Please see above list of diagnoses and related plan for additional information. Condition at Discharge: stable    Discharge Day Visit / Exam:   Subjective: Patient examined at bedside, is looking forward to be discharged home today. Vitals: Blood Pressure: 120/63 (07/13/23 1535)  Pulse: 80 (07/13/23 1535)  Temperature: 98.5 °F (36.9 °C) (07/13/23 1535)  Temp Source: Oral (07/11/23 1633)  Respirations: 18 (07/13/23 1535)  Height: 5' 8" (172.7 cm) (07/09/23 1125)  Weight - Scale: 101 kg (223 lb 12.3 oz) (07/09/23 1125)  SpO2: 97 % (07/13/23 1535)  Exam:   Physical Exam  Constitutional:       Appearance: Normal appearance. HENT:      Head: Normocephalic. Mouth/Throat:      Mouth: Mucous membranes are moist.      Pharynx: Oropharynx is clear.    Eyes: Conjunctiva/sclera: Conjunctivae normal.      Pupils: Pupils are equal, round, and reactive to light. Cardiovascular:      Rate and Rhythm: Normal rate and regular rhythm. Abdominal:      General: Abdomen is flat. Bowel sounds are normal.   Musculoskeletal:         General: Normal range of motion. Cervical back: Normal range of motion and neck supple. Skin:     General: Skin is warm and dry. Neurological:      General: No focal deficit present. Mental Status: He is alert and oriented to person, place, and time. Psychiatric:         Mood and Affect: Mood normal.         Behavior: Behavior normal.          Discussion with Family: Patient declined call to . Discharge instructions/Information to patient and family:   See after visit summary for information provided to patient and family. Provisions for Follow-Up Care:  See after visit summary for information related to follow-up care and any pertinent home health orders. Disposition:   Home    Planned Readmission: no     Discharge Statement:  I spent 41 minutes discharging the patient. This time was spent on the day of discharge. I had direct contact with the patient on the day of discharge. Greater than 50% of the total time was spent examining patient, answering all patient questions, arranging and discussing plan of care with patient as well as directly providing post-discharge instructions. Additional time then spent on discharge activities. Discharge Medications:  See after visit summary for reconciled discharge medications provided to patient and/or family.       **Please Note: This note may have been constructed using a voice recognition system**

## 2023-07-13 NOTE — INCIDENTAL FINDINGS
The following findings require follow up:  Radiographic finding   Finding: Multiple gastric varices, hepatomegaly and diffuse hepatic steatosis with hypodense lesions.    Follow up required: yes   Follow up should be done within 2 week(s)    Please notify the following clinician to assist with the follow up:   Dr. Sara Bingham

## 2023-07-13 NOTE — ASSESSMENT & PLAN NOTE
· Acute blood loss anemia likely due to GI blood loss. · Patient is s/p 3 doses of IV Venofer, last dose on 7/12. · Hemoglobin has been fluctuating, 7.2 this morning, repeat 7.5. · Hemoglobin stable at 8.2 on discharge. Patient to get CBC in 1 week at PCPs office.

## 2023-07-13 NOTE — ASSESSMENT & PLAN NOTE
Calcium of 7.2, repleted with calcium gluconate. Patient was encouraged to get CMP in 1 week postdischarge.

## 2023-07-13 NOTE — PLAN OF CARE
Problem: HEMATOLOGIC - ADULT  Goal: Maintains hematologic stability  Description: INTERVENTIONS  - Assess for signs and symptoms of bleeding or hemorrhage  - Monitor labs  - Administer supportive blood products/factors as ordered and appropriate  7/12/2023 2236 by Rihcy Pineda  Outcome: Progressing    Problem: GASTROINTESTINAL - ADULT  Goal: Maintains or returns to baseline bowel function  Description: INTERVENTIONS:  - Assess bowel function  - Encourage oral fluids to ensure adequate hydration  - Administer IV fluids if ordered to ensure adequate hydration  - Administer ordered medications as needed  - Encourage mobilization and activity  - Consider nutritional services referral to assist patient with adequate nutrition and appropriate food choices  7/12/2023 2236 by Richy Pineda  Outcome: Progressing    Problem: INFECTION - ADULT  Goal: Absence or prevention of progression during hospitalization  Description: INTERVENTIONS:  - Assess and monitor for signs and symptoms of infection  - Monitor lab/diagnostic results  - Monitor all insertion sites, i.e. indwelling lines, tubes, and drains  - [place] appropriate cooling/warming therapies per order  - Administer medications as ordered  - Instruct and encourage patient and family to use good hand hygiene technique  - Identify and instruct in appropriate isolation precautions for identified infection/condition  7/12/2023 2236 by Richy Pineda  Outcome: Progressing    Problem: DISCHARGE PLANNING  Goal: Discharge to home or other facility with appropriate resources  Description: INTERVENTIONS:  - Identify barriers to discharge w/patient and caregiver  - Arrange for needed discharge resources and transportation as appropriate  - Identify discharge learning needs (meds, wound care, etc.)  - Arrange for interpretive services to assist at discharge as needed  - Refer to Case Management Department for coordinating discharge planning if the patient needs post-hospital services based on physician/advanced practitioner order or complex needs related to functional status, cognitive ability, or social support system  7/12/2023 2236 by Precious York  Outcome: Progressing  7/12/2023 2235 by Precious York  Outcome: Progressing

## 2023-07-13 NOTE — DISCHARGE INSTR - AVS FIRST PAGE
Please get repeat CBC and CMP in 1 week post discharge, at your family [facility] office. Thanks.

## 2023-07-13 NOTE — ASSESSMENT & PLAN NOTE
· Patient noted to have pancytopenia as evidenced by WBC count of 2.92, platelet 95, hemoglobin 7.5. · Likely in setting of underlying liver disease versus medication induced as he had anesthesia twice in the last week. · CT abdomen pelvis remarkable for hepatomegaly and diffuse hepatic steatosis with hypodense lesions. · Continue to monitor. Patient is afebrile. · Counts improving. · Recommended to get repeat CBC in 1 week postdischarge.

## 2023-07-13 NOTE — ASSESSMENT & PLAN NOTE
· Reported feeling lightheaded, dehydration versus anesthesia effect. · Orthostatic vitals negative. · Reports improvement with IV fluids, IV fluids stopped on 7/12. · This morning reports feeling much better.

## 2023-07-14 ENCOUNTER — TELEPHONE (OUTPATIENT)
Dept: GASTROENTEROLOGY | Facility: CLINIC | Age: 63
End: 2023-07-14

## 2023-07-14 NOTE — TELEPHONE ENCOUNTER
Called patient, lvm to call and schedule ov with hepatology.  (hepatic steatosis with hypodense lesions)

## 2023-07-14 NOTE — TELEPHONE ENCOUNTER
----- Message from Jevon Babin RN sent at 7/13/2023  8:14 AM EDT -----  Flakito Dykes, I do not remember if I sent this one.  thanks  ----- Message -----  From: Arnaud Jefferson MD  Sent: 7/11/2023   4:42 PM EDT  To: [address],    Can you please help the patient set up a follow up appointment with any provider in 6-8 weeks. Thank you.     Chris

## 2023-07-17 ENCOUNTER — TELEPHONE (OUTPATIENT)
Dept: GASTROENTEROLOGY | Facility: CLINIC | Age: 63
End: 2023-07-17

## 2023-07-17 NOTE — TELEPHONE ENCOUNTER
----- Message from Mert Hernandez MD sent at 7/17/2023  9:45 AM EDT -----  David Camilo,    Can you please call patient with results/message/questions as outlined in message to patient. I sent my chart message and it went unviewed for 5 days. Let me know if patient has any additional questions. Thank you.     V

## 2023-07-20 ENCOUNTER — TELEPHONE (OUTPATIENT)
Age: 63
End: 2023-07-20

## 2023-10-17 ENCOUNTER — HOSPITAL ENCOUNTER (EMERGENCY)
Facility: HOSPITAL | Age: 63
Discharge: HOME | DRG: 897 | End: 2023-10-17
Attending: EMERGENCY MEDICINE
Payer: MEDICARE

## 2023-10-17 ENCOUNTER — APPOINTMENT (EMERGENCY)
Dept: RADIOLOGY | Facility: HOSPITAL | Age: 63
DRG: 897 | End: 2023-10-17
Payer: MEDICARE

## 2023-10-17 ENCOUNTER — HOSPITAL ENCOUNTER (INPATIENT)
Facility: HOSPITAL | Age: 63
LOS: 1 days | Discharge: HOME | DRG: 897 | End: 2023-10-18
Attending: EMERGENCY MEDICINE | Admitting: EMERGENCY MEDICINE
Payer: MEDICARE

## 2023-10-17 VITALS
TEMPERATURE: 99.3 F | OXYGEN SATURATION: 94 % | HEART RATE: 88 BPM | DIASTOLIC BLOOD PRESSURE: 94 MMHG | RESPIRATION RATE: 17 BRPM | SYSTOLIC BLOOD PRESSURE: 165 MMHG

## 2023-10-17 DIAGNOSIS — F10.20 ALCOHOL USE DISORDER, SEVERE, DEPENDENCE (HCC): Primary | ICD-10-CM

## 2023-10-17 DIAGNOSIS — F10.939 ALCOHOL WITHDRAWAL (HCC): Primary | ICD-10-CM

## 2023-10-17 PROBLEM — M62.08 DIASTASIS RECTI: Status: ACTIVE | Noted: 2023-10-17

## 2023-10-17 PROBLEM — F32.A DEPRESSION: Status: ACTIVE | Noted: 2023-10-17

## 2023-10-17 LAB
ALBUMIN SERPL BCP-MCNC: 3.7 G/DL (ref 3.5–5)
ALP SERPL-CCNC: 102 U/L (ref 34–104)
ALT SERPL W P-5'-P-CCNC: 45 U/L (ref 7–52)
AMMONIA PLAS-SCNC: 49 UMOL/L (ref 18–72)
ANION GAP SERPL CALCULATED.3IONS-SCNC: 11 MMOL/L
APAP SERPL-MCNC: <10 UG/ML (ref 10–20)
AST SERPL W P-5'-P-CCNC: 81 U/L (ref 13–39)
ATRIAL RATE: 108 BPM
BASOPHILS # BLD AUTO: 0.06 THOUSANDS/ÂΜL (ref 0–0.1)
BASOPHILS NFR BLD AUTO: 1 % (ref 0–1)
BILIRUB SERPL-MCNC: 1.22 MG/DL (ref 0.2–1)
BUN SERPL-MCNC: 7 MG/DL (ref 5–25)
CALCIUM SERPL-MCNC: 8.4 MG/DL (ref 8.4–10.2)
CARDIAC TROPONIN I PNL SERPL HS: 6 NG/L
CHLORIDE SERPL-SCNC: 100 MMOL/L (ref 96–108)
CO2 SERPL-SCNC: 27 MMOL/L (ref 21–32)
CREAT SERPL-MCNC: 0.52 MG/DL (ref 0.6–1.3)
EOSINOPHIL # BLD AUTO: 0.02 THOUSAND/ÂΜL (ref 0–0.61)
EOSINOPHIL NFR BLD AUTO: 0 % (ref 0–6)
ERYTHROCYTE [DISTWIDTH] IN BLOOD BY AUTOMATED COUNT: 15.4 % (ref 11.6–15.1)
ETHANOL SERPL-MCNC: <10 MG/DL
FLUAV RNA RESP QL NAA+PROBE: NEGATIVE
FLUBV RNA RESP QL NAA+PROBE: NEGATIVE
GFR SERPL CREATININE-BSD FRML MDRD: 111 ML/MIN/1.73SQ M
GLUCOSE SERPL-MCNC: 132 MG/DL (ref 65–140)
HCT VFR BLD AUTO: 42.9 % (ref 36.5–49.3)
HGB BLD-MCNC: 15.2 G/DL (ref 12–17)
IMM GRANULOCYTES # BLD AUTO: 0.03 THOUSAND/UL (ref 0–0.2)
IMM GRANULOCYTES NFR BLD AUTO: 1 % (ref 0–2)
LIPASE SERPL-CCNC: 47 U/L (ref 11–82)
LYMPHOCYTES # BLD AUTO: 1.06 THOUSANDS/ÂΜL (ref 0.6–4.47)
LYMPHOCYTES NFR BLD AUTO: 21 % (ref 14–44)
MAGNESIUM SERPL-MCNC: 1.3 MG/DL (ref 1.9–2.7)
MCH RBC QN AUTO: 34.8 PG (ref 26.8–34.3)
MCHC RBC AUTO-ENTMCNC: 35.4 G/DL (ref 31.4–37.4)
MCV RBC AUTO: 98 FL (ref 82–98)
MONOCYTES # BLD AUTO: 0.46 THOUSAND/ÂΜL (ref 0.17–1.22)
MONOCYTES NFR BLD AUTO: 9 % (ref 4–12)
NEUTROPHILS # BLD AUTO: 3.51 THOUSANDS/ÂΜL (ref 1.85–7.62)
NEUTS SEG NFR BLD AUTO: 68 % (ref 43–75)
NRBC BLD AUTO-RTO: 0 /100 WBCS
P AXIS: 42 DEGREES
PHOSPHATE SERPL-MCNC: 4 MG/DL (ref 2.3–4.1)
PLATELET # BLD AUTO: 114 THOUSANDS/UL (ref 149–390)
PLATELET # BLD AUTO: 125 THOUSANDS/UL (ref 149–390)
PMV BLD AUTO: 9 FL (ref 8.9–12.7)
PMV BLD AUTO: 9.9 FL (ref 8.9–12.7)
POTASSIUM SERPL-SCNC: 3.7 MMOL/L (ref 3.5–5.3)
PR INTERVAL: 166 MS
PROT SERPL-MCNC: 6.2 G/DL (ref 6.4–8.4)
QRS AXIS: 90 DEGREES
QRSD INTERVAL: 76 MS
QT INTERVAL: 302 MS
QTC INTERVAL: 404 MS
RBC # BLD AUTO: 4.37 MILLION/UL (ref 3.88–5.62)
RSV RNA RESP QL NAA+PROBE: NEGATIVE
SALICYLATES SERPL-MCNC: <5 MG/DL (ref 3–20)
SARS-COV-2 RNA RESP QL NAA+PROBE: NEGATIVE
SODIUM SERPL-SCNC: 138 MMOL/L (ref 135–147)
T WAVE AXIS: 39 DEGREES
VENTRICULAR RATE: 108 BPM
WBC # BLD AUTO: 5.14 THOUSAND/UL (ref 4.31–10.16)

## 2023-10-17 PROCEDURE — 0241U HB NFCT DS VIR RESP RNA 4 TRGT: CPT

## 2023-10-17 PROCEDURE — 93005 ELECTROCARDIOGRAM TRACING: CPT

## 2023-10-17 PROCEDURE — 96365 THER/PROPH/DIAG IV INF INIT: CPT

## 2023-10-17 PROCEDURE — 85049 AUTOMATED PLATELET COUNT: CPT

## 2023-10-17 PROCEDURE — 84100 ASSAY OF PHOSPHORUS: CPT

## 2023-10-17 PROCEDURE — C9113 INJ PANTOPRAZOLE SODIUM, VIA: HCPCS

## 2023-10-17 PROCEDURE — HZ2ZZZZ DETOXIFICATION SERVICES FOR SUBSTANCE ABUSE TREATMENT: ICD-10-PCS | Performed by: EMERGENCY MEDICINE

## 2023-10-17 PROCEDURE — 93010 ELECTROCARDIOGRAM REPORT: CPT | Performed by: INTERNAL MEDICINE

## 2023-10-17 PROCEDURE — 70450 CT HEAD/BRAIN W/O DYE: CPT

## 2023-10-17 PROCEDURE — 82077 ASSAY SPEC XCP UR&BREATH IA: CPT

## 2023-10-17 PROCEDURE — 74177 CT ABD & PELVIS W/CONTRAST: CPT

## 2023-10-17 PROCEDURE — G1004 CDSM NDSC: HCPCS

## 2023-10-17 PROCEDURE — 99291 CRITICAL CARE FIRST HOUR: CPT | Performed by: EMERGENCY MEDICINE

## 2023-10-17 PROCEDURE — 84484 ASSAY OF TROPONIN QUANT: CPT

## 2023-10-17 PROCEDURE — 96376 TX/PRO/DX INJ SAME DRUG ADON: CPT

## 2023-10-17 PROCEDURE — 99284 EMERGENCY DEPT VISIT MOD MDM: CPT

## 2023-10-17 PROCEDURE — 85025 COMPLETE CBC W/AUTO DIFF WBC: CPT

## 2023-10-17 PROCEDURE — 83690 ASSAY OF LIPASE: CPT

## 2023-10-17 PROCEDURE — 80053 COMPREHEN METABOLIC PANEL: CPT

## 2023-10-17 PROCEDURE — 96361 HYDRATE IV INFUSION ADD-ON: CPT

## 2023-10-17 PROCEDURE — 83735 ASSAY OF MAGNESIUM: CPT

## 2023-10-17 PROCEDURE — 80179 DRUG ASSAY SALICYLATE: CPT

## 2023-10-17 PROCEDURE — 99223 1ST HOSP IP/OBS HIGH 75: CPT

## 2023-10-17 PROCEDURE — 80143 DRUG ASSAY ACETAMINOPHEN: CPT

## 2023-10-17 PROCEDURE — 82140 ASSAY OF AMMONIA: CPT

## 2023-10-17 PROCEDURE — 36415 COLL VENOUS BLD VENIPUNCTURE: CPT

## 2023-10-17 PROCEDURE — 96375 TX/PRO/DX INJ NEW DRUG ADDON: CPT

## 2023-10-17 RX ORDER — ACETAMINOPHEN 325 MG/1
650 TABLET ORAL EVERY 6 HOURS PRN
Status: DISCONTINUED | OUTPATIENT
Start: 2023-10-17 | End: 2023-10-18 | Stop reason: HOSPADM

## 2023-10-17 RX ORDER — FLUOXETINE HYDROCHLORIDE 20 MG/1
40 CAPSULE ORAL DAILY
Status: DISCONTINUED | OUTPATIENT
Start: 2023-10-18 | End: 2023-10-18 | Stop reason: HOSPADM

## 2023-10-17 RX ORDER — PHENOBARBITAL SODIUM 65 MG/ML
130 INJECTION INTRAMUSCULAR ONCE
Status: COMPLETED | OUTPATIENT
Start: 2023-10-17 | End: 2023-10-17

## 2023-10-17 RX ORDER — TRAZODONE HYDROCHLORIDE 50 MG/1
50 TABLET ORAL
Status: DISCONTINUED | OUTPATIENT
Start: 2023-10-17 | End: 2023-10-18 | Stop reason: HOSPADM

## 2023-10-17 RX ORDER — FOLIC ACID 1 MG/1
1 TABLET ORAL DAILY
Status: DISCONTINUED | OUTPATIENT
Start: 2023-10-18 | End: 2023-10-18 | Stop reason: HOSPADM

## 2023-10-17 RX ORDER — PANTOPRAZOLE SODIUM 40 MG/1
40 TABLET, DELAYED RELEASE ORAL
Status: DISCONTINUED | OUTPATIENT
Start: 2023-10-18 | End: 2023-10-18 | Stop reason: HOSPADM

## 2023-10-17 RX ORDER — ONDANSETRON 2 MG/ML
4 INJECTION INTRAMUSCULAR; INTRAVENOUS EVERY 6 HOURS PRN
Status: DISCONTINUED | OUTPATIENT
Start: 2023-10-17 | End: 2023-10-18 | Stop reason: HOSPADM

## 2023-10-17 RX ORDER — LANOLIN ALCOHOL/MO/W.PET/CERES
100 CREAM (GRAM) TOPICAL DAILY
Status: DISCONTINUED | OUTPATIENT
Start: 2023-10-18 | End: 2023-10-18 | Stop reason: HOSPADM

## 2023-10-17 RX ORDER — LORATADINE 10 MG/1
10 TABLET ORAL DAILY
Status: DISCONTINUED | OUTPATIENT
Start: 2023-10-18 | End: 2023-10-18 | Stop reason: HOSPADM

## 2023-10-17 RX ORDER — MAGNESIUM SULFATE HEPTAHYDRATE 40 MG/ML
2 INJECTION, SOLUTION INTRAVENOUS ONCE
Status: COMPLETED | OUTPATIENT
Start: 2023-10-17 | End: 2023-10-17

## 2023-10-17 RX ORDER — PANTOPRAZOLE SODIUM 40 MG/10ML
40 INJECTION, POWDER, LYOPHILIZED, FOR SOLUTION INTRAVENOUS ONCE
Status: COMPLETED | OUTPATIENT
Start: 2023-10-17 | End: 2023-10-17

## 2023-10-17 RX ORDER — ALBUTEROL SULFATE 90 UG/1
2 AEROSOL, METERED RESPIRATORY (INHALATION) EVERY 4 HOURS PRN
Status: DISCONTINUED | OUTPATIENT
Start: 2023-10-17 | End: 2023-10-18 | Stop reason: HOSPADM

## 2023-10-17 RX ORDER — ENOXAPARIN SODIUM 100 MG/ML
40 INJECTION SUBCUTANEOUS DAILY
Status: DISCONTINUED | OUTPATIENT
Start: 2023-10-18 | End: 2023-10-18 | Stop reason: HOSPADM

## 2023-10-17 RX ORDER — LEVOTHYROXINE SODIUM 0.07 MG/1
75 TABLET ORAL EVERY MORNING
Status: DISCONTINUED | OUTPATIENT
Start: 2023-10-18 | End: 2023-10-18 | Stop reason: HOSPADM

## 2023-10-17 RX ORDER — SODIUM CHLORIDE 9 MG/ML
100 INJECTION, SOLUTION INTRAVENOUS CONTINUOUS
Status: DISCONTINUED | OUTPATIENT
Start: 2023-10-17 | End: 2023-10-18

## 2023-10-17 RX ORDER — ALLOPURINOL 100 MG/1
300 TABLET ORAL DAILY
Status: DISCONTINUED | OUTPATIENT
Start: 2023-10-18 | End: 2023-10-18 | Stop reason: HOSPADM

## 2023-10-17 RX ADMIN — IOHEXOL 100 ML: 350 INJECTION, SOLUTION INTRAVENOUS at 15:31

## 2023-10-17 RX ADMIN — PHENOBARBITAL SODIUM 130 MG: 65 INJECTION INTRAMUSCULAR at 13:13

## 2023-10-17 RX ADMIN — TRAZODONE HYDROCHLORIDE 50 MG: 50 TABLET ORAL at 23:17

## 2023-10-17 RX ADMIN — PANTOPRAZOLE SODIUM 40 MG: 40 INJECTION, POWDER, FOR SOLUTION INTRAVENOUS at 17:38

## 2023-10-17 RX ADMIN — SODIUM CHLORIDE 100 ML/HR: 0.9 INJECTION, SOLUTION INTRAVENOUS at 21:22

## 2023-10-17 RX ADMIN — SODIUM CHLORIDE 1000 ML: 0.9 INJECTION, SOLUTION INTRAVENOUS at 12:19

## 2023-10-17 RX ADMIN — MAGNESIUM SULFATE HEPTAHYDRATE 2 G: 40 INJECTION, SOLUTION INTRAVENOUS at 13:11

## 2023-10-17 RX ADMIN — PHENOBARBITAL SODIUM 130 MG: 65 INJECTION INTRAMUSCULAR at 17:38

## 2023-10-17 NOTE — ED PROVIDER NOTES
History  Chief Complaint   Patient presents with    Lethargy     Pt complaining of feeling tired, weak and having no appetitie. Pt also complains of having a "hernia that pops out with movement" in his abd and a mass in his right pectoral.      Patient is a 49-year-old male with a significant past medical history of GI bleeding, pancreatitis, alcohol abuse, presenting for evaluation of generalized weakness. He reports that over the last several weeks he has been having decreased appetite, he believes that this is causing him to be generally weak. He is unsure what is causing him to have a decreased appetite. He does note some increased depression lately secondary to some stresses at home. He denies any SI/HI/AVH. He does admit to ongoing alcohol use that he says is decreased from previous. He admits to drinking 3 mixed drinks with whiskey per night. He says that currently he is feeling some anxiousness, hot flashes, and tremors, all similar to past episodes of alcohol withdrawal.  He denies any history of seizures or delirium tremens. He says that several days ago he had an episode of dry heaving and there was a small amount of blood in the mucus. He has some ongoing nausea with some minimal, generalized abdominal pain. He denies any fevers. He denies any chest pain or difficulty breathing. He denies any episodes of syncope. Prior to Admission Medications   Prescriptions Last Dose Informant Patient Reported? Taking?    FLUoxetine (PROzac) 40 MG capsule   No No   Sig: TAKE ONE CAPSULE BY MOUTH EVERY DAY   Multiple Vitamin (DAILY VALUE MULTIVITAMIN) TABS   Yes No   Sig: Take 1 tablet by mouth daily   albuterol (PROVENTIL HFA,VENTOLIN HFA) 90 mcg/act inhaler   Yes No   Sig: Inhale 1-2 puffs   allopurinol (ZYLOPRIM) 300 mg tablet   No No   Sig: TAKE 1 TABLET EVERY DAY   cetirizine (ZyrTEC) 10 mg tablet   Yes No   Sig: Take one tablet by mouth daily as needed for allergies/congestion    levothyroxine 75 mcg tablet   No No   Sig: TAKE 1 TABLET IN THE MORNING ON AN EMPTY STOMACH FOR THYROID      Facility-Administered Medications: None       Past Medical History:   Diagnosis Date    Medical history unknown        Past Surgical History:   Procedure Laterality Date    REPAIR / REINSERT BICEPS TENDON AT ELBOW      Last Assessed: 1/12/2016        Family History   Problem Relation Age of Onset    Diabetes Mother      I have reviewed and agree with the history as documented. E-Cigarette/Vaping     E-Cigarette/Vaping Substances     Social History     Tobacco Use    Smoking status: Never    Smokeless tobacco: Never   Substance Use Topics    Alcohol use: Yes     Comment: social    Drug use: Yes     Types: Marijuana        Review of Systems   Constitutional:  Positive for appetite change and unexpected weight change. Respiratory:  Negative for shortness of breath. Cardiovascular:  Negative for chest pain. Gastrointestinal:  Positive for vomiting. Neurological:  Positive for weakness (generalized). Psychiatric/Behavioral:  The patient is nervous/anxious. All other systems reviewed and are negative. Physical Exam  ED Triage Vitals [10/17/23 1051]   Temperature Pulse Respirations Blood Pressure SpO2   99.3 °F (37.4 °C) (!) 107 20 169/90 97 %      Temp Source Heart Rate Source Patient Position - Orthostatic VS BP Location FiO2 (%)   Oral Monitor Sitting Left arm --      Pain Score       --             Orthostatic Vital Signs  Vitals:    10/17/23 1310 10/17/23 1315 10/17/23 1650 10/17/23 1800   BP: 137/83 137/83 170/95 165/94   Pulse: 94 96 88 88   Patient Position - Orthostatic VS:  Sitting  Lying       Physical Exam  Vitals and nursing note reviewed. Constitutional:       General: He is not in acute distress. Appearance: Normal appearance. He is not ill-appearing or toxic-appearing. HENT:      Head: Normocephalic and atraumatic.       Right Ear: External ear normal.      Left Ear: External ear normal.      Nose: Nose normal.      Mouth/Throat:      Comments: Tongue fasciculations  Eyes:      General: No scleral icterus. Right eye: No discharge. Left eye: No discharge. Extraocular Movements: Extraocular movements intact. Conjunctiva/sclera: Conjunctivae normal.   Cardiovascular:      Rate and Rhythm: Tachycardia present. Heart sounds: Normal heart sounds. No murmur heard. No friction rub. No gallop. Pulmonary:      Effort: Pulmonary effort is normal. No respiratory distress. Breath sounds: Normal breath sounds. Abdominal:      General: Abdomen is flat. There is no distension. Palpations: Abdomen is soft. There is no mass. Tenderness: There is no abdominal tenderness. Genitourinary:     Comments: Deferred  Skin:     General: Skin is warm and dry. Neurological:      General: No focal deficit present. Mental Status: He is alert. Comments: Intention tremor   Psychiatric:         Mood and Affect: Mood is anxious.          ED Medications  Medications   sodium chloride 0.9 % bolus 1,000 mL (0 mL Intravenous Stopped 10/17/23 1658)   PHENobarbital injection 130 mg (130 mg Intravenous Given 10/17/23 1313)   magnesium sulfate 2 g/50 mL IVPB (premix) 2 g (0 g Intravenous Stopped 10/17/23 1412)   iohexol (OMNIPAQUE) 350 MG/ML injection (MULTI-DOSE) 100 mL (100 mL Intravenous Given 10/17/23 1531)   PHENobarbital injection 130 mg (130 mg Intravenous Given 10/17/23 1738)   pantoprazole (PROTONIX) injection 40 mg (40 mg Intravenous Given 10/17/23 1738)       Diagnostic Studies  Results Reviewed       Procedure Component Value Units Date/Time    FLU/RSV/COVID - if FLU/RSV clinically relevant [[id number]]  (Normal) Collected: 10/17/23 1218    Lab Status: Final result Specimen: Nares from Nose Updated: 10/17/23 1317     SARS-CoV-2 Negative     INFLUENZA A PCR Negative     INFLUENZA B PCR Negative     RSV PCR Negative    Narrative:      FOR PEDIATRIC PATIENTS - copy/paste COVID Guidelines URL to browser: [email or web address]    SARS-CoV-2 assay is a Nucleic Acid Amplification assay intended for the  qualitative detection of nucleic acid from SARS-CoV-2 in nasopharyngeal  swabs. Results are for the presumptive identification of SARS-CoV-2 RNA. Positive results are indicative of infection with SARS-CoV-2, the virus  causing COVID-19, but do not rule out bacterial infection or co-infection  with other viruses. Laboratories within the [facility] and its  territories are required to report all positive results to the appropriate  public health authorities. Negative results do not preclude SARS-CoV-2  infection and should not be used as the sole basis for treatment or other  patient management decisions. Negative results must be combined with  clinical observations, patient history, and epidemiological information. This test has not been FDA cleared or approved. This test has been authorized by FDA under an Emergency Use Authorization  (EUA). This test is only authorized for the duration of time the  declaration that circumstances exist justifying the authorization of the  emergency use of an in vitro diagnostic tests for detection of SARS-CoV-2  virus and/or diagnosis of COVID-19 infection under section 564(b)(1) of  the Act, 21 U. S.C. 877BQH-7(C)(4), unless the authorization is terminated  or revoked sooner. The test has been validated but independent review by FDA  and CLIA is pending. Test performed using [employer] Gene[employer]pert: This RT-PCR assay targets N2,  a region unique to SARS-CoV-2. A conserved region in the E-gene was chosen  for pan-Sarbecovirus detection which includes SARS-CoV-2. According to CMS-2020-01-R, this platform meets the definition of high-throughput technology.     HS Troponin 0hr (reflex protocol) [[id number]]  (Normal) Collected: 10/17/23 1218    Lab Status: Final result Specimen: Blood from Arm, Left Updated: 10/17/23 1313     hs TnI 0hr 6 ng/L     Ethanol [[id number]]  (Normal) Collected: 10/17/23 1218    Lab Status: Final result Specimen: Blood from Arm, Left Updated: 10/17/23 1304     Ethanol Lvl <10 mg/dL     CMP [[id number]]  (Abnormal) Collected: 10/17/23 1218    Lab Status: Final result Specimen: Blood from Arm, Left Updated: 10/17/23 1304     Sodium 138 mmol/L      Potassium 3.7 mmol/L      Chloride 100 mmol/L      CO2 27 mmol/L      ANION GAP 11 mmol/L      BUN 7 mg/dL      Creatinine 0.52 mg/dL      Glucose 132 mg/dL      Calcium 8.4 mg/dL      AST 81 U/L      ALT 45 U/L      Alkaline Phosphatase 102 U/L      Total Protein 6.2 g/dL      Albumin 3.7 g/dL      Total Bilirubin 1.22 mg/dL      eGFR 111 ml/min/1.73sq m     Narrative:      [facility] guidelines for Chronic Kidney Disease (CKD):     Stage 1 with normal or high GFR (GFR > 90 mL/min/1.73 square meters)    Stage 2 Mild CKD (GFR = 60-89 mL/min/1.73 square meters)    Stage 3A Moderate CKD (GFR = 45-59 mL/min/1.73 square meters)    Stage 3B Moderate CKD (GFR = 30-44 mL/min/1.73 square meters)    Stage 4 Severe CKD (GFR = 15-29 mL/min/1.73 square meters)    Stage 5 End Stage CKD (GFR <15 mL/min/1.73 square meters)  Note: GFR calculation is accurate only with a steady state creatinine    Lipase [[id number]]  (Normal) Collected: 10/17/23 1218    Lab Status: Final result Specimen: Blood from Arm, Left Updated: 10/17/23 1304     Lipase 47 u/L     Magnesium [[id number]]  (Abnormal) Collected: 10/17/23 1218    Lab Status: Final result Specimen: Blood from Arm, Left Updated: 10/17/23 1304     Magnesium 1.3 mg/dL     Phosphorus [[id number]]  (Normal) Collected: 10/17/23 1218    Lab Status: Final result Specimen: Blood from Arm, Left Updated: 10/17/23 1304     Phosphorus 4.0 mg/dL     Salicylate level [[id number]]  (Normal) Collected: 10/17/23 1218    Lab Status: Final result Specimen: Blood from Arm, Left Updated: 10/17/23 2475     Salicylate Lvl <5 mg/dL     Acetaminophen level-If concentration is detectable, please discuss with medical  on call. [[id number]]  (Abnormal) Collected: 10/17/23 1218    Lab Status: Final result Specimen: Blood from Arm, Left Updated: 10/17/23 1304     Acetaminophen Level <10 ug/mL     Ammonia [[id number]]  (Normal) Collected: 10/17/23 1218    Lab Status: Final result Specimen: Blood from Arm, Left Updated: 10/17/23 1302     Ammonia 49 umol/L     CBC and differential [[id number]]  (Abnormal) Collected: 10/17/23 1218    Lab Status: Final result Specimen: Blood from Arm, Left Updated: 10/17/23 1229     WBC 5.14 Thousand/uL      RBC 4.37 Million/uL      Hemoglobin 15.2 g/dL      Hematocrit 42.9 %      MCV 98 fL      MCH 34.8 pg      MCHC 35.4 g/dL      RDW 15.4 %      MPV 9.0 fL      Platelets 036 Thousands/uL      nRBC 0 /100 WBCs      Neutrophils Relative 68 %      Immat GRANS % 1 %      Lymphocytes Relative 21 %      Monocytes Relative 9 %      Eosinophils Relative 0 %      Basophils Relative 1 %      Neutrophils Absolute 3.51 Thousands/µL      Immature Grans Absolute 0.03 Thousand/uL      Lymphocytes Absolute 1.06 Thousands/µL      Monocytes Absolute 0.46 Thousand/µL      Eosinophils Absolute 0.02 Thousand/µL      Basophils Absolute 0.06 Thousands/µL                    CT head without contrast   Final Result by Benita Pina MD (10/17 4928)      No acute intracranial abnormality. Workstation performed: [id number]         CT abdomen pelvis with contrast   Final Result by Idalmis Hernandez MD (54/97 [id number])      Increased diffuse mild mesenteric edema with more focal edema surrounding the first portion of the duodenum and interposed between the duodenum and head of the pancreas. Findings may represent duodenitis or paraduodenal pancreatitis. Mild increase in circumferential bladder wall thickening compared to 9/9/2023, favored to be due to under distention.  Correlate for cystitis/urinary tract infection. Unchanged hepatic hypodensities compared to 7/9/2023. See CT of the abdomen and pelvis 7/9/2023 for recommendation for abdominal MRI. Unchanged pancreatic duct dilatation with abrupt cut off, no pancreatic head mass, no bile duct dilatation. Unchanged hypoattenuating cystic lesion in the pancreatic tail. These findings are unchanged since 2020. Recommend attention on MRI of the    abdomen. Similar appearance of multiple gastric varices secondary to portal hypertension. Workstation performed: [id number]               Procedures  Procedures      ED Course  ED Course as of 10/18/23 1428   Tue Oct 17, 2023   1204 Procedure Note: EKG  Date/Time: 10/17/23 12:04 PM   Interpreted by: Agustín Murphy   Indications / Diagnosis: weakness  ECG reviewed by me, the ED Provider: yes   The EKG demonstrates:  Rhythm: sinus tachycardia  Intervals: normal intervals  Axis: borderline rightward axis  QRS/Blocks: normal QRS  ST Changes: No acute ST Changes, no STD/RAMÓN.   1306 MEDICAL ALCOHOL: <10   1820 EMTALA signed and in chart. Going to [place] for medical detox. Medical Decision Making  Patient is a 55-year-old male presenting for evaluation of generalized weakness. Based on history and evaluation, differential diagnosis includes but is not limited to: Acute alcohol withdrawal, alcohol intoxication, anemia, electrolyte disturbance, pancreatitis, subdural hemorrhage, hyperammonemia, arrhythmia. Plan: CBC, CMP, lipase, troponin, coma panel, magnesium, phosphorus, ECG, CT head, CT abdomen pelvis, will discuss with toxicology    Labs remarkable for hypomagnesemia which was repleted. Otherwise unremarkable. ECG without active ischemia, STEMI, or concerning arrhythmia. CT head without emergent findings. CT abdomen pelvis with mesenteric edema and duodenitis otherwise unremarkable.   Discussed with toxicology, patient given 130 of phenobarbital with improvement in symptoms. Patient accepted to the medical detox unit at [facility]. Patient given another 130 of phenobarbital for ongoing symptoms prior to transfer over to [address] [place] [address] detox. Amount and/or Complexity of Data Reviewed  Labs: ordered. Decision-making details documented in ED Course. Radiology: ordered. Risk  Prescription drug management. Disposition  Final diagnoses:   Alcohol withdrawal ([address])     Time reflects when diagnosis was documented in both MDM as applicable and the Disposition within this note       Time User Action Codes Description Comment    10/17/2023  5:05 PM Belgica Kramer Add [W97.981] Alcohol withdrawal [facility])           ED Disposition       ED Disposition   Transfer to Another Facility-In Network    Condition   --    Date/Time   Tue Oct 17, 2023  5:04 PM    Comment   Ana Flores should be transferred out to [facility].                MD Documentation      Pascale Avila Most Recent Value   Patient Condition The patient has been stabilized such that within reasonable medical probability, no material deterioration of the patient condition or the condition of the unborn child(raymond) is likely to result from the transfer   Reason for Transfer Level of Care needed not available at this facility   Benefits of Transfer Specialized equipment and/or services available at the receiving facility (Include comment)________________________   Accepting Physician [address] Name, [address] [facility]   Sending MD Lopez   Provider Certification General risk, such as traffic hazards, adverse weather conditions, rough terrain or turbulence, possible failure of equipment (including vehicle or aircraft), or consequences of actions of persons outside the control of the transport personnel          RN Documentation      Flowsheet Row Most [address]  Name, [address]  Heart   Level of Care Basic life support   Transfer Date 10/17/23          Follow-up Information    None         Discharge Medication List as of 10/17/2023  7:23 PM        CONTINUE these medications which have NOT CHANGED    Details   albuterol (PROVENTIL HFA,VENTOLIN HFA) 90 mcg/act inhaler Inhale 1-2 puffs, Historical Med      allopurinol (ZYLOPRIM) 300 mg tablet TAKE 1 TABLET EVERY DAY, Normal      cetirizine (ZyrTEC) 10 mg tablet Take one tablet by mouth daily as needed for allergies/congestion , Historical Med      FLUoxetine (PROzac) 40 MG capsule TAKE ONE CAPSULE BY MOUTH EVERY DAY, Normal      levothyroxine 75 mcg tablet TAKE 1 TABLET IN THE MORNING ON AN EMPTY STOMACH FOR THYROID, Normal      Multiple Vitamin (DAILY VALUE MULTIVITAMIN) TABS Take 1 tablet by mouth daily, Historical Med      atorvastatin (LIPITOR) 20 mg tablet TAKE 1 TABLET DAILY AS DIRECTED., Normal      fenofibrate (TRIGLIDE) 160 MG tablet TAKE 1 TABLET DAILY., Normal      lisinopril (ZESTRIL) 30 mg tablet TAKE 1 TABLET BY MOUTH EVERY DAY, Normal      LORazepam (ATIVAN) 0.5 mg tablet TAKE 1 TABLET BY MOUTH TWICE A DAY, Normal      magnesium Oxide (MAG-OX) 400 mg TABS Take 1 tablet (400 mg total) by mouth daily for 15 days, Starting Thu 7/13/2023, Until Fri 7/28/2023, Normal      pantoprazole (PROTONIX) 40 mg tablet Take 1 tablet (40 mg total) by mouth daily in the early morning Do not start before July 14, 2023., Starting Fri 7/14/2023, Until Sun 8/13/2023, Normal           No discharge procedures on file. PDMP Review         Value Time User    PDMP Reviewed  Yes 7/9/2023  8:07 PM Ankit Aden PA-C             ED Provider  Attending physically available and evaluated Romel Amanda. I managed the patient along with the ED Attending.     Electronically Signed by           Gem Thomas DO  10/18/23 5142

## 2023-10-17 NOTE — EMTALA/ACUTE CARE TRANSFER
[address] [facility] [address]  Dept: [phone]      VGPQEM TRANSFER CONSENT    NAME Luis Eduardo Borrero                                         1960                              MRN [id number]    I have been informed of my rights regarding examination, treatment, and transfer   by Dr. Olivia Kan MD    Benefits: Specialized equipment and/or services available at the receiving facility (Include comment)________________________    Risks:        Transfer Request   I acknowledge that my medical condition has been evaluated and explained to me by the emergency department physician or other qualified medical person and/or my attending physician who has recommended and offered to me further medical examination and treatment. I understand the Hospital's obligation with respect to the treatment and stabilization of my emergency medical condition. I nevertheless request to be transferred. I release the Hospital, the doctor, and any other persons caring for me from all responsibility or liability for any injury or ill effects that may result from my transfer and agree to accept all responsibility for the consequences of my choice to transfer, rather than receive stabilizing treatment at the Hospital. I understand that because the transfer is my request, my insurance may not provide reimbursement for the services. The Hospital will assist and direct me and my family in how to make arrangements for transfer, but the hospital is not liable for any fees charged by the transport service. In spite of this understanding, I refuse to consent to further medical examination and treatment which has been offered to me, and request transfer to [address] [place] [address] [address] Name, [address] [place] [address] : [facility]. I authorize the performance of emergency medical procedures and treatments upon me in both transit and upon arrival at the receiving facility.   Additionally, I authorize the release of any and all medical records to the receiving facility and request they be transported with me, if possible. I authorize the performance of emergency medical procedures and treatments upon me in both transit and upon arrival at the receiving facility. Additionally, I authorize the release of any and all medical records to the receiving facility and request they be transported with me, if possible. I understand that the safest mode of transportation during a medical emergency is an ambulance and that the Hospital advocates the use of this mode of transport. Risks of traveling to the receiving facility by car, including absence of medical control, life sustaining equipment, such as oxygen, and medical personnel has been explained to me and I fully understand them. (DARYL CORRECT BOX BELOW)  [  ]  I consent to the stated transfer and to be transported by ambulance/helicopter. [  ]  I consent to the stated transfer, but refuse transportation by ambulance and accept full responsibility for my transportation by car. I understand the risks of non-ambulance transfers and I exonerate the Hospital and its staff from any deterioration in my condition that results from this refusal.    X___________________________________________    DATE  10/17/23  TIME________  Signature of patient or legally responsible individual signing on patient behalf           RELATIONSHIP TO PATIENT_________________________          Provider Certification    NAME Sole Hong                                         1960                              MRN [id number]    A medical screening exam was performed on the above named patient. Based on the examination:    Condition Necessitating Transfer The encounter diagnosis was Alcohol withdrawal ([address]).     Patient Condition: The patient has been stabilized such that within reasonable medical probability, no material deterioration of the patient condition or the condition of the unborn child(raymond) is likely to result from the transfer    Reason for Transfer: Level of Care needed not available at this facility    Transfer Requirements: [address] available and qualified personnel available for treatment as acknowledged by    Zoe to accept transfer and to provide appropriate medical treatment as acknowledged by       Cielo  Appropriate medical records of the examination and treatment of the patient are provided at the time of transfer   [address] _______  Transfer will be performed by qualified personnel from    and appropriate transfer equipment as required, including the use of necessary and appropriate life support measures. Provider Certification: I have examined the patient and explained the following risks and benefits of being transferred/refusing transfer to the patient/family:  General risk, such as traffic hazards, adverse weather conditions, rough terrain or turbulence, possible failure of equipment (including vehicle or aircraft), or consequences of actions of persons outside the control of the transport personnel      Based on these reasonable risks and benefits to the patient and/or the unborn child(raymond), and based upon the information available at the time of the patient’s examination, I certify that the medical benefits reasonably to be expected from the provision of appropriate medical treatments at another medical facility outweigh the increasing risks, if any, to the individual’s medical condition, and in the case of labor to the unborn child, from effecting the transfer.     X____________________________________________ DATE 10/17/23        TIME_______      ORIGINAL - SEND TO MEDICAL RECORDS   COPY - SEND WITH PATIENT DURING TRANSFER

## 2023-10-18 VITALS
DIASTOLIC BLOOD PRESSURE: 96 MMHG | SYSTOLIC BLOOD PRESSURE: 146 MMHG | OXYGEN SATURATION: 97 % | TEMPERATURE: 98.1 F | HEIGHT: 70 IN | HEART RATE: 78 BPM | RESPIRATION RATE: 18 BRPM | BODY MASS INDEX: 30.92 KG/M2 | WEIGHT: 216 LBS

## 2023-10-18 PROBLEM — E87.5 HYPERKALEMIA: Status: ACTIVE | Noted: 2023-10-18

## 2023-10-18 LAB
ALBUMIN SERPL BCP-MCNC: 3.4 G/DL (ref 3.5–5)
ALP SERPL-CCNC: 75 U/L (ref 34–104)
ALT SERPL W P-5'-P-CCNC: 34 U/L (ref 7–52)
ANION GAP SERPL CALCULATED.3IONS-SCNC: 10 MMOL/L
ANION GAP SERPL CALCULATED.3IONS-SCNC: 10 MMOL/L
AST SERPL W P-5'-P-CCNC: 47 U/L (ref 13–39)
BILIRUB SERPL-MCNC: 1.38 MG/DL (ref 0.2–1)
BUN SERPL-MCNC: 4 MG/DL (ref 5–25)
BUN SERPL-MCNC: 5 MG/DL (ref 5–25)
CALCIUM ALBUM COR SERPL-MCNC: 8 MG/DL (ref 8.3–10.1)
CALCIUM SERPL-MCNC: 7.5 MG/DL (ref 8.4–10.2)
CALCIUM SERPL-MCNC: 7.9 MG/DL (ref 8.4–10.2)
CHLORIDE SERPL-SCNC: 103 MMOL/L (ref 96–108)
CHLORIDE SERPL-SCNC: 103 MMOL/L (ref 96–108)
CO2 SERPL-SCNC: 24 MMOL/L (ref 21–32)
CO2 SERPL-SCNC: 26 MMOL/L (ref 21–32)
CREAT SERPL-MCNC: 0.51 MG/DL (ref 0.6–1.3)
CREAT SERPL-MCNC: 0.59 MG/DL (ref 0.6–1.3)
ERYTHROCYTE [DISTWIDTH] IN BLOOD BY AUTOMATED COUNT: 15 % (ref 11.6–15.1)
GFR SERPL CREATININE-BSD FRML MDRD: 106 ML/MIN/1.73SQ M
GFR SERPL CREATININE-BSD FRML MDRD: 112 ML/MIN/1.73SQ M
GLUCOSE SERPL-MCNC: 101 MG/DL (ref 65–140)
GLUCOSE SERPL-MCNC: 133 MG/DL (ref 65–140)
HCT VFR BLD AUTO: 40.7 % (ref 36.5–49.3)
HGB BLD-MCNC: 13.6 G/DL (ref 12–17)
MAGNESIUM SERPL-MCNC: 1.6 MG/DL (ref 1.9–2.7)
MAGNESIUM SERPL-MCNC: 1.9 MG/DL (ref 1.9–2.7)
MCH RBC QN AUTO: 33.6 PG (ref 26.8–34.3)
MCHC RBC AUTO-ENTMCNC: 33.4 G/DL (ref 31.4–37.4)
MCV RBC AUTO: 101 FL (ref 82–98)
PLATELET # BLD AUTO: 94 THOUSANDS/UL (ref 149–390)
PMV BLD AUTO: 10.5 FL (ref 8.9–12.7)
POTASSIUM SERPL-SCNC: 2.9 MMOL/L (ref 3.5–5.3)
POTASSIUM SERPL-SCNC: 3.7 MMOL/L (ref 3.5–5.3)
PROT SERPL-MCNC: 5.4 G/DL (ref 6.4–8.4)
RBC # BLD AUTO: 4.05 MILLION/UL (ref 3.88–5.62)
SODIUM SERPL-SCNC: 137 MMOL/L (ref 135–147)
SODIUM SERPL-SCNC: 139 MMOL/L (ref 135–147)
T4 FREE SERPL-MCNC: 0.59 NG/DL (ref 0.61–1.12)
TSH SERPL DL<=0.05 MIU/L-ACNC: 8.44 UIU/ML (ref 0.45–4.5)
WBC # BLD AUTO: 2.94 THOUSAND/UL (ref 4.31–10.16)

## 2023-10-18 PROCEDURE — 84443 ASSAY THYROID STIM HORMONE: CPT

## 2023-10-18 PROCEDURE — 80053 COMPREHEN METABOLIC PANEL: CPT

## 2023-10-18 PROCEDURE — 84439 ASSAY OF FREE THYROXINE: CPT

## 2023-10-18 PROCEDURE — 80048 BASIC METABOLIC PNL TOTAL CA: CPT | Performed by: PHYSICIAN ASSISTANT

## 2023-10-18 PROCEDURE — 83735 ASSAY OF MAGNESIUM: CPT | Performed by: PHYSICIAN ASSISTANT

## 2023-10-18 PROCEDURE — 83735 ASSAY OF MAGNESIUM: CPT

## 2023-10-18 PROCEDURE — 85027 COMPLETE CBC AUTOMATED: CPT

## 2023-10-18 RX ORDER — FOLIC ACID 1 MG/1
1 TABLET ORAL DAILY
Qty: 7 TABLET | Refills: 0 | Status: SHIPPED | OUTPATIENT
Start: 2023-10-19 | End: 2023-10-26

## 2023-10-18 RX ORDER — POTASSIUM CHLORIDE 14.9 MG/ML
20 INJECTION INTRAVENOUS
Status: DISPENSED | OUTPATIENT
Start: 2023-10-18 | End: 2023-10-18

## 2023-10-18 RX ORDER — MAGNESIUM SULFATE HEPTAHYDRATE 40 MG/ML
2 INJECTION, SOLUTION INTRAVENOUS ONCE
Status: DISCONTINUED | OUTPATIENT
Start: 2023-10-18 | End: 2023-10-18

## 2023-10-18 RX ORDER — PANTOPRAZOLE SODIUM 40 MG/1
40 TABLET, DELAYED RELEASE ORAL
Qty: 7 TABLET | Refills: 0 | Status: SHIPPED | OUTPATIENT
Start: 2023-10-19 | End: 2023-10-26

## 2023-10-18 RX ORDER — LANOLIN ALCOHOL/MO/W.PET/CERES
100 CREAM (GRAM) TOPICAL DAILY
Qty: 5 TABLET | Refills: 0 | Status: SHIPPED | OUTPATIENT
Start: 2023-10-19 | End: 2023-10-24

## 2023-10-18 RX ORDER — POTASSIUM CHLORIDE 20 MEQ/1
40 TABLET, EXTENDED RELEASE ORAL ONCE
Status: COMPLETED | OUTPATIENT
Start: 2023-10-18 | End: 2023-10-18

## 2023-10-18 RX ORDER — MAGNESIUM SULFATE HEPTAHYDRATE 40 MG/ML
2 INJECTION, SOLUTION INTRAVENOUS ONCE
Status: COMPLETED | OUTPATIENT
Start: 2023-10-18 | End: 2023-10-18

## 2023-10-18 RX ADMIN — LORATADINE 10 MG: 10 TABLET ORAL at 08:22

## 2023-10-18 RX ADMIN — LEVOTHYROXINE SODIUM 75 MCG: 75 TABLET ORAL at 05:57

## 2023-10-18 RX ADMIN — THIAMINE HCL TAB 100 MG 100 MG: 100 TAB at 08:22

## 2023-10-18 RX ADMIN — FLUOXETINE 40 MG: 20 CAPSULE ORAL at 08:22

## 2023-10-18 RX ADMIN — PANTOPRAZOLE SODIUM 40 MG: 40 TABLET, DELAYED RELEASE ORAL at 08:22

## 2023-10-18 RX ADMIN — POTASSIUM CHLORIDE 20 MEQ: 14.9 INJECTION, SOLUTION INTRAVENOUS at 14:14

## 2023-10-18 RX ADMIN — FOLIC ACID 1 MG: 1 TABLET ORAL at 08:22

## 2023-10-18 RX ADMIN — MAGNESIUM SULFATE HEPTAHYDRATE 2 G: 40 INJECTION, SOLUTION INTRAVENOUS at 08:17

## 2023-10-18 RX ADMIN — ALLOPURINOL 300 MG: 100 TABLET ORAL at 08:22

## 2023-10-18 RX ADMIN — POTASSIUM CHLORIDE 20 MEQ: 14.9 INJECTION, SOLUTION INTRAVENOUS at 11:06

## 2023-10-18 RX ADMIN — ENOXAPARIN SODIUM 40 MG: 40 INJECTION SUBCUTANEOUS at 08:22

## 2023-10-18 RX ADMIN — POTASSIUM CHLORIDE 40 MEQ: 1500 TABLET, EXTENDED RELEASE ORAL at 08:22

## 2023-10-18 RX ADMIN — MULTIPLE VITAMINS W/ MINERALS TAB 1 TABLET: TAB ORAL at 08:22

## 2023-10-18 NOTE — PROGRESS NOTES
10/18/23 1101   Referral Data   Referral Source Patient   Referral Name Pt presented to the [facility] ED   Referral Reason Drug/Alcohol [address] of Residence [place]   Readmission Root Cause   30 Day Readmission No   Patient Information   Mental Status Alert   Primary Caregiver Self   Support System Extended family;Friends   Jehovah's witness/Cultural Requests Faith   Legal Information   Legal Issues Pt declined any current legal issues. Activities of Daily Living Prior to Admission   Functional Status Independent   Assistive Device No device needed   Living Arrangement House  (Pt lives in friend's sister's home. The friend is currently placed in a SNF and Pt is currently living alone.)   Ambulation Independent   Access to Firearms   Access to Firearms No  (Pt declines any access to firearms)   [address] Pension/alf  (Pt is retired.)   [employer] of Transport to Appts: Drives Self        45/59/82 1111   Substance Abuse Addendum Details   History of Withdrawal Symptoms Other withdrawal symptoms (specify in comment)  (Tired, fatigue)   Medical Complications Hypothyroidism   Sober Supports family, sister and brother, friend Dakota Victor)   Present Treatment Pt denies any present treatment. Substance Abuse Treatment Hx Denies past history   Stage of Change   Stage of Change Precontemplation     Questions Responses   Problems Due to Past Use of Alcohol? Yes   Problems Due to Past Use of Substances?  No   Substance Use Assessment Substance use within the past 12 months   Alcohol Use Frequency Daily   Cannabis frequency Daily   Comment:  Daily on 10/18/2023    Alcohol Drink of Choice Whiskey   1st Use of Alcohol High School   Cannabis 1st Use High School   Last Use of Alcohol & Amount 10/16- 2 shots in mixed drink   Cannabis last use 10/16/23   Comment:  10/16/2023 on 10/18/2023    Longest Abstinence from Alcohol 1 month   Cannabis Longest Abstinence 3 weeks     Pt is a 70year old male who was admitted to withdrawal management unit for alcohol withdrawal. Pt reported that everything at home was becoming to overhwelming and unmanageable. [facility] Pt's name, date of birth, home address, and telephone number were verified. Pt was informed of case management role and purpose of the completion of intake with case management. Pt presented as cooperative. Pt reports his first drink of alcohol was in high school. Pt reports his last use of alcohol was on 10/16. Pt reports he has been drinking approximately 2 shots of whiskey in a mixed drink daily. Pt reports he has been drinking daily for about 10 years. Pt reported his longest period of sobriety is 1 month and it was in 2013. Pt reports to smoke a pipe of marijuana daily. Pt reported his first use of marijuana was in high school. Pt reported his last use was 10/16. Pt reports that he has been using marijuana for 47 years and his longest period of sobriety is 3 weeks. Pt denied nicotine use. Pt denied any prior D&A treatment. Pt denied attending any AA/NA meetings. Pt denied any withdrawal symptoms other than feeling tired. Pt denied history of any OD/ blackouts or withdrawal seizures. Pt denied any family history of substance use disorder or mental health diagnosis. UDS: Not Completed  Audit: Not Completed   PAWSS: 1  Ethanol: Normal    Pt has a diagnosis of Depression and generalized anxiety. Pt denied any history of mental health outpatient or inpatient treatment. Pt denied any family history of mental health or substance use disorder diagnosis. Pt denied any history of abuse or trauma, denied recent loses. Pt denied having SI or HI and denied AVH. Pt denied access to any firearms. Pt has current chronic medical condition of  Hypothyroidism. Pt signed KACY for PCP Dr. Manuel Hardwick from [facility] practice located at [address] [place] [address], [id number].  The phone number is [phone]. Cm called PCP and informed them of client's presence in treatment with [facility]. Pt declined any follow up appointment needed. Pt has current health insurance with Medicare/ Medicare A and B. Pt also has a secondary of [employer]. Pt signed KACY for both insurance companies. Pt reported his preferred pharmacy is [facility] at [address]- [phone]. Pt denied any legal issues and denied probation and parole. Pt reports he is unemployed and retired. Pt reports his highest level of education is some college at [employer]. Pt denied any  history. Pt identified as Hinduism and declined any accommodations necessary at this time. Pt reported he drives himself and owns his own vehicle and has a valid 's license. Pt reported his vehicle is not located at [facility] and will need a ride home upon discharge from detox. Pt reported there is no one that is able to pick him up. Pt reports to live at [address] [place], [place], [address]. Pt reports that this is his friend's sister's home. Pt reported that the owner of the home lives out of state. Pt reports he used to live in the home with his friend "Loreto Brown" but Loreto Brown is now in a nursing home and Pt lives in the home alone. Pt reported he visits Loreto Brown often in the nursing home. Pt reported he is able to return home after discharge from [facility] detox. Pt reported his main supports are his sister and brother and Loreto Brown. Pt declined any KACY for supports. Pt reported a good phone number for him is [phone] and email is [email or web address]. Pt reported that his marital status is single. Pt declined to complete the relapse prevention plan. Pt stated he feels moments of depression because of all the responsibilities that he carries in the home.  Pt stated he is responsible for the bills and the upkeep of the home along with feeding the stray cats outside of his home. Pt reports that he does not usually reach out to anyone when he is feeling overwhelmed. Cm encouraged Pt to reach out to supports. Cm offered the option for transitioning to an Inpatient treatment facility. Pt declined. Cm presented the option of Outpatient treatment and Pt declined. Pt reported he cannot leave the home for a long period of time. Pt also reported he has the outpatient resources from his PCP and does not need any help. Pt reported " I will call those numbers when I feel I need too". Pt was pleasant and calm when interacting with Cm. Pt is in the pre-contemplation stage of change due top refusing to address AUD. Pt's additional barrier is lacking external supports and preferring to isolate. Pt declined all resources. Cm will continue to encourage Pt. Pt's goals for detox are to successfully complete medical withdrawal and to develop a discharge plan that includes relapse prevention education.

## 2023-10-18 NOTE — DISCHARGE SUMMARY
Discharge summary  [facility] 4 MEDICAL DETOX UNIT  Francisco Villar 70 y.o. male MRN: [id number]  Unit/Bed#: 5T DETOX 507-01 Encounter: [id number]      Reason for Admission/Principal Problem: Ethanol withdrawal, Ethanol use disorder  Admitting Provider: Sydnie Saucedo PA-C  Attending Provider: Mariza Holder DO   10/17/2023  7:31 PM        Alcohol use disorder, moderate, dependence ([address])  Assessment & Plan  Patient reports remote history of binge drinking  Currently endorses drinking 2-3 whiskey containing drinks per day   Last drink the evening of 10/16  Reports previous withdrawal episodes involving tremors, diaphoresis, and anxiety  Denies that he currently feels like he's in withdrawal, stating he came to the ED for evaluation of his abdomen   Denies rodrigue naltrexone   Patient requesting outpatient services  See Alcohol Withdrawal    * Alcohol withdrawal syndrome with complication [facility])  Assessment & Plan  Patient reports daily alcohol use   Last drink the evening of 10/16  EtOH < 10 on initial labs   Patient denies history of withdrawal seizures   Patient currently endorses anxiety as only withdrawal symptoms     [facility] symptom triggered [facility]obar[facility]   On admission, phenobarbital held due to lack of withdrawal symptoms, continue to monitor  Patient received 130 mg phenobarbital in the ED prior to transfer   As of 1017 has remained asymptomatic no additional phenobarbital needed currently  Symptomatic and supportive management   Thiamine and folic acid supplementation  Electrolyte repletion as needed  Pulse oximetry and telemetry monitoring      Hyperkalemia  Assessment & Plan  Recent Labs     10/17/23  1218 10/18/23  0520 10/18/23  1633   K 3.7 2.9* 3.7         - Resolved  - Noted this morning  - Conjunction with hypomagnesia  - Correction placed times 2K riders  - IV fluids have been held  - Repeat labs at 1900    Hypomagnesemia  Assessment & Plan  Recent Labs     10/17/23  1218 10/18/23  0520 10/18/23  1633   MG 1.3* 1.6* 1.9     - Resolved  Repleted - additional ordered  Conjunction with hypokalemia  No acute EKG abnormalities  Continue to monitor and replete as needed    Diastasis recti  Assessment & Plan  Patient reports presenting to the ED due to bulging in his abdomen   Notes it is present when he sits up but disappears when laying down   On exam, patient has vertical bulging to his center abdomen without associated pain or tenderness to palpation with lifting head  Self reduces when laying flat   CT A/P without acute changes pathology   Noted mild abdominal pain this morning but has since resolved     Educated patient about diastasis recti and gave reassurance about the condition  Recommend follow up with PCP outpatient     Depression  Assessment & Plan  Patient reports history of depression controlled on fluoxetine 40 mg daily   Notes worsening symptoms lately including poor appetite, lack of interest in activities, decreased mood, and increased fatigue  Notes recent stressors of his sister being diagnosed with lung cancer, his friend being admitted to a short term rehab facility, and needing to maintain both his and his friend's houses   Patient does not currently follow with psychiatry or a therapist   Spoke to patient about the benefits of CBT and he voiced interest in pursuing outpatient   Denies SI/HI   No continual observation indicated at this time     Continue home medication   Patient declining psychiatric evaluation at this time   Recommend follow up with psychiatry/therapy outpatient    Transaminitis  Assessment & Plan  Recent Labs     10/17/23  1218 10/18/23  0520   AST 81* 47*   ALT 45 34   ALKPHOS 102 75     Mildly elevated AST on initial labs  Trending down  In the setting of daily alcohol use  Encourage cessation  Continue to monitor    Generalized anxiety disorder  Assessment & Plan  See Depression for management  Continue home medication    Gastroesophageal reflux disease without esophagitis  Assessment & Plan  No signs of acute bleed   Continue home pantoprazole       Hypothyroidism  Assessment & Plan  Continue home medications  TSH level pending    Hypertension  Assessment & Plan  History of HTN, no current medications  Vitals:    10/18/23 0710   BP: 123/69   Pulse: 78   Resp: 18   Temp: 97.7 °F (36.5 °C)   SpO2: 93%     Continue to monitor   Consider initiation of antihypertensives if hypertension persists after acute withdrawal managed               VTE Prophylaxis: Enoxaparin (Lovenox)  / sequential compression device   Code Status: FULL      Anticipated Length of Stay:  Patient will be admitted on an Inpatient basis with an anticipated length of stay of  1  midnights. Justification for Hospital Stay: Alocohol detox    For any questions or concerns, please Tiger Text the advanced practitioner in the role of [facility]-DETOX-AP On Call      This patient qualifies for Level IV medically managed intensive inpatient services under the criteria set by the American Society of Addiction Medicine, including dimensions 1-3. The patient is in withdrawal (or is intoxicated with high risk of withdrawal), with severe and unstable medical and/or psychiatric (dual diagnosis) problems, requiring requires 24-hour medical and nursing care and the full resources of a licensed hospital.          [facility] patient to medical detox unit and continue supportive care and stabilization of acute ethanol withdrawal per medical toxicology/detox treatment pathway. Monitor ethanol withdrawal severity via the Severity of Ethanol Withdrawal Scale (SEWS) Q4 hours and then hourly if/when SEWS > 6. Treat withdrawal per pathway and reassess Q30-60 minutes. Mild SEWS Score 1-6  Administer medications* (IV or PO; PO preferred):   If initial SEWS score: diazepam 10mg PO/IV x 1 AND phenobarbital 65 mg PO/IV x 1  If repeat SEWS score 1-6: phenobarbital 65 mg PO/IV q1 hour x 5 doses maximum   Reassessment:   SEWS q1 hour after each dose until SEWS 0 x 2 hours  VS q1 hours (until SEWS 0, then q4 hours)  Notify provider for bedside evaluation if 5-dose maximum is reached, RASS of -3 to -5, or SEWS score escalates to moderate or severe. Moderate SEWS Score 7-12  Administer medications* (IV):  If initial SEWS score: diazepam 10mg IV x 1 AND phenobarbital 260 mg IV x 1  If repeat SEWS score 7-12 or score escalated from mild: phenobarbital 130 mg IV q30 minutes x 5 doses maximum   Reassessment:  SEWS q30 minutes after each dose until SEWS < 7 (then hourly until SEWS 0 x 2 hours)  VS q30 minutes until SEWS < 7 (then hourly until SEWS 0, then q4 hours)  Notify provider for bedside evaluation if 5-dose maximum is reached, RASS of -3 to -5, or SEWS score escalates to severe. Severe SEWS Score ? 13  Administer medications* (IV):  If initial SEWS score: Diazepam 10 mg IV x 1 AND phenobarbital 650 mg IV piggyback x 1 over 15-30 minutes  If repeat SEWS score ? 13 or score escalated from mild or moderate: phenobarbital 130 mg IV q30 minutes x 5 doses maximum   Reassessment:  SEWS q30 minutes after each dose until SEWS < 7 (then hourly until SEWS 0 x 2 hours)   VS q30 minutes until SEWS < 7 (then hourly until SEWS 0, then q4 hours)  Notify provider for bedside evaluation if 5-dose maximum is reached or RASS of -3 to -5   *Hold medications and notify provider if CNS depression, respirations < 10/min, or RASS of -3 to -5.          Medications to be administered adjunctively if more than 2 grams of phenobarbital is needed for stabilization of withdrawal; require attending approval.   Dexmedetomidine infusion 0.1-1mcg/kg/hr IV infusion, titratable to reduced agitation (Goal: RASS -2)  Ketamine   Acute agitated delirium: 1-2 mg/kg IV or 4-5 mg/kg IM  Refractory withdrawal: 0.1-1mg/kg/hr IV infusion, titratable to reduced agitation (Goal: RASS -2)    Further evaluation, screening and treatment:  Evaluate complete metabolic panel, transaminases, INR, and lipase. Assess hepatic ultrasound for any sign of alcoholic liver disease or cirrhosis, and ultimately refer for further hepatic evaluation and care as/if indicated. Additional medications for ethanol associated malnutrition: Thiamine 100 mg IV daily, increase to 500 mg TID for signs/symptoms of Wernicke's Encephalopathy or Wernicke Korsakoff Syndrome   Folic acid 1 mg IV daily   Multivitamin PO daily      Will offer first monthly injection of Naltrexone 380 mg IM, once patient is stabilized, as it has been shown to assist in decreasing cravings for ethanol. Evaluate and treat for coexisting substance use, such as opioids and nicotine. Discuss risk factors for infectious disease, such as history of intravenous drug abuse, and offer hepatitis and HIV screening if indicated. Case management consultation to assist with coordination of subsequent treatment after discharge. Hx and PE limited by: N/A    HPI: Beti Manuel is a 70y.o. year old male who presents with alcohol withdrawal, and abdominal pain. He was admitted to our service overnight, and has not had withdrawal symptoms. He has remained off of our scoring system SEWS. He has not used any phenobarbital for withdrawal symptoms. Patient seen and examined bedside today he is doing well asymptomatic having no difficulties. He had a decrease in his electrolytes today which were corrected, and he has resolved at this point he is ready for discharge.     Preferred alcoholic beverage(s): Beer/Liquor   Quantity and frequency of alcohol intake: Randomly  Use of any ethanol substitutes (toxic alcohols): no  Current signs and symptoms of ethanol withdrawal: None    SEWS Total Score: 0 (10/18/2023  8:20 AM)      Social History     Substance and Sexual Activity   Alcohol Use Yes    Comment: social     Social History     Substance and Sexual Activity   Drug Use Yes    Types: Marijuana     Social History     Tobacco Use   Smoking Status Never   Smokeless Tobacco Never       Review of Systems    Historical Information   Past Medical History:   Diagnosis Date    Medical history unknown      Past Surgical History:   Procedure Laterality Date    REPAIR / REINSERT BICEPS TENDON AT ELBOW      Last Assessed: 1/12/2016      Family History   Problem Relation Age of Onset    Diabetes Mother        Allergies   Allergen Reactions    Amoxicillin Swelling and Fever     Other reaction(s): vertigo    Escitalopram      Other reaction(s): sweaty palms/hands, felt faint, passed out for a short time, had anxiety/panic attack    Gemfibrozil      Other reaction(s): Nausea and/or vomiting  Other reaction(s): Nausea and/or vomiting    Other      Other reaction(s): Other (See Comments)  rhinitis, itchy eyes       Prior to Admission medications    Medication Sig Start Date End Date Taking?  Authorizing Provider   albuterol (PROVENTIL HFA,VENTOLIN HFA) 90 mcg/act inhaler Inhale 1-2 puffs   Yes Historical Provider, MD   allopurinol (ZYLOPRIM) 300 mg tablet TAKE 1 TABLET EVERY DAY 8/15/19  Yes Alfreda Gambino MD   cetirizine (ZyrTEC) 10 mg tablet Take one tablet by mouth daily as needed for allergies/congestion  5/2/14  Yes Historical Provider, MD   FLUoxetine (PROzac) 40 MG capsule TAKE ONE CAPSULE BY MOUTH EVERY DAY 7/22/19  Yes Alfreda Gambino MD   folic acid (FOLVITE) 1 mg tablet Take 1 tablet (1 mg total) by mouth daily for 7 days Do not start before October 19, 2023. 10/19/23 10/26/23 Yes Anthony Gurrola PA-C   levothyroxine 75 mcg tablet TAKE 1 TABLET IN THE MORNING ON AN EMPTY STOMACH FOR THYROID 6/9/19  Yes Alfreda Gambino MD   Multiple Vitamin (DAILY VALUE MULTIVITAMIN) TABS Take 1 tablet by mouth daily   Yes Historical Provider, MD   pantoprazole (PROTONIX) 40 mg tablet Take 1 tablet (40 mg total) by mouth daily in the early morning for 7 days Do not start before October 19, 2023. 10/19/23 10/26/23 Yes Ronny Brewster PA-C   thiamine 100 MG tablet Take 1 tablet (100 mg total) by mouth daily for 5 days Do not start before October 19, 2023. 10/19/23 10/24/23 Yes Makeda Hughes PA-C       Current Facility-Administered Medications   Medication Dose Route Frequency    acetaminophen (TYLENOL) tablet 650 mg  650 mg Oral Q6H PRN    albuterol (PROVENTIL HFA,VENTOLIN HFA) inhaler 2 puff  2 puff Inhalation Q4H PRN    allopurinol (ZYLOPRIM) tablet 300 mg  300 mg Oral Daily    enoxaparin (LOVENOX) subcutaneous injection 40 mg  40 mg Subcutaneous Daily    FLUoxetine (PROzac) capsule 40 mg  40 mg Oral Daily    folic acid (FOLVITE) tablet 1 mg  1 mg Oral Daily    levothyroxine tablet 75 mcg  75 mcg Oral QAM    loratadine (CLARITIN) tablet 10 mg  10 mg Oral Daily    multivitamin-minerals (CENTRUM) tablet 1 tablet  1 tablet Oral Daily    ondansetron (ZOFRAN) injection 4 mg  4 mg Intravenous Q6H PRN    pantoprazole (PROTONIX) EC tablet 40 mg  40 mg Oral Early Morning    potassium chloride 20 mEq IVPB (premix)  20 mEq Intravenous Q2H    thiamine tablet 100 mg  100 mg Oral Daily    traZODone (DESYREL) tablet 50 mg  50 mg Oral HS PRN       Continuous Infusions:          Objective       Intake/Output Summary (Last 24 hours) at 10/18/2023 1812  Last data filed at 10/18/2023 1300  Gross per 24 hour   Intake 480 ml   Output --   Net 480 ml       Invasive Devices:   Peripheral IV 10/17/23 Distal;Left;Upper;Ventral (anterior) Arm (Active)   Site Assessment WDL; Clean;Dry; Intact 10/18/23 0820   Dressing Type Transparent 10/18/23 0820   Line Status Flushed & Clamped 10/18/23 0820   Dressing Status Clean;Dry; Intact 10/18/23 0820   Dressing Intervention Dressing reinforced 10/18/23 0820   Dressing Change Due 10/21/23 10/18/23 0820   Reason Not Rotated Not due 10/18/23 0820       Vitals   Vitals:    10/18/23 0600 10/18/23 0710 10/18/23 1115 10/18/23 1526 BP: 145/84 123/69 134/81 146/96   TempSrc: Temporal Temporal Temporal Temporal   Pulse: 81 78 84 78   Resp: 16 18 18 18   Patient Position - Orthostatic VS: Lying Lying Lying Lying   Temp: 97.9 °F (36.6 °C) 97.7 °F (36.5 °C) 98.1 °F (36.7 °C) 98.1 °F (36.7 °C)       Physical Exam  PHYSICAL EXAM    General: No acute distress  Neuro: GCS 15, A&Ox3, No focal deficits, Sensation intact  Eyes: Conjunctivae are pink. ENT: Mucous membranes are moist and intact. Nares are patent w/o inflammation/foreign body, Oropharynx is clear and symmetric with no erythema or exudates. Neck: Trachea midline, No JVD  CV: RRR, No murmurs/gallops/rubs, +2 pulses  Pulm: CTA/B, No wheezing, No chest wall tenderness  GI: Soft, nontender, bowel sounds heard throughout all quadrants. MSK: Moves all extremities  Back: No step offs, ecchymosis, trauma noted, No CVA tenderness  Skin: Warm, dry, pink  Psych: Appropriate and cooperative       Data:    EKG, Pathology, and Other Studies: I have personally reviewed pertinent reports.       Lab Results:  CBC ETOH     Lab Results   Component Value Date    WBC 2.94 (L) 10/18/2023    RBC 4.05 10/18/2023    HGB 13.6 10/18/2023    HCT 40.7 10/18/2023     (H) 10/18/2023    MCH 33.6 10/18/2023    MCHC 33.4 10/18/2023    RDW 15.0 10/18/2023    PLT 94 (L) 10/18/2023    MPV 10.5 10/18/2023      No results found for: "LACTICACID"   CMP UA         Component Value Date/Time     11/09/2016 0723    K 3.7 10/18/2023 1633    K 3.9 11/09/2016 0723     10/18/2023 1633     11/09/2016 0723    CO2 24 10/18/2023 1633    CO2 29 11/09/2016 0723    BUN 4 (L) 10/18/2023 1633    BUN 21 11/09/2016 0723    CREATININE 0.59 (L) 10/18/2023 1633    CREATININE 0.91 11/09/2016 0723         Component Value Date/Time    CALCIUM 7.9 (L) 10/18/2023 1633    CALCIUM 8.6 11/09/2016 0723    ALKPHOS 75 10/18/2023 0520    ALKPHOS 37 (L) 11/09/2016 0723    AST 47 (H) 10/18/2023 0520    AST 21 11/09/2016 0723    ALT 34 10/18/2023 0520    ALT 20 11/09/2016 0723    BILITOT 0.5 11/09/2016 0723      No results found for: "CLARITYU", "COLORU", "Christelle Adas", "PHUR", "GLUCOSEU", "Bettyann Felt", "BLOODU", "PROTEIN UA", "NITRITE", "BILIRUBINUR", "UROBILINOGEN", "LEUKOCYTESUR", "WBCUA", "RBCUA", "HYALINE", "BACTERIA", "EPIS"     Liver Function Test: ASA     Lab Results   Component Value Date    TBILI 1.38 (H) 10/18/2023    ALKPHOS 75 10/18/2023    ALKPHOS 37 (L) 11/09/2016    AST 47 (H) 10/18/2023    AST 21 11/09/2016    ALT 34 10/18/2023    ALT 20 11/09/2016    TP 5.4 (L) 10/18/2023    ALB 3.4 (L) 10/18/2023    ALB 4.0 11/09/2016      Lab Results   Component Value Date    SALICYLATE <5 05/71/2865      Troponin APAP     No results found for: "TROPONINI"   Lab Results   Component Value Date    ACTMNPHEN <10 (L) 10/17/2023      VBG HCG     No results found for: "PHVEN", "KQO4DFJ", "PO2VEN", "CCG9KXA", "Maki Russian", "L4PXTXROZ", "L3KTVNJ"   No results found for: "HCGQUANT"   ABG Urine Drug Screen     No results found for: "PHART", "AYO5LVT", "PO2ART", "LRX2BTO", "BEART", "O9LZOTLTW", "O2HGB", "SOURC", "JUDY", "VTAC", "Alcario Stagger", "Julissa Crow", "PEEP"   No results found for: "AMPMETHUR", "Ortiz Parrot", "Cleda Rater", "Kristi Age", "Shantel Ganong", "OPIATEUR", "PCPUR", "[place]", "OXYCODONEUR"   Lactate INR     No results found for: "LACTICACID"   Lab Results   Component Value Date    INR 1.10 07/09/2023      PTT Protime     Lab Results   Component Value Date/Time    PTT 24 07/09/2023 09:06 AM        Lab Results   Component Value Date/Time    PROTIME 14.4 07/09/2023 09:06 AM              Imaging Studies:       Counseling / Coordination of Care  Total floor / unit time spent today 40 minutes. Greater than 50% of total time was spent with the patient and / or family counseling and / or coordination of care. Disposition is discharged to home. Without outpatient services per request from patient.     ** Please Note: This note has been constructed using a voice recognition system.  **

## 2023-10-18 NOTE — ASSESSMENT & PLAN NOTE
Patient reports history of depression controlled on fluoxetine 40 mg daily   Notes worsening symptoms lately including poor appetite, lack of interest in activities, decreased mood, and increased fatigue  Notes recent stressors of his sister being diagnosed with lung cancer, his friend being admitted to a short term rehab facility, and needing to maintain both his and his friend's houses   Patient does not currently follow with psychiatry or a therapist   Spoke to patient about the benefits of CBT and he voiced interest in pursuing outpatient   Denies SI/HI   No continual observation indicated at this time     Continue home medication   Patient declining psychiatric evaluation at this time   Recommend follow up with psychiatry/therapy outpatient

## 2023-10-18 NOTE — ASSESSMENT & PLAN NOTE
Recent Labs     10/17/23  1218 10/18/23  0520 10/18/23  1633   K 3.7 2.9* 3.7         - Resolved  - Noted this morning  - Conjunction with hypomagnesia  - Correction placed times 2K riders  - IV fluids have been held  - Repeat labs at 1900

## 2023-10-18 NOTE — CASE MANAGEMENT
Case Management Discharge Planning Note    Patient name Arina Jackson  Location 5T DETOX 507/5T DETOX 50* MRN [id number]  : 1960 Date 10/18/2023       Current Admission Date: 10/17/2023  Current Admission Diagnosis:Alcohol withdrawal syndrome with complication [facility])   Patient Active Problem List    Diagnosis Date Noted    Hyperkalemia 10/18/2023    Alcohol withdrawal syndrome with complication ([address])     Depression 10/17/2023    Diastasis recti 10/17/2023    Hypocalcemia 2023    Pancytopenia ([address]) 2023    Obese 2023    Hypomagnesemia 2023    Lightheaded 2023    Hypernatremia 07/10/2023    GI bleed 2023    Acute blood loss anemia 2023    Alcohol use disorder, moderate, dependence ([address]) 2023    SIRS (systemic inflammatory response syndrome) 2023    Transaminitis 2023    Transition of care performed with sharing of clinical summary 2018    Alcohol-induced acute pancreatitis 2018    Leukopenia 2018    Platelets decreased ([address]) 2018    Generalized anxiety disorder 2018    Hypertension 2018    Hyperlipidemia 2018    Impaired fasting glucose 2018    Hypothyroidism 2018    Habitual alcohol use 2018    Routine health maintenance 2018    Gastroesophageal reflux disease without esophagitis 2018      LOS (days): 1  Geometric Mean LOS (GMLOS) (days): 3.40  Days to GMLOS:2.6     OBJECTIVE:  Risk of Unplanned Readmission Score: 15.72         Current admission status: Inpatient   Preferred Pharmacy:   [facility], [address] [place] [id number]  Phone: [phone] Fax: [phone]    Primary Care Provider: Matilda Underwood DO    Primary Insurance: MEDICARE  Secondary Insurance: AARP    DISCHARGE DETAILS:    Pt scheduled for discharge today. Pt denies all withdrawal symptoms.  Pt refused referral to Outpatient or Inpatient drug and alcohol treatment. Pt was provided with resources on AVS. Pt will discharge to his home via [id number] [place] [address].

## 2023-10-18 NOTE — ASSESSMENT & PLAN NOTE
Patient reports remote history of binge drinking  Currently endorses drinking 2-3 whiskey containing drinks per day   Last drink the evening of 10/16  Reports previous withdrawal episodes involving tremors, diaphoresis, and anxiety  Denies that he currently feels like he's in withdrawal, stating he came to the ED for evaluation of his abdomen   Denies rodrigue naltrexone   Patient requesting outpatient services  See Alcohol Withdrawal

## 2023-10-18 NOTE — NURSING NOTE
1800 Voices readiness for d/c; labs reviewed by provider; wnl; per provider may be discharged at this time; d/c instructions reviewed; verbalizes understanding; iv d/c'd intact; site free from trauma; dsd applied. [phone] D/c'd via ambulation with all belongings without incident.

## 2023-10-18 NOTE — ASSESSMENT & PLAN NOTE
History of HTN, no current medications  Vitals:    10/18/23 0710   BP: 123/69   Pulse: 78   Resp: 18   Temp: 97.7 °F (36.5 °C)   SpO2: 93%     Continue to monitor   Consider initiation of antihypertensives if hypertension persists after acute withdrawal managed

## 2023-10-18 NOTE — ASSESSMENT & PLAN NOTE
Recent Labs     10/17/23  1218 10/18/23  0520   AST 81* 47*   ALT 45 34   ALKPHOS 102 75     Mildly elevated AST on initial labs  Trending down  In the setting of daily alcohol use  Encourage cessation  Continue to monitor

## 2023-10-18 NOTE — H&P
HISTORY & PHYSICAL EXAM  DEPARTMENT OF MEDICAL TOXICOLOGY  LEVEL 4 MEDICAL DETOX UNIT  Morteza Winters 70 y.o. male MRN: [id number]  Unit/Bed#: 5T DETOX 507-01 Encounter: [id number]      Reason for Admission/Principal Problem: Ethanol withdrawal, Ethanol use disorder  Admitting Provider: Gi Lino PA-C  Attending Provider: Socrates Antoine DO   10/17/2023  7:31 PM        * Alcohol withdrawal syndrome with complication [facility])  Assessment & Plan  Patient reports daily alcohol use   Last drink the evening of 10/16  EtOH < 10 on initial labs   Patient denies history of withdrawal seizures   Patient currently endorses anxiety as only withdrawal symptoms     Patient initiated on [facility] protocol for symptom triggered phenobarbital therapy   On admission, phenobarbital held due to lack of withdrawal symptoms, continue to monitor  Patient received 130 mg phenobarbital in the ED prior to transfer   Symptomatic and supportive management   Thiamine and folic acid supplementation  Electrolyte repletion as needed  Pulse oximetry and telemetry monitoring      Alcohol use disorder, moderate, dependence ([address])  Assessment & Plan  Patient reports remote history of binge drinking  Currently endorses drinking 2-3 whiskey containing drinks per day   Last drink the evening of 10/16  Reports previous withdrawal episodes involving tremors, diaphoresis, and anxiety  Denies that he currently feels like he's in withdrawal, stating he came to the ED for evaluation of his abdomen   Denies rodrigue naltrexone     Case management for assistance in discharge planning   See Alcohol Withdrawal    Depression  Assessment & Plan  Patient reports history of depression controlled on fluoxetine 40 mg daily   Notes worsening symptoms lately including poor appetite, lack of interest in activities, decreased mood, and increased fatigue  Notes recent stressors of his sister being diagnosed with lung cancer, his friend being admitted to a short term rehab facility, and needing to maintain both his and his friend's houses   Patient does not currently follow with psychiatry or a therapist   Spoke to patient about the benefits of CBT and he voiced interest in pursuing outpatient   Denies SI/HI   No continual observation indicated at this time     Continue home medication   Patient declining psychiatric evaluation at this time   Recommend follow up with psychiatry/therapy outpatient    Diastasis recti  Assessment & Plan  Patient reports presenting to the ED due to bulging in his abdomen   Notes it is present when he sits up but disappears when laying down   On exam, patient has vertical bulging to his center abdomen without associated pain or tenderness to palpation with lifting head  Self reduces when laying flat   CT A/P without acute changes pathology   Noted mild abdominal pain this morning but has since resolved     Educated patient about diastasis recti and gave reassurance about the condition  Recommend follow up with PCP outpatient     Hypomagnesemia  Assessment & Plan  Recent Labs     10/17/23  1218   MG 1.3*     Repleted  No acute EKG abnormalities  Continue to monitor and replete as needed    Gastroesophageal reflux disease without esophagitis  Assessment & Plan  No signs of acute bleed   Continue home pantoprazole       Hypertension  Assessment & Plan  History of HTN, no current medications  Most recent BP: 160/99  Continue to monitor   Consider initiation of antihypertensives if hypertension persists after acute withdrawal managed    Transaminitis  Assessment & Plan  Recent Labs     10/17/23  1218   AST 81*   ALT 45   ALKPHOS 102     Mildly elevated AST on initial labs  In the setting of daily alcohol use  Encourage cessation  Continue to monitor    Hypothyroidism  Assessment & Plan  Continue home medications  TSH level pending    Generalized anxiety disorder  Assessment & Plan  See Depression for management  Continue home medication               VTE Prophylaxis: Enoxaparin (Lovenox)  / sequential compression device   Code Status: Full code      Anticipated Length of Stay:  Patient will be admitted on an Inpatient basis with an anticipated length of stay of  2-3  midnights. Justification for Hospital Stay: Alcohol withdrawal     For any questions or concerns, please Tiger Text the advanced practitioner in the role of \[facility] [place] [facility]\""-DETOX-AP On Call      This patient qualifies for Level IV medically managed intensive inpatient services under the criteria set by the American Society of Addiction Medicine, including dimensions 1-3. The patient is in withdrawal (or is intoxicated with high risk of withdrawal), with severe and unstable medical and/or psychiatric (dual diagnosis) problems, requiring requires 24-hour medical and nursing care and the full resources of a licensed hospital.          [facility] patient to medical detox unit and continue supportive care and stabilization of acute ethanol withdrawal per medical toxicology/detox treatment pathway. Monitor ethanol withdrawal severity via the Severity of Ethanol Withdrawal Scale (SEWS) Q4 hours and then hourly if/when SEWS > 6. Treat withdrawal per pathway and reassess Q30-60 minutes. Mild SEWS Score 1-6  Administer medications* (IV or PO; PO preferred): If initial SEWS score: diazepam 10mg PO/IV x 1 AND phenobarbital 65 mg PO/IV x 1  If repeat SEWS score 1-6: phenobarbital 65 mg PO/IV q1 hour x 5 doses maximum   Reassessment:   SEWS q1 hour after each dose until SEWS 0 x 2 hours  VS q1 hours (until SEWS 0, then q4 hours)  Notify provider for bedside evaluation if 5-dose maximum is reached, RASS of -3 to -5, or SEWS score escalates to moderate or severe.    Moderate SEWS Score 7-12  Administer medications* (IV):  If initial SEWS score: diazepam 10mg IV x 1 AND phenobarbital 260 mg IV x 1  If repeat SEWS score 7-12 or score escalated from mild: phenobarbital 130 mg IV q30 minutes x 5 doses maximum   Reassessment:  SEWS q30 minutes after each dose until SEWS < 7 (then hourly until SEWS 0 x 2 hours)  VS q30 minutes until SEWS < 7 (then hourly until SEWS 0, then q4 hours)  Notify provider for bedside evaluation if 5-dose maximum is reached, RASS of -3 to -5, or SEWS score escalates to severe. Severe SEWS Score ? 13  Administer medications* (IV):  If initial SEWS score: Diazepam 10 mg IV x 1 AND phenobarbital 650 mg IV piggyback x 1 over 15-30 minutes  If repeat SEWS score ? 13 or score escalated from mild or moderate: phenobarbital 130 mg IV q30 minutes x 5 doses maximum   Reassessment:  SEWS q30 minutes after each dose until SEWS < 7 (then hourly until SEWS 0 x 2 hours)   VS q30 minutes until SEWS < 7 (then hourly until SEWS 0, then q4 hours)  Notify provider for bedside evaluation if 5-dose maximum is reached or RASS of -3 to -5   *Hold medications and notify provider if CNS depression, respirations < 10/min, or RASS of -3 to -5. Medications to be administered adjunctively if more than 2 grams of phenobarbital is needed for stabilization of withdrawal; require attending approval.   Dexmedetomidine infusion 0.1-1mcg/kg/hr IV infusion, titratable to reduced agitation (Goal: RASS -2)  Ketamine   Acute agitated delirium: 1-2 mg/kg IV or 4-5 mg/kg IM  Refractory withdrawal: 0.1-1mg/kg/hr IV infusion, titratable to reduced agitation (Goal: RASS -2)    Further evaluation, screening and treatment:  Evaluate complete metabolic panel, transaminases, INR, and lipase. Assess hepatic ultrasound for any sign of alcoholic liver disease or cirrhosis, and ultimately refer for further hepatic evaluation and care as/if indicated. Additional medications for ethanol associated malnutrition:   Thiamine 100 mg IV daily, increase to 500 mg TID for signs/symptoms of Wernicke's Encephalopathy or Wernicke Korsakoff Syndrome   Folic acid 1 mg IV daily   Multivitamin PO daily      Will offer first monthly injection of Naltrexone 380 mg IM, once patient is stabilized, as it has been shown to assist in decreasing cravings for ethanol. Evaluate and treat for coexisting substance use, such as opioids and nicotine. Discuss risk factors for infectious disease, such as history of intravenous drug abuse, and offer hepatitis and HIV screening if indicated. Case management consultation to assist with coordination of subsequent treatment after discharge. Hx and PE limited by: None, patient alert and cooperative     HPI: Morteza Winters is a 70y.o. year old male with PMHx of esophageal varies with previous GI bleeds, AUD, and depression, who presented to the  ED due to increased fatigue and abdominal mass. Patient reports having increased depression symptoms over the past few weeks, noting poor appetite, lack of interest in activities, decreased mood, and increased fatigue. He notes this started after his sister was diagnosed with stage IV lung cancer and his partner was admitted to short-term rehab leading to him needing to upkeep both of their houses. He also reported having a bulging mass in his abdomen when he sits up. Noted some abdominal pain earlier in the day but has since resolved. Patient has history of alcohol use disorder, noting he previously was a binge drinker but now drinks approximately 3 whiskey containing drinks per day. Last drink the evening of 10/16. EtOH less than 10 on initial labs. Patient received phenobarbital 130 mg total in the ED prior to transport. Patient was admitted to [address] [place] [address] detox unit for alcohol detoxification. He was started on [facility] protocol with symptom triggered phenobarbital along with symptomatic management of withdrawal.  At time of admission, patient only reports anxiety and loss of appetite for current symptoms. On exam, abdominal mass appears to be diastases recti and he notes no abdominal pain or tenderness.   Patient denies any SI/HI but does note worsening depression symptoms due to large amounts of stress in his home life. He is not currently interested in seeing psychiatric services but expressed interest in seeking out therapy once outpatient. Initial dose of phenobarbital was held due to lack of withdrawal symptoms. We will continue to monitor. Preferred alcoholic beverage(s): Whiskey   Quantity and frequency of alcohol intake: 3 drinks daily   Use of any ethanol substitutes (toxic alcohols): no  Date/Time of last alcohol intake: evening of 10/16  Current signs and symptoms of ethanol withdrawal: anxiety    SEWS Total Score: 0 (10/17/2023  8:04 PM)      Ethanol Withdrawal History  Previous ethanol withdrawal? yes  Prior inpatient treatment for ethanol withdrawal? no  Prior outpatient treatment for ethanol withdrawal? yes  History of seizures with prior ethanol withdrawal? no  Prior treatment with naltrexone (Vivitrol)? no  Current treatment with naltrexone (Vivitrol)? no  Other current treatment for ethanol use disorder? no  Co-existing substance use? Yes, marijuana    Review of PDMP: yes     Social History     Substance and Sexual Activity   Alcohol Use Yes    Comment: social     Social History     Substance and Sexual Activity   Drug Use Yes    Types: Marijuana     Social History     Tobacco Use   Smoking Status Never   Smokeless Tobacco Never       Review of Systems   Constitutional:  Positive for appetite change and fatigue. Negative for chills, diaphoresis and fever. HENT:  Negative for congestion and rhinorrhea. Respiratory:  Negative for cough, chest tightness and shortness of breath. Cardiovascular:  Negative for chest pain and palpitations. Gastrointestinal:  Negative for abdominal pain, constipation, diarrhea, nausea and vomiting. Genitourinary:  Negative for difficulty urinating. Musculoskeletal:  Negative for arthralgias, gait problem and myalgias.    Neurological:  Negative for dizziness, tremors, seizures and headaches. Psychiatric/Behavioral:  Positive for dysphoric mood. Negative for agitation, hallucinations, self-injury and suicidal ideas. The patient is nervous/anxious. Historical Information   Past Medical History:   Diagnosis Date    Medical history unknown      Past Surgical History:   Procedure Laterality Date    REPAIR / REINSERT BICEPS TENDON AT ELBOW      Last Assessed: 1/12/2016      Family History   Problem Relation Age of Onset    Diabetes Mother      Social History   Marital Status: Single   Occupation: Retired   Patient Pre-hospital Living Situation: Stable, lives independently   Patient Pre-hospital Level of Mobility: Independent   Patient Pre-hospital Diet Restrictions: None    Allergies   Allergen Reactions    Amoxicillin Swelling and Fever     Other reaction(s): vertigo    Escitalopram      Other reaction(s): sweaty palms/hands, felt faint, passed out for a short time, had anxiety/panic attack    Gemfibrozil      Other reaction(s): Nausea and/or vomiting  Other reaction(s): Nausea and/or vomiting    Other      Other reaction(s): Other (See Comments)  rhinitis, itchy eyes       Prior to Admission medications    Medication Sig Start Date End Date Taking?  Authorizing Provider   albuterol (PROVENTIL HFA,VENTOLIN HFA) 90 mcg/act inhaler Inhale 1-2 puffs   Yes Historical Provider, MD   allopurinol (ZYLOPRIM) 300 mg tablet TAKE 1 TABLET EVERY DAY 8/15/19  Yes Alfreda Gambino MD   cetirizine (ZyrTEC) 10 mg tablet Take one tablet by mouth daily as needed for allergies/congestion  5/2/14  Yes Historical Provider, MD   FLUoxetine (PROzac) 40 MG capsule TAKE ONE CAPSULE BY MOUTH EVERY DAY 7/22/19  Yes Alfreda Gambino MD   levothyroxine 75 mcg tablet TAKE 1 TABLET IN THE MORNING ON AN EMPTY STOMACH FOR THYROID 6/9/19  Yes Alfreda Gambino MD   Multiple Vitamin (DAILY VALUE MULTIVITAMIN) TABS Take 1 tablet by mouth daily   Yes Historical Provider, MD   atorvastatin (LIPITOR) 20 mg tablet TAKE 1 TABLET DAILY AS DIRECTED. 4/28/19 10/17/23  Alisha Pineda MD   fenofibrate (TRIGLIDE) 160 MG tablet TAKE 1 TABLET DAILY.  1/28/19 10/17/23  Alisha Pineda MD   lisinopril (ZESTRIL) 30 mg tablet TAKE 1 TABLET BY MOUTH EVERY DAY 10/26/19 10/17/23  Alisha Pineda MD   LORazepam (ATIVAN) 0.5 mg tablet TAKE 1 TABLET BY MOUTH TWICE A DAY 11/6/19 10/17/23  Alisha Pineda MD   magnesium Oxide (MAG-OX) 400 mg TABS Take 1 tablet (400 mg total) by mouth daily for 15 days 7/13/23 10/17/23  Mike Morales MD   pantoprazole (PROTONIX) 40 mg tablet Take 1 tablet (40 mg total) by mouth daily in the early morning Do not start before July 14, 2023. 7/14/23 10/17/23  Mike Morales MD       Current Facility-Administered Medications   Medication Dose Route Frequency    acetaminophen (TYLENOL) tablet 650 mg  650 mg Oral Q6H PRN    albuterol (PROVENTIL HFA,VENTOLIN HFA) inhaler 2 puff  2 puff Inhalation Q4H PRN    [START ON 10/18/2023] allopurinol (ZYLOPRIM) tablet 300 mg  300 mg Oral Daily    [START ON 10/18/2023] enoxaparin (LOVENOX) subcutaneous injection 40 mg  40 mg Subcutaneous Daily    [START ON 10/18/2023] FLUoxetine (PROzac) capsule 40 mg  40 mg Oral Daily    [START ON 84/78/1232] folic acid (FOLVITE) tablet 1 mg  1 mg Oral Daily    [START ON 10/18/2023] levothyroxine tablet 75 mcg  75 mcg Oral QAM    [START ON 10/18/2023] loratadine (CLARITIN) tablet 10 mg  10 mg Oral Daily    [START ON 10/18/2023] multivitamin-minerals (CENTRUM) tablet 1 tablet  1 tablet Oral Daily    ondansetron (ZOFRAN) injection 4 mg  4 mg Intravenous Q6H PRN    [START ON 10/18/2023] pantoprazole (PROTONIX) EC tablet 40 mg  40 mg Oral Early Morning    sodium chloride 0.9 % infusion  100 mL/hr Intravenous Continuous    [START ON 10/18/2023] thiamine tablet 100 mg  100 mg Oral Daily    traZODone (DESYREL) tablet 50 mg  50 mg Oral HS PRN       Continuous Infusions:sodium chloride, 100 mL/hr             Objective     No intake or output data in the 24 hours ending 10/17/23 2050    Invasive Devices:   Peripheral IV 10/17/23 Distal;Left;Upper;Ventral (anterior) Arm (Active)   Site Assessment WDL; Clean;Dry 10/17/23 2012   Dressing Type Transparent 10/17/23 2012   Line Status Flushed & Clamped 10/17/23 2012   Reason Not Rotated Not due 10/17/23 2012       Vitals   Vitals:    10/17/23 1947   BP: 160/99   TempSrc: Temporal   Pulse: 103   Resp: 18   Patient Position - Orthostatic VS: Lying   Temp: 98.1 °F (36.7 °C)       Physical Exam  Constitutional:       General: He is not in acute distress. Appearance: He is not diaphoretic. HENT:      Head: Normocephalic. Eyes:      Pupils: Pupils are equal, round, and reactive to light. Cardiovascular:      Rate and Rhythm: Normal rate and regular rhythm. Pulses: Normal pulses. Heart sounds: Normal heart sounds. No murmur heard. No gallop. Pulmonary:      Effort: Pulmonary effort is normal. No respiratory distress. Breath sounds: Normal breath sounds. No wheezing or rales. Abdominal:      General: Abdomen is protuberant. Bowel sounds are normal. There is no distension. Palpations: Abdomen is soft. Tenderness: There is no abdominal tenderness. Musculoskeletal:      Cervical back: Normal range of motion. Neurological:      Mental Status: He is alert. Psychiatric:         Mood and Affect: Mood is anxious and depressed. Speech: Speech normal.         Behavior: Behavior is cooperative. Thought Content: Thought content normal. Thought content does not include homicidal or suicidal ideation. Data:    EKG, Pathology, and Other Studies: I have personally reviewed pertinent reports.         Lab Results:  CBC ETOH     Lab Results   Component Value Date    WBC 5.14 10/17/2023    RBC 4.37 10/17/2023    HGB 15.2 10/17/2023    HCT 42.9 10/17/2023    MCV 98 10/17/2023    MCH 34.8 (H) 10/17/2023    MCHC 35.4 10/17/2023    RDW 15.4 (H) 10/17/2023     (L) 10/17/2023    MPV 9.0 10/17/2023      No results found for: "LACTICACID"   CMP UA         Component Value Date/Time     11/09/2016 0723    K 3.7 10/17/2023 1218    K 3.9 11/09/2016 0723     10/17/2023 1218     11/09/2016 0723    CO2 27 10/17/2023 1218    CO2 29 11/09/2016 0723    BUN 7 10/17/2023 1218    BUN 21 11/09/2016 0723    CREATININE 0.52 (L) 10/17/2023 1218    CREATININE 0.91 11/09/2016 0723         Component Value Date/Time    CALCIUM 8.4 10/17/2023 1218    CALCIUM 8.6 11/09/2016 0723    ALKPHOS 102 10/17/2023 1218    ALKPHOS 37 (L) 11/09/2016 0723    AST 81 (H) 10/17/2023 1218    AST 21 11/09/2016 0723    ALT 45 10/17/2023 1218    ALT 20 11/09/2016 0723    BILITOT 0.5 11/09/2016 0723      No results found for: "CLARITYU", "COLORU", "Ondina Limes", "PHUR", "GLUCOSEU", "Cathalene Lust", "BLOODU", "PROTEIN UA", "NITRITE", "BILIRUBINUR", "UROBILINOGEN", "LEUKOCYTESUR", "Deward Rein", "Idalmis Gurney", "HYALINE", "BACTERIA", "EPIS"     Liver Function Test: ASA     Lab Results   Component Value Date    TBILI 1.22 (H) 10/17/2023    ALKPHOS 102 10/17/2023    ALKPHOS 37 (L) 11/09/2016    AST 81 (H) 10/17/2023    AST 21 11/09/2016    ALT 45 10/17/2023    ALT 20 11/09/2016    TP 6.2 (L) 10/17/2023    ALB 3.7 10/17/2023    ALB 4.0 11/09/2016      Lab Results   Component Value Date    SALICYLATE <5 16/07/6393      Troponin APAP     No results found for: "TROPONINI"   Lab Results   Component Value Date    ACTMNPHEN <10 (L) 10/17/2023      VBG HCG     No results found for: "PHVEN", "JBO4FFD", "PO2VEN", "WMZ0GPD", "Cleaster Founds", "K7MDWVLPP", "W1JZKMA"   No results found for: "HCGQUANT"   ABG Urine Drug Screen     No results found for: "PHART", "LSO9APP", "PO2ART", "LPL8CCS", "BEART", "U0LHYPIJT", "O2HGB", "SOURC", "JUDY", "VTAC", "Colan Morea", "Haig Rising", "PEEP"   No results found for: "AMPMETHUR", "Lequita Friends", "Johana Luria", "Ana Lilia Carmelo", "Triny Sas", "OPIATEUR", "PCPUR", "THCUR", "OXYCODONEUR"   Lactate INR     No results found for: "LACTICACID"   Lab Results Component Value Date    INR 1.10 07/09/2023      PTT Protime     Lab Results   Component Value Date/Time    PTT 24 07/09/2023 09:06 AM        Lab Results   Component Value Date/Time    PROTIME 14.4 07/09/2023 09:06 AM              Imaging Studies: I have personally reviewed pertinent reports. Counseling / Coordination of Care  Total floor / unit time spent today 60 minutes. Greater than 50% of total time was spent with the patient and / or family counseling and / or coordination of care. ** Please Note: This note has been constructed using a voice recognition system.  **

## 2023-10-18 NOTE — ASSESSMENT & PLAN NOTE
Patient reports presenting to the ED due to bulging in his abdomen   Notes it is present when he sits up but disappears when laying down   On exam, patient has vertical bulging to his center abdomen without associated pain or tenderness to palpation with lifting head  Self reduces when laying flat   CT A/P without acute changes pathology   Noted mild abdominal pain this morning but has since resolved     Educated patient about diastasis recti and gave reassurance about the condition  Recommend follow up with PCP outpatient

## 2023-10-18 NOTE — PROGRESS NOTES
PROGRESS NOTE  DEPARTMENT OF MEDICAL TOXICOLOGY  LEVEL 4 MEDICAL DETOX UNIT  Kassandra Vences 70 y.o. male MRN: [id number]  Unit/Bed#: 5T DETOX 507-01 Encounter: [id number]      Reason for Admission/Principal Problem: Medical management alcohol use disorder  Rounding Provider: Frances Gonzalez PA-C  Attending Provider: Diane Cardenas DO   10/17/2023  7:31 PM       Alcohol use disorder, moderate, dependence ([address])  Assessment & Plan  Patient reports remote history of binge drinking  Currently endorses drinking 2-3 whiskey containing drinks per day   Last drink the evening of 10/16  Reports previous withdrawal episodes involving tremors, diaphoresis, and anxiety  Denies that he currently feels like he's in withdrawal, stating he came to the ED for evaluation of his abdomen   Denies rodrigue naltrexone   Patient requesting outpatient services  See Alcohol Withdrawal    * Alcohol withdrawal syndrome with complication [facility])  Assessment & Plan  Patient reports daily alcohol use   Last drink the evening of 10/16  EtOH < 10 on initial labs   Patient denies history of withdrawal seizures   Patient currently endorses anxiety as only withdrawal symptoms     Patient initiated on SEWS protocol for symptom triggered phenobarbital therapy   On admission, phenobarbital held due to lack of withdrawal symptoms, continue to monitor  Patient received 130 mg phenobarbital in the ED prior to transfer   As of 1017 has remained asymptomatic no additional phenobarbital needed currently  Symptomatic and supportive management   Thiamine and folic acid supplementation  Electrolyte repletion as needed  Pulse oximetry and telemetry monitoring      Hyperkalemia  Assessment & Plan      Recent Labs     10/18/23  0520   K 2.9*      - Noted this morning  - Conjunction with hypomagnesia  - Correction placed times 2K riders  - IV fluids have been held  - Repeat labs at 1900    Hypomagnesemia  Assessment & Plan  Recent Labs 10/17/23  1218 10/18/23  0520   MG 1.3* 1.6*     Repleted - additional ordered  Conjunction with hypokalemia  No acute EKG abnormalities  Continue to monitor and replete as needed    Diastasis recti  Assessment & Plan  Patient reports presenting to the ED due to bulging in his abdomen   Notes it is present when he sits up but disappears when laying down   On exam, patient has vertical bulging to his center abdomen without associated pain or tenderness to palpation with lifting head  Self reduces when laying flat   CT A/P without acute changes pathology   Noted mild abdominal pain this morning but has since resolved     Educated patient about diastasis recti and gave reassurance about the condition  Recommend follow up with PCP outpatient     Depression  Assessment & Plan  Patient reports history of depression controlled on fluoxetine 40 mg daily   Notes worsening symptoms lately including poor appetite, lack of interest in activities, decreased mood, and increased fatigue  Notes recent stressors of his sister being diagnosed with lung cancer, his friend being admitted to a short term rehab facility, and needing to maintain both his and his friend's houses   Patient does not currently follow with psychiatry or a therapist   Spoke to patient about the benefits of CBT and he voiced interest in pursuing outpatient   Denies SI/HI   No continual observation indicated at this time     Continue home medication   Patient declining psychiatric evaluation at this time   Recommend follow up with psychiatry/therapy outpatient    Transaminitis  Assessment & Plan  Recent Labs     10/17/23  1218 10/18/23  0520   AST 81* 47*   ALT 45 34   ALKPHOS 102 75     Mildly elevated AST on initial labs  Trending down  In the setting of daily alcohol use  Encourage cessation  Continue to monitor    Generalized anxiety disorder  Assessment & Plan  See Depression for management  Continue home medication    Gastroesophageal reflux disease without esophagitis  Assessment & Plan  No signs of acute bleed   Continue home pantoprazole       Hypothyroidism  Assessment & Plan  Continue home medications  TSH level pending    Hypertension  Assessment & Plan  History of HTN, no current medications  Vitals:    10/18/23 0710   BP: 123/69   Pulse: 78   Resp: 18   Temp: 97.7 °F (36.5 °C)   SpO2: 93%     Continue to monitor   Consider initiation of antihypertensives if hypertension persists after acute withdrawal managed            VTE Pharmacologic Prophylaxis:   Pharmacologic: Enoxaparin (Lovenox)  Mechanical VTE Prophylaxis in Place: Yes    Code Status: Level 1 - Full Code    Patient Centered Rounds: I have performed bedside rounds with nursing staff today. Discussions with Specialists or Other Care Team Provider: Attending     Time Spent for Care: 30 minutes. More than 50% of total time spent on counseling and coordination of care as described above. Current Length of Stay: 1 day(s)    Current Patient Status: Inpatient     Certification Statement: {Certification [id number]} Discharge Plan: Outpatient placement      Subjective:   Patient 40-year-old male admitted to detox service for alcohol use disorder. He is remained asymptomatic of withdrawal since his time here. He originally received 250 mg of phenobarbital in the ED. Seen and examined bedside myself today. He states he has no complaints, denies any chest pain shortness of breath fevers chills shakes, seizure-like activity, abdominal pain nausea vomiting. No reported changes by RN. ROS  Constitutional: No fever, No chills, No sweats, No weakness, No fatigue, No decreased activity. Eye: No recent visual problem, No icterus, No discharge, No double vision. Respiratory: No shortness of breath, No cough, No sputum production, No hemoptysis, No wheezing, No cyanosis.   Cardiovascular: No chest pain, No palpitations, No bradycardia, No tachycardia, No peripheral edema, No syncope. Gastrointestinal: No nausea, No vomiting, No diarrhea, No constipation, No heartburn, No abdominal pain. Genitourinary: No dysuria, No hematuria, No change in urine stream, No urethral discharge, No lesions. Endocrine: No excessive thirst, No polyuria, No cold intolerance, No heat intolerance, No excessive hunger. Musculoskeletal: No back pain, No neck pain, No joint pain, No muscle pain, No claudication, No decreased range of motion, No trauma. Integumentary: No rash, No pruritus, No abrasions. Neurologic: Alert and oriented X4, No abnormal balance, No headache, No confusion, No numbness, No tingling.      Objective:     Clinical Opiate Withdrawal Scale  Pulse: 78    SEWS Total Score: 0 (10/18/2023  8:20 AM)        Last 24 Hours Medication List:   Current Facility-Administered Medications   Medication Dose Route Frequency Provider Last Rate    acetaminophen  650 mg Oral Q6H PRN Zhang Mimi, PA-C      albuterol  2 puff Inhalation Q4H PRN Zhang Mimi, PA-C      allopurinol  300 mg Oral Daily MONTRELL Wills-GABE      enoxaparin  40 mg Subcutaneous Daily Mar Dooley, PA-C      FLUoxetine  40 mg Oral Daily Mar Dooley, JUNIOR      folic acid  1 mg Oral Daily Zhang Mimi, PA-C      levothyroxine  75 mcg Oral QAM Zhang Mimi, PA-C      loratadine  10 mg Oral Daily Zhang Mimi, PA-C      multivitamin-minerals  1 tablet Oral Daily Zhang Mimi, PA-C      ondansetron  4 mg Intravenous Q6H PRN Zhang Mimi, PA-C      pantoprazole  40 mg Oral Early Morning Zhang Mimi, PA-C      potassium chloride  20 mEq Intravenous Q2H Leatha Mimi, PA-C 20 mEq (10/18/23 1414)    thiamine  100 mg Oral Daily Mar Dooley, JUNIOR      traZODone  50 mg Oral HS PRN Zhang Mimi, PA-C           Vitals:   Temp (24hrs), Av °F (36.7 °C), Min:97.7 °F (36.5 °C), Max:98.1 °F (36.7 °C)    Temp:  [97.7 °F (36.5 °C)-98.1 °F (36.7 °C)] 98.1 °F (36.7 °C)  HR:  [] 78  Resp:  [16-18] 18  BP: (123-170)/(69-99) 146/96  SpO2:  [93 %-97 %] 97 %  Body mass index is 30.99 kg/m². Input and Output Summary (last 24 hours): Intake/Output Summary (Last 24 hours) at 10/18/2023 1622  Last data filed at 10/18/2023 1300  Gross per 24 hour   Intake 480 ml   Output --   Net 480 ml       Physical Exam:   Physical Exam  PHYSICAL EXAM    General: No acute distress  Neuro: GCS 15, A&Ox3, No focal deficits, Sensation intact, CN 2-12 intact, no nystagmus, fine motor skills remain intact, no ataxia  Eyes: Conjunctivae are pink, pupils are PERRLA, EOMI  Neck: Trachea midline, No JVD  CV: RRR, No murmurs/gallops/rubs, +2 pulses  Pulm: CTA/B, No wheezing, No chest wall tenderness  GI: Soft, nontender, bowel sounds heard throughout all quadrants. MSK: Moves all extremities  Back: No step offs, ecchymosis, trauma noted, No CVA tenderness  Skin: Warm, dry, pink  Psych: Appropriate and cooperative     Additional Data:     Labs:   Results from last 7 days   Lab Units 10/18/23  0520 10/17/23  2117 10/17/23  1218   WBC Thousand/uL 2.94*  --  5.14   HEMOGLOBIN g/dL 13.6  --  15.2   HEMATOCRIT % 40.7  --  42.9   PLATELETS Thousands/uL 94*   < > 125*   NEUTROS PCT %  --   --  68   LYMPHS PCT %  --   --  21   MONOS PCT %  --   --  9   EOS PCT %  --   --  0    < > = values in this interval not displayed. Results from last 7 days   Lab Units 10/18/23  0520   SODIUM mmol/L 139   POTASSIUM mmol/L 2.9*   CHLORIDE mmol/L 103   CO2 mmol/L 26   BUN mg/dL 5   CREATININE mg/dL 0.51*   ANION GAP mmol/L 10   CALCIUM mg/dL 7.5*   ALBUMIN g/dL 3.4*   TOTAL BILIRUBIN mg/dL 1.38*   ALK PHOS U/L 75   ALT U/L 34   AST U/L 47*   GLUCOSE RANDOM mg/dL 133                              * I Have Reviewed All Lab Data Listed Above. * Additional Pertinent Lab Tests Reviewed: [address] [place] [address] Admission Reviewed      Imaging Studies: I have personally reviewed pertinent reports. Recent Cultures (last 7 days):           Today, Patient Was Seen By: Nimesh Oleary PA-C    ** Please Note: Dictation voice to text software may have been used in the creation of this document.  **

## 2023-10-18 NOTE — ASSESSMENT & PLAN NOTE
Patient reports daily alcohol use   Last drink the evening of 10/16  EtOH < 10 on initial labs   Patient denies history of withdrawal seizures   Patient currently endorses anxiety as only withdrawal symptoms     [facility] triggered [facility]   On admission, phenobarbital held due to lack of withdrawal symptoms, continue to monitor  Patient received 130 mg phenobarbital in the ED prior to transfer   As of 1017 has remained asymptomatic no additional phenobarbital needed currently  Symptomatic and supportive management   Thiamine and folic acid supplementation  Electrolyte repletion as needed  Pulse oximetry and telemetry monitoring

## 2023-10-18 NOTE — PLAN OF CARE
Problem: COPING  Goal: Pt/Family able to verbalize concerns and demonstrate effective coping strategies  Description: INTERVENTIONS:  - Assist patient/family to identify coping skills, available support systems and cultural and spiritual values  - Provide emotional support, including active listening and acknowledgement of concerns of patient and caregivers  - Reduce environmental stimuli, as able  - Provide patient education  - Assess for spiritual pain/suffering and initiate spiritual care, including notification of Pastoral Care or jone based community as needed  - Assess effectiveness of coping strategies  Outcome: Progressing  Goal: Will report anxiety at manageable levels  Description: INTERVENTIONS:  - Administer medication as ordered  - Teach and encourage coping skills  - Provide emotional support  - Assess patient/family for anxiety and ability to cope  Outcome: Progressing     Problem: Depression - IP adult  Goal: Effects of depression will be minimized  Description: INTERVENTIONS:  - Assess impact of patient's symptoms on level of functioning, self-care needs and offer support as indicated  - Assess patient/family knowledge of depression, impact on illness and need for teaching  - Provide emotional support, presence and reassurance  - Assess for possible suicidal thoughts, ideation or self-harm.  If patient expresses suicidal thoughts or statements do not leave alone, notify physician/AP immediately, initiate Suicide Precautions, and determine need for continual observation.  - Initiate consults and referrals as appropriate (a mental health professional, Spiritual Care)  - Administer medication as ordered  Outcome: Progressing     Problem: SUBSTANCE USE/ABUSE  Goal: By discharge, will develop insight into their chemical dependency and sustain motivation to continue in recovery  Description: INTERVENTIONS:  - Attends all daily group sessions and scheduled AA groups  - Actively practices coping skills through participation in the therapeutic community and adherence to program rules  - Reviews and completes assignments from individual treatment plan  - Assist patient development of understanding of their personal cycle of addiction and relapse triggers  Outcome: Progressing  Goal: By discharge, patient will have ongoing treatment plan addressing chemical dependency  Description: INTERVENTIONS:  - Assist patient with resources and/or appointments for ongoing recovery based living  Outcome: Progressing

## 2023-10-18 NOTE — DISCHARGE INSTR - OTHER ORDERS
CRISIS INFORMATION  If you are experiencing a mental health emergency, you may call the [employer] 24 hours a day, 7 days per week at [phone]. In [place], call [phone]. Megan Pelayo is a confidential 24/7 telephone support service manned by trained mental health consumers. [facility] provides support, a listening ear and can provide information about available services. [facility] specializes in the concerns of mental health consumers, their families and friends. However, we are also here for anyone who has a mental health concern, is confused about or just doesn't know anything about mental health or where to get information. To reach Megan Pelayo, call [phone]. HOW TO GET SUBSTANCE ABUSE HELP:  If you or someone you know has a drug or alcohol problem, there is help:  Austin Lares,[address]Th Floor: [address]: [phone]  An assessment is the first step. In addition to those listed there are other programs available in the area but assessment is best to determine an appropriate level of care. If you DO NOT have Medical Assistance (MA) or [employer], an assessment can be scheduled at one of these providers:  [facility]  [address] [facility], [address] [place] [address]  [phone]   [facility]  [address] [facility],2 And 3 S Floors., [facility], [address] [place] [address]  [address] [facility].  ARIANNA, [address] [facility]  [address]  [address] MABRY [facility]   Step by [address]  [address] [facility] Dominic Mercado.[address]  [address] [facility]., RAGHAVENDRA [place], [address] [place] [address]  [phone]     If you [address], an assessment can be scheduled at one of these providers:  [place] on Alcohol & Drug Abuse  [facility]., [facility], 630 [facility]  [address] [facility] [address], 350 [facility]  150 [facility] D&A Intake Unit  [address] 1113 [facility] 1st Floor, Silvia KELLER  [phone]  1 [facility], [facility] [address] [facility]  [address] Floors., [facility], 350 [facility]  [address] [facility]. ARIANNA, [address]  [phone]   NET ([address])  90 [facility] 1801 [facility], Silvia KELLER  [address]  [address]   Step by [address] [facility], 630 [facility]  [address] [place] [facility], [address] [place] [address]  1002 [facility] [address] [facility], [address] [place] [address]  [phone]     If you [address] [facility], an assessment can be scheduled at one of these providers. Please contact these Providers to determine if they are in your network plan:  [facility] D&A Intake Unit  [address] 1113 [facility], 1st Floor, Silvia KELLER  [facility]  1700 [facility],2 And 3 S Floors., [facility], 350 [facility]  [phone]   [address] [facility]  [place]. ARIANNA, [address]  [phone]   NET ([address])  [address]  1801 [facility], Silvia KELLER  [address]  [address] [facility] [address] [facility]., [facility], [address]

## 2023-10-18 NOTE — ASSESSMENT & PLAN NOTE
Recent Labs     10/17/23  1218 10/18/23  0520 10/18/23  1633   MG 1.3* 1.6* 1.9     - Resolved  Repleted - additional ordered  Conjunction with hypokalemia  No acute EKG abnormalities  Continue to monitor and replete as needed

## 2023-10-19 NOTE — ED ATTENDING ATTESTATION
10/17/2023  INilda MD, saw and evaluated the patient. I have discussed the patient with the resident/non-physician practitioner and agree with the resident's/non-physician practitioner's findings, Plan of Care, and MDM as documented in the resident's/non-physician practitioner's note, except where noted. All available labs and Radiology studies were reviewed. I was present for key portions of any procedure(s) performed by the resident/non-physician practitioner and I was immediately available to provide assistance. At this point I agree with the current assessment done in the Emergency Department. I have conducted an independent evaluation of this patient a history and physical is as follows:    ED Course   26-year-old male with a history of alcohol abuse, pancreatitis, GI bleeding presenting with generalized weakness. States that this is not worsening over the last several weeks with decreased appetite abnormal taste with food. Generalized weakness malaise. Patient admits that he has been attempting to cut down on his drinking at home due to have tremors x2-day prior episodes of alcohol withdrawal.  He does admit to mild 1 episode of dry heaving. As well as generalized abdominal pain    Vitals reviewed. Patient appears to be generally tremulous with tongue fasciculations present. Tachycardia present. No diaphoresis. Patient appears acutely anxious. Abdomen is protuberant however there is no distention nontender and soft.  exam deferred    Neuro exam nonfocal.    Impression: Generalized weakness malaise, history of alcohol abuse    Differential diagnosis: Alcohol withdrawal syndrome, hyponatremia, at risk for occult subdural hematoma, risk for intra-abdominal process, risk for viral syndrome, at risk for pancreatitis, at risk for metabolic cephalopathy, arrhythmia, MI,    Plan to check ECG, CT brain and CT abdomen pelvis, CBC, ammonia's Tylenol salicylate alcohol level.   [address] [place] [address], lipase, CMP, serial troponins, flu COVID RSV    We will treat with IV fluids, phenobarbital, magnesium, Protonix    Discussed case with toxicology for possible candidacy for detox unit. ECG independently interpreted by me sinus tachycardia rightward axis, normal intervals, no acute ST-T changes. CT imaging reviewed reports: CT brain unremarkable. CT down pelvis remarkable forIncreased diffuse mild mesenteric edema with more focal edema surrounding the first portion of the duodenum and interposed between the duodenum and head of the pancreas. Findings may represent duodenitis or paraduodenal pancreatitis. Mild increase in circumferential bladder wall thickening compared to 9/9/2023, favored to be due to under distention. Correlate for cystitis/urinary tract infection. Unchanged hepatic hypodensities compared to 7/9/2023. See CT of the abdomen and pelvis 7/9/2023 for recommendation for abdominal MRI. Unchanged pancreatic duct dilatation with abrupt cut off, no pancreatic head mass, no bile duct dilatation. Unchanged hypoattenuating cystic lesion in the pancreatic tail. These findings are unchanged since 2020. Recommend attention on MRI of the   abdomen. Similar appearance of multiple gastric varices secondary to portal hypertension    Case discussed with toxicology patient is candidate for phenobarbital we will start with 130 mg IV titrated as needed for symptom control. Labs reviewed: CBC unremarkable. Ammonia unremarkable, salicylate alcohol level unremarkable. Magnesium level low. Phosphate within normal limits. Lipase within normal limits CMP remarkable for elevated AST low total protein elevated T. bili initial troponin 6, flu COVID RSV unremarkable    Case discussed with detox unit will transfer patient over to detox for further evaluation and management.               Critical Care Time  CriticalCare Time    Date/Time: 10/17/2023 8:00 PM    Performed by: Gunnar Vallecillo MD  Authorized by: Ridge Schmidt MD    Critical care provider statement:     Critical care time (minutes):  32    Critical care time was exclusive of:  Separately billable procedures and treating other patients and teaching time    Critical care was necessary to treat or prevent imminent or life-threatening deterioration of the following conditions:  Toxidrome and metabolic crisis    Critical care was time spent personally by me on the following activities:  Obtaining history from patient or surrogate, development of treatment plan with patient or surrogate, discussions with consultants, evaluation of patient's response to treatment, examination of patient, ordering and performing treatments and interventions, ordering and review of laboratory studies, ordering and review of radiographic studies and re-evaluation of patient's condition    I assumed direction of critical care for this patient from another provider in my specialty: no

## 2024-02-21 PROBLEM — Z00.00 ROUTINE HEALTH MAINTENANCE: Status: RESOLVED | Noted: 2018-03-28 | Resolved: 2024-02-21

## 2024-04-20 ENCOUNTER — HOSPITAL ENCOUNTER (INPATIENT)
Facility: HOSPITAL | Age: 64
LOS: 3 days | Discharge: HOME | DRG: 377 | End: 2024-04-23
Attending: EMERGENCY MEDICINE | Admitting: INTERNAL MEDICINE
Payer: MEDICARE

## 2024-04-20 DIAGNOSIS — K92.0 GASTROINTESTINAL HEMORRHAGE WITH HEMATEMESIS: Primary | ICD-10-CM

## 2024-04-20 DIAGNOSIS — F10.20 ALCOHOL USE DISORDER, SEVERE, DEPENDENCE (HCC): ICD-10-CM

## 2024-04-20 DIAGNOSIS — R26.2 AMBULATORY DYSFUNCTION: ICD-10-CM

## 2024-04-20 DIAGNOSIS — F10.939 ALCOHOL WITHDRAWAL SYNDROME WITH COMPLICATION (HCC): ICD-10-CM

## 2024-04-20 LAB
2HR DELTA HS TROPONIN: -4 NG/L
4HR DELTA HS TROPONIN: -7 NG/L
ABO GROUP BLD: NORMAL
ALBUMIN SERPL BCP-MCNC: 4.2 G/DL (ref 3.5–5)
ALP SERPL-CCNC: 111 U/L (ref 34–104)
ALT SERPL W P-5'-P-CCNC: 69 U/L (ref 7–52)
ANION GAP SERPL CALCULATED.3IONS-SCNC: 16 MMOL/L (ref 4–13)
AST SERPL W P-5'-P-CCNC: 56 U/L (ref 13–39)
ATRIAL RATE: 123 BPM
BASOPHILS # BLD AUTO: 0.06 THOUSANDS/ÂΜL (ref 0–0.1)
BASOPHILS # BLD AUTO: 0.18 THOUSANDS/ÂΜL (ref 0–0.1)
BASOPHILS NFR BLD AUTO: 1 % (ref 0–1)
BASOPHILS NFR BLD AUTO: 1 % (ref 0–1)
BILIRUB SERPL-MCNC: 1.47 MG/DL (ref 0.2–1)
BLD GP AB SCN SERPL QL: NEGATIVE
BUN SERPL-MCNC: 25 MG/DL (ref 5–25)
CALCIUM SERPL-MCNC: 9.3 MG/DL (ref 8.4–10.2)
CARDIAC TROPONIN I PNL SERPL HS: 11 NG/L
CARDIAC TROPONIN I PNL SERPL HS: 15 NG/L
CARDIAC TROPONIN I PNL SERPL HS: 8 NG/L
CHLORIDE SERPL-SCNC: 95 MMOL/L (ref 96–108)
CO2 SERPL-SCNC: 25 MMOL/L (ref 21–32)
CREAT SERPL-MCNC: 0.77 MG/DL (ref 0.6–1.3)
EOSINOPHIL # BLD AUTO: 0.05 THOUSAND/ÂΜL (ref 0–0.61)
EOSINOPHIL # BLD AUTO: 0.06 THOUSAND/ÂΜL (ref 0–0.61)
EOSINOPHIL NFR BLD AUTO: 0 % (ref 0–6)
EOSINOPHIL NFR BLD AUTO: 1 % (ref 0–6)
ERYTHROCYTE [DISTWIDTH] IN BLOOD BY AUTOMATED COUNT: 12.2 % (ref 11.6–15.1)
ERYTHROCYTE [DISTWIDTH] IN BLOOD BY AUTOMATED COUNT: 12.3 % (ref 11.6–15.1)
GFR SERPL CREATININE-BSD FRML MDRD: 94 ML/MIN/1.73SQ M
GLUCOSE SERPL-MCNC: 224 MG/DL (ref 65–140)
HCT VFR BLD AUTO: 35.2 % (ref 36.5–49.3)
HCT VFR BLD AUTO: 36.1 % (ref 36.5–49.3)
HCT VFR BLD AUTO: 42.9 % (ref 36.5–49.3)
HGB BLD-MCNC: 11.7 G/DL (ref 12–17)
HGB BLD-MCNC: 12.1 G/DL (ref 12–17)
HGB BLD-MCNC: 14.6 G/DL (ref 12–17)
IMM GRANULOCYTES # BLD AUTO: 0.04 THOUSAND/UL (ref 0–0.2)
IMM GRANULOCYTES # BLD AUTO: 0.16 THOUSAND/UL (ref 0–0.2)
IMM GRANULOCYTES NFR BLD AUTO: 0 % (ref 0–2)
IMM GRANULOCYTES NFR BLD AUTO: 1 % (ref 0–2)
INR PPP: 1.02 (ref 0.84–1.19)
LIPASE SERPL-CCNC: 39 U/L (ref 11–82)
LYMPHOCYTES # BLD AUTO: 1.82 THOUSANDS/ÂΜL (ref 0.6–4.47)
LYMPHOCYTES # BLD AUTO: 3.05 THOUSANDS/ÂΜL (ref 0.6–4.47)
LYMPHOCYTES NFR BLD AUTO: 19 % (ref 14–44)
LYMPHOCYTES NFR BLD AUTO: 21 % (ref 14–44)
MAGNESIUM SERPL-MCNC: 1.2 MG/DL (ref 1.9–2.7)
MCH RBC QN AUTO: 35 PG (ref 26.8–34.3)
MCH RBC QN AUTO: 35.3 PG (ref 26.8–34.3)
MCHC RBC AUTO-ENTMCNC: 33.2 G/DL (ref 31.4–37.4)
MCHC RBC AUTO-ENTMCNC: 34 G/DL (ref 31.4–37.4)
MCV RBC AUTO: 104 FL (ref 82–98)
MCV RBC AUTO: 105 FL (ref 82–98)
MONOCYTES # BLD AUTO: 0.32 THOUSAND/ÂΜL (ref 0.17–1.22)
MONOCYTES # BLD AUTO: 0.46 THOUSAND/ÂΜL (ref 0.17–1.22)
MONOCYTES NFR BLD AUTO: 3 % (ref 4–12)
MONOCYTES NFR BLD AUTO: 3 % (ref 4–12)
NEUTROPHILS # BLD AUTO: 10.8 THOUSANDS/ÂΜL (ref 1.85–7.62)
NEUTROPHILS # BLD AUTO: 7.19 THOUSANDS/ÂΜL (ref 1.85–7.62)
NEUTS SEG NFR BLD AUTO: 74 % (ref 43–75)
NEUTS SEG NFR BLD AUTO: 76 % (ref 43–75)
NRBC BLD AUTO-RTO: 0 /100 WBCS
NRBC BLD AUTO-RTO: 0 /100 WBCS
P AXIS: 43 DEGREES
PLATELET # BLD AUTO: 188 THOUSANDS/UL (ref 149–390)
PLATELET # BLD AUTO: 278 THOUSANDS/UL (ref 149–390)
PMV BLD AUTO: 9.7 FL (ref 8.9–12.7)
PMV BLD AUTO: 9.8 FL (ref 8.9–12.7)
POTASSIUM SERPL-SCNC: 3.4 MMOL/L (ref 3.5–5.3)
PR INTERVAL: 158 MS
PROT SERPL-MCNC: 7.1 G/DL (ref 6.4–8.4)
PROTHROMBIN TIME: 13.3 SECONDS (ref 11.6–14.5)
QRS AXIS: 66 DEGREES
QRSD INTERVAL: 80 MS
QT INTERVAL: 318 MS
QTC INTERVAL: 455 MS
RBC # BLD AUTO: 3.34 MILLION/UL (ref 3.88–5.62)
RBC # BLD AUTO: 4.14 MILLION/UL (ref 3.88–5.62)
RH BLD: POSITIVE
SODIUM SERPL-SCNC: 136 MMOL/L (ref 135–147)
SPECIMEN EXPIRATION DATE: NORMAL
T WAVE AXIS: 5 DEGREES
VENTRICULAR RATE: 123 BPM
WBC # BLD AUTO: 14.7 THOUSAND/UL (ref 4.31–10.16)
WBC # BLD AUTO: 9.49 THOUSAND/UL (ref 4.31–10.16)

## 2024-04-20 PROCEDURE — 84484 ASSAY OF TROPONIN QUANT: CPT

## 2024-04-20 PROCEDURE — 87040 BLOOD CULTURE FOR BACTERIA: CPT

## 2024-04-20 PROCEDURE — 83605 ASSAY OF LACTIC ACID: CPT

## 2024-04-20 PROCEDURE — 99285 EMERGENCY DEPT VISIT HI MDM: CPT

## 2024-04-20 PROCEDURE — 84484 ASSAY OF TROPONIN QUANT: CPT | Performed by: INTERNAL MEDICINE

## 2024-04-20 PROCEDURE — 96368 THER/DIAG CONCURRENT INF: CPT

## 2024-04-20 PROCEDURE — 99285 EMERGENCY DEPT VISIT HI MDM: CPT | Performed by: EMERGENCY MEDICINE

## 2024-04-20 PROCEDURE — 85025 COMPLETE CBC W/AUTO DIFF WBC: CPT

## 2024-04-20 PROCEDURE — 85018 HEMOGLOBIN: CPT | Performed by: INTERNAL MEDICINE

## 2024-04-20 PROCEDURE — 93005 ELECTROCARDIOGRAM TRACING: CPT

## 2024-04-20 PROCEDURE — C9113 INJ PANTOPRAZOLE SODIUM, VIA: HCPCS | Performed by: INTERNAL MEDICINE

## 2024-04-20 PROCEDURE — 93010 ELECTROCARDIOGRAM REPORT: CPT | Performed by: INTERNAL MEDICINE

## 2024-04-20 PROCEDURE — 83735 ASSAY OF MAGNESIUM: CPT | Performed by: EMERGENCY MEDICINE

## 2024-04-20 PROCEDURE — 83690 ASSAY OF LIPASE: CPT

## 2024-04-20 PROCEDURE — 99223 1ST HOSP IP/OBS HIGH 75: CPT | Performed by: INTERNAL MEDICINE

## 2024-04-20 PROCEDURE — C9113 INJ PANTOPRAZOLE SODIUM, VIA: HCPCS

## 2024-04-20 PROCEDURE — 84145 PROCALCITONIN (PCT): CPT

## 2024-04-20 PROCEDURE — 85610 PROTHROMBIN TIME: CPT

## 2024-04-20 PROCEDURE — 86901 BLOOD TYPING SEROLOGIC RH(D): CPT

## 2024-04-20 PROCEDURE — 86900 BLOOD TYPING SEROLOGIC ABO: CPT

## 2024-04-20 PROCEDURE — 96365 THER/PROPH/DIAG IV INF INIT: CPT

## 2024-04-20 PROCEDURE — 85014 HEMATOCRIT: CPT | Performed by: INTERNAL MEDICINE

## 2024-04-20 PROCEDURE — 80053 COMPREHEN METABOLIC PANEL: CPT

## 2024-04-20 PROCEDURE — 99222 1ST HOSP IP/OBS MODERATE 55: CPT | Performed by: INTERNAL MEDICINE

## 2024-04-20 PROCEDURE — 36415 COLL VENOUS BLD VENIPUNCTURE: CPT

## 2024-04-20 PROCEDURE — 86850 RBC ANTIBODY SCREEN: CPT

## 2024-04-20 PROCEDURE — 96375 TX/PRO/DX INJ NEW DRUG ADDON: CPT

## 2024-04-20 RX ORDER — LORAZEPAM 2 MG/ML
2 INJECTION INTRAMUSCULAR ONCE
Status: COMPLETED | OUTPATIENT
Start: 2024-04-20 | End: 2024-04-20

## 2024-04-20 RX ORDER — FLUOXETINE HYDROCHLORIDE 20 MG/1
40 CAPSULE ORAL DAILY
Status: DISCONTINUED | OUTPATIENT
Start: 2024-04-20 | End: 2024-04-23 | Stop reason: HOSPADM

## 2024-04-20 RX ORDER — SODIUM CHLORIDE, SODIUM GLUCONATE, SODIUM ACETATE, POTASSIUM CHLORIDE, MAGNESIUM CHLORIDE, SODIUM PHOSPHATE, DIBASIC, AND POTASSIUM PHOSPHATE .53; .5; .37; .037; .03; .012; .00082 G/100ML; G/100ML; G/100ML; G/100ML; G/100ML; G/100ML; G/100ML
1000 INJECTION, SOLUTION INTRAVENOUS ONCE
Status: COMPLETED | OUTPATIENT
Start: 2024-04-20 | End: 2024-04-20

## 2024-04-20 RX ORDER — ACETAMINOPHEN 325 MG/1
650 TABLET ORAL EVERY 6 HOURS PRN
Status: DISCONTINUED | OUTPATIENT
Start: 2024-04-20 | End: 2024-04-23 | Stop reason: HOSPADM

## 2024-04-20 RX ORDER — LORAZEPAM 1 MG/1
2 TABLET ORAL EVERY 8 HOURS
Status: DISCONTINUED | OUTPATIENT
Start: 2024-04-20 | End: 2024-04-21

## 2024-04-20 RX ORDER — ALLOPURINOL 300 MG/1
300 TABLET ORAL DAILY
Status: DISCONTINUED | OUTPATIENT
Start: 2024-04-20 | End: 2024-04-23 | Stop reason: HOSPADM

## 2024-04-20 RX ORDER — ONDANSETRON 2 MG/ML
4 INJECTION INTRAMUSCULAR; INTRAVENOUS EVERY 4 HOURS PRN
Status: DISCONTINUED | OUTPATIENT
Start: 2024-04-20 | End: 2024-04-23 | Stop reason: HOSPADM

## 2024-04-20 RX ORDER — LANOLIN ALCOHOL/MO/W.PET/CERES
100 CREAM (GRAM) TOPICAL DAILY
Status: DISCONTINUED | OUTPATIENT
Start: 2024-04-20 | End: 2024-04-23 | Stop reason: HOSPADM

## 2024-04-20 RX ORDER — FOLIC ACID 1 MG/1
1 TABLET ORAL DAILY
Status: DISCONTINUED | OUTPATIENT
Start: 2024-04-20 | End: 2024-04-23 | Stop reason: HOSPADM

## 2024-04-20 RX ORDER — MAGNESIUM SULFATE HEPTAHYDRATE 40 MG/ML
4 INJECTION, SOLUTION INTRAVENOUS ONCE
Status: COMPLETED | OUTPATIENT
Start: 2024-04-20 | End: 2024-04-20

## 2024-04-20 RX ORDER — SODIUM CHLORIDE, SODIUM GLUCONATE, SODIUM ACETATE, POTASSIUM CHLORIDE, MAGNESIUM CHLORIDE, SODIUM PHOSPHATE, DIBASIC, AND POTASSIUM PHOSPHATE .53; .5; .37; .037; .03; .012; .00082 G/100ML; G/100ML; G/100ML; G/100ML; G/100ML; G/100ML; G/100ML
75 INJECTION, SOLUTION INTRAVENOUS CONTINUOUS
Status: DISCONTINUED | OUTPATIENT
Start: 2024-04-20 | End: 2024-04-23

## 2024-04-20 RX ORDER — POTASSIUM CHLORIDE 14.9 MG/ML
20 INJECTION INTRAVENOUS ONCE
Status: DISCONTINUED | OUTPATIENT
Start: 2024-04-20 | End: 2024-04-20

## 2024-04-20 RX ORDER — LEVOTHYROXINE SODIUM 0.07 MG/1
75 TABLET ORAL EVERY MORNING
Status: DISCONTINUED | OUTPATIENT
Start: 2024-04-20 | End: 2024-04-23 | Stop reason: HOSPADM

## 2024-04-20 RX ORDER — ONDANSETRON 2 MG/ML
4 INJECTION INTRAMUSCULAR; INTRAVENOUS ONCE
Status: COMPLETED | OUTPATIENT
Start: 2024-04-20 | End: 2024-04-20

## 2024-04-20 RX ORDER — POTASSIUM CHLORIDE 14.9 MG/ML
20 INJECTION INTRAVENOUS ONCE
Status: COMPLETED | OUTPATIENT
Start: 2024-04-20 | End: 2024-04-20

## 2024-04-20 RX ORDER — ALBUTEROL SULFATE 90 UG/1
1 AEROSOL, METERED RESPIRATORY (INHALATION) EVERY 4 HOURS PRN
Status: DISCONTINUED | OUTPATIENT
Start: 2024-04-20 | End: 2024-04-23 | Stop reason: HOSPADM

## 2024-04-20 RX ADMIN — SODIUM CHLORIDE 80 MG: 9 INJECTION, SOLUTION INTRAVENOUS at 08:38

## 2024-04-20 RX ADMIN — SODIUM CHLORIDE 1000 ML: 0.9 INJECTION, SOLUTION INTRAVENOUS at 10:32

## 2024-04-20 RX ADMIN — SODIUM CHLORIDE, SODIUM GLUCONATE, SODIUM ACETATE, POTASSIUM CHLORIDE, MAGNESIUM CHLORIDE, SODIUM PHOSPHATE, DIBASIC, AND POTASSIUM PHOSPHATE 125 ML/HR: .53; .5; .37; .037; .03; .012; .00082 INJECTION, SOLUTION INTRAVENOUS at 19:32

## 2024-04-20 RX ADMIN — POTASSIUM CHLORIDE 20 MEQ: 14.9 INJECTION, SOLUTION INTRAVENOUS at 10:14

## 2024-04-20 RX ADMIN — LORAZEPAM 2 MG: 2 INJECTION INTRAMUSCULAR; INTRAVENOUS at 08:37

## 2024-04-20 RX ADMIN — SODIUM CHLORIDE 8 MG/HR: 9 INJECTION, SOLUTION INTRAVENOUS at 19:32

## 2024-04-20 RX ADMIN — ONDANSETRON 4 MG: 2 INJECTION INTRAMUSCULAR; INTRAVENOUS at 08:38

## 2024-04-20 RX ADMIN — LORAZEPAM 2 MG: 2 INJECTION INTRAMUSCULAR; INTRAVENOUS at 16:25

## 2024-04-20 RX ADMIN — SODIUM CHLORIDE, SODIUM GLUCONATE, SODIUM ACETATE, POTASSIUM CHLORIDE, MAGNESIUM CHLORIDE, SODIUM PHOSPHATE, DIBASIC, AND POTASSIUM PHOSPHATE 125 ML/HR: .53; .5; .37; .037; .03; .012; .00082 INJECTION, SOLUTION INTRAVENOUS at 11:18

## 2024-04-20 RX ADMIN — SODIUM CHLORIDE, SODIUM GLUCONATE, SODIUM ACETATE, POTASSIUM CHLORIDE, MAGNESIUM CHLORIDE, SODIUM PHOSPHATE, DIBASIC, AND POTASSIUM PHOSPHATE 1000 ML: .53; .5; .37; .037; .03; .012; .00082 INJECTION, SOLUTION INTRAVENOUS at 08:38

## 2024-04-20 RX ADMIN — MAGNESIUM SULFATE HEPTAHYDRATE 4 G: 40 INJECTION, SOLUTION INTRAVENOUS at 10:19

## 2024-04-20 RX ADMIN — METOPROLOL TARTRATE 25 MG: 25 TABLET, FILM COATED ORAL at 20:24

## 2024-04-20 RX ADMIN — LORAZEPAM 2 MG: 2 INJECTION INTRAMUSCULAR; INTRAVENOUS at 11:53

## 2024-04-20 RX ADMIN — SODIUM CHLORIDE 8 MG/HR: 9 INJECTION, SOLUTION INTRAVENOUS at 11:53

## 2024-04-20 NOTE — ED ATTENDING ATTESTATION
4/20/2024  I, Archie Fox MD, saw and evaluated the patient. I have discussed the patient with the resident/non-physician practitioner and agree with the resident's/non-physician practitioner's findings, Plan of Care, and MDM as documented in the resident's/non-physician practitioner's note, except where noted. All available labs and Radiology studies were reviewed.  I was present for key portions of any procedure(s) performed by the resident/non-physician practitioner and I was immediately available to provide assistance.       At this point I agree with the current assessment done in the Emergency Department.  I have conducted an independent evaluation of this patient a history and physical is as follows:  Patient presents with complaints of vomiting dark material and also having dark bloody stool patient has no abdominal pain drinks alcohol quite heavily no anticoagulants no NSAID or aspirin use denies fever chills  Exam the patient is in no acute distress he is mildly tachycardic his sclera are anicteric his conjunctiva are somewhat pale his mucous membranes are dry  Lungs clear heart tachycardic no murmurs abdomen soft positive bowel sounds nontender nondistended  Impression gastrointestinal hemorrhage  Likely upper  2 IV lines IV fluid bolus type and screen Protonix  Patient will require admission to the hospital  ED Course         Critical Care Time  Procedures

## 2024-04-20 NOTE — ED PROVIDER NOTES
History  Chief Complaint   Patient presents with    Abdominal Pain     Starting yesterday started lower abdomen pain with nausea vomiting black red/ diarrhea. Denies CP and SOB feels more lethargic than normal.      Patient is a 65 yo male with pertinent PMH of alcohol abuse, GERD, alcohol-induced pancreatitis, GI bleed, who presents for evaluation of coffee ground emesis and dark stools for the past day. Patient has accompanying nausea and intermittent epigastric abdominal pain. Patient was unable to take daily medications, including protonix and levothyroxine 2/2 vomiting. Patient feels weak. Denying chest pain, SOB, fever, chills, difficulty urinating, or syncopal episodes. Patient last saw PCP 1 year ago. Had a recent appointment that he cancelled due to feeling unwell. Endorsing drinking 3-4 glasses of scotch daily. Last drink was yesterday at 7 pm. Does not take antiplatelets or anticoagulants.         Prior to Admission Medications   Prescriptions Last Dose Informant Patient Reported? Taking?   FLUoxetine (PROzac) 40 MG capsule   No No   Sig: TAKE ONE CAPSULE BY MOUTH EVERY DAY   Multiple Vitamin (DAILY VALUE MULTIVITAMIN) TABS   Yes No   Sig: Take 1 tablet by mouth daily   albuterol (PROVENTIL HFA,VENTOLIN HFA) 90 mcg/act inhaler   Yes No   Sig: Inhale 1-2 puffs   allopurinol (ZYLOPRIM) 300 mg tablet   No No   Sig: TAKE 1 TABLET EVERY DAY   cetirizine (ZyrTEC) 10 mg tablet   Yes No   Sig: Take one tablet by mouth daily as needed for allergies/congestion    folic acid (FOLVITE) 1 mg tablet   No No   Sig: Take 1 tablet (1 mg total) by mouth daily for 7 days Do not start before October 19, 2023.   levothyroxine 75 mcg tablet   No No   Sig: TAKE 1 TABLET IN THE MORNING ON AN EMPTY STOMACH FOR THYROID   pantoprazole (PROTONIX) 40 mg tablet   No No   Sig: Take 1 tablet (40 mg total) by mouth daily in the early morning for 7 days Do not start before October 19, 2023.   thiamine 100 MG tablet   No No   Sig: Take 1  tablet (100 mg total) by mouth daily for 5 days Do not start before October 19, 2023.      Facility-Administered Medications: None       Past Medical History:   Diagnosis Date    Medical history unknown        Past Surgical History:   Procedure Laterality Date    REPAIR / REINSERT BICEPS TENDON AT ELBOW      Last Assessed: 1/12/2016        Family History   Problem Relation Age of Onset    Diabetes Mother      I have reviewed and agree with the history as documented.    E-Cigarette/Vaping     E-Cigarette/Vaping Substances     Social History     Tobacco Use    Smoking status: Never    Smokeless tobacco: Never   Substance Use Topics    Alcohol use: Yes     Comment: social    Drug use: Yes     Types: Marijuana        Review of Systems   Gastrointestinal:  Positive for abdominal distention and blood in stool.        Hematemesis     All other systems reviewed and are negative.      Physical Exam  ED Triage Vitals [04/20/24 0756]   Temperature Pulse Respirations Blood Pressure SpO2   97.9 °F (36.6 °C) (!) 125 20 144/76 98 %      Temp Source Heart Rate Source Patient Position - Orthostatic VS BP Location FiO2 (%)   Oral Monitor Lying Right arm --      Pain Score       --             Orthostatic Vital Signs  Vitals:    04/20/24 0756 04/20/24 0900 04/20/24 0930 04/20/24 1000   BP: 144/76 135/81 122/75 137/78   Pulse: (!) 125 (!) 114 (!) 114 (!) 116   Patient Position - Orthostatic VS: Lying Lying Lying Lying       Physical Exam  Constitutional:       General: He is not in acute distress.     Appearance: He is well-developed. He is ill-appearing. He is not toxic-appearing or diaphoretic.   HENT:      Head: Normocephalic and atraumatic.      Nose: Nose normal.      Mouth/Throat:      Mouth: Mucous membranes are dry.      Pharynx: Oropharynx is clear.   Eyes:      General: No scleral icterus.     Comments: Pale conjunctiva     Cardiovascular:      Rate and Rhythm: Regular rhythm. Tachycardia present.      Heart sounds: Normal  heart sounds.   Pulmonary:      Effort: Pulmonary effort is normal.      Breath sounds: Normal breath sounds.   Abdominal:      General: Abdomen is flat. There is no distension.      Palpations: Abdomen is soft.      Tenderness: There is abdominal tenderness (mild, epigastric).      Comments: Coffee ground emesis and dark tarry stool observed in emesis bag and toilet     Musculoskeletal:         General: Normal range of motion.      Cervical back: Normal range of motion and neck supple.      Right lower leg: No edema.      Left lower leg: No edema.   Skin:     General: Skin is warm.      Coloration: Skin is not jaundiced or pale.      Findings: No bruising.   Neurological:      General: No focal deficit present.      Mental Status: He is alert and oriented to person, place, and time.   Psychiatric:         Mood and Affect: Mood normal.         Behavior: Behavior normal.         ED Medications  Medications   magnesium sulfate 4 g/100 mL IVPB (premix) 4 g (4 g Intravenous New Bag 4/20/24 1019)   potassium chloride 20 mEq IVPB (premix) (has no administration in time range)   potassium chloride 20 mEq IVPB (premix) (20 mEq Intravenous New Bag 4/20/24 1014)   pantoprazole (PROTONIX) 80 mg in sodium chloride 0.9 % 100 mL IVPB (0 mg Intravenous Stopped 4/20/24 0922)   ondansetron (ZOFRAN) injection 4 mg (4 mg Intravenous Given 4/20/24 0838)   multi-electrolyte (ISOLYTE-S PH 7.4) bolus 1,000 mL (0 mL Intravenous Stopped 4/20/24 0938)   LORazepam (ATIVAN) injection 2 mg (2 mg Intravenous Given 4/20/24 0837)       Diagnostic Studies  Results Reviewed       Procedure Component Value Units Date/Time    HS Troponin I 2hr [[id number]] Collected: 04/20/24 1028    Lab Status: No result Specimen: Blood from Arm, Right     Protime-INR [[id number]]  (Normal) Collected: 04/20/24 0833    Lab Status: Final result Specimen: Blood from Arm, Left Updated: 04/20/24 0911     Protime 13.3 seconds      INR 1.02    HS Troponin 0hr (reflex  protocol) [[id number]]  (Normal) Collected: 04/20/24 0833    Lab Status: Final result Specimen: Blood from Arm, Left Updated: 04/20/24 0908     hs TnI 0hr 15 ng/L     Comprehensive metabolic panel [[id number]]  (Abnormal) Collected: 04/20/24 0833    Lab Status: Final result Specimen: Blood from Arm, Left Updated: 04/20/24 0903     Sodium 136 mmol/L      Potassium 3.4 mmol/L      Chloride 95 mmol/L      CO2 25 mmol/L      ANION GAP 16 mmol/L      BUN 25 mg/dL      Creatinine 0.77 mg/dL      Glucose 224 mg/dL      Calcium 9.3 mg/dL      AST 56 U/L      ALT 69 U/L      Alkaline Phosphatase 111 U/L      Total Protein 7.1 g/dL      Albumin 4.2 g/dL      Total Bilirubin 1.47 mg/dL      eGFR 94 ml/min/1.73sq m     Narrative:      [employer] Kid[employer] guidelines for Chronic Kidney Disease (CKD):     Stage 1 with normal or high GFR (GFR > 90 mL/min/1.73 square meters)    Stage 2 Mild CKD (GFR = 60-89 mL/min/1.73 square meters)    Stage 3A Moderate CKD (GFR = 45-59 mL/min/1.73 square meters)    Stage 3B Moderate CKD (GFR = 30-44 mL/min/1.73 square meters)    Stage 4 Severe CKD (GFR = 15-29 mL/min/1.73 square meters)    Stage 5 End Stage CKD (GFR <15 mL/min/1.73 square meters)  Note: GFR calculation is accurate only with a steady state creatinine    Lipase [[id number]]  (Normal) Collected: 04/20/24 0833    Lab Status: Final result Specimen: Blood from Arm, Left Updated: 04/20/24 0903     Lipase 39 u/L     Magnesium [[id number]]  (Abnormal) Collected: 04/20/24 0833    Lab Status: Final result Specimen: Blood from Arm, Left Updated: 04/20/24 0902     Magnesium 1.2 mg/dL     CBC and differential [[id number]]  (Abnormal) Collected: 04/20/24 0833    Lab Status: Final result Specimen: Blood from Arm, Left Updated: 04/20/24 0844     WBC 14.70 Thousand/uL      RBC 4.14 Million/uL      Hemoglobin 14.6 g/dL      Hematocrit 42.9 %       fL      MCH 35.3 pg      MCHC 34.0 g/dL      RDW 12.2 %      MPV 9.8 fL      Platelets  278 Thousands/uL      nRBC 0 /100 WBCs      Segmented % 74 %      Immature Grans % 1 %      Lymphocytes % 21 %      Monocytes % 3 %      Eosinophils Relative 0 %      Basophils Relative 1 %      Absolute Neutrophils 10.80 Thousands/µL      Absolute Immature Grans 0.16 Thousand/uL      Absolute Lymphocytes 3.05 Thousands/µL      Absolute Monocytes 0.46 Thousand/µL      Eosinophils Absolute 0.05 Thousand/µL      Basophils Absolute 0.18 Thousands/µL                    No orders to display         Procedures  ECG 12 Lead Documentation Only    Date/Time: 4/20/2024 10:26 AM    Performed by: Delphine Henderson MD  Authorized by: Delphine Henderson MD    Indications / Diagnosis:  Tachycardia  ECG reviewed by me, the ED Provider: yes    Patient location:  ED  Previous ECG:     Previous ECG:  Compared to current    Similarity:  No change  Interpretation:     Interpretation: abnormal    Rate:     ECG rate:  123    ECG rate assessment: tachycardic    Rhythm:     Rhythm: sinus tachycardia    Ectopy:     Ectopy: none    QRS:     QRS axis:  Normal    QRS intervals:  Normal  Conduction:     Conduction: normal    ST segments:     ST segments:  Non-specific  T waves:     T waves: non-specific          ED Course                                       Medical Decision Making  Sammy Mays is a 64 y.o. who presents with complaints of dark blood in vomit and stool    Vital signs are tachycardic, otherwise stable afebrile    Ddx: suspect upper GI bleed given history     Plan:lab work showing stable Hgb  Hypokalemia and hypomagnesemia- will replete  EKG-tachycardia  2 IV's inserted  1 L bolus isolyte  GI consulted    Disposition: Discussed with [facility], admit inpatient.              Amount and/or Complexity of Data Reviewed  Labs: ordered.    Risk  Prescription drug management.  Decision regarding hospitalization.          Disposition  Final diagnoses:   Gastrointestinal hemorrhage with hematemesis     Time reflects when diagnosis was  documented in both MDM as applicable and the Disposition within this note       Time User Action Codes Description Comment    4/20/2024 10:17 AM Delphine Henderson [K92.0] Gastrointestinal hemorrhage with hematemesis           ED Disposition       ED Disposition   Admit    Condition   Stable    Date/Time   Sat Apr 20, 2024 10:18 AM    Comment   Case was discussed with Dr. Boston Nolasco and the patient's admission status was agreed to be Admission Status: inpatient status to [facility].                Follow-up Information    None         Patient's Medications   Discharge Prescriptions    No medications on file     No discharge procedures on file.    PDMP Review         Value Time User    PDMP Reviewed  Yes 7/9/2023  8:07 PM Daisy Dupree PA-C             ED Provider  Attending physically available and evaluated Sammy Mays. I managed the patient along with the ED Attending.    Electronically Signed by           Delphine Henderson MD  04/20/24 8239

## 2024-04-20 NOTE — ASSESSMENT & PLAN NOTE
Active alcohol abuse  Reports previous withdrawal episodes involving tremors, diaphoresis, and anxiety   Start CIWA protocol  Folic acid and thiamine with multivitamin  Start beta-blocker given his tachycardia  Start Ativan 2 mg every 8 hours  Aggressive IV fluid for hydration  Monitor for DTs  Monitor electrolytes

## 2024-04-20 NOTE — CONSULTS
Consult Service: Gastroenterology      PATIENT INFORMATION      Sammy Mays 64 y.o. male MRN: [id number]  Unit/Bed#: ED 15 Encounter: [id number]  PCP: Ankur Bustillo DO  Date of Admission:  4/20/2024  Date of Consultation: 04/20/24  Requesting Physician: Boston Nolasco DO       ASSESSMENTS & PLAN   Sammy Mays is a 64 y.o. old male with PMH of alcohol use disorder, CBD stricture, hx of pancreatitis presenting with melena and hematemesis, with GI consulted for GIB.    Melena, Hematemesis  Reported hematemesis x1 yesterday  Ongoing melena today   Hg stable at 14 on most recent check  VS w tachycardia and no hypotension  Trend Hg q8 hrs, maintain 2 large bore IVs, maintain active T&S, transfuse for Hg < 7.0   Pantoprazole 40 mg IV BID   Endoscopic plans to be determined based on subsequent Hg checks    REASON FOR CONSULTATION      Melena, Hematemsis    HISTORY OF PRESENT ILLNESS      Sammy Mays is a 64 y.o. male with PMH of alcohol use disorder, CBD stricture, hx of pancreatitis presenting with melena and hematemesis, with GI consulted for GIB.    EGD and colonoscopy 7/2023 for GIB, had Grade A esophagitis, mild ulcerated mucosa in gastric antrum (biopsies normal), extensive diverticulosis with no active bleeding identified. Recommended for recall in 6 months due to visualized polyps which were not removed due to indication being GIB.     Today endorsing hematemesis and dark stools x1 day, associated w nausea and intermittent abdominal pain. Drinks 3-4 glasses of scotch daily. VS notable for tachycardia to max , Hg stable. Has leukocytosis to 14.    REVIEW OF SYSTEMS     A thorough 12-point review of systems has been conducted. Pertinent positives and negatives are mentioned in the history of present illness.       PAST MEDICAL & SURGICAL HISTORY      Past Medical History:   Diagnosis Date    Medical history unknown        Past Surgical History:   Procedure Laterality Date    REPAIR / REINSERT  BICEPS TENDON AT ELBOW      Last Assessed: 1/12/2016          MEDICATIONS & ALLERGIES       Medications:   Prior to Admission medications    Medication Sig Start Date End Date Taking? Authorizing Provider   albuterol (PROVENTIL HFA,VENTOLIN HFA) 90 mcg/act inhaler Inhale 1-2 puffs    Historical Provider, MD   allopurinol (ZYLOPRIM) 300 mg tablet TAKE 1 TABLET EVERY DAY 8/15/19   Sarah Fiore MD   cetirizine (ZyrTEC) 10 mg tablet Take one tablet by mouth daily as needed for allergies/congestion  5/2/14   Historical Provider, MD   FLUoxetine (PROzac) 40 MG capsule TAKE ONE CAPSULE BY MOUTH EVERY DAY 7/22/19   Sarah Fiore MD   folic acid (FOLVITE) 1 mg tablet Take 1 tablet (1 mg total) by mouth daily for 7 days Do not start before October 19, 2023. 10/19/23 10/26/23  Ronny Lofton PA-C   levothyroxine 75 mcg tablet TAKE 1 TABLET IN THE MORNING ON AN EMPTY STOMACH FOR THYROID 6/9/19   Sarah Fiore MD   Multiple Vitamin (DAILY VALUE MULTIVITAMIN) TABS Take 1 tablet by mouth daily    Historical Provider, MD   pantoprazole (PROTONIX) 40 mg tablet Take 1 tablet (40 mg total) by mouth daily in the early morning for 7 days Do not start before October 19, 2023. 10/19/23 10/26/23  Ronny Lofton PA-C   thiamine 100 MG tablet Take 1 tablet (100 mg total) by mouth daily for 5 days Do not start before October 19, 2023. 10/19/23 10/24/23  Ronny Lofton PA-C       Allergies:   Allergies   Allergen Reactions    Amoxicillin Swelling and Fever     Other reaction(s): vertigo    Escitalopram      Other reaction(s): sweaty palms/hands, felt faint, passed out for a short time, had anxiety/panic attack    Gemfibrozil      Other reaction(s): Nausea and/or vomiting  Other reaction(s): Nausea and/or vomiting    Other      Other reaction(s): Other (See Comments)  rhinitis, itchy eyes         SOCIAL HISTORY      Marital Status: Single    Substance Use History:   Social History     Substance and Sexual Activity   Alcohol Use  Yes    Comment: social     Social History     Tobacco Use   Smoking Status Never   Smokeless Tobacco Never     Social History     Substance and Sexual Activity   Drug Use Yes    Types: Marijuana         FAMILY HISTORY      Non-Contributory      PHYSICAL EXAM     Vitals:   Blood Pressure: 137/78 (04/20/24 1000)  Pulse: (!) 116 (04/20/24 1000)  Temperature: 97.9 °F (36.6 °C) (04/20/24 0756)  Temp Source: Oral (04/20/24 0756)  Respirations: 12 (04/20/24 1000)  SpO2: 96 % (04/20/24 1000)    Physical Exam:   GENERAL: NAD  HEENT:  NC/AT, No Scleral Icterus  CARDIAC:  Regular Rate  PULMONARY:  Non-Labored Breathing, No Respiratory Distress  ABDOMEN:  Soft, No Rebound/Guarding/Rigidity, No Organomegaly, No Tenderness  EXTREMITIES:  No Edema, Cyanosis, or Clubbing  NEUROLOGIC:  Alert  SKIN:  No Rashes or Erythema    ADDITIONAL DATA     Lab Results:       Lab Units 04/20/24  0833 10/18/23  1633 10/18/23  0520 10/17/23  1218 09/18/23  1015 07/21/23  1129 07/13/23  0633   SODIUM mmol/L 136 137 139 138 141   < > 138   POTASSIUM mmol/L 3.4* 3.7 2.9* 3.7 3.5   < > 4.1   CHLORIDE mmol/L 95* 103 103 100 101   < > 114*   CO2 mmol/L 25 24 26 27 27   < > 21   BUN mg/dL 25 4* 5 7 6*   < > 9   CREATININE mg/dL 0.77 0.59* 0.51* 0.52* 0.54   < > 0.67   GLUCOSE RANDOM mg/dL 224* 101 133 132 169*   < > 128   CALCIUM mg/dL 9.3 7.9* 7.5* 8.4 8.5   < > 7.2*   MAGNESIUM mg/dL 1.2* 1.9 1.6* 1.3*  --   --  2.2   PHOSPHORUS mg/dL  --   --   --  4.0  --   --   --     < > = values in this interval not displayed.            Lab Units 04/20/24  0833 10/18/23  0520 10/17/23  1218 09/18/23  1015 07/21/23  1129   TOTAL PROTEIN g/dL 7.1 5.4* 6.2* 5.9* 6.7   ALBUMIN g/dL 4.2 3.4* 3.7 3.5 3.8   TOTAL BILIRUBIN mg/dL 1.47* 1.38* 1.22* 1.5* 0.5   AST U/L 56* 47* 81* 82* 65*   ALT U/L 69* 34 45 175* 65*   ALK PHOS U/L 111* 75 102 126* 78           Lab Units 04/20/24  0833 10/18/23  0520 10/17/23  2117 10/17/23  1218 07/12/23  1638 07/12/23  0843 07/12/23  0511    WBC Thousand/uL 14.70* 2.94*  --  5.14 3.17*  --  2.92*   HEMOGLOBIN g/dL 14.6 13.6  --  15.2 8.2* 7.5* 7.2*   HEMATOCRIT % 42.9 40.7  --  42.9 25.3*  --  22.9*   PLATELETS Thousands/uL 278 94* 114* 125* 123*  --  95*   MCV fL 104* 101*  --  98 108*  --  108*       Lab Results   Component Value Date    IRON 15 (L) 09/08/2022    TIBC 500 (H) 09/08/2022    FERRITIN 8 (L) 09/08/2022       Lab Results   Component Value Date    INR 1.02 04/20/2024    INR 1.10 07/09/2023    INR 1.4 05/28/2021    PROTIME 13.3 04/20/2024    PROTIME 14.4 07/09/2023         Microbiology Results:        Imaging:  No results found.  Narrative/Impressions - 3 day look back     EKG, Pathology, and Other Studies Reviewed on Admission:   EKG: Reviewed    PRIOR GI PROCEDURES  07/09/23    Colonoscopy    Impression:  Extensive diverticulosis of moderate severity containing stool and causing  mild luminal narrowing  Polyp in the cecum and transverse colon  No source of GI bleeding noted.  No evidence of active GI bleeding noted.    RECOMMENDATION:  Repeat colonoscopy in 6 months  Personal history of colon polyps    Continue to monitor H/H, transfuse for Hgb < 7 and for clinical evidence  of ongoing/recurrent GI bleeding.    Filipe Romo MD      EGD    Impression:  Grade A esophagitis in the GE junction  Mild ulcerated mucosa in the antrum; performed cold forceps biopsy  The duodenal bulb, 1st part of the duodenum and 2nd part of the duodenum  appeared normal.    RECOMMENDATION:  Await pathology results  PPI daily  Colonoscopy tomorrow  Clear liquid diet  NPO at midnight, bowel prep this evening    Filipe Romo MD      Counseling / Coordination of Care Time: 30 total mins spent n consult. Greater than 50% of total time spent on patient counseling and coordination of care.    ...............................................................................................................................................  Gerald Sanchez M.D.  PGY-5  Gastroenterology Fellow  [facility]  Available on [employer]  [email or web address]  Recommendations not final until attending attestation

## 2024-04-20 NOTE — ASSESSMENT & PLAN NOTE
Patient presented with coffee-ground emesis and black stool x 1 day  Alcohol abuse with poor oral intake  Denied using NSAIDs  Hemoglobin of 14 with tachycardia  Start IV fluid for hydration  Monitor H&H  Started on Protonix drip  GI consulted for further management  Transfuse for hemoglobin below 7

## 2024-04-20 NOTE — H&P
[facility]  H&P  Name: Sammy Mays 64 y.o. male I MRN: [id number]  Unit/Bed#: [facility] 623-01 I Date of Admission: 4/20/2024   Date of Service: 4/20/2024 I Hospital Day: 0      Assessment/Plan   * GI bleed  Assessment & Plan  Patient presented with coffee-ground emesis and black stool x 1 day  Alcohol abuse with poor oral intake  Denied using NSAIDs  Hemoglobin of 14 with tachycardia  Start IV fluid for hydration  Monitor H&H  Started on Protonix drip  GI consulted for further management  Transfuse for hemoglobin below 7    Alcohol use disorder, moderate, dependence (HCC)  Assessment & Plan  Active alcohol abuse  Reports previous withdrawal episodes involving tremors, diaphoresis, and anxiety   Start CIWA protocol  Folic acid and thiamine with multivitamin  Start beta-blocker given his tachycardia  Start Ativan 2 mg every 8 hours  Aggressive IV fluid for hydration  Monitor for DTs  Monitor electrolytes    Transaminitis  Assessment & Plan  In the setting of alcohol abuse  GI is following  Monitor    Depression  Assessment & Plan  Continue Paxil    Hypothyroidism  Assessment & Plan  Continue levothyroxine  Check thyroid study         VTE Pharmacologic Prophylaxis: VTE Score: 5 contraindicated due to GI bleed  Code Status: Level 1 - Full Code   Discussion with family: Patient    Anticipated Length of Stay: Patient will be admitted on an inpatient basis with an anticipated length of stay of greater than 2 midnights secondary to management of GI bleeding.    Total Time Spent on Date of Encounter in care of patient:60 mins. This time was spent on one or more of the following: performing physical exam; counseling and coordination of care; obtaining or reviewing history; documenting in the medical record; reviewing/ordering tests, medications or procedures; communicating with other healthcare professionals and discussing with patient's family/caregivers.    Chief Complaint:   Hematemesis  and black stool    History of Present Illness:  Sammy Mays is a 64 y.o. male with a PMH of alcohol abuse, GI bleed, CBD stricture, hypothyroidism, transaminitis, depression, anxiety and history of pancreatitis who presents with coffee-ground emesis and dark stool for the past day  Patient has accompanying nausea and intermittent epigastric abdominal pain. Patient was unable to take daily medications, including protonix and levothyroxine 2/2 vomiting. Patient feels weak. Denying chest pain, SOB, fever, chills, difficulty urinating, or syncopal episodes. Patient last saw PCP 1 year ago. Had a recent appointment that he cancelled due to feeling unwell. Endorsing drinking 3-4 glasses of scotch daily. Last drink was yesterday at 7 pm. Does not take antiplatelets or anticoagulants.     Patient had similar episode and was admitted in July 2023    He was evaluated in the emergency room hemoglobin 14, sinus tachycardia  Hypomagnesemia and hypokalemia with elevated LFTs  Patient received IV Ativan, IV fluid, IV magnesium, Zofran, IV potassium and started on Protonix drip  Review of Systems:  Review of Systems   Constitutional:  Positive for appetite change. Negative for chills and fever.   HENT:  Negative for sore throat.    Respiratory:  Negative for cough, chest tightness and shortness of breath.    Cardiovascular:  Negative for chest pain, palpitations and leg swelling.   Gastrointestinal:  Positive for abdominal pain, blood in stool, nausea and vomiting. Negative for diarrhea.   Endocrine: Negative for polyuria.   Genitourinary:  Negative for difficulty urinating and dysuria.   Neurological:  Negative for dizziness, speech difficulty and headaches.   All other systems reviewed and are negative.      Past Medical and Surgical History:   Past Medical History:   Diagnosis Date    Medical history unknown        Past Surgical History:   Procedure Laterality Date    REPAIR / REINSERT BICEPS TENDON AT ELBOW      Last  Assessed: 1/12/2016        Meds/Allergies:  Prior to Admission medications    Medication Sig Start Date End Date Taking? Authorizing Provider   albuterol (PROVENTIL HFA,VENTOLIN HFA) 90 mcg/act inhaler Inhale 1-2 puffs    Historical Provider, MD   allopurinol (ZYLOPRIM) 300 mg tablet TAKE 1 TABLET EVERY DAY 8/15/19   Sarah Fiore MD   cetirizine (ZyrTEC) 10 mg tablet Take one tablet by mouth daily as needed for allergies/congestion  5/2/14   Historical Provider, MD   FLUoxetine (PROzac) 40 MG capsule TAKE ONE CAPSULE BY MOUTH EVERY DAY 7/22/19   Sarah Fiore MD   folic acid (FOLVITE) 1 mg tablet Take 1 tablet (1 mg total) by mouth daily for 7 days Do not start before October 19, 2023. 10/19/23 10/26/23  Ronny Lofton PA-C   levothyroxine 75 mcg tablet TAKE 1 TABLET IN THE MORNING ON AN EMPTY STOMACH FOR THYROID 6/9/19   Sarah Fiore MD   Multiple Vitamin (DAILY VALUE MULTIVITAMIN) TABS Take 1 tablet by mouth daily    Historical Provider, MD   pantoprazole (PROTONIX) 40 mg tablet Take 1 tablet (40 mg total) by mouth daily in the early morning for 7 days Do not start before October 19, 2023. 10/19/23 10/26/23  Ronny Lofton PA-C   thiamine 100 MG tablet Take 1 tablet (100 mg total) by mouth daily for 5 days Do not start before October 19, 2023. 10/19/23 10/24/23  Ronny Lofton PA-C     I have reviewed home medications using recent Epic encounter.    Allergies:   Allergies   Allergen Reactions    Amoxicillin Swelling and Fever     Other reaction(s): vertigo    Escitalopram      Other reaction(s): sweaty palms/hands, felt faint, passed out for a short time, had anxiety/panic attack    Gemfibrozil      Other reaction(s): Nausea and/or vomiting  Other reaction(s): Nausea and/or vomiting    Other      Other reaction(s): Other (See Comments)  rhinitis, itchy eyes       Social History:  Marital Status: Single     Substance Use History:   Social History     Substance and Sexual Activity   Alcohol Use Yes  "   Comment: social     Social History     Tobacco Use   Smoking Status Never   Smokeless Tobacco Never     Social History     Substance and Sexual Activity   Drug Use Yes    Types: Marijuana       Family History:    Family History   Problem Relation Age of Onset    Diabetes Mother       Physical Exam:     Vitals:   Blood Pressure: 134/87 (04/20/24 1412)  Pulse: 105 (04/20/24 1412)  Temperature: 98.1 °F (36.7 °C) (04/20/24 1412)  Temp Source: Oral (04/20/24 0756)  Respirations: 19 (04/20/24 1412)  Height: 5' 10\" (177.8 cm) (04/20/24 1100)  Weight - Scale: 86.2 kg (190 lb 0.6 oz) (04/20/24 1100)  SpO2: 97 % (04/20/24 1412)    Physical Exam  Vitals reviewed.   Constitutional:       Appearance: Normal appearance. He is ill-appearing.   HENT:      Head: Normocephalic and atraumatic.      Mouth/Throat:      Mouth: Mucous membranes are moist.      Pharynx: No oropharyngeal exudate.   Eyes:      General: No scleral icterus.     Extraocular Movements: Extraocular movements intact.   Cardiovascular:      Rate and Rhythm: Regular rhythm. Tachycardia present.      Pulses: Normal pulses.      Heart sounds: Normal heart sounds. No murmur heard.  Pulmonary:      Effort: Pulmonary effort is normal. No respiratory distress.      Breath sounds: Normal breath sounds. No wheezing.   Abdominal:      General: Bowel sounds are normal. There is no distension.      Palpations: Abdomen is soft.      Tenderness: There is no abdominal tenderness.   Musculoskeletal:         General: Normal range of motion.      Cervical back: Normal range of motion and neck supple.      Right lower leg: No edema.      Left lower leg: No edema.   Skin:     General: Skin is warm and dry.      Findings: No rash.   Neurological:      General: No focal deficit present.      Mental Status: He is alert and oriented to person, place, and time.      Cranial Nerves: No cranial nerve deficit.      Comments: Bilateral upper extremities tremor with movement   Psychiatric:   "       Mood and Affect: Mood normal.            Additional Data:     Lab Results:  Results from last 7 days   Lab Units 04/20/24  0833   WBC Thousand/uL 14.70*   HEMOGLOBIN g/dL 14.6   HEMATOCRIT % 42.9   PLATELETS Thousands/uL 278   SEGS PCT % 74   LYMPHO PCT % 21   MONO PCT % 3*   EOS PCT % 0     Results from last 7 days   Lab Units 04/20/24  0833   SODIUM mmol/L 136   POTASSIUM mmol/L 3.4*   CHLORIDE mmol/L 95*   CO2 mmol/L 25   BUN mg/dL 25   CREATININE mg/dL 0.77   ANION GAP mmol/L 16*   CALCIUM mg/dL 9.3   ALBUMIN g/dL 4.2   TOTAL BILIRUBIN mg/dL 1.47*   ALK PHOS U/L 111*   ALT U/L 69*   AST U/L 56*   GLUCOSE RANDOM mg/dL 224*     Results from last 7 days   Lab Units 04/20/24  0833   INR  1.02                   Lines/Drains:  Invasive Devices       Peripheral Intravenous Line  Duration             Peripheral IV 04/20/24 Dorsal (posterior);Proximal;Right Forearm <1 day    Peripheral IV 04/20/24 Left Antecubital <1 day                        Imaging: no new imaging study  No orders to display       EKG and Other Studies Reviewed on Admission:   EKG: Personally reviewed shows sinus tachycardia at 123 bpm    ** Please Note: This note has been constructed using a voice recognition system. **

## 2024-04-20 NOTE — PLAN OF CARE
Problem: PAIN - ADULT  Goal: Verbalizes/displays adequate comfort level or baseline comfort level  Description: Interventions:  - Encourage patient to monitor pain and request assistance  - Assess pain using appropriate pain scale  - Administer analgesics based on type and severity of pain and evaluate response  - Implement non-pharmacological measures as appropriate and evaluate response  - Consider cultural and social influences on pain and pain management  - Notify physician/advanced practitioner if interventions unsuccessful or patient reports new pain  Outcome: Progressing     Problem: INFECTION - ADULT  Goal: Absence or prevention of progression during hospitalization  Description: INTERVENTIONS:  - Assess and monitor for signs and symptoms of infection  - Monitor lab/diagnostic results  - Monitor all insertion sites, i.e. indwelling lines, tubes, and drains  - Monitor endotracheal if appropriate and nasal secretions for changes in amount and color  - [place] appropriate cooling/warming therapies per order  - Administer medications as ordered  - Instruct and encourage patient and family to use good hand hygiene technique  - Identify and instruct in appropriate isolation precautions for identified infection/condition  Outcome: Progressing     Problem: SAFETY ADULT  Goal: Patient will remain free of falls  Description: INTERVENTIONS:  - Educate patient/family on patient safety including physical limitations  - Instruct patient to call for assistance with activity   - Consult OT/PT to assist with strengthening/mobility   - Keep Call bell within reach  - Keep bed low and locked with side rails adjusted as appropriate  - Keep care items and personal belongings within reach  - Initiate and maintain comfort rounds  - Make Fall Risk Sign visible to staff  - Apply yellow socks and bracelet for high fall risk patients  - Consider moving patient to room near nurses station  Outcome: Progressing  Goal: Maintain or  return to baseline ADL function  Description: INTERVENTIONS:  -  Assess patient's ability to carry out ADLs; assess patient's baseline for ADL function and identify physical deficits which impact ability to perform ADLs (bathing, care of mouth/teeth, toileting, grooming, dressing, etc.)  - Assess/evaluate cause of self-care deficits   - Assess range of motion  - Assess patient's mobility; develop plan if impaired  - Assess patient's need for assistive devices and provide as appropriate  - Encourage maximum independence but intervene and supervise when necessary  - Involve family in performance of ADLs  - Assess for home care needs following discharge   - Consider OT consult to assist with ADL evaluation and planning for discharge  - Provide patient education as appropriate  Outcome: Progressing  Goal: Maintains/Returns to pre admission functional level  Description: INTERVENTIONS:  - Perform AM-PAC 6 Click Basic Mobility/ Daily Activity assessment daily.  - Set and communicate daily mobility goal to care team and patient/family/caregiver.   - Collaborate with rehabilitation services on mobility goals if consulted  - Out of bed for toileting  - Record patient progress and toleration of activity level   Outcome: Progressing     Problem: DISCHARGE PLANNING  Goal: Discharge to home or other facility with appropriate resources  Description: INTERVENTIONS:  - Identify barriers to discharge w/patient and caregiver  - Arrange for needed discharge resources and transportation as appropriate  - Identify discharge learning needs (meds, wound care, etc.)  - Arrange for interpretive services to assist at discharge as needed  - Refer to Case Management Department for coordinating discharge planning if the patient needs post-hospital services based on physician/advanced practitioner order or complex needs related to functional status, cognitive ability, or social support system  Outcome: Progressing     Problem: Knowledge  Deficit  Goal: Patient/family/caregiver demonstrates understanding of disease process, treatment plan, medications, and discharge instructions  Description: Complete learning assessment and assess knowledge base.  Interventions:  - Provide teaching at level of understanding  - Provide teaching via preferred learning methods  Outcome: Progressing

## 2024-04-21 ENCOUNTER — ANESTHESIA EVENT (INPATIENT)
Dept: PERIOP | Facility: HOSPITAL | Age: 64
DRG: 377 | End: 2024-04-21
Payer: MEDICARE

## 2024-04-21 ENCOUNTER — APPOINTMENT (INPATIENT)
Dept: RADIOLOGY | Facility: HOSPITAL | Age: 64
DRG: 377 | End: 2024-04-21
Payer: MEDICARE

## 2024-04-21 ENCOUNTER — APPOINTMENT (OUTPATIENT)
Dept: PERIOP | Facility: HOSPITAL | Age: 64
DRG: 377 | End: 2024-04-21
Payer: MEDICARE

## 2024-04-21 ENCOUNTER — ANESTHESIA (INPATIENT)
Dept: PERIOP | Facility: HOSPITAL | Age: 64
DRG: 377 | End: 2024-04-21
Payer: MEDICARE

## 2024-04-21 PROBLEM — F12.90 MARIJUANA SMOKER, CONTINUOUS: Status: ACTIVE | Noted: 2024-04-21

## 2024-04-21 PROBLEM — E43 SEVERE PROTEIN-CALORIE MALNUTRITION (HCC): Status: ACTIVE | Noted: 2024-04-21

## 2024-04-21 LAB
ALBUMIN SERPL BCP-MCNC: 3.1 G/DL (ref 3.5–5)
ALP SERPL-CCNC: 75 U/L (ref 34–104)
ALT SERPL W P-5'-P-CCNC: 41 U/L (ref 7–52)
ANION GAP SERPL CALCULATED.3IONS-SCNC: 8 MMOL/L (ref 4–13)
AST SERPL W P-5'-P-CCNC: 35 U/L (ref 13–39)
BACTERIA UR QL AUTO: NORMAL /HPF
BASOPHILS # BLD AUTO: 0.07 THOUSANDS/ÂΜL (ref 0–0.1)
BASOPHILS NFR BLD AUTO: 1 % (ref 0–1)
BILIRUB SERPL-MCNC: 0.95 MG/DL (ref 0.2–1)
BILIRUB UR QL STRIP: NEGATIVE
BUN SERPL-MCNC: 13 MG/DL (ref 5–25)
CALCIUM ALBUM COR SERPL-MCNC: 8.2 MG/DL (ref 8.3–10.1)
CALCIUM SERPL-MCNC: 7.5 MG/DL (ref 8.4–10.2)
CHLORIDE SERPL-SCNC: 102 MMOL/L (ref 96–108)
CLARITY UR: CLEAR
CO2 SERPL-SCNC: 30 MMOL/L (ref 21–32)
COLOR UR: NORMAL
CREAT SERPL-MCNC: 0.48 MG/DL (ref 0.6–1.3)
EOSINOPHIL # BLD AUTO: 0.1 THOUSAND/ÂΜL (ref 0–0.61)
EOSINOPHIL NFR BLD AUTO: 2 % (ref 0–6)
ERYTHROCYTE [DISTWIDTH] IN BLOOD BY AUTOMATED COUNT: 12.5 % (ref 11.6–15.1)
FOLATE SERPL-MCNC: >22.3 NG/ML
GFR SERPL CREATININE-BSD FRML MDRD: 114 ML/MIN/1.73SQ M
GLUCOSE SERPL-MCNC: 115 MG/DL (ref 65–140)
GLUCOSE UR STRIP-MCNC: NEGATIVE MG/DL
HCT VFR BLD AUTO: 30.9 % (ref 36.5–49.3)
HCT VFR BLD AUTO: 31.4 % (ref 36.5–49.3)
HCT VFR BLD AUTO: 33.3 % (ref 36.5–49.3)
HGB BLD-MCNC: 10.1 G/DL (ref 12–17)
HGB BLD-MCNC: 10.5 G/DL (ref 12–17)
HGB BLD-MCNC: 10.8 G/DL (ref 12–17)
HGB UR QL STRIP.AUTO: NEGATIVE
IMM GRANULOCYTES # BLD AUTO: 0.02 THOUSAND/UL (ref 0–0.2)
IMM GRANULOCYTES NFR BLD AUTO: 0 % (ref 0–2)
KETONES UR STRIP-MCNC: NEGATIVE MG/DL
LACTATE SERPL-SCNC: 2.3 MMOL/L (ref 0.5–2)
LACTATE SERPL-SCNC: 2.5 MMOL/L (ref 0.5–2)
LEUKOCYTE ESTERASE UR QL STRIP: NEGATIVE
LYMPHOCYTES # BLD AUTO: 1.6 THOUSANDS/ÂΜL (ref 0.6–4.47)
LYMPHOCYTES NFR BLD AUTO: 27 % (ref 14–44)
MAGNESIUM SERPL-MCNC: 2 MG/DL (ref 1.9–2.7)
MCH RBC QN AUTO: 35.2 PG (ref 26.8–34.3)
MCHC RBC AUTO-ENTMCNC: 32.4 G/DL (ref 31.4–37.4)
MCV RBC AUTO: 109 FL (ref 82–98)
MONOCYTES # BLD AUTO: 0.27 THOUSAND/ÂΜL (ref 0.17–1.22)
MONOCYTES NFR BLD AUTO: 5 % (ref 4–12)
NEUTROPHILS # BLD AUTO: 3.87 THOUSANDS/ÂΜL (ref 1.85–7.62)
NEUTS SEG NFR BLD AUTO: 65 % (ref 43–75)
NITRITE UR QL STRIP: NEGATIVE
NON-SQ EPI CELLS URNS QL MICRO: NORMAL /HPF
NRBC BLD AUTO-RTO: 0 /100 WBCS
PH UR STRIP.AUTO: 6.5 [PH]
PHOSPHATE SERPL-MCNC: 3.3 MG/DL (ref 2.3–4.1)
PLATELET # BLD AUTO: 129 THOUSANDS/UL (ref 149–390)
PMV BLD AUTO: 9.7 FL (ref 8.9–12.7)
POTASSIUM SERPL-SCNC: 3.5 MMOL/L (ref 3.5–5.3)
PROCALCITONIN SERPL-MCNC: 0.08 NG/ML
PROCALCITONIN SERPL-MCNC: 0.1 NG/ML
PROT SERPL-MCNC: 5.1 G/DL (ref 6.4–8.4)
PROT UR STRIP-MCNC: NEGATIVE MG/DL
RBC # BLD AUTO: 3.07 MILLION/UL (ref 3.88–5.62)
RBC #/AREA URNS AUTO: NORMAL /HPF
SODIUM SERPL-SCNC: 140 MMOL/L (ref 135–147)
SP GR UR STRIP.AUTO: 1.01 (ref 1–1.03)
T4 FREE SERPL-MCNC: 0.62 NG/DL (ref 0.61–1.12)
TSH SERPL DL<=0.05 MIU/L-ACNC: 8.21 UIU/ML (ref 0.45–4.5)
UROBILINOGEN UR STRIP-ACNC: <2 MG/DL
VIT B12 SERPL-MCNC: 648 PG/ML (ref 180–914)
WBC # BLD AUTO: 5.93 THOUSAND/UL (ref 4.31–10.16)
WBC #/AREA URNS AUTO: NORMAL /HPF

## 2024-04-21 PROCEDURE — 82746 ASSAY OF FOLIC ACID SERUM: CPT | Performed by: INTERNAL MEDICINE

## 2024-04-21 PROCEDURE — 84439 ASSAY OF FREE THYROXINE: CPT | Performed by: INTERNAL MEDICINE

## 2024-04-21 PROCEDURE — 84145 PROCALCITONIN (PCT): CPT

## 2024-04-21 PROCEDURE — 85018 HEMOGLOBIN: CPT | Performed by: INTERNAL MEDICINE

## 2024-04-21 PROCEDURE — 84100 ASSAY OF PHOSPHORUS: CPT | Performed by: INTERNAL MEDICINE

## 2024-04-21 PROCEDURE — 83605 ASSAY OF LACTIC ACID: CPT

## 2024-04-21 PROCEDURE — 43235 EGD DIAGNOSTIC BRUSH WASH: CPT | Performed by: INTERNAL MEDICINE

## 2024-04-21 PROCEDURE — 84443 ASSAY THYROID STIM HORMONE: CPT | Performed by: INTERNAL MEDICINE

## 2024-04-21 PROCEDURE — 81001 URINALYSIS AUTO W/SCOPE: CPT

## 2024-04-21 PROCEDURE — 85025 COMPLETE CBC W/AUTO DIFF WBC: CPT | Performed by: INTERNAL MEDICINE

## 2024-04-21 PROCEDURE — C9113 INJ PANTOPRAZOLE SODIUM, VIA: HCPCS | Performed by: INTERNAL MEDICINE

## 2024-04-21 PROCEDURE — 97167 OT EVAL HIGH COMPLEX 60 MIN: CPT

## 2024-04-21 PROCEDURE — 71045 X-RAY EXAM CHEST 1 VIEW: CPT

## 2024-04-21 PROCEDURE — 97163 PT EVAL HIGH COMPLEX 45 MIN: CPT

## 2024-04-21 PROCEDURE — 80053 COMPREHEN METABOLIC PANEL: CPT | Performed by: INTERNAL MEDICINE

## 2024-04-21 PROCEDURE — 99232 SBSQ HOSP IP/OBS MODERATE 35: CPT | Performed by: INTERNAL MEDICINE

## 2024-04-21 PROCEDURE — 83735 ASSAY OF MAGNESIUM: CPT | Performed by: INTERNAL MEDICINE

## 2024-04-21 PROCEDURE — 82607 VITAMIN B-12: CPT | Performed by: INTERNAL MEDICINE

## 2024-04-21 PROCEDURE — 0DJ08ZZ INSPECTION OF UPPER INTESTINAL TRACT, VIA NATURAL OR ARTIFICIAL OPENING ENDOSCOPIC: ICD-10-PCS | Performed by: INTERNAL MEDICINE

## 2024-04-21 PROCEDURE — 85014 HEMATOCRIT: CPT | Performed by: INTERNAL MEDICINE

## 2024-04-21 RX ORDER — PANTOPRAZOLE SODIUM 40 MG/10ML
40 INJECTION, POWDER, LYOPHILIZED, FOR SOLUTION INTRAVENOUS EVERY 12 HOURS SCHEDULED
Status: DISCONTINUED | OUTPATIENT
Start: 2024-04-21 | End: 2024-04-23 | Stop reason: HOSPADM

## 2024-04-21 RX ORDER — HYDROMORPHONE HCL IN WATER/PF 6 MG/30 ML
0.2 PATIENT CONTROLLED ANALGESIA SYRINGE INTRAVENOUS
Status: DISCONTINUED | OUTPATIENT
Start: 2024-04-21 | End: 2024-04-21 | Stop reason: HOSPADM

## 2024-04-21 RX ORDER — LIDOCAINE HYDROCHLORIDE 10 MG/ML
INJECTION, SOLUTION EPIDURAL; INFILTRATION; INTRACAUDAL; PERINEURAL AS NEEDED
Status: DISCONTINUED | OUTPATIENT
Start: 2024-04-21 | End: 2024-04-21

## 2024-04-21 RX ORDER — ONDANSETRON 2 MG/ML
INJECTION INTRAMUSCULAR; INTRAVENOUS AS NEEDED
Status: DISCONTINUED | OUTPATIENT
Start: 2024-04-21 | End: 2024-04-21

## 2024-04-21 RX ORDER — ALBUTEROL SULFATE 2.5 MG/3ML
2.5 SOLUTION RESPIRATORY (INHALATION) ONCE AS NEEDED
Status: DISCONTINUED | OUTPATIENT
Start: 2024-04-21 | End: 2024-04-21 | Stop reason: HOSPADM

## 2024-04-21 RX ORDER — PROPOFOL 10 MG/ML
INJECTION, EMULSION INTRAVENOUS AS NEEDED
Status: DISCONTINUED | OUTPATIENT
Start: 2024-04-21 | End: 2024-04-21

## 2024-04-21 RX ORDER — PROMETHAZINE HYDROCHLORIDE 25 MG/ML
12.5 INJECTION, SOLUTION INTRAMUSCULAR; INTRAVENOUS ONCE AS NEEDED
Status: DISCONTINUED | OUTPATIENT
Start: 2024-04-21 | End: 2024-04-21 | Stop reason: HOSPADM

## 2024-04-21 RX ORDER — ONDANSETRON 2 MG/ML
4 INJECTION INTRAMUSCULAR; INTRAVENOUS ONCE AS NEEDED
Status: DISCONTINUED | OUTPATIENT
Start: 2024-04-21 | End: 2024-04-21 | Stop reason: HOSPADM

## 2024-04-21 RX ORDER — METOCLOPRAMIDE HYDROCHLORIDE 5 MG/ML
10 INJECTION INTRAMUSCULAR; INTRAVENOUS ONCE AS NEEDED
Status: DISCONTINUED | OUTPATIENT
Start: 2024-04-21 | End: 2024-04-21 | Stop reason: HOSPADM

## 2024-04-21 RX ORDER — FENTANYL CITRATE/PF 50 MCG/ML
25 SYRINGE (ML) INJECTION
Status: DISCONTINUED | OUTPATIENT
Start: 2024-04-21 | End: 2024-04-21 | Stop reason: HOSPADM

## 2024-04-21 RX ORDER — ALBUTEROL SULFATE 90 UG/1
AEROSOL, METERED RESPIRATORY (INHALATION) AS NEEDED
Status: DISCONTINUED | OUTPATIENT
Start: 2024-04-21 | End: 2024-04-21

## 2024-04-21 RX ORDER — LORAZEPAM 1 MG/1
1 TABLET ORAL EVERY 8 HOURS
Status: DISCONTINUED | OUTPATIENT
Start: 2024-04-21 | End: 2024-04-22

## 2024-04-21 RX ORDER — SUCCINYLCHOLINE/SOD CL,ISO/PF 100 MG/5ML
SYRINGE (ML) INTRAVENOUS AS NEEDED
Status: DISCONTINUED | OUTPATIENT
Start: 2024-04-21 | End: 2024-04-21

## 2024-04-21 RX ORDER — HYDRALAZINE HYDROCHLORIDE 20 MG/ML
5 INJECTION INTRAMUSCULAR; INTRAVENOUS
Status: DISCONTINUED | OUTPATIENT
Start: 2024-04-21 | End: 2024-04-21 | Stop reason: HOSPADM

## 2024-04-21 RX ORDER — SODIUM CHLORIDE, SODIUM LACTATE, POTASSIUM CHLORIDE, CALCIUM CHLORIDE 600; 310; 30; 20 MG/100ML; MG/100ML; MG/100ML; MG/100ML
125 INJECTION, SOLUTION INTRAVENOUS CONTINUOUS
Status: DISCONTINUED | OUTPATIENT
Start: 2024-04-21 | End: 2024-04-22

## 2024-04-21 RX ORDER — HYDROMORPHONE HCL/PF 1 MG/ML
0.5 SYRINGE (ML) INJECTION
Status: DISCONTINUED | OUTPATIENT
Start: 2024-04-21 | End: 2024-04-21 | Stop reason: HOSPADM

## 2024-04-21 RX ORDER — SODIUM CHLORIDE, SODIUM GLUCONATE, SODIUM ACETATE, POTASSIUM CHLORIDE, MAGNESIUM CHLORIDE, SODIUM PHOSPHATE, DIBASIC, AND POTASSIUM PHOSPHATE .53; .5; .37; .037; .03; .012; .00082 G/100ML; G/100ML; G/100ML; G/100ML; G/100ML; G/100ML; G/100ML
1000 INJECTION, SOLUTION INTRAVENOUS ONCE
Status: COMPLETED | OUTPATIENT
Start: 2024-04-21 | End: 2024-04-21

## 2024-04-21 RX ORDER — SODIUM CHLORIDE, SODIUM GLUCONATE, SODIUM ACETATE, POTASSIUM CHLORIDE, MAGNESIUM CHLORIDE, SODIUM PHOSPHATE, DIBASIC, AND POTASSIUM PHOSPHATE .53; .5; .37; .037; .03; .012; .00082 G/100ML; G/100ML; G/100ML; G/100ML; G/100ML; G/100ML; G/100ML
500 INJECTION, SOLUTION INTRAVENOUS ONCE
Status: COMPLETED | OUTPATIENT
Start: 2024-04-21 | End: 2024-04-21

## 2024-04-21 RX ORDER — SODIUM CHLORIDE, SODIUM LACTATE, POTASSIUM CHLORIDE, CALCIUM CHLORIDE 600; 310; 30; 20 MG/100ML; MG/100ML; MG/100ML; MG/100ML
INJECTION, SOLUTION INTRAVENOUS CONTINUOUS PRN
Status: DISCONTINUED | OUTPATIENT
Start: 2024-04-21 | End: 2024-04-21

## 2024-04-21 RX ORDER — LABETALOL HYDROCHLORIDE 5 MG/ML
10 INJECTION, SOLUTION INTRAVENOUS
Status: DISCONTINUED | OUTPATIENT
Start: 2024-04-21 | End: 2024-04-21 | Stop reason: HOSPADM

## 2024-04-21 RX ADMIN — B-COMPLEX W/ C & FOLIC ACID TAB 1 TABLET: TAB at 08:42

## 2024-04-21 RX ADMIN — FOLIC ACID 1 MG: 1 TABLET ORAL at 08:42

## 2024-04-21 RX ADMIN — ALLOPURINOL 300 MG: 300 TABLET ORAL at 08:42

## 2024-04-21 RX ADMIN — FLUOXETINE HYDROCHLORIDE 40 MG: 20 CAPSULE ORAL at 08:42

## 2024-04-21 RX ADMIN — LIDOCAINE HYDROCHLORIDE 50 MG: 10 INJECTION, SOLUTION EPIDURAL; INFILTRATION; INTRACAUDAL; PERINEURAL at 11:06

## 2024-04-21 RX ADMIN — SODIUM CHLORIDE, SODIUM GLUCONATE, SODIUM ACETATE, POTASSIUM CHLORIDE, MAGNESIUM CHLORIDE, SODIUM PHOSPHATE, DIBASIC, AND POTASSIUM PHOSPHATE 125 ML/HR: .53; .5; .37; .037; .03; .012; .00082 INJECTION, SOLUTION INTRAVENOUS at 12:23

## 2024-04-21 RX ADMIN — SODIUM CHLORIDE, SODIUM LACTATE, POTASSIUM CHLORIDE, AND CALCIUM CHLORIDE: .6; .31; .03; .02 INJECTION, SOLUTION INTRAVENOUS at 10:58

## 2024-04-21 RX ADMIN — LEVOTHYROXINE SODIUM 75 MCG: 75 TABLET ORAL at 08:42

## 2024-04-21 RX ADMIN — THIAMINE HCL TAB 100 MG 100 MG: 100 TAB at 08:42

## 2024-04-21 RX ADMIN — LORAZEPAM 2 MG: 1 TABLET ORAL at 00:03

## 2024-04-21 RX ADMIN — LORAZEPAM 2 MG: 1 TABLET ORAL at 08:42

## 2024-04-21 RX ADMIN — METOPROLOL TARTRATE 25 MG: 25 TABLET, FILM COATED ORAL at 08:42

## 2024-04-21 RX ADMIN — PANTOPRAZOLE SODIUM 40 MG: 40 INJECTION, POWDER, FOR SOLUTION INTRAVENOUS at 21:34

## 2024-04-21 RX ADMIN — Medication 140 MG: at 11:06

## 2024-04-21 RX ADMIN — LORAZEPAM 1 MG: 1 TABLET ORAL at 16:21

## 2024-04-21 RX ADMIN — ALBUTEROL SULFATE 6 PUFF: 90 AEROSOL, METERED RESPIRATORY (INHALATION) at 11:11

## 2024-04-21 RX ADMIN — LORAZEPAM 1 MG: 1 TABLET ORAL at 23:32

## 2024-04-21 RX ADMIN — SODIUM CHLORIDE, SODIUM GLUCONATE, SODIUM ACETATE, POTASSIUM CHLORIDE, MAGNESIUM CHLORIDE, SODIUM PHOSPHATE, DIBASIC, AND POTASSIUM PHOSPHATE 500 ML: .53; .5; .37; .037; .03; .012; .00082 INJECTION, SOLUTION INTRAVENOUS at 01:24

## 2024-04-21 RX ADMIN — PANTOPRAZOLE SODIUM 40 MG: 40 INJECTION, POWDER, FOR SOLUTION INTRAVENOUS at 09:49

## 2024-04-21 RX ADMIN — SODIUM CHLORIDE, SODIUM GLUCONATE, SODIUM ACETATE, POTASSIUM CHLORIDE, MAGNESIUM CHLORIDE, SODIUM PHOSPHATE, DIBASIC, AND POTASSIUM PHOSPHATE 500 ML: .53; .5; .37; .037; .03; .012; .00082 INJECTION, SOLUTION INTRAVENOUS at 03:24

## 2024-04-21 RX ADMIN — PROPOFOL 200 MG: 10 INJECTION, EMULSION INTRAVENOUS at 11:06

## 2024-04-21 RX ADMIN — ONDANSETRON 4 MG: 2 INJECTION INTRAMUSCULAR; INTRAVENOUS at 11:14

## 2024-04-21 RX ADMIN — SODIUM CHLORIDE 8 MG/HR: 9 INJECTION, SOLUTION INTRAVENOUS at 04:55

## 2024-04-21 RX ADMIN — SODIUM CHLORIDE, SODIUM GLUCONATE, SODIUM ACETATE, POTASSIUM CHLORIDE, MAGNESIUM CHLORIDE, SODIUM PHOSPHATE, DIBASIC, AND POTASSIUM PHOSPHATE 1000 ML: .53; .5; .37; .037; .03; .012; .00082 INJECTION, SOLUTION INTRAVENOUS at 00:23

## 2024-04-21 NOTE — ASSESSMENT & PLAN NOTE
Active alcohol abuse  Reports previous withdrawal episodes involving tremors, diaphoresis, and anxiety   Continue CIWA protocol  Continue folic acid and thiamine with multivitamin  Continue with Ativan  Aggressive IV fluid for hydration  Monitor for DTs  Monitor electrolytes

## 2024-04-21 NOTE — ANESTHESIA POSTPROCEDURE EVALUATION
Post-Op Assessment Note    CV Status:  Stable  Pain Score: 0    Pain management: adequate       Mental Status:  Alert and awake   Hydration Status:  Euvolemic   PONV Controlled:  Controlled   Airway Patency:  Patent     Post Op Vitals Reviewed: Yes    No anethesia notable event occurred.    Staff: CRNA               BP   98/58   Temp      Pulse  75   Resp      SpO2   98 RA

## 2024-04-21 NOTE — ASSESSMENT & PLAN NOTE
Patient presented with coffee-ground emesis and black stool x 1 day  Active alcohol abuse with poor oral intake  Denied using NSAIDs  Hemoglobin of 14 with tachycardia  Evaluated by GI with the plan for EGD tomorrow  Continue IV fluid for hydration  Continue Protonix  Continue to monitor

## 2024-04-21 NOTE — PROGRESS NOTES
[facility]  Progress Note  Name: Sammy Mays I  MRN: [id number]  [facility] Date of Admission: 4/20/2024   Date of Service: 4/21/2024 I Hospital Day: 1    Assessment/Plan   * GI bleed  Assessment & Plan  Patient presented with coffee-ground emesis and black stool x 1 day  Active alcohol abuse with poor oral intake  Denied using NSAIDs  Hemoglobin of 14 with tachycardia  Evaluated by GI with the plan for EGD tomorrow  Continue IV fluid for hydration  Continue Protonix  Continue to monitor    Alcohol use disorder, moderate, dependence (HCC)  Assessment & Plan  Active alcohol abuse  Reports previous withdrawal episodes involving tremors, diaphoresis, and anxiety   Continue CIWA protocol  Continue folic acid and thiamine with multivitamin  Continue with Ativan  Aggressive IV fluid for hydration  Monitor for DTs  Monitor electrolytes    Acute blood loss anemia  Assessment & Plan  Secondary to GI bleed  Check iron study  Monitor    Transaminitis  Assessment & Plan  In the setting of alcohol abuse  Improving  GI is following  Monitor    Depression  Assessment & Plan  Continue Paxil    Hypothyroidism  Assessment & Plan  TSH 8.20, free T40.62  Elevated TSH probably patient was not taking levothyroxine  Continue levothyroxine  Repeat thyroid study in 4 weeks as an outpatient               VTE Pharmacologic Prophylaxis: VTE Score: 5 High Risk (Score >/= 5) - Pharmacological DVT Prophylaxis Contraindicated. Sequential Compression Devices Ordered.    Mobility:   Basic Mobility Inpatient Raw Score: 15  JH-HLM Goal: 4: Move to chair/commode  JH-HLM Achieved: 6: Walk 10 steps or more  JH-HLM Goal achieved. Continue to encourage appropriate mobility.    Patient Centered Rounds: I performed bedside rounds with nursing staff today.   Discussions with Specialists or Other Care Team Provider:     Education and Discussions with Family / Patient: Patient    Total Time Spent on Date of  Encounter in care of patient: 35 mins. This time was spent on one or more of the following: performing physical exam; counseling and coordination of care; obtaining or reviewing history; documenting in the medical record; reviewing/ordering tests, medications or procedures; communicating with other healthcare professionals and discussing with patient's family/caregivers.    Current Length of Stay: 1 day(s)  Current Patient Status: Inpatient   Certification Statement: The patient will continue to require additional inpatient hospital stay due to management of GI bleed  Discharge Plan: Anticipate discharge in 48-72 hrs to home.    Code Status: Level 1 - Full Code    Subjective:   Patient seen and examined  Comfortable in bed  Continues to have black stool  No abdominal pain  Made n.p.o. per GI for possible EGD    Objective:     Vitals:   Temp (24hrs), Av.8 °F (36.6 °C), Min:97.3 °F (36.3 °C), Max:98.1 °F (36.7 °C)    Temp:  [97.3 °F (36.3 °C)-98.1 °F (36.7 °C)] 97.8 °F (36.6 °C)  HR:  [] 87  Resp:  [12-20] 16  BP: ()/(43-90) 115/76  SpO2:  [93 %-98 %] 98 %  Body mass index is 27.27 kg/m².     Input and Output Summary (last 24 hours):     Intake/Output Summary (Last 24 hours) at 2024 0914  Last data filed at 2024 0454  Gross per 24 hour   Intake 4971.93 ml   Output 400 ml   Net 4571.93 ml       Physical Exam:   Physical Exam     Patient is awake alert oriented no acute distress  Comfortable in bed  Lungs clear to auscultation bilateral  Heart positive S1-S2 no murmur  Abdomen soft nontender  Lower extremities no edema    Additional Data:     Labs:  Results from last 7 days   Lab Units 24  0813 24  0634   WBC Thousand/uL  --  5.93   HEMOGLOBIN g/dL 10.1* 10.8*   HEMATOCRIT % 30.9* 33.3*   PLATELETS Thousands/uL  --  129*   SEGS PCT %  --  65   LYMPHO PCT %  --  27   MONO PCT %  --  5   EOS PCT %  --  2     Results from last 7 days   Lab Units 24  0634   SODIUM mmol/L 140    POTASSIUM mmol/L 3.5   CHLORIDE mmol/L 102   CO2 mmol/L 30   BUN mg/dL 13   CREATININE mg/dL 0.48*   ANION GAP mmol/L 8   CALCIUM mg/dL 7.5*   ALBUMIN g/dL 3.1*   TOTAL BILIRUBIN mg/dL 0.95   ALK PHOS U/L 75   ALT U/L 41   AST U/L 35   GLUCOSE RANDOM mg/dL 115     Results from last 7 days   Lab Units 04/20/24  0833   INR  1.02             Results from last 7 days   Lab Units 04/21/24  0634 04/21/24  0231 04/20/24  2323   LACTIC ACID mmol/L  --  2.5* 2.3*   PROCALCITONIN ng/ml 0.08  --  0.10       Lines/Drains:  Invasive Devices       Peripheral Intravenous Line  Duration             Peripheral IV 04/20/24 Dorsal (posterior);Proximal;Right Forearm 1 day    Peripheral IV 04/20/24 Left Antecubital 1 day                          Imaging: No pertinent imaging reviewed.    Recent Cultures (last 7 days):   Results from last 7 days   Lab Units 04/20/24  2323   BLOOD CULTURE  Received in Microbiology Lab. Culture in Progress.  Received in Microbiology Lab. Culture in Progress.       Last 24 Hours Medication List:   Current Facility-Administered Medications   Medication Dose Route Frequency Provider Last Rate    acetaminophen  650 mg Oral Q6H PRN Boston Nolasco, DO      albuterol  1 puff Inhalation Q4H PRN Boston Nolasco, DO      allopurinol  300 mg Oral Daily Boston Nolasco, DO      FLUoxetine  40 mg Oral Daily Boston Nolasco, DO      folic acid  1 mg Oral Daily Boston Nolasco, DO      levothyroxine  75 mcg Oral QAM Boston Nolasco, DO      LORazepam  1 mg Oral Q8H Boston Nolasco, DO      multi-electrolyte  125 mL/hr Intravenous Continuous Boston Nolasco  mL/hr (04/21/24 0425)    multivitamin stress formula  1 tablet Oral Daily Boston Nolasco, DO      ondansetron  4 mg Intravenous Q4H PRN Boston Nolasco, DO      pantoprazole  40 mg Intravenous Q12H [facility] Boston Nolasco, DO      pneumococcal 20-jacob conj vacc  0.5 mL Intramuscular Prior to discharge Boston Nolasco, DO      thiamine  100 mg Oral Daily Boston Nolasco,           Today, Patient  Was Seen By: Boston Nolasco DO    **Please Note: This note may have been constructed using a voice recognition system.**

## 2024-04-21 NOTE — PLAN OF CARE
Problem: PHYSICAL THERAPY ADULT  Goal: Performs mobility at highest level of function for planned discharge setting.  See evaluation for individualized goals.  Description: Treatment/Interventions: Functional transfer training, Therapeutic exercise, Endurance training, Gait training, Bed mobility, Equipment eval/education, Elevations  Equipment Recommended: Walker       See flowsheet documentation for full assessment, interventions and recommendations.  Outcome: Progressing  Note: Prognosis: Fair     Assessment: Pt is 64 y.o. male seen for PT evaluation s/p admit to [facility] on 4/20/2024 w/ GI bleed. PT consulted to assess pt's functional mobility and d/c needs. Order placed for PT eval and tx, w/ up and OOB as tolerated order. Pt agreeable to PT  session upon arrival, pt found supine in bed.  PTA, pt was independent w/ all functional mobility w/ no AD, ambulates community distances and elevations, has 3 RAMÓN, and lives alone in 1 level home .  Pt to benefit from continued PT tx to address deficits and maximize level of functional independent mobility and consistency. Upon conclusion pt  seated in recliner. Complexity: Comorbidities affecting pt's physical performance at time of assessment include: depression and anemia and alcohol abuse . Personal factors affecting pt at time of IE include: living alone, limited mobility, limited insight into impairments, depression, and alcohol abuse. Please find objective findings from PT assessment regarding body systems outlined above with impairments and limitations including impaired balance, decreased endurance, gait deviations, decreased functional mobility tolerance, decreased safety awareness, and fall risk.  Pt's clinical presentation is currently unstable/unpredictable seen in pt's presentation of abnormal H&H and pending procedures. The patient's AM-PAC Basic Mobility Inpatient Short Form Raw Score is 18.  Based on patient presentations and impairments, pt  would most appropriately benefit from Level 2  resource intensity upon discharge. Please also refer to the recommendation of the Physical Therapist for safe discharge planning. RN verbalized pt appropriate for PT session. Pt seen as a co-eval with OT due to the patient's co-morbidities, clinically unstable presentation, and present impairments which are a regression from the patient's baseline.  Barriers to Discharge: Decreased caregiver support, Inaccessible home environment     Rehab Resource Intensity Level, PT: II (Moderate Resource Intensity)    See flowsheet documentation for full assessment.

## 2024-04-21 NOTE — PLAN OF CARE
Problem: PAIN - ADULT  Goal: Verbalizes/displays adequate comfort level or baseline comfort level  Description: Interventions:  - Encourage patient to monitor pain and request assistance  - Assess pain using appropriate pain scale  - Administer analgesics based on type and severity of pain and evaluate response  - Implement non-pharmacological measures as appropriate and evaluate response  - Consider cultural and social influences on pain and pain management  - Notify physician/advanced practitioner if interventions unsuccessful or patient reports new pain  Outcome: Progressing     Problem: INFECTION - ADULT  Goal: Absence or prevention of progression during hospitalization  Description: INTERVENTIONS:  - Assess and monitor for signs and symptoms of infection  - Monitor lab/diagnostic results  - Monitor all insertion sites, i.e. indwelling lines, tubes, and drains  - Monitor endotracheal if appropriate and nasal secretions for changes in amount and color  - [place] appropriate cooling/warming therapies per order  - Administer medications as ordered  - Instruct and encourage patient and family to use good hand hygiene technique  - Identify and instruct in appropriate isolation precautions for identified infection/condition  Outcome: Progressing     Problem: SAFETY ADULT  Goal: Patient will remain free of falls  Description: INTERVENTIONS:  - Educate patient/family on patient safety including physical limitations  - Instruct patient to call for assistance with activity   - Consult OT/PT to assist with strengthening/mobility   - Keep Call bell within reach  - Keep bed low and locked with side rails adjusted as appropriate  - Keep care items and personal belongings within reach  - Initiate and maintain comfort rounds  - Make Fall Risk Sign visible to staff  - Apply yellow socks and bracelet for high fall risk patients  - Consider moving patient to room near nurses station  Outcome: Progressing  Goal: Maintain or  return to baseline ADL function  Description: INTERVENTIONS:  -  Assess patient's ability to carry out ADLs; assess patient's baseline for ADL function and identify physical deficits which impact ability to perform ADLs (bathing, care of mouth/teeth, toileting, grooming, dressing, etc.)  - Assess/evaluate cause of self-care deficits   - Assess range of motion  - Assess patient's mobility; develop plan if impaired  - Assess patient's need for assistive devices and provide as appropriate  - Encourage maximum independence but intervene and supervise when necessary  - Involve family in performance of ADLs  - Assess for home care needs following discharge   - Consider OT consult to assist with ADL evaluation and planning for discharge  - Provide patient education as appropriate  Outcome: Progressing  Goal: Maintains/Returns to pre admission functional level  Description: INTERVENTIONS:  - Perform AM-PAC 6 Click Basic Mobility/ Daily Activity assessment daily.  - Set and communicate daily mobility goal to care team and patient/family/caregiver.   - Collaborate with rehabilitation services on mobility goals if consulted  - Out of bed for toileting  - Record patient progress and toleration of activity level   Outcome: Progressing     Problem: DISCHARGE PLANNING  Goal: Discharge to home or other facility with appropriate resources  Description: INTERVENTIONS:  - Identify barriers to discharge w/patient and caregiver  - Arrange for needed discharge resources and transportation as appropriate  - Identify discharge learning needs (meds, wound care, etc.)  - Arrange for interpretive services to assist at discharge as needed  - Refer to Case Management Department for coordinating discharge planning if the patient needs post-hospital services based on physician/advanced practitioner order or complex needs related to functional status, cognitive ability, or social support system  Outcome: Progressing     Problem: Knowledge  Deficit  Goal: Patient/family/caregiver demonstrates understanding of disease process, treatment plan, medications, and discharge instructions  Description: Complete learning assessment and assess knowledge base.  Interventions:  - Provide teaching at level of understanding  - Provide teaching via preferred learning methods  Outcome: Progressing     Problem: Prexisting or High Potential for Compromised Skin Integrity  Goal: Skin integrity is maintained or improved  Description: INTERVENTIONS:  - Identify patients at risk for skin breakdown  - Assess and monitor skin integrity  - Assess and monitor nutrition and hydration status  - Monitor labs   - Assess for incontinence   - Turn and reposition patient  - Assist with mobility/ambulation  - Relieve pressure over bony prominences  - Avoid friction and shearing  - Provide appropriate hygiene as needed including keeping skin clean and dry  - Evaluate need for skin moisturizer/barrier cream  - Collaborate with interdisciplinary team   - Patient/family teaching  - Consider wound care consult   Outcome: Progressing

## 2024-04-21 NOTE — PHYSICAL THERAPY NOTE
PHYSICAL THERAPY EVALUATION  NAME:  Sammy Mays  DATE: 04/21/24    AGE:   64 y.o.  Mrn:   [id number]  ADMIT DX:  Abdominal pain [R10.9]  Gastrointestinal hemorrhage with hematemesis [K92.0]  Problem List:   Patient Active Problem List   Diagnosis    Hypertension    Hyperlipidemia    Impaired fasting glucose    Hypothyroidism    Habitual alcohol use    Gastroesophageal reflux disease without esophagitis    Transition of care performed with sharing of clinical summary    Alcohol-induced acute pancreatitis    Leukopenia    Platelets decreased (HCC)    Generalized anxiety disorder    GI bleed    Acute blood loss anemia    Alcohol use disorder, moderate, dependence (HCC)    SIRS (systemic inflammatory response syndrome)    Transaminitis    Hypernatremia    Obese    Hypomagnesemia    Lightheaded    Pancytopenia (HCC)    Hypocalcemia    Alcohol withdrawal syndrome with complication (HCC)    Depression    Diastasis recti    Hyperkalemia    Marijuana smoker, continuous    Severe protein-calorie malnutrition (HCC)       Past Medical History  Past Medical History:   Diagnosis Date    Medical history unknown        Past Surgical History  Past Surgical History:   Procedure Laterality Date    REPAIR / REINSERT BICEPS TENDON AT ELBOW      Last Assessed: 1/12/2016        Length Of Stay: 1  Performed at least 2 patient identifiers during session: Name and ID bracelet       04/21/24 0913   PT Last Visit   PT Visit Date 04/21/24   Note Type   Note type Evaluation   Pain Assessment   Pain Assessment Tool 0-10   Pain Score No Pain   Restrictions/Precautions   Weight Bearing Precautions Per Order No   Other Precautions Fall Risk;Chair Alarm;Bed Alarm;Multiple lines  (CIWA)   Home Living   Type of Home House   Home Layout One level;Stairs to enter with rails  (2-3 RAMÓN)   Bathroom Shower/Tub Tub/shower unit   Bathroom Toilet Standard   Bathroom Equipment Grab bars in shower   Home Equipment   (no AD used at baseline)   Prior Function    Level of [place] Independent with ADLs;Independent with functional mobility;Independent with IADLS   Lives With Alone   IADLs Independent with driving;Independent with meal prep;Independent with medication management   Falls in the last 6 months 0   General   Family/Caregiver Present No   Cognition   Overall Cognitive Status Impaired   Arousal/Participation Alert   Orientation Level Oriented X4   Memory Within functional limits   Following Commands Follows one step commands with increased time or repetition   Comments decreased motor planning and processing noted   RLE Assessment   RLE Assessment WFL   LLE Assessment   LLE Assessment WFL   Vision-Basic Assessment   Current Vision Wears glasses all the time   Coordination   Sensation WFL   Bed Mobility   Supine to Sit 4  Minimal assistance   Additional items Assist x 1;HOB elevated;Increased time required   Additional Comments pt reported dizziness with transitional movement that resolved with rest   Transfers   Sit to Stand 4  Minimal assistance   Additional items Assist x 1;Verbal cues;Increased time required   Stand to Sit 4  Minimal assistance   Additional items Assist x 1;Increased time required;Verbal cues   Additional Comments pt required cues for proper hand placement and generalized safety awareness   Ambulation/Elevation   Gait pattern Improper Weight shift;Decreased foot clearance  (generalized unsteady gait pattern)   Gait Assistance 4  Minimal assist   Additional items Assist x 1;Verbal cues   Assistive Device Rolling walker   Distance 120 ft   Ambulation/Elevation Additional Comments cues to keep feet inside RW and increase stride length   Balance   Static Sitting Good   Dynamic Sitting Fair +   Static Standing Fair   Dynamic Standing Fair -   Ambulatory Fair -   Endurance Deficit   Endurance Deficit Yes   Endurance Deficit Description reports of dizziness   Activity Tolerance   Activity Tolerance Patient limited by fatigue   Assessment    Prognosis Fair   Assessment Pt is 64 y.o. male seen for PT evaluation s/p admit to [facility] on 4/20/2024 w/ GI bleed. PT consulted to assess pt's functional mobility and d/c needs. Order placed for PT eval and tx, w/ up and OOB as tolerated order. Pt agreeable to PT  session upon arrival, pt found supine in bed.  PTA, pt was independent w/ all functional mobility w/ no AD, ambulates community distances and elevations, has 3 RAMÓN, and lives alone in 1 level home .  Pt to benefit from continued PT tx to address deficits and maximize level of functional independent mobility and consistency. Upon conclusion pt  seated in recliner. Complexity: Comorbidities affecting pt's physical performance at time of assessment include: depression and anemia and alcohol abuse . Personal factors affecting pt at time of IE include: living alone, limited mobility, limited insight into impairments, depression, and alcohol abuse. Please find objective findings from PT assessment regarding body systems outlined above with impairments and limitations including impaired balance, decreased endurance, gait deviations, decreased functional mobility tolerance, decreased safety awareness, and fall risk.  Pt's clinical presentation is currently unstable/unpredictable seen in pt's presentation of abnormal H&H and pending procedures. The patient's AM-PAC Basic Mobility Inpatient Short Form Raw Score is 18.  Based on patient presentations and impairments, pt would most appropriately benefit from Level 2  resource intensity upon discharge. Please also refer to the recommendation of the Physical Therapist for safe discharge planning. RN verbalized pt appropriate for PT session. Pt seen as a co-eval with OT due to the patient's co-morbidities, clinically unstable presentation, and present impairments which are a regression from the patient's baseline.   Barriers to Discharge Decreased caregiver support;Inaccessible home environment   Goals    Patient Goals to get better   LTG Expiration Date 05/01/24   Long Term Goal #1 Pt will: Perform bed mobility tasks to modified I to improve ease of bed mobility. Perform transfers to modified I to improve ease of transfers. Perform ambulation with MI and LRAD for 250 feet to increase Indep in home environment. Increase dynamic standing balance to F+ to decrease fall risk. Increase OOB activity tolerance to 10 minutes without s/s of exertion to decrease fall risk. Navigate up and down 3 steps with MI so patient can enter and exit home.   Plan   Treatment/Interventions Functional transfer training;Therapeutic exercise;Endurance training;Gait training;Bed mobility;Equipment eval/education;Elevations   PT Frequency 3-5x/wk   Discharge Recommendation   Rehab Resource Intensity Level, PT II (Moderate Resource Intensity)   Equipment Recommended Walker   Walker Package Recommended Wheeled walker   AM-PAC Basic Mobility Inpatient   Turning in Flat Bed Without Bedrails 4   Lying on Back to Sitting on Edge of Flat Bed Without Bedrails 3   Moving Bed to Chair 3   Standing Up From Chair Using Arms 3   Walk in Room 3   Climb 3-5 Stairs With Railing 2   Basic Mobility Inpatient Raw Score 18   Basic Mobility Standardized Score 41.05   [facility] Highest Level Of Mobility   JH-HLM Goal 6: Walk 10 steps or more   JH-HLM Achieved 7: Walk 25 feet or more       Time In: 0853  Time Out: 0913  Total Evaluation Minutes: 20    Carrie Guidry, PT

## 2024-04-21 NOTE — PLAN OF CARE
Problem: OCCUPATIONAL THERAPY ADULT  Goal: Performs self-care activities at highest level of function for planned discharge setting.  See evaluation for individualized goals.  Description: Treatment Interventions: ADL retraining, Functional transfer training, UE strengthening/ROM, Endurance training, Equipment evaluation/education, Patient/family training, Cognitive reorientation, Compensatory technique education, Energy conservation, Activityengagement, Continued evaluation          See flowsheet documentation for full assessment, interventions and recommendations.   Note: Limitation: Decreased ADL status, Decreased Safe judgement during ADL, Decreased cognition, Decreased endurance, Decreased self-care trans, Decreased high-level ADLs  Prognosis: Fair, Good  Assessment: Pt is a 64 y.o. male who was admitted to [facility] on 4/20/2024 with black stool GI bleed +EGD, alcohol disorder, depression . Patient  has a past medical history of Medical history unknown.  At baseline pt was completing I  with ADL's/IaDL's, no AD with functional mobility +drives. Pt lives alone in a ranch style home with Yamilet. Currently pt requires mod a   for overall ADLS and min a with RW for functional mobility/transfers. Pt currently presents with impairments in the following categories -steps to enter environment, limited home support, difficulty performing ADLS, difficulty performing IADLS , limited insight into deficits, and health management  activity tolerance, endurance, standing balance/tolerance, sitting balance/tolerance, memory, insight, safety , judgement , and attention . These impairments, as well as pt's fatigue, decreased caregiver support, and risk for falls  limit pt's ability to safely engage in all baseline areas of occupation, includingbathing, dressing, toileting, functional mobility/transfers, community mobility, laundry , driving, house maintenance, medication management, meal prep, cleaning, social  participation , and leisure activities  From OT standpoint, recommend mod level II upon D/C. The patient's raw score on the AM-PAC Daily Activity Inpatient Short Form is 16. A raw score of less than 19 suggests the patient may benefit from discharge to post-acute rehabilitation services. Please refer to the recommendation of the Occupational Therapist for safe discharge planning.  OT will continue to follow to address the below stated goals.     Rehab Resource Intensity Level, OT: II (Moderate Resource Intensity)

## 2024-04-21 NOTE — PROGRESS NOTES
[facility] - Progress Note  Sammy Mays 64 y.o. male MRN: [id number]  Unit/Bed#: [facility] 623-01 Encounter: [id number]      ASSESSMENT & PLAN:    64 y.o. old male with PMH of alcohol use disorder, CBD stricture, hx of pancreatitis presenting with melena and hematemesis, with GI consulted for GIB.     Melena, Hematemesis  Reported hematemesis x1 2 days ago. Ongoing melena yesterday and today. Hg downtrending though likely has dilutional component. Has required aggressive fluid resuscitation as well  Tachycardia resolved  Trend Hg q8 hrs, maintain 2 large bore IVs, maintain active T&S, transfuse for Hg < 7.0   Pantoprazole 40 mg IV BID   NPO for EGD today   ______________________________________________________________________    SUBJECTIVE:     Pt reports no additional BMs since yesterday, no abdominal pain this morning. Per pt's RN has had 3 additional melenic stools overnight. Has received 3 L IVF bolus since yesterday, additional 4 L continuous. On ppi gtt. Hg 10.1 this morning, all cell lines decreased (WBC 9.49 -> 5.93, platelets 188 -> 129).     Scheduled Meds:  Current Facility-Administered Medications   Medication Dose Route Frequency Provider Last Rate    acetaminophen  650 mg Oral Q6H PRN [place] Nolasco, DO      albuterol  1 puff Inhalation Q4H PRN [place] Nolasco, DO      allopurinol  300 mg Oral Daily [place] Nolasco, DO      FLUoxetine  40 mg Oral Daily [place] Nolasco, DO      folic acid  1 mg Oral Daily [place] Nolasco, DO      levothyroxine  75 mcg Oral QAM Boston Nolasco, DO      LORazepam  1 mg Oral Q8H Boston Nolasco, DO      multi-electrolyte  125 mL/hr Intravenous Continuous Boston Trujillod,  mL/hr (04/21/24 3335)    multivitamin stress formula  1 tablet Oral Daily Boston Nolasco, DO      ondansetron  4 mg Intravenous Q4H PRN Boston Nolasco, DO      pantoprazole  40 mg Intravenous Q12H CHERYL Boston Nolasco, DO      pneumococcal 20-jacob conj vacc  0.5 mL Intramuscular Prior to discharge Boston  "DO Gaurav      thiamine  100 mg Oral Daily Boston Nolasco DO       Continuous Infusions:multi-electrolyte, 125 mL/hr, Last Rate: 125 mL/hr (04/21/24 0425)      PRN Meds:.  acetaminophen    albuterol    ondansetron    pneumococcal 20-jacob conj vacc    OBJECTIVE:     Objective   Blood pressure 115/76, pulse 87, temperature 97.8 °F (36.6 °C), resp. rate 16, height 5' 10\" (1.778 m), weight 86.2 kg (190 lb 0.6 oz), SpO2 98%. Body mass index is 27.27 kg/m².    Intake/Output Summary (Last 24 hours) at 4/21/2024 0940  Last data filed at 4/21/2024 0454  Gross per 24 hour   Intake 4971.93 ml   Output 400 ml   Net 4571.93 ml       PHYSICAL EXAM:   General Appearance: Awake and alert, in no acute distress  Abdomen: Soft, non-tender, non-distended; no masses or no organomegaly    Invasive Devices       Peripheral Intravenous Line  Duration             Peripheral IV 04/20/24 Dorsal (posterior);Proximal;Right Forearm 1 day    Peripheral IV 04/20/24 Left Antecubital 1 day                    LAB RESULTS:      Lab Units 04/21/24  0634 04/20/24  0833 10/18/23  1633 10/18/23  0520 10/17/23  1218   SODIUM mmol/L 140 136 137 139 138   POTASSIUM mmol/L 3.5 3.4* 3.7 2.9* 3.7   CHLORIDE mmol/L 102 95* 103 103 100   CO2 mmol/L 30 25 24 26 27   BUN mg/dL 13 25 4* 5 7   CREATININE mg/dL 0.48* 0.77 0.59* 0.51* 0.52*   GLUCOSE RANDOM mg/dL 115 224* 101 133 132   CALCIUM mg/dL 7.5* 9.3 7.9* 7.5* 8.4   MAGNESIUM mg/dL 2.0 1.2* 1.9 1.6* 1.3*   PHOSPHORUS mg/dL 3.3  --   --   --  4.0            Lab Units 04/21/24  0634 04/20/24  0833 10/18/23  0520 10/17/23  1218 09/18/23  1015   TOTAL PROTEIN g/dL 5.1* 7.1 5.4* 6.2* 5.9*   ALBUMIN g/dL 3.1* 4.2 3.4* 3.7 3.5   TOTAL BILIRUBIN mg/dL 0.95 1.47* 1.38* 1.22* 1.5*   AST U/L 35 56* 47* 81* 82*   ALT U/L 41 69* 34 45 175*   ALK PHOS U/L 75 111* 75 102 126*           Lab Units 04/21/24  0813 04/21/24  0634 04/20/24  2323 04/20/24  1619 04/20/24  0833 10/18/23  0520 10/17/23  2117 10/17/23  1218   WBC " Thousand/uL  --  5.93 9.49  --  14.70* 2.94*  --  5.14   HEMOGLOBIN g/dL 10.1* 10.8* 11.7* 12.1 14.6 13.6  --  15.2   HEMATOCRIT % 30.9* 33.3* 35.2* 36.1* 42.9 40.7  --  42.9   PLATELETS Thousands/uL  --  129* 188  --  278 94* 114* 125*   MCV fL  --  109* 105*  --  104* 101*  --  98       Lab Results   Component Value Date    IRON 15 (L) 09/08/2022    TIBC 500 (H) 09/08/2022    FERRITIN 8 (L) 09/08/2022       Lab Results   Component Value Date    INR 1.02 04/20/2024    INR 1.10 07/09/2023    INR 1.4 05/28/2021    PROTIME 13.3 04/20/2024    PROTIME 14.4 07/09/2023       RADIOLOGY RESULTS:   Procedure: XR chest portable    Result Date: 4/21/2024  Narrative: XR CHEST PORTABLE INDICATION: SIRS. COMPARISON: Abdomen CT 10/17/2023. FINDINGS: Clear lungs. No pneumothorax or pleural effusion. Normal cardiomediastinal silhouette. Bones are unremarkable for age. Normal upper abdomen. Stable mild elevation of the right diaphragm.     Impression: No acute cardiopulmonary disease. Workstation performed: [id number]     Narrative/Impressions - 3 day look back     GI RESULTS:   07/09/23    Colonoscopy    Impression:  Extensive diverticulosis of moderate severity containing stool and causing  mild luminal narrowing  Polyp in the cecum and transverse colon  No source of GI bleeding noted.  No evidence of active GI bleeding noted.    RECOMMENDATION:  Repeat colonoscopy in 6 months  Personal history of colon polyps    Continue to monitor H/H, transfuse for Hgb < 7 and for clinical evidence  of ongoing/recurrent GI bleeding.    Filipe Romo MD      EGD    Impression:  Grade A esophagitis in the GE junction  Mild ulcerated mucosa in the antrum; performed cold forceps biopsy  The duodenal bulb, 1st part of the duodenum and 2nd part of the duodenum  appeared normal.    RECOMMENDATION:  Await pathology results  PPI daily  Colonoscopy tomorrow  Clear liquid diet  NPO at midnight, bowel prep this evening    MD Gerald Ingram,  M.D.  PGY-5 Gastroenterology Fellow  [facility]  Available on [email or web address]

## 2024-04-21 NOTE — ANESTHESIA PREPROCEDURE EVALUATION
Procedure:  EGD    Relevant Problems   ANESTHESIA (within normal limits)      CARDIO   (+) Hyperlipidemia   (+) Hypertension      ENDO   (+) Hypothyroidism      GI/HEPATIC   (+) Alcohol-induced acute pancreatitis   (+) GI bleed   (+) Gastroesophageal reflux disease without esophagitis      HEMATOLOGY   (+) Acute blood loss anemia   (+) Pancytopenia (HCC)      MUSCULOSKELETAL   (+) Diastasis recti      NEURO/PSYCH   (+) Depression   (+) Generalized anxiety disorder      Behavioral Health   (+) Marijuana smoker, continuous      Other   (+) Alcohol use disorder, moderate, dependence (HCC)   (+) Transaminitis        Physical Exam    Airway    Mallampati score: II  TM Distance: >3 FB  Neck ROM: full     Dental        Cardiovascular  Rhythm: regular, Rate: normal, Cardiovascular exam normal    Pulmonary  Pulmonary exam normal Breath sounds clear to auscultation    Other Findings        Anesthesia Plan  ASA Score- 3 Emergent    Anesthesia Type- general with ASA Monitors.         Additional Monitors:     Airway Plan: ETT.           Plan Factors-Exercise tolerance (METS): >4 METS.    Chart reviewed. EKG reviewed.  Existing labs reviewed. Patient summary reviewed.    Patient is a current smoker.  Patient instructed to abstain from smoking on day of procedure. Patient did not smoke on day of surgery.            Induction- intravenous and rapid sequence induction.    Postoperative Plan- Plan for postoperative opioid use. Planned trial extubation    Informed Consent- Anesthetic plan and risks discussed with patient.  I personally reviewed this patient with the CRNA. Discussed and agreed on the Anesthesia Plan with the CRNA..

## 2024-04-21 NOTE — ASSESSMENT & PLAN NOTE
TSH 8.20, free T40.62  Elevated TSH probably patient was not taking levothyroxine  Continue levothyroxine  Repeat thyroid study in 4 weeks as an outpatient

## 2024-04-21 NOTE — OCCUPATIONAL THERAPY NOTE
Occupational Therapy Evaluation     Patient Name: Sammy Mays  Today's Date: 4/21/2024  Problem List  Principal Problem:    GI bleed  Active Problems:    Hypothyroidism    Acute blood loss anemia    Alcohol use disorder, moderate, dependence (HCC)    Transaminitis    Depression    Marijuana smoker, continuous    Severe protein-calorie malnutrition (HCC)    Past Medical History  Past Medical History:   Diagnosis Date    Medical history unknown      Past Surgical History  Past Surgical History:   Procedure Laterality Date    REPAIR / REINSERT BICEPS TENDON AT ELBOW      Last Assessed: 1/12/2016 04/21/24 0852   OT Last Visit   OT Visit Date 04/21/24   Note Type   Note type Evaluation   Pain Assessment   Pain Assessment Tool 0-10   Pain Score No Pain   Restrictions/Precautions   Weight Bearing Precautions Per Order No   Other Precautions Cognitive;Chair Alarm;Bed Alarm;Telemetry;Fall Risk   Home Living   Type of Home House   Home Layout One level;Stairs to enter with rails   Bathroom Toilet Standard   Bathroom Equipment Grab bars in shower   Prior Function   Level of [place] Independent with ADLs   Lives With Alone   Receives Help From   (limited social support)   IADLs Independent with driving;Independent with meal prep;Independent with medication management   Falls in the last 6 months 0   Lifestyle   Autonomy I with ADL's/IaDL's, no AD with functional mobility +drives   Reciprocal Relationships limited social support    Service to Others retired   Intrinsic Gratification visiting SO in [facility]   General   Family/Caregiver Present No   ADL   Eating Assistance 7  Independent   Grooming Assistance 7  Independent   UB Bathing Assistance 5  Supervision/Setup   LB Bathing Assistance 3  Moderate Assistance   UB Dressing Assistance 5  Supervision/Setup   LB Dressing Assistance 2  Maximal Assistance   LB Dressing Deficit Don/doff R sock;Don/doff L sock   Bed Mobility   Supine to Sit 4  Minimal assistance    Additional items Assist x 1;Bedrails   Transfers   Sit to Stand 4  Minimal assistance   Additional items Assist x 1   Stand to Sit 4  Minimal assistance   Additional items Assist x 1   Additional Comments +RW with encouragement to trial   Functional Mobility   Functional Mobility 4  Minimal assistance   Additional Comments min a x 1 iwth RW and increaesd time short distance functinal mobiltiy into hallway.   Additional items Rolling walker   Balance   Static Sitting Good   Dynamic Sitting Fair +   Static Standing Fair   Dynamic Standing Fair -   Ambulatory Poor +   Activity Tolerance   Activity Tolerance Patient limited by fatigue   Medical Staff Made Aware PT lawrence due to the patient's co-morbidities, clinically unstable presentation, and present impairments which are a regression from the patient's baseline.    Nurse Made Aware RN   RUE Assessment   RUE Assessment WFL   LUE Assessment   LUE Assessment WFL   Hand Function   Gross Motor Coordination Functional   Fine Motor Coordination Functional   Vision-Basic Assessment   Current Vision Wears glasses all the time   Cognition   Overall Cognitive Status (S)  Impaired   Arousal/Participation Alert;Responsive;Cooperative   Attention Attends with cues to redirect   Orientation Level Oriented X4   Memory Decreased recall of precautions   Following Commands Follows one step commands with increased time or repetition   Comments pt motivated for therapy, slow processing and motor planning, fair historian, continue to assess, lives alone, drives   Assessment   Limitation Decreased ADL status;Decreased Safe judgement during ADL;Decreased cognition;Decreased endurance;Decreased self-care trans;Decreased high-level ADLs   Prognosis Fair;Good   Assessment Pt is a 64 y.o. male who was admitted to [facility] on 4/20/2024 with black stool GI bleed +EGD, alcohol disorder, depression, history of alcohol use . Patient  has a past medical history of Medical history  unknown.  At baseline pt was completing I  with ADL's/IaDL's, no AD with functional mobility +drives. Pt lives alone in a ranch style home with Yamilet. Currently pt requires mod a   for overall ADLS and min a with RW for functional mobility/transfers. Pt currently presents with impairments in the following categories -steps to enter environment, limited home support, difficulty performing ADLS, difficulty performing IADLS , limited insight into deficits, and health management  activity tolerance, endurance, standing balance/tolerance, sitting balance/tolerance, memory, insight, safety , judgement , and attention . These impairments, as well as pt's fatigue, decreased caregiver support, and risk for falls  limit pt's ability to safely engage in all baseline areas of occupation, includingbathing, dressing, toileting, functional mobility/transfers, community mobility, laundry , driving, house maintenance, medication management, meal prep, cleaning, social participation , and leisure activities  From OT standpoint, recommend mod level II upon D/C. The patient's raw score on the AM-PAC Daily Activity Inpatient Short Form is 16. A raw score of less than 19 suggests the patient may benefit from discharge to post-acute rehabilitation services. Please refer to the recommendation of the Occupational Therapist for safe discharge planning.  OT will continue to follow to address the below stated goals.   Goals   Patient Goals get better   LTG Time Frame 10-14   Long Term Goal #1 see goals below   Plan   Treatment Interventions ADL retraining;Functional transfer training;UE strengthening/ROM;Endurance training;Equipment evaluation/education;Patient/family training;Cognitive reorientation;Compensatory technique education;Energy conservation;Activityengagement;Continued evaluation   Goal Expiration Date 05/05/24   OT Frequency 2-3x/wk   Discharge Recommendation   Rehab Resource Intensity Level, OT II (Moderate Resource Intensity)    AM-PAC Daily Activity Inpatient   Lower Body Dressing 2   Bathing 2   Toileting 3   Upper Body Dressing 3   Grooming 3   Eating 3   Daily Activity Raw Score 16   Daily Activity Standardized Score (Calc for Raw Score >=11) 35.96   AM-PAC Applied Cognition Inpatient   Following a Speech/Presentation 3   Understanding Ordinary Conversation 4   Taking Medications 3   Remembering Where Things Are Placed or Put Away 3   Remembering List of 4-5 Errands 2   Taking Care of Complicated Tasks 2   Applied Cognition Raw Score 17   Applied Cognition Standardized Score 36.52   End of Consult   Patient Position at End of Consult All needs within reach;Bed/Chair alarm activated;Bedside chair   Nurse Communication Nurse aware of consult       Occupational Therapy Goals:    *Mod I with bed mobility to engage in functional tasks.  *Mod I Adl's after setup with use of AE PRN  *Mod I toileting and clothing management   *Mod I functional mobility and transfers to/from all surfaces with Fair + dynamic balance and safety for participation in dynamic adls and iadl tasks   *Demonstrate good carryover with safe use of RW during functional tasks   *Assess DME needs   *Increase activity tolerance to 25-30 minutes for participation in adls and enjoyable activities  *Pt to participate in further cognitive testing with good attention and participation to assist with safe d/c recommendations  *Mod I with Simulated IADL management task.  *Demonstrate good carryover of pt/family education and training with good tolerance for increased safety and independence with ADL's/ADl's.  *Pt will improve standing balance to 4-5 minutes with functional tasks to increase I with toileting/transfers.  *Patient will demonstrate 100% carryover of energy conservation techniques t/o functional I/ADL/leisure tasks w/o cues s/p skilled education to increase endurance during functional tasks  *Pt will follow 100% simple one step verbal commands and be A/Ox4 consistently  t/o use of external environmental cues w/ mod I    Janine Membreno OTR/L

## 2024-04-21 NOTE — CASE MANAGEMENT
Case Management Assessment & Discharge Planning Note    Patient name Sammy Mays  Location [facility]23/[facility] 623-01 MRN [id number]  : 1960 Date 2024       Current Admission Date: 2024  Current Admission Diagnosis:GI bleed   Patient Active Problem List    Diagnosis Date Noted    Marijuana smoker, continuous 2024    Hyperkalemia 10/18/2023    Alcohol withdrawal syndrome with complication (HCC) 10/17/2023    Depression 10/17/2023    Diastasis recti 10/17/2023    Hypocalcemia 2023    Pancytopenia (HCC) 2023    Obese 2023    Hypomagnesemia 2023    Lightheaded 2023    Hypernatremia 07/10/2023    GI bleed 2023    Acute blood loss anemia 2023    Alcohol use disorder, moderate, dependence (HCC) 2023    SIRS (systemic inflammatory response syndrome) 2023    Transaminitis 2023    Transition of care performed with sharing of clinical summary 2018    Alcohol-induced acute pancreatitis 2018    Leukopenia 2018    Platelets decreased (HCC) 2018    Generalized anxiety disorder 2018    Hypertension 2018    Hyperlipidemia 2018    Impaired fasting glucose 2018    Hypothyroidism 2018    Habitual alcohol use 2018    Gastroesophageal reflux disease without esophagitis 2018      LOS (days): 1  Geometric Mean LOS (GMLOS) (days):   Days to GMLOS:     OBJECTIVE:    Risk of Unplanned Readmission Score: 24.14         Current admission status: Inpatient       Preferred Pharmacy:   [id number] - [place] - [address] [facility] [address].  [facility] [facility] [address].  [place] [address]  Phone: [phone] Fax: [phone]    Primary Care Provider: Ankur Bustillo DO    Primary Insurance: MEDICARE  Secondary Insurance: AARP    ASSESSMENT:  Active Health Care Proxies       [facility] Representative - Friend   Primary Phone: [phone] (Mobile)                 Patient  Information  Admitted from:: Home  Mental Status: Alert  During Assessment patient was accompanied by: Not accompanied during assessment  Assessment information provided by:: Patient  Primary Caregiver: Self  Support Systems: Self, Friend  County of Residence: [place]  What city do you live in?: [place]  Home entry access options. Select all that apply.: Stairs  Number of steps to enter home.: 4  Type of Current Residence: [facility]  Living Arrangements: Lives Alone  Is patient a ?: No    Activities of Daily Living Prior to Admission  Functional Status: Independent  Completes ADLs independently?: Yes  Ambulates independently?: Yes  Does patient have a history of Outpatient Therapy (PT/OT)?: No  Does the patient have a history of Short-Term Rehab?: No  Does patient have a history of HHC?: No  Does patient currently have HHC?: No    Patient Information Continued  Income Source: Pension/alf  Does patient have prescription coverage?: Yes  Does patient receive dialysis treatments?: No  Does patient have a history of substance abuse?: No  Does patient have a history of Mental Health Diagnosis?: No      Means of Transportation  Means of Transport to Appts:: Drives Self      Social Determinants of Health (SDOH)      Flowsheet Row Most Recent Value   Housing Stability    In the last 12 months, was there a time when you were not able to pay the mortgage or rent on time? N   In the last 12 months, how many places have you lived? 1   In the last 12 months, was there a time when you did not have a steady place to sleep or slept in a shelter (including now)? N   Transportation Needs    In the past 12 months, has lack of transportation kept you from medical appointments or from getting medications? no   In the past 12 months, has lack of transportation kept you from meetings, work, or from getting things needed for daily living? No   Food Insecurity    Within the past 12 months, you worried that your food would run out  before you got the money to buy more. Never true   Within the past 12 months, the food you bought just didn't last and you didn't have money to get more. Never true   Utilities    In the past 12 months has the electric, gas, oil, or water company threatened to shut off services in your home? No            DISCHARGE DETAILS:    Discharge planning discussed with:: Patient  Freedom of Choice: Yes  Comments - Freedom of Choice: Discussed FOC  CM contacted family/caregiver?: No- see comments (Declined)    CM reviewed d/c planning process including the following: identifying help at home, patient preference for d/c planning needs, Discharge Lounge, [facility] Meds to Bed program, availability of treatment team to discuss questions or concerns patient and/or family may have regarding understanding medications and recognizing signs and symptoms once discharged.  CM also encouraged patient to follow up with all recommended appointments after discharge. Patient advised of importance for patient and family to participate in managing patient’s medical well being.    Information obtained from patient, was IADLS prior to admission, denies any ETOH or drug abuse.  Likely home, no needs

## 2024-04-22 LAB
ANION GAP SERPL CALCULATED.3IONS-SCNC: 7 MMOL/L (ref 4–13)
BASOPHILS # BLD AUTO: 0.03 THOUSANDS/ÂΜL (ref 0–0.1)
BASOPHILS NFR BLD AUTO: 1 % (ref 0–1)
BUN SERPL-MCNC: 5 MG/DL (ref 5–25)
CALCIUM SERPL-MCNC: 7.4 MG/DL (ref 8.4–10.2)
CHLORIDE SERPL-SCNC: 103 MMOL/L (ref 96–108)
CO2 SERPL-SCNC: 29 MMOL/L (ref 21–32)
CREAT SERPL-MCNC: 0.4 MG/DL (ref 0.6–1.3)
DME PARACHUTE DELIVERY DATE REQUESTED: NORMAL
DME PARACHUTE ITEM DESCRIPTION: NORMAL
DME PARACHUTE ORDER STATUS: NORMAL
DME PARACHUTE SUPPLIER NAME: NORMAL
DME PARACHUTE SUPPLIER PHONE: NORMAL
EOSINOPHIL # BLD AUTO: 0.09 THOUSAND/ÂΜL (ref 0–0.61)
EOSINOPHIL NFR BLD AUTO: 2 % (ref 0–6)
ERYTHROCYTE [DISTWIDTH] IN BLOOD BY AUTOMATED COUNT: 12.3 % (ref 11.6–15.1)
FERRITIN SERPL-MCNC: 349 NG/ML (ref 24–336)
GFR SERPL CREATININE-BSD FRML MDRD: 123 ML/MIN/1.73SQ M
GLUCOSE SERPL-MCNC: 119 MG/DL (ref 65–140)
HCT VFR BLD AUTO: 29.6 % (ref 36.5–49.3)
HCT VFR BLD AUTO: 30.7 % (ref 36.5–49.3)
HGB BLD-MCNC: 10 G/DL (ref 12–17)
HGB BLD-MCNC: 9.9 G/DL (ref 12–17)
IMM GRANULOCYTES # BLD AUTO: 0.01 THOUSAND/UL (ref 0–0.2)
IMM GRANULOCYTES NFR BLD AUTO: 0 % (ref 0–2)
IRON SATN MFR SERPL: 29 % (ref 15–50)
IRON SERPL-MCNC: 60 UG/DL (ref 50–212)
LYMPHOCYTES # BLD AUTO: 1.05 THOUSANDS/ÂΜL (ref 0.6–4.47)
LYMPHOCYTES NFR BLD AUTO: 28 % (ref 14–44)
MAGNESIUM SERPL-MCNC: 1.7 MG/DL (ref 1.9–2.7)
MCH RBC QN AUTO: 35.7 PG (ref 26.8–34.3)
MCHC RBC AUTO-ENTMCNC: 33.4 G/DL (ref 31.4–37.4)
MCV RBC AUTO: 107 FL (ref 82–98)
MONOCYTES # BLD AUTO: 0.16 THOUSAND/ÂΜL (ref 0.17–1.22)
MONOCYTES NFR BLD AUTO: 4 % (ref 4–12)
NEUTROPHILS # BLD AUTO: 2.44 THOUSANDS/ÂΜL (ref 1.85–7.62)
NEUTS SEG NFR BLD AUTO: 65 % (ref 43–75)
NRBC BLD AUTO-RTO: 0 /100 WBCS
PHOSPHATE SERPL-MCNC: 3.6 MG/DL (ref 2.3–4.1)
PLATELET # BLD AUTO: 109 THOUSANDS/UL (ref 149–390)
PMV BLD AUTO: 9.8 FL (ref 8.9–12.7)
POTASSIUM SERPL-SCNC: 3.3 MMOL/L (ref 3.5–5.3)
RBC # BLD AUTO: 2.77 MILLION/UL (ref 3.88–5.62)
SODIUM SERPL-SCNC: 139 MMOL/L (ref 135–147)
TIBC SERPL-MCNC: 207 UG/DL (ref 250–450)
UIBC SERPL-MCNC: 147 UG/DL (ref 155–355)
WBC # BLD AUTO: 3.78 THOUSAND/UL (ref 4.31–10.16)

## 2024-04-22 PROCEDURE — C9113 INJ PANTOPRAZOLE SODIUM, VIA: HCPCS | Performed by: INTERNAL MEDICINE

## 2024-04-22 PROCEDURE — 83550 IRON BINDING TEST: CPT | Performed by: INTERNAL MEDICINE

## 2024-04-22 PROCEDURE — 99223 1ST HOSP IP/OBS HIGH 75: CPT | Performed by: INTERNAL MEDICINE

## 2024-04-22 PROCEDURE — 85014 HEMATOCRIT: CPT | Performed by: INTERNAL MEDICINE

## 2024-04-22 PROCEDURE — 83540 ASSAY OF IRON: CPT | Performed by: INTERNAL MEDICINE

## 2024-04-22 PROCEDURE — 83735 ASSAY OF MAGNESIUM: CPT | Performed by: INTERNAL MEDICINE

## 2024-04-22 PROCEDURE — 85025 COMPLETE CBC W/AUTO DIFF WBC: CPT | Performed by: INTERNAL MEDICINE

## 2024-04-22 PROCEDURE — 85018 HEMOGLOBIN: CPT | Performed by: INTERNAL MEDICINE

## 2024-04-22 PROCEDURE — 82728 ASSAY OF FERRITIN: CPT | Performed by: INTERNAL MEDICINE

## 2024-04-22 PROCEDURE — 80048 BASIC METABOLIC PNL TOTAL CA: CPT | Performed by: INTERNAL MEDICINE

## 2024-04-22 PROCEDURE — 84100 ASSAY OF PHOSPHORUS: CPT | Performed by: INTERNAL MEDICINE

## 2024-04-22 PROCEDURE — 99232 SBSQ HOSP IP/OBS MODERATE 35: CPT | Performed by: INTERNAL MEDICINE

## 2024-04-22 RX ORDER — LORAZEPAM 1 MG/1
1 TABLET ORAL EVERY 6 HOURS PRN
Status: DISCONTINUED | OUTPATIENT
Start: 2024-04-22 | End: 2024-04-23 | Stop reason: HOSPADM

## 2024-04-22 RX ORDER — POTASSIUM CHLORIDE 20 MEQ/1
40 TABLET, EXTENDED RELEASE ORAL ONCE
Status: COMPLETED | OUTPATIENT
Start: 2024-04-22 | End: 2024-04-22

## 2024-04-22 RX ORDER — MAGNESIUM SULFATE HEPTAHYDRATE 40 MG/ML
2 INJECTION, SOLUTION INTRAVENOUS ONCE
Status: COMPLETED | OUTPATIENT
Start: 2024-04-22 | End: 2024-04-22

## 2024-04-22 RX ADMIN — MAGNESIUM SULFATE HEPTAHYDRATE 2 G: 40 INJECTION, SOLUTION INTRAVENOUS at 08:47

## 2024-04-22 RX ADMIN — FOLIC ACID 1 MG: 1 TABLET ORAL at 08:47

## 2024-04-22 RX ADMIN — FLUOXETINE HYDROCHLORIDE 40 MG: 20 CAPSULE ORAL at 08:47

## 2024-04-22 RX ADMIN — THIAMINE HCL TAB 100 MG 100 MG: 100 TAB at 08:47

## 2024-04-22 RX ADMIN — B-COMPLEX W/ C & FOLIC ACID TAB 1 TABLET: TAB at 08:47

## 2024-04-22 RX ADMIN — LORAZEPAM 1 MG: 1 TABLET ORAL at 12:57

## 2024-04-22 RX ADMIN — POTASSIUM CHLORIDE 40 MEQ: 1500 TABLET, EXTENDED RELEASE ORAL at 08:47

## 2024-04-22 RX ADMIN — PANTOPRAZOLE SODIUM 40 MG: 40 INJECTION, POWDER, FOR SOLUTION INTRAVENOUS at 20:00

## 2024-04-22 RX ADMIN — LEVOTHYROXINE SODIUM 75 MCG: 75 TABLET ORAL at 08:47

## 2024-04-22 RX ADMIN — PANTOPRAZOLE SODIUM 40 MG: 40 INJECTION, POWDER, FOR SOLUTION INTRAVENOUS at 08:47

## 2024-04-22 RX ADMIN — SODIUM CHLORIDE, SODIUM GLUCONATE, SODIUM ACETATE, POTASSIUM CHLORIDE, MAGNESIUM CHLORIDE, SODIUM PHOSPHATE, DIBASIC, AND POTASSIUM PHOSPHATE 125 ML/HR: .53; .5; .37; .037; .03; .012; .00082 INJECTION, SOLUTION INTRAVENOUS at 03:09

## 2024-04-22 RX ADMIN — ALLOPURINOL 300 MG: 300 TABLET ORAL at 08:47

## 2024-04-22 RX ADMIN — LORAZEPAM 1 MG: 1 TABLET ORAL at 22:14

## 2024-04-22 NOTE — ASSESSMENT & PLAN NOTE
Active alcohol abuse  Reports previous withdrawal episodes involving tremors, diaphoresis, and anxiety   Continue CIWA protocol  Continue folic acid and thiamine with multivitamin  Change Ativan to as needed  IV fluid for hydration  Monitor for DTs  Monitor electrolytes

## 2024-04-22 NOTE — PROGRESS NOTES
Progress Note- Sammy Mays 64 y.o. male MRN: [id number]    Unit/Bed#: [facility] 623-01 Encounter: [id number]      Assessment and Plan:  64-year-old male with history of alcohol abuse, CBD stricture and pancreatitis who presented on 4/20 due to melena and hematemesis now status post EGD on 4/21 showing 5 cm hiatal hernia with a single submucosal nodule in the gastric antrum with biopsies pending without obvious etiology for bleeding.  Hemoglobin stable around 10 we will plan for further management outpatient.    1.  Melena  2.  Hematemesis  3.  Gastric nodule  - No further bleeding and no obvious source for bleeding on EGD  - Continue pantoprazole 40 mg p.o. daily  - Continue to trend hemoglobin  - Transfuse for hemoglobin less than 7  - Plan for EUS outpatient to evaluate gastric nodule  - Plan for outpatient colonoscopy for colon cancer screening    ______________________________________________________________________    Subjective:  Patient feeling well overall.  He states he had a bowel movement yesterday that was later.  Denies nausea, vomiting, abdominal pain.    Medication Administration - last 24 hours from 04/21/2024 1606 to 04/22/2024 1606         Date/Time Order Dose Route Action Action by     04/22/2024 0847 EDT folic acid (FOLVITE) tablet 1 mg 1 mg Oral Given Perla Goode RN     04/22/2024 0847 EDT multivitamin stress formula tablet 1 tablet 1 tablet Oral Given Perla Goode RN     04/22/2024 0847 EDT thiamine tablet 100 mg 100 mg Oral Given Perla Goode RN     04/22/2024 0847 EDT FLUoxetine (PROzac) capsule 40 mg 40 mg Oral Given Perla Goode RN     04/22/2024 0847 EDT levothyroxine tablet 75 mcg 75 mcg Oral Given Perla Goode RN     04/22/2024 0847 EDT allopurinol (ZYLOPRIM) tablet 300 mg 300 mg Oral Given Perla Goode RN     04/22/2024 0849 EDT multi-electrolyte (PLASMALYTE-A/ISOLYTE-S PH 7.4) IV solution 75 mL/hr Intravenous Rate/Dose Change Perla Goode RN     04/22/2024 0305 EDT  "multi-electrolyte (PLASMALYTE-A/ISOLYTE-S PH 7.4) IV solution 125 mL/hr Intravenous New Bag Rasta Herrera RN     04/22/2024 0307 EDT multi-electrolyte (PLASMALYTE-A/ISOLYTE-S PH 7.4) IV solution 0 mL/hr Intravenous Stopped Rasta Herrera RN     04/22/2024 0848 EDT LORazepam (ATIVAN) tablet 1 mg 1 mg Oral Not Given Perla Goode RN     04/21/2024 2332 EDT LORazepam (ATIVAN) tablet 1 mg 1 mg Oral Given Rasta Herrera RN     04/21/2024 1621 EDT LORazepam (ATIVAN) tablet 1 mg 1 mg Oral Given Cali Cheema RN     04/22/2024 0847 EDT pantoprazole (PROTONIX) injection 40 mg 40 mg Intravenous Given Perla Goode RN     04/21/2024 2134 EDT pantoprazole (PROTONIX) injection 40 mg 40 mg Intravenous Given Rasta Herrera RN     04/22/2024 0847 EDT potassium chloride (Klor-Con M20) CR tablet 40 mEq 40 mEq Oral Given Perla Goode RN     04/22/2024 0847 EDT magnesium sulfate 2 g/50 mL IVPB (premix) 2 g 2 g Intravenous New Bag Perla Goode RN     04/22/2024 1257 EDT LORazepam (ATIVAN) tablet 1 mg 1 mg Oral Given Perla Goode RN            Objective:     Vitals: Blood pressure 102/65, pulse 78, temperature 97.8 °F (36.6 °C), resp. rate 16, height 5' 10\" (1.778 m), weight 86.2 kg (190 lb 0.6 oz), SpO2 97%.,Body mass index is 27.27 kg/m².      Intake/Output Summary (Last 24 hours) at 4/22/2024 1606  Last data filed at 4/22/2024 1300  Gross per 24 hour   Intake 3077.08 ml   Output 975 ml   Net 2102.08 ml       Physical Exam:   General Appearance: Awake and alert, in no acute distress  Abdomen: Soft, non-tender, non-distended; bowel sounds normal; no masses or no organomegaly    Invasive Devices       Peripheral Intravenous Line  Duration             Peripheral IV 04/20/24 Dorsal (posterior);Proximal;Right Forearm 2 days    Peripheral IV 04/20/24 Left Antecubital 2 days                    Lab Results:  No results displayed because visit has over 200 results.          Imaging Studies: I have personally reviewed " pertinent imaging studies.

## 2024-04-22 NOTE — ASSESSMENT & PLAN NOTE
Patient presented with coffee-ground emesis and black stool x 1 day  Active alcohol abuse with poor oral intake  Denied using NSAIDs  Status post EGD on 9/21,   5 cm hiatal hernia, single submucosal nodule in the antrum, moderate erythematous mucosa in the duodenal bulb and first part of duodenum  Continue IV fluid for hydration  Continue Protonix  Still on clear liquid diet  Continue to monitor  Follow on  GI recommendation  PT OT recommending rehab however patient is not interested

## 2024-04-22 NOTE — PROGRESS NOTES
[facility]  Progress Note  Name: Sammy Mays I  MRN: [id number]  [facility] I Date of Admission: 4/20/2024   Date of Service: 4/22/2024 I Hospital Day: 2    Assessment/Plan   * GI bleed  Assessment & Plan  Patient presented with coffee-ground emesis and black stool x 1 day  Active alcohol abuse with poor oral intake  Denied using NSAIDs  Status post EGD on 9/21,   5 cm hiatal hernia, single submucosal nodule in the antrum, moderate erythematous mucosa in the duodenal bulb and first part of duodenum  Continue IV fluid for hydration  Continue Protonix  Still on clear liquid diet  Continue to monitor  Follow on  GI recommendation  PT OT recommending rehab however patient is not interested    Alcohol use disorder, moderate, dependence (HCC)  Assessment & Plan  Active alcohol abuse  Reports previous withdrawal episodes involving tremors, diaphoresis, and anxiety   Continue CIWA protocol  Continue folic acid and thiamine with multivitamin  Change Ativan to as needed  IV fluid for hydration  Monitor for DTs  Monitor electrolytes    Acute blood loss anemia  Assessment & Plan  Secondary to GI bleed  Anemia panel consistent with anemia of chronic disease  Monitor    Transaminitis  Assessment & Plan  In the setting of alcohol abuse  Improving  GI is following  Monitor    Depression  Assessment & Plan  Continue Paxil    Hypothyroidism   Assessment & Plan  TSH 8.20, free T40.62  Elevated TSH probably patient was not taking levothyroxine  Continue levothyroxine  Repeat thyroid study in 4 weeks as an outpatient       Severe protein calorie malnutrition      VTE Pharmacologic Prophylaxis: VTE Score: 5 contraindicated due to GI bleed    Mobility:   Basic Mobility Inpatient Raw Score: 18  JH-HLM Goal: 6: Walk 10 steps or more  JH-HLM Achieved: 7: Walk 25 feet or more  JH-HLM Goal achieved. Continue to encourage appropriate mobility.    Patient Centered Rounds: I performed bedside  rounds with nursing staff today.   Discussions with Specialists or Other Care Team Provider: CM    Education and Discussions with Family / Patient:  Patient.     Total Time Spent on Date of Encounter in care of patient: 35 mins. This time was spent on one or more of the following: performing physical exam; counseling and coordination of care; obtaining or reviewing history; documenting in the medical record; reviewing/ordering tests, medications or procedures; communicating with other healthcare professionals and discussing with patient's family/caregivers.    Current Length of Stay: 2 day(s)  Current Patient Status: Inpatient   Certification Statement: The patient will continue to require additional inpatient hospital stay due to management of GI bleed  Discharge Plan: Anticipate discharge in 48 hrs to home.    Code Status: Level 1 - Full Code    Subjective:   Patient seen and examined  Comfortable in bed  No event overnight  No abdominal pain  Still on clear liquid diet    Objective:     Vitals:   Temp (24hrs), Av.9 °F (36.6 °C), Min:97.5 °F (36.4 °C), Max:98.1 °F (36.7 °C)    Temp:  [97.5 °F (36.4 °C)-98.1 °F (36.7 °C)] 98 °F (36.7 °C)  HR:  [76-83] 83  Resp:  [14-17] 16  BP: ()/(60-75) 104/68  SpO2:  [94 %-99 %] 98 %  Body mass index is 27.27 kg/m².     Input and Output Summary (last 24 hours):     Intake/Output Summary (Last 24 hours) at 2024 1154  Last data filed at 2024 0536  Gross per 24 hour   Intake 2957.08 ml   Output 1475 ml   Net 1482.08 ml       Physical Exam:   Physical Exam   Patient is awake alert oriented no acute distress  Comfortable in bed  Lungs clear to auscultation bilateral  Heart positive S1-S2 no murmur  Abdomen soft nontender  Lower extremities no edema    Additional Data:     Labs:  Results from last 7 days   Lab Units 24  0541   WBC Thousand/uL 3.78*   HEMOGLOBIN g/dL 9.9*   HEMATOCRIT % 29.6*   PLATELETS Thousands/uL 109*   SEGS PCT % 65   LYMPHO PCT % 28    MONO PCT % 4   EOS PCT % 2     Results from last 7 days   Lab Units 04/22/24  0541 04/21/24  0634   SODIUM mmol/L 139 140   POTASSIUM mmol/L 3.3* 3.5   CHLORIDE mmol/L 103 102   CO2 mmol/L 29 30   BUN mg/dL 5 13   CREATININE mg/dL 0.40* 0.48*   ANION GAP mmol/L 7 8   CALCIUM mg/dL 7.4* 7.5*   ALBUMIN g/dL  --  3.1*   TOTAL BILIRUBIN mg/dL  --  0.95   ALK PHOS U/L  --  75   ALT U/L  --  41   AST U/L  --  35   GLUCOSE RANDOM mg/dL 119 115     Results from last 7 days   Lab Units 04/20/24  0833   INR  1.02             Results from last 7 days   Lab Units 04/21/24  0634 04/21/24  0231 04/20/24  2323   LACTIC ACID mmol/L  --  2.5* 2.3*   PROCALCITONIN ng/ml 0.08  --  0.10       Lines/Drains:  Invasive Devices       Peripheral Intravenous Line  Duration             Peripheral IV 04/20/24 Dorsal (posterior);Proximal;Right Forearm 2 days    Peripheral IV 04/20/24 Left Antecubital 2 days                          Imaging: EGD result reviewed    Recent Cultures (last 7 days):   Results from last 7 days   Lab Units 04/20/24  2323   BLOOD CULTURE  No Growth at 24 hrs.  No Growth at 24 hrs.       Last 24 Hours Medication List:   Current Facility-Administered Medications   Medication Dose Route Frequency Provider Last Rate    acetaminophen  650 mg Oral Q6H PRN Boston Nolasco, DO      albuterol  1 puff Inhalation Q4H PRN Boston Nolasco, DO      allopurinol  300 mg Oral Daily Boston Nolasco, DO      FLUoxetine  40 mg Oral Daily Boston Nolasco, DO      folic acid  1 mg Oral Daily Boston Nolasco, DO      levothyroxine  75 mcg Oral QAM Boston Nolasco, DO      LORazepam  1 mg Oral Q6H PRN Boston Nolasco, DO      multi-electrolyte  75 mL/hr Intravenous Continuous Boston Nolasco, DO 75 mL/hr (04/22/24 0849)    multivitamin stress formula  1 tablet Oral Daily Boston Nolasco, DO      ondansetron  4 mg Intravenous Q4H PRN Boston Nolasco, DO      pantoprazole  40 mg Intravenous Q12H [facility] Boston Nolasco, DO      pneumococcal 20-jacob conj vacc  0.5 mL Intramuscular  Prior to discharge Boston Nolasco DO      thiamine  100 mg Oral Daily Boston Nolasco DO          Today, Patient Was Seen By: Boston Nolasco DO    **Please Note: This note may have been constructed using a voice recognition system.**

## 2024-04-22 NOTE — CASE MANAGEMENT
Case Management Discharge Planning Note    Patient name Sammy Mays  Location [facility] 623/[facility] 623-01 MRN [id number]  : 1960 Date 2024       Current Admission Date: 2024  Current Admission Diagnosis:GI bleed   Patient Active Problem List    Diagnosis Date Noted    Marijuana smoker, continuous 2024    Severe protein-calorie malnutrition (HCC) 2024    Hyperkalemia 10/18/2023    Alcohol withdrawal syndrome with complication (HCC) 10/17/2023    Depression 10/17/2023    Diastasis recti 10/17/2023    Hypocalcemia 2023    Pancytopenia (HCC) 2023    Obese 2023    Hypomagnesemia 2023    Lightheaded 2023    Hypernatremia 07/10/2023    GI bleed 2023    Acute blood loss anemia 2023    Alcohol use disorder, moderate, dependence (HCC) 2023    SIRS (systemic inflammatory response syndrome) 2023    Transaminitis 2023    Transition of care performed with sharing of clinical summary 2018    Alcohol-induced acute pancreatitis 2018    Leukopenia 2018    Platelets decreased (HCC) 2018    Generalized anxiety disorder 2018    Hypertension 2018    Hyperlipidemia 2018    Impaired fasting glucose 2018    Hypothyroidism 2018    Habitual alcohol use 2018    Gastroesophageal reflux disease without esophagitis 2018      LOS (days): 2  Geometric Mean LOS (GMLOS) (days): 3  Days to GMLOS:0.8     OBJECTIVE:  Risk of Unplanned Readmission Score: 21.16         Current admission status: Inpatient   Preferred Pharmacy:   [facility] - [place] - [address]  [address] [place] [address]  Phone: [phone] Fax: [phone]    Primary Care Provider: Ankur Bustillo DO    Primary Insurance: MEDICARE  Secondary Insurance: AAR    DISCHARGE DETAILS:    Discharge planning discussed with:: Patient  Freedom of Choice: Yes  Comments - Freedom of Choice: Discussed  FOC     DME Referral Provided  Referral made for DME?: Yes  DME referral completed for the following items:: Walker  DME Supplier Name:: [employer]    Other Referral/Resources/Interventions Provided:  Referral Comments: This CM offerred rolling walker. Patient agreeable, ordered through [employer]- to be delivered to patient's room

## 2024-04-22 NOTE — CASE MANAGEMENT
Case Management Discharge Planning Note    Patient name Sammy Mays  Location [facility] 623/[facility] 623-01 MRN [id number]  : 1960 Date 2024       Current Admission Date: 2024  Current Admission Diagnosis:GI bleed   Patient Active Problem List    Diagnosis Date Noted    Marijuana smoker, continuous 2024    Severe protein-calorie malnutrition (HCC) 2024    Hyperkalemia 10/18/2023    Alcohol withdrawal syndrome with complication (HCC) 10/17/2023    Depression 10/17/2023    Diastasis recti 10/17/2023    Hypocalcemia 2023    Pancytopenia (HCC) 2023    Obese 2023    Hypomagnesemia 2023    Lightheaded 2023    Hypernatremia 07/10/2023    GI bleed 2023    Acute blood loss anemia 2023    Alcohol use disorder, moderate, dependence (HCC) 2023    SIRS (systemic inflammatory response syndrome) 2023    Transaminitis 2023    Transition of care performed with sharing of clinical summary 2018    Alcohol-induced acute pancreatitis 2018    Leukopenia 2018    Platelets decreased (HCC) 2018    Generalized anxiety disorder 2018    Hypertension 2018    Hyperlipidemia 2018    Impaired fasting glucose 2018    Hypothyroidism 2018    Habitual alcohol use 2018    Gastroesophageal reflux disease without esophagitis 2018      LOS (days): 2  Geometric Mean LOS (GMLOS) (days): 3  Days to GMLOS:0.8     OBJECTIVE:  Risk of Unplanned Readmission Score: 21.16         Current admission status: Inpatient   Preferred Pharmacy:   [facility] - [place] - [address]  [address] [address]  Phone: [phone] Fax: [phone]    Primary Care Provider: Ankur Bustillo DO    Primary Insurance: MEDICARE  Secondary Insurance: AAR    DISCHARGE DETAILS:    Discharge planning discussed with:: Patient  Freedom of Choice: Yes  Comments - Freedom of Choice: Discussed  FOC  CM contacted family/caregiver?: No- see comments (Declined)  Were Treatment Team discharge recommendations reviewed with patient/caregiver?: Yes  Did patient/caregiver verbalize understanding of patient care needs?: Yes  Were patient/caregiver advised of the risks associated with not following Treatment Team discharge recommendations?: Yes    Other Referral/Resources/Interventions Provided:  Referral Comments: Therapy recommendation is level 2, patient likely would benefit from a STR stay.  Patient refusing STR, aware of risks of not following therapy recommendations.  This CM offered HHC, which patient also refused.  Patient states he would like to potentially do OP therapy, will need ambulatory referrals placed on AVS on DC

## 2024-04-22 NOTE — ASSESSMENT & PLAN NOTE
Malnutrition Findings:   Adult Malnutrition type: Chronic illness  Adult Degree of Malnutrition: Other severe protein calorie malnutrition  Malnutrition Characteristics: Inadequate energy, Weight loss                  360 Statement: Other severe protein calroei malnutrition in the context of chronic illness related to poor appetite as evidenced by <75% energy intake compared to estimated energy needs >1 month and 4.7% weight loss X 9 months. Patient will benefit from nutritional supplementation    BMI Findings:           Body mass index is 27.27 kg/m².     Currently on clear liquid diet  Follow GI recommendation

## 2024-04-23 VITALS
HEART RATE: 97 BPM | BODY MASS INDEX: 27.21 KG/M2 | TEMPERATURE: 97.6 F | HEIGHT: 70 IN | SYSTOLIC BLOOD PRESSURE: 112 MMHG | WEIGHT: 190.04 LBS | DIASTOLIC BLOOD PRESSURE: 72 MMHG | RESPIRATION RATE: 16 BRPM | OXYGEN SATURATION: 94 %

## 2024-04-23 LAB
ANION GAP SERPL CALCULATED.3IONS-SCNC: 8 MMOL/L (ref 4–13)
BASOPHILS # BLD AUTO: 0.02 THOUSANDS/ÂΜL (ref 0–0.1)
BASOPHILS NFR BLD AUTO: 1 % (ref 0–1)
BUN SERPL-MCNC: 4 MG/DL (ref 5–25)
CALCIUM SERPL-MCNC: 7.7 MG/DL (ref 8.4–10.2)
CHLORIDE SERPL-SCNC: 102 MMOL/L (ref 96–108)
CO2 SERPL-SCNC: 28 MMOL/L (ref 21–32)
CREAT SERPL-MCNC: 0.48 MG/DL (ref 0.6–1.3)
EOSINOPHIL # BLD AUTO: 0.09 THOUSAND/ÂΜL (ref 0–0.61)
EOSINOPHIL NFR BLD AUTO: 2 % (ref 0–6)
ERYTHROCYTE [DISTWIDTH] IN BLOOD BY AUTOMATED COUNT: 12.4 % (ref 11.6–15.1)
GFR SERPL CREATININE-BSD FRML MDRD: 114 ML/MIN/1.73SQ M
GLUCOSE SERPL-MCNC: 123 MG/DL (ref 65–140)
HCT VFR BLD AUTO: 29.1 % (ref 36.5–49.3)
HGB BLD-MCNC: 9.4 G/DL (ref 12–17)
IMM GRANULOCYTES # BLD AUTO: 0.02 THOUSAND/UL (ref 0–0.2)
IMM GRANULOCYTES NFR BLD AUTO: 1 % (ref 0–2)
LYMPHOCYTES # BLD AUTO: 1.04 THOUSANDS/ÂΜL (ref 0.6–4.47)
LYMPHOCYTES NFR BLD AUTO: 28 % (ref 14–44)
MAGNESIUM SERPL-MCNC: 1.7 MG/DL (ref 1.9–2.7)
MCH RBC QN AUTO: 35.3 PG (ref 26.8–34.3)
MCHC RBC AUTO-ENTMCNC: 32.3 G/DL (ref 31.4–37.4)
MCV RBC AUTO: 109 FL (ref 82–98)
MONOCYTES # BLD AUTO: 0.14 THOUSAND/ÂΜL (ref 0.17–1.22)
MONOCYTES NFR BLD AUTO: 4 % (ref 4–12)
NEUTROPHILS # BLD AUTO: 2.42 THOUSANDS/ÂΜL (ref 1.85–7.62)
NEUTS SEG NFR BLD AUTO: 64 % (ref 43–75)
NRBC BLD AUTO-RTO: 0 /100 WBCS
PLATELET # BLD AUTO: 109 THOUSANDS/UL (ref 149–390)
PMV BLD AUTO: 9.9 FL (ref 8.9–12.7)
POTASSIUM SERPL-SCNC: 3.3 MMOL/L (ref 3.5–5.3)
RBC # BLD AUTO: 2.66 MILLION/UL (ref 3.88–5.62)
SODIUM SERPL-SCNC: 138 MMOL/L (ref 135–147)
WBC # BLD AUTO: 3.73 THOUSAND/UL (ref 4.31–10.16)

## 2024-04-23 PROCEDURE — 85025 COMPLETE CBC W/AUTO DIFF WBC: CPT | Performed by: INTERNAL MEDICINE

## 2024-04-23 PROCEDURE — 80048 BASIC METABOLIC PNL TOTAL CA: CPT | Performed by: INTERNAL MEDICINE

## 2024-04-23 PROCEDURE — 99239 HOSP IP/OBS DSCHRG MGMT >30: CPT | Performed by: NURSE PRACTITIONER

## 2024-04-23 PROCEDURE — C9113 INJ PANTOPRAZOLE SODIUM, VIA: HCPCS | Performed by: INTERNAL MEDICINE

## 2024-04-23 PROCEDURE — 83735 ASSAY OF MAGNESIUM: CPT | Performed by: INTERNAL MEDICINE

## 2024-04-23 RX ORDER — LANOLIN ALCOHOL/MO/W.PET/CERES
800 CREAM (GRAM) TOPICAL ONCE
Status: COMPLETED | OUTPATIENT
Start: 2024-04-23 | End: 2024-04-23

## 2024-04-23 RX ORDER — TRAZODONE HYDROCHLORIDE 50 MG/1
50 TABLET ORAL
Status: DISCONTINUED | OUTPATIENT
Start: 2024-04-23 | End: 2024-04-23 | Stop reason: HOSPADM

## 2024-04-23 RX ORDER — POTASSIUM CHLORIDE 20 MEQ/1
40 TABLET, EXTENDED RELEASE ORAL ONCE
Status: COMPLETED | OUTPATIENT
Start: 2024-04-23 | End: 2024-04-23

## 2024-04-23 RX ORDER — MAGNESIUM SULFATE HEPTAHYDRATE 40 MG/ML
2 INJECTION, SOLUTION INTRAVENOUS ONCE
Status: DISCONTINUED | OUTPATIENT
Start: 2024-04-23 | End: 2024-04-23

## 2024-04-23 RX ORDER — PANTOPRAZOLE SODIUM 40 MG/1
40 TABLET, DELAYED RELEASE ORAL
Qty: 30 TABLET | Refills: 0 | Status: SHIPPED | OUTPATIENT
Start: 2024-04-23

## 2024-04-23 RX ADMIN — POTASSIUM CHLORIDE 40 MEQ: 1500 TABLET, EXTENDED RELEASE ORAL at 10:22

## 2024-04-23 RX ADMIN — MAGNESIUM OXIDE TAB 400 MG (241.3 MG ELEMENTAL MG) 800 MG: 400 (241.3 MG) TAB at 10:22

## 2024-04-23 RX ADMIN — FOLIC ACID 1 MG: 1 TABLET ORAL at 09:27

## 2024-04-23 RX ADMIN — ALLOPURINOL 300 MG: 300 TABLET ORAL at 09:27

## 2024-04-23 RX ADMIN — LEVOTHYROXINE SODIUM 75 MCG: 75 TABLET ORAL at 09:27

## 2024-04-23 RX ADMIN — PANTOPRAZOLE SODIUM 40 MG: 40 INJECTION, POWDER, FOR SOLUTION INTRAVENOUS at 09:27

## 2024-04-23 RX ADMIN — TRAZODONE HYDROCHLORIDE 50 MG: 50 TABLET ORAL at 03:40

## 2024-04-23 RX ADMIN — SODIUM CHLORIDE, SODIUM GLUCONATE, SODIUM ACETATE, POTASSIUM CHLORIDE, MAGNESIUM CHLORIDE, SODIUM PHOSPHATE, DIBASIC, AND POTASSIUM PHOSPHATE 75 ML/HR: .53; .5; .37; .037; .03; .012; .00082 INJECTION, SOLUTION INTRAVENOUS at 03:26

## 2024-04-23 RX ADMIN — THIAMINE HCL TAB 100 MG 100 MG: 100 TAB at 09:27

## 2024-04-23 RX ADMIN — B-COMPLEX W/ C & FOLIC ACID TAB 1 TABLET: TAB at 09:27

## 2024-04-23 RX ADMIN — FLUOXETINE HYDROCHLORIDE 40 MG: 20 CAPSULE ORAL at 09:27

## 2024-04-23 NOTE — CASE MANAGEMENT
Case Management Discharge Planning Note    Patient name Sammy Mays  Location [facility] 623/[facility] 623-01 MRN [id number]  : 1960 Date 2024       Current Admission Date: 2024  Current Admission Diagnosis:GI bleed   Patient Active Problem List    Diagnosis Date Noted    Marijuana smoker, continuous 2024    Severe protein-calorie malnutrition (HCC) 2024    Hyperkalemia 10/18/2023    Alcohol withdrawal syndrome with complication (HCC) 10/17/2023    Depression 10/17/2023    Diastasis recti 10/17/2023    Hypocalcemia 2023    Pancytopenia (HCC) 2023    Obese 2023    Hypomagnesemia 2023    Lightheaded 2023    Hypernatremia 07/10/2023    GI bleed 2023    Acute blood loss anemia 2023    Alcohol use disorder, moderate, dependence (HCC) 2023    SIRS (systemic inflammatory response syndrome) 2023    Transaminitis 2023    Transition of care performed with sharing of clinical summary 2018    Alcohol-induced acute pancreatitis 2018    Leukopenia 2018    Platelets decreased (HCC) 2018    Generalized anxiety disorder 2018    Hypertension 2018    Hyperlipidemia 2018    Impaired fasting glucose 2018    Hypothyroidism 2018    Habitual alcohol use 2018    Gastroesophageal reflux disease without esophagitis 2018      LOS (days): 3  Geometric Mean LOS (GMLOS) (days): 3  Days to GMLOS:-0.1     OBJECTIVE:  Risk of Unplanned Readmission Score: 21.4      Current admission status: Inpatient   Preferred Pharmacy:   [facility] - [place] - [address]  [address] [address]  Phone: [phone] Fax: [phone]    Primary Care Provider: Ankur Bustillo DO    Primary Insurance: MEDICARE  Secondary Insurance: AARP    DISCHARGE DETAILS:      Other Referral/Resources/Interventions Provided:  Financial Resources Provided: Indigent Transportation  Referral  Comments: Lyft waiver explained to patient, patient signed, copy sent to medical records.  K[place]on set up through round trip for 1330,  Lobby A, drop off at patient's residence. RW delivered to patient's room by [facility] [employer]      IMM Given (Date):: 04/23/24  IMM Given to:: Patient        IMM reviewed with patient, patient agrees with discharge determination.

## 2024-04-23 NOTE — PLAN OF CARE
Problem: PAIN - ADULT  Goal: Verbalizes/displays adequate comfort level or baseline comfort level  Description: Interventions:  - Encourage patient to monitor pain and request assistance  - Assess pain using appropriate pain scale  - Administer analgesics based on type and severity of pain and evaluate response  - Implement non-pharmacological measures as appropriate and evaluate response  - Consider cultural and social influences on pain and pain management  - Notify physician/advanced practitioner if interventions unsuccessful or patient reports new pain  Outcome: Progressing     Problem: INFECTION - ADULT  Goal: Absence or prevention of progression during hospitalization  Description: INTERVENTIONS:  - Assess and monitor for signs and symptoms of infection  - Monitor lab/diagnostic results  - Monitor all insertion sites, i.e. indwelling lines, tubes, and drains  - Monitor endotracheal if appropriate and nasal secretions for changes in amount and color  - [place] appropriate cooling/warming therapies per order  - Administer medications as ordered  - Instruct and encourage patient and family to use good hand hygiene technique  - Identify and instruct in appropriate isolation precautions for identified infection/condition  Outcome: Progressing     Problem: SAFETY ADULT  Goal: Patient will remain free of falls  Description: INTERVENTIONS:  - Educate patient/family on patient safety including physical limitations  - Instruct patient to call for assistance with activity   - Consult OT/PT to assist with strengthening/mobility   - Keep Call bell within reach  - Keep bed low and locked with side rails adjusted as appropriate  - Keep care items and personal belongings within reach  - Initiate and maintain comfort rounds  - Make Fall Risk Sign visible to staff  - Apply yellow socks and bracelet for high fall risk patients  - Consider moving patient to room near nurses station  Outcome: Progressing     Problem: DISCHARGE  PLANNING  Goal: Discharge to home or other facility with appropriate resources  Description: INTERVENTIONS:  - Identify barriers to discharge w/patient and caregiver  - Arrange for needed discharge resources and transportation as appropriate  - Identify discharge learning needs (meds, wound care, etc.)  - Arrange for interpretive services to assist at discharge as needed  - Refer to Case Management Department for coordinating discharge planning if the patient needs post-hospital services based on physician/advanced practitioner order or complex needs related to functional status, cognitive ability, or social support system  Outcome: Progressing     Problem: Knowledge Deficit  Goal: Patient/family/caregiver demonstrates understanding of disease process, treatment plan, medications, and discharge instructions  Description: Complete learning assessment and assess knowledge base.  Interventions:  - Provide teaching at level of understanding  - Provide teaching via preferred learning methods  Outcome: Progressing

## 2024-04-23 NOTE — DISCHARGE INSTR - AVS FIRST PAGE
Your EGD which we did here in the hospital showed a stomach nodule which will require further investigation with an EUS (endoscopic ultrasound.) You will also need a colonoscopy.  Both of these test can be done as outpatient and the gastroenterology office will call you to schedule.  If you do not hear from them, please call. The phone number has been listed below.    Thanks,    ANI Puga  [facility] internal medicine

## 2024-04-23 NOTE — DISCHARGE SUMMARY
Discharge Summary - [facility] Internal Medicine    Patient Information: Sammy Mays 64 y.o. male MRN: [id number]  Unit/Bed#: [facility] 623-01 Encounter: [id number]    Discharging Physician / Practitioner: ANI Barlow  PCP: Ankur Bustillo DO  Admission Date: 4/20/2024  Discharge Date: 04/23/24    Reason for Admission: Hematemesis    Discharge Diagnoses:     Principal Problem:    GI bleed  Active Problems:    Hypothyroidism    Acute blood loss anemia    Alcohol use disorder, moderate, dependence (HCC)    Transaminitis    Depression    Marijuana smoker, continuous    Severe protein-calorie malnutrition (HCC)  Resolved Problems:    * No resolved hospital problems. *      Consultations During Hospital Stay:  GI  PT/OT  Case management     Procedures Performed:     EGD 4/21 with 5 cm hiatal hernia and submucosal lesion in the gastric antrum.     Significant Findings / Test Results:     CXR negative  HGB on day of discharge 9.4  TSH 8.208, T4 0.62    Incidental Findings:   none     Test Results Pending at Discharge (will require follow up):   none     Outpatient Tests Requested:  Outpatient f/u with PCP  Outpatient f/u GI for EUS/c-scope   Repeat TFT in 4 weeks    Complications:  none     Hospital Course:     Sammy Mays is a 64 y.o. male patient with past medical history of alcohol abuse, CBD stricture, pancreatitis, depression, hypothyroidism who originally presented to the hospital on 4/20/2024 due to hematemesis and black stool for 1 day.  Patient underwent EGD 4/21 which showed 5 cm hiatal hernia, single submucosal nodule in the antrum, moderate erythematous mucosa in the duodenal bulb and first part of the duodenum.  Hemoglobin remained stable.  He was followed by GI, he will require outpatient EUS for further evaluation of gastric nodule as well as screening colonoscopy.    Was evaluated by physical and Occupational Therapy who recommended rehab however patient refused.  Will be discharged home today  "with an outpatient therapy referral.    Condition at Discharge: stable     Discharge Day Visit / Exam:     Subjective: Patient offers no acute complaints.  States that his appetite has improved, ate a full breakfast.  No further hematemesis, no melena.  We discussed importance of follow-up to include outpatient EUS and colonoscopy and patient is in agreement.  He did ask me to write this down for which I placed this on his discharge instructions.  Vitals: Blood Pressure: 128/83 (04/23/24 0713)  Pulse: 81 (04/23/24 0713)  Temperature: 97.9 °F (36.6 °C) (04/23/24 0713)  Temp Source: Oral (04/23/24 0713)  Respirations: 20 (04/23/24 0713)  Height: 5' 10\" (177.8 cm) (04/20/24 1100)  Weight - Scale: 86.2 kg (190 lb 0.6 oz) (04/20/24 1100)  SpO2: 98 % (04/23/24 0713)  Exam:   Physical Exam  Vitals and nursing note reviewed.   Constitutional:       General: He is not in acute distress.     Appearance: He is obese.   Cardiovascular:      Rate and Rhythm: Normal rate.   Pulmonary:      Effort: No respiratory distress.   Abdominal:      General: There is no distension.      Tenderness: There is no abdominal tenderness.   Musculoskeletal:         General: No swelling.   Skin:     General: Skin is warm.   Neurological:      Mental Status: He is alert and oriented to person, place, and time. Mental status is at baseline.   Psychiatric:         Mood and Affect: Mood normal.         Discussion with Family: Patient    Discharge instructions/Information to patient and family:   See after visit summary for information provided to patient and family.      Provisions for Follow-Up Care:  See after visit summary for information related to follow-up care and any pertinent home health orders.      Disposition:     Home    For Discharges to [facility] SNF:   Not Applicable to this Patient - Not Applicable to this Patient    Planned Readmission: no     Discharge Statement:  I spent 40 minutes discharging the patient. This time was " spent on the day of discharge. I had direct contact with the patient on the day of discharge. Greater than 50% of the total time was spent examining patient, answering all patient questions, arranging and discussing plan of care with patient as well as directly providing post-discharge instructions.  Additional time then spent on discharge activities.    Discharge Medications:  See after visit summary for reconciled discharge medications provided to patient and family.      ** Please Note: This note has been constructed using a voice recognition system **

## 2024-04-23 NOTE — PLAN OF CARE
Problem: PAIN - ADULT  Goal: Verbalizes/displays adequate comfort level or baseline comfort level  Description: Interventions:  - Encourage patient to monitor pain and request assistance  - Assess pain using appropriate pain scale  - Administer analgesics based on type and severity of pain and evaluate response  - Implement non-pharmacological measures as appropriate and evaluate response  - Consider cultural and social influences on pain and pain management  - Notify physician/advanced practitioner if interventions unsuccessful or patient reports new pain  Outcome: Progressing     Problem: INFECTION - ADULT  Goal: Absence or prevention of progression during hospitalization  Description: INTERVENTIONS:  - Assess and monitor for signs and symptoms of infection  - Monitor lab/diagnostic results  - Monitor all insertion sites, i.e. indwelling lines, tubes, and drains  - Monitor endotracheal if appropriate and nasal secretions for changes in amount and color  - [place] appropriate cooling/warming therapies per order  - Administer medications as ordered  - Instruct and encourage patient and family to use good hand hygiene technique  - Identify and instruct in appropriate isolation precautions for identified infection/condition  Outcome: Progressing     Problem: SAFETY ADULT  Goal: Patient will remain free of falls  Description: INTERVENTIONS:  - Educate patient/family on patient safety including physical limitations  - Instruct patient to call for assistance with activity   - Consult OT/PT to assist with strengthening/mobility   - Keep Call bell within reach  - Keep bed low and locked with side rails adjusted as appropriate  - Keep care items and personal belongings within reach  - Initiate and maintain comfort rounds  - Make Fall Risk Sign visible to staff  - Apply yellow socks and bracelet for high fall risk patients  - Consider moving patient to room near nurses station  Outcome: Progressing  Goal: Maintain or  return to baseline ADL function  Description: INTERVENTIONS:  -  Assess patient's ability to carry out ADLs; assess patient's baseline for ADL function and identify physical deficits which impact ability to perform ADLs (bathing, care of mouth/teeth, toileting, grooming, dressing, etc.)  - Assess/evaluate cause of self-care deficits   - Assess range of motion  - Assess patient's mobility; develop plan if impaired  - Assess patient's need for assistive devices and provide as appropriate  - Encourage maximum independence but intervene and supervise when necessary  - Involve family in performance of ADLs  - Assess for home care needs following discharge   - Consider OT consult to assist with ADL evaluation and planning for discharge  - Provide patient education as appropriate  Outcome: Progressing  Goal: Maintains/Returns to pre admission functional level  Description: INTERVENTIONS:  - Perform AM-PAC 6 Click Basic Mobility/ Daily Activity assessment daily.  - Set and communicate daily mobility goal to care team and patient/family/caregiver.   - Collaborate with rehabilitation services on mobility goals if consulted  - Out of bed for toileting  - Record patient progress and toleration of activity level   Outcome: Progressing     Problem: DISCHARGE PLANNING  Goal: Discharge to home or other facility with appropriate resources  Description: INTERVENTIONS:  - Identify barriers to discharge w/patient and caregiver  - Arrange for needed discharge resources and transportation as appropriate  - Identify discharge learning needs (meds, wound care, etc.)  - Arrange for interpretive services to assist at discharge as needed  - Refer to Case Management Department for coordinating discharge planning if the patient needs post-hospital services based on physician/advanced practitioner order or complex needs related to functional status, cognitive ability, or social support system  Outcome: Progressing     Problem: Knowledge  Deficit  Goal: Patient/family/caregiver demonstrates understanding of disease process, treatment plan, medications, and discharge instructions  Description: Complete learning assessment and assess knowledge base.  Interventions:  - Provide teaching at level of understanding  - Provide teaching via preferred learning methods  Outcome: Progressing     Problem: Prexisting or High Potential for Compromised Skin Integrity  Goal: Skin integrity is maintained or improved  Description: INTERVENTIONS:  - Identify patients at risk for skin breakdown  - Assess and monitor skin integrity  - Assess and monitor nutrition and hydration status  - Monitor labs   - Assess for incontinence   - Turn and reposition patient  - Assist with mobility/ambulation  - Relieve pressure over bony prominences  - Avoid friction and shearing  - Provide appropriate hygiene as needed including keeping skin clean and dry  - Evaluate need for skin moisturizer/barrier cream  - Collaborate with interdisciplinary team   - Patient/family teaching  - Consider wound care consult   Outcome: Progressing     Problem: Nutrition/Hydration-ADULT  Goal: Nutrient/Hydration intake appropriate for improving, restoring or maintaining nutritional needs  Description: Monitor and assess patient's nutrition/hydration status for malnutrition. Collaborate with interdisciplinary team and initiate plan and interventions as ordered.  Monitor patient's weight and dietary intake as ordered or per policy. Utilize nutrition screening tool and intervene as necessary. Determine patient's food preferences and provide high-protein, high-caloric foods as appropriate.     INTERVENTIONS:  - Monitor oral intake, urinary output, labs, and treatment plans  - Assess nutrition and hydration status and recommend course of action  - Evaluate amount of meals eaten  - Assist patient with eating if necessary   - Allow adequate time for meals  - Recommend/ encourage appropriate diets, oral  nutritional supplements, and vitamin/mineral supplements  - Order, calculate, and assess calorie counts as needed  - Recommend, monitor, and adjust tube feedings and TPN/PPN based on assessed needs  - Assess need for intravenous fluids  - Provide specific nutrition/hydration education as appropriate  - Include patient/family/caregiver in decisions related to nutrition  Outcome: Progressing

## 2024-04-26 LAB
BACTERIA BLD CULT: NORMAL
BACTERIA BLD CULT: NORMAL

## 2024-04-29 LAB
DME PARACHUTE DELIVERY DATE ACTUAL: NORMAL
DME PARACHUTE DELIVERY DATE REQUESTED: NORMAL
DME PARACHUTE ITEM DESCRIPTION: NORMAL
DME PARACHUTE ORDER STATUS: NORMAL
DME PARACHUTE SUPPLIER NAME: NORMAL
DME PARACHUTE SUPPLIER PHONE: NORMAL

## 2024-05-15 NOTE — ASSESSMENT & PLAN NOTE
Presents with GI bleed  Upper endoscopy revealed esophagitis, gastritis  GI plans for colonoscopy today noted  IV pantoprazole  Monitor hemoglobin  GI following Pt placed in a hospital bed for comfort. Safety maintained.

## 2024-06-06 ENCOUNTER — APPOINTMENT (EMERGENCY)
Dept: RADIOLOGY | Facility: HOSPITAL | Age: 64
DRG: 439 | End: 2024-06-06
Payer: MEDICARE

## 2024-06-06 ENCOUNTER — HOSPITAL ENCOUNTER (INPATIENT)
Facility: HOSPITAL | Age: 64
LOS: 4 days | Discharge: HOME | DRG: 439 | End: 2024-06-10
Attending: EMERGENCY MEDICINE | Admitting: INTERNAL MEDICINE
Payer: MEDICARE

## 2024-06-06 DIAGNOSIS — K85.20 ALCOHOLIC PANCREATITIS: ICD-10-CM

## 2024-06-06 DIAGNOSIS — K85.90 ACUTE PANCREATITIS, UNSPECIFIED COMPLICATION STATUS, UNSPECIFIED PANCREATITIS TYPE: Primary | ICD-10-CM

## 2024-06-06 PROBLEM — E87.6 HYPOKALEMIA: Status: ACTIVE | Noted: 2024-06-06

## 2024-06-06 PROBLEM — E87.1 HYPONATREMIA: Status: ACTIVE | Noted: 2024-06-06

## 2024-06-06 PROBLEM — R79.89 ELEVATED LFTS: Status: ACTIVE | Noted: 2024-06-06

## 2024-06-06 LAB
2HR DELTA HS TROPONIN: -1 NG/L
ALBUMIN SERPL BCP-MCNC: 3.6 G/DL (ref 3.5–5)
ALP SERPL-CCNC: 176 U/L (ref 34–104)
ALT SERPL W P-5'-P-CCNC: 65 U/L (ref 7–52)
AMMONIA PLAS-SCNC: 44 UMOL/L (ref 18–72)
ANION GAP SERPL CALCULATED.3IONS-SCNC: 14 MMOL/L (ref 4–13)
ANION GAP SERPL CALCULATED.3IONS-SCNC: 17 MMOL/L (ref 4–13)
APTT PPP: 26 SECONDS (ref 23–37)
AST SERPL W P-5'-P-CCNC: 123 U/L (ref 13–39)
ATRIAL RATE: 113 BPM
BACTERIA UR QL AUTO: ABNORMAL /HPF
BASOPHILS # BLD AUTO: 0.04 THOUSANDS/ÂΜL (ref 0–0.1)
BASOPHILS NFR BLD AUTO: 1 % (ref 0–1)
BILIRUB SERPL-MCNC: 1.86 MG/DL (ref 0.2–1)
BILIRUB UR QL STRIP: NEGATIVE
BUN SERPL-MCNC: 3 MG/DL (ref 5–25)
BUN SERPL-MCNC: 3 MG/DL (ref 5–25)
CALCIUM SERPL-MCNC: 7.4 MG/DL (ref 8.4–10.2)
CALCIUM SERPL-MCNC: 7.8 MG/DL (ref 8.4–10.2)
CARDIAC TROPONIN I PNL SERPL HS: 10 NG/L
CARDIAC TROPONIN I PNL SERPL HS: 9 NG/L
CHLORIDE SERPL-SCNC: 89 MMOL/L (ref 96–108)
CHLORIDE SERPL-SCNC: 94 MMOL/L (ref 96–108)
CLARITY UR: CLEAR
CO2 SERPL-SCNC: 23 MMOL/L (ref 21–32)
CO2 SERPL-SCNC: 24 MMOL/L (ref 21–32)
COLOR UR: YELLOW
CREAT SERPL-MCNC: 0.42 MG/DL (ref 0.6–1.3)
CREAT SERPL-MCNC: 0.57 MG/DL (ref 0.6–1.3)
EOSINOPHIL # BLD AUTO: 0.01 THOUSAND/ÂΜL (ref 0–0.61)
EOSINOPHIL NFR BLD AUTO: 0 % (ref 0–6)
ERYTHROCYTE [DISTWIDTH] IN BLOOD BY AUTOMATED COUNT: 14 % (ref 11.6–15.1)
ETHANOL SERPL-MCNC: <10 MG/DL
GFR SERPL CREATININE-BSD FRML MDRD: 107 ML/MIN/1.73SQ M
GFR SERPL CREATININE-BSD FRML MDRD: 121 ML/MIN/1.73SQ M
GLUCOSE SERPL-MCNC: 132 MG/DL (ref 65–140)
GLUCOSE SERPL-MCNC: 191 MG/DL (ref 65–140)
GLUCOSE UR STRIP-MCNC: ABNORMAL MG/DL
HCT VFR BLD AUTO: 41.8 % (ref 36.5–49.3)
HGB BLD-MCNC: 14.8 G/DL (ref 12–17)
HGB UR QL STRIP.AUTO: NEGATIVE
HYALINE CASTS #/AREA URNS LPF: ABNORMAL /LPF
IMM GRANULOCYTES # BLD AUTO: 0.04 THOUSAND/UL (ref 0–0.2)
IMM GRANULOCYTES NFR BLD AUTO: 1 % (ref 0–2)
INR PPP: 1.12 (ref 0.84–1.19)
KETONES UR STRIP-MCNC: NEGATIVE MG/DL
LEUKOCYTE ESTERASE UR QL STRIP: NEGATIVE
LIPASE SERPL-CCNC: 549 U/L (ref 11–82)
LYMPHOCYTES # BLD AUTO: 0.99 THOUSANDS/ÂΜL (ref 0.6–4.47)
LYMPHOCYTES NFR BLD AUTO: 18 % (ref 14–44)
MCH RBC QN AUTO: 34.9 PG (ref 26.8–34.3)
MCHC RBC AUTO-ENTMCNC: 35.4 G/DL (ref 31.4–37.4)
MCV RBC AUTO: 99 FL (ref 82–98)
MONOCYTES # BLD AUTO: 0.41 THOUSAND/ÂΜL (ref 0.17–1.22)
MONOCYTES NFR BLD AUTO: 7 % (ref 4–12)
NEUTROPHILS # BLD AUTO: 4.08 THOUSANDS/ÂΜL (ref 1.85–7.62)
NEUTS SEG NFR BLD AUTO: 73 % (ref 43–75)
NITRITE UR QL STRIP: NEGATIVE
NON-SQ EPI CELLS URNS QL MICRO: ABNORMAL /HPF
NRBC BLD AUTO-RTO: 0 /100 WBCS
P AXIS: 31 DEGREES
PH UR STRIP.AUTO: 8.5 [PH]
PLATELET # BLD AUTO: 183 THOUSANDS/UL (ref 149–390)
PMV BLD AUTO: 10.3 FL (ref 8.9–12.7)
POTASSIUM SERPL-SCNC: 2.9 MMOL/L (ref 3.5–5.3)
POTASSIUM SERPL-SCNC: 3.3 MMOL/L (ref 3.5–5.3)
PR INTERVAL: 156 MS
PROT SERPL-MCNC: 6.3 G/DL (ref 6.4–8.4)
PROT UR STRIP-MCNC: ABNORMAL MG/DL
PROTHROMBIN TIME: 14.3 SECONDS (ref 11.6–14.5)
QRS AXIS: 68 DEGREES
QRSD INTERVAL: 86 MS
QT INTERVAL: 360 MS
QTC INTERVAL: 493 MS
RBC # BLD AUTO: 4.24 MILLION/UL (ref 3.88–5.62)
RBC #/AREA URNS AUTO: ABNORMAL /HPF
SODIUM SERPL-SCNC: 129 MMOL/L (ref 135–147)
SODIUM SERPL-SCNC: 132 MMOL/L (ref 135–147)
SP GR UR STRIP.AUTO: 1.01 (ref 1–1.03)
T WAVE AXIS: 70 DEGREES
UROBILINOGEN UR STRIP-ACNC: 2 MG/DL
VENTRICULAR RATE: 113 BPM
WBC # BLD AUTO: 5.57 THOUSAND/UL (ref 4.31–10.16)
WBC #/AREA URNS AUTO: ABNORMAL /HPF

## 2024-06-06 PROCEDURE — 82077 ASSAY SPEC XCP UR&BREATH IA: CPT | Performed by: PHYSICIAN ASSISTANT

## 2024-06-06 PROCEDURE — 85610 PROTHROMBIN TIME: CPT | Performed by: PHYSICIAN ASSISTANT

## 2024-06-06 PROCEDURE — 85730 THROMBOPLASTIN TIME PARTIAL: CPT | Performed by: PHYSICIAN ASSISTANT

## 2024-06-06 PROCEDURE — 96365 THER/PROPH/DIAG IV INF INIT: CPT

## 2024-06-06 PROCEDURE — 80053 COMPREHEN METABOLIC PANEL: CPT | Performed by: PHYSICIAN ASSISTANT

## 2024-06-06 PROCEDURE — 96366 THER/PROPH/DIAG IV INF ADDON: CPT

## 2024-06-06 PROCEDURE — 99223 1ST HOSP IP/OBS HIGH 75: CPT | Performed by: INTERNAL MEDICINE

## 2024-06-06 PROCEDURE — 36415 COLL VENOUS BLD VENIPUNCTURE: CPT | Performed by: PHYSICIAN ASSISTANT

## 2024-06-06 PROCEDURE — 96361 HYDRATE IV INFUSION ADD-ON: CPT

## 2024-06-06 PROCEDURE — 85025 COMPLETE CBC W/AUTO DIFF WBC: CPT | Performed by: PHYSICIAN ASSISTANT

## 2024-06-06 PROCEDURE — 84484 ASSAY OF TROPONIN QUANT: CPT | Performed by: PHYSICIAN ASSISTANT

## 2024-06-06 PROCEDURE — 87040 BLOOD CULTURE FOR BACTERIA: CPT | Performed by: PHYSICIAN ASSISTANT

## 2024-06-06 PROCEDURE — 81001 URINALYSIS AUTO W/SCOPE: CPT | Performed by: PHYSICIAN ASSISTANT

## 2024-06-06 PROCEDURE — 74177 CT ABD & PELVIS W/CONTRAST: CPT

## 2024-06-06 PROCEDURE — 83690 ASSAY OF LIPASE: CPT | Performed by: PHYSICIAN ASSISTANT

## 2024-06-06 PROCEDURE — 82140 ASSAY OF AMMONIA: CPT

## 2024-06-06 PROCEDURE — 80048 BASIC METABOLIC PNL TOTAL CA: CPT

## 2024-06-06 PROCEDURE — 93010 ELECTROCARDIOGRAM REPORT: CPT | Performed by: INTERNAL MEDICINE

## 2024-06-06 PROCEDURE — 93005 ELECTROCARDIOGRAM TRACING: CPT

## 2024-06-06 PROCEDURE — 99285 EMERGENCY DEPT VISIT HI MDM: CPT

## 2024-06-06 PROCEDURE — 96375 TX/PRO/DX INJ NEW DRUG ADDON: CPT

## 2024-06-06 PROCEDURE — 99285 EMERGENCY DEPT VISIT HI MDM: CPT | Performed by: PHYSICIAN ASSISTANT

## 2024-06-06 RX ORDER — HYDROMORPHONE HCL IN WATER/PF 6 MG/30 ML
0.2 PATIENT CONTROLLED ANALGESIA SYRINGE INTRAVENOUS
Status: DISCONTINUED | OUTPATIENT
Start: 2024-06-06 | End: 2024-06-10 | Stop reason: HOSPADM

## 2024-06-06 RX ORDER — ALBUTEROL SULFATE 90 UG/1
1 AEROSOL, METERED RESPIRATORY (INHALATION) EVERY 4 HOURS PRN
Status: DISCONTINUED | OUTPATIENT
Start: 2024-06-07 | End: 2024-06-10 | Stop reason: HOSPADM

## 2024-06-06 RX ORDER — ONDANSETRON 2 MG/ML
4 INJECTION INTRAMUSCULAR; INTRAVENOUS ONCE
Status: COMPLETED | OUTPATIENT
Start: 2024-06-06 | End: 2024-06-06

## 2024-06-06 RX ORDER — LORAZEPAM 2 MG/ML
1 INJECTION INTRAMUSCULAR ONCE
Status: COMPLETED | OUTPATIENT
Start: 2024-06-06 | End: 2024-06-06

## 2024-06-06 RX ORDER — ALLOPURINOL 300 MG/1
300 TABLET ORAL DAILY
Status: DISCONTINUED | OUTPATIENT
Start: 2024-06-07 | End: 2024-06-10 | Stop reason: HOSPADM

## 2024-06-06 RX ORDER — SODIUM CHLORIDE AND POTASSIUM CHLORIDE 300; 900 MG/100ML; MG/100ML
100 INJECTION, SOLUTION INTRAVENOUS CONTINUOUS
Status: DISCONTINUED | OUTPATIENT
Start: 2024-06-06 | End: 2024-06-07

## 2024-06-06 RX ORDER — LORAZEPAM 2 MG/ML
2 INJECTION INTRAMUSCULAR ONCE
Status: COMPLETED | OUTPATIENT
Start: 2024-06-06 | End: 2024-06-06

## 2024-06-06 RX ORDER — FLUOXETINE HYDROCHLORIDE 20 MG/1
40 CAPSULE ORAL DAILY
Status: DISCONTINUED | OUTPATIENT
Start: 2024-06-07 | End: 2024-06-10 | Stop reason: HOSPADM

## 2024-06-06 RX ORDER — HYDROMORPHONE HCL/PF 1 MG/ML
0.3 SYRINGE (ML) INJECTION EVERY 6 HOURS PRN
Status: DISCONTINUED | OUTPATIENT
Start: 2024-06-06 | End: 2024-06-10 | Stop reason: HOSPADM

## 2024-06-06 RX ORDER — ACETAMINOPHEN 325 MG/1
650 TABLET ORAL EVERY 6 HOURS PRN
Status: DISCONTINUED | OUTPATIENT
Start: 2024-06-06 | End: 2024-06-10 | Stop reason: HOSPADM

## 2024-06-06 RX ORDER — HEPARIN SODIUM 5000 [USP'U]/ML
5000 INJECTION, SOLUTION INTRAVENOUS; SUBCUTANEOUS EVERY 8 HOURS SCHEDULED
Status: DISCONTINUED | OUTPATIENT
Start: 2024-06-06 | End: 2024-06-10 | Stop reason: HOSPADM

## 2024-06-06 RX ORDER — PANTOPRAZOLE SODIUM 40 MG/1
40 TABLET, DELAYED RELEASE ORAL
Status: DISCONTINUED | OUTPATIENT
Start: 2024-06-06 | End: 2024-06-10 | Stop reason: HOSPADM

## 2024-06-06 RX ORDER — POTASSIUM CHLORIDE 14.9 MG/ML
20 INJECTION INTRAVENOUS ONCE
Status: COMPLETED | OUTPATIENT
Start: 2024-06-06 | End: 2024-06-06

## 2024-06-06 RX ORDER — LORATADINE 10 MG/1
10 TABLET ORAL DAILY
Status: DISCONTINUED | OUTPATIENT
Start: 2024-06-07 | End: 2024-06-10 | Stop reason: HOSPADM

## 2024-06-06 RX ORDER — LEVOTHYROXINE SODIUM 0.07 MG/1
75 TABLET ORAL
Status: DISCONTINUED | OUTPATIENT
Start: 2024-06-07 | End: 2024-06-10 | Stop reason: HOSPADM

## 2024-06-06 RX ORDER — HYDROMORPHONE HCL/PF 1 MG/ML
0.5 SYRINGE (ML) INJECTION EVERY 6 HOURS PRN
Status: DISCONTINUED | OUTPATIENT
Start: 2024-06-06 | End: 2024-06-10 | Stop reason: HOSPADM

## 2024-06-06 RX ADMIN — IOHEXOL 100 ML: 350 INJECTION, SOLUTION INTRAVENOUS at 13:31

## 2024-06-06 RX ADMIN — LORAZEPAM 2 MG: 2 INJECTION INTRAMUSCULAR; INTRAVENOUS at 17:33

## 2024-06-06 RX ADMIN — SODIUM CHLORIDE 500 ML: 0.9 INJECTION, SOLUTION INTRAVENOUS at 10:29

## 2024-06-06 RX ADMIN — LORAZEPAM 1 MG: 2 INJECTION INTRAMUSCULAR; INTRAVENOUS at 12:05

## 2024-06-06 RX ADMIN — ONDANSETRON 4 MG: 2 INJECTION INTRAMUSCULAR; INTRAVENOUS at 10:07

## 2024-06-06 RX ADMIN — POTASSIUM CHLORIDE AND SODIUM CHLORIDE 100 ML/HR: 900; 300 INJECTION, SOLUTION INTRAVENOUS at 20:27

## 2024-06-06 RX ADMIN — SODIUM CHLORIDE 500 ML: 0.9 INJECTION, SOLUTION INTRAVENOUS at 16:45

## 2024-06-06 RX ADMIN — POTASSIUM CHLORIDE 20 MEQ: 14.9 INJECTION, SOLUTION INTRAVENOUS at 13:18

## 2024-06-06 NOTE — ASSESSMENT & PLAN NOTE
Acute on chronic issue  Cessation counseling  CIWA protocol  Supplemental thiamine/folic acid and multivitamin  PPI regimen  Monitor/replete electrolyte deficiencies if present

## 2024-06-06 NOTE — Clinical Note
Case was discussed with CRISTINO and the patient's admission status was agreed to be Admission Status: inpatient status to the service of Dr. Wagner

## 2024-06-06 NOTE — ED PROVIDER NOTES
"History  Chief Complaint   Patient presents with    Abdominal Pain     Epigastric pain with \"lump\" that protrudes from abd. Pt states it is not a hernia but it is causing pain. Reports nausea, decreased appetite, and generalized weakness. -V/D. GI history. Was supposed to get colonoscopy but never scheduled appointment.      64-year-old male presents to the emergency department with complaints of epigastric abdominal pain.  States the pain started approximately 2 to 3 days ago.  Describes some nausea with 1-2 episodes of dry heaving.  Denies bloody emesis.  States that pain seems to be worse after eating or drinking.  States that he is been limiting oral intake to help with symptoms.  Denies history of similar symptoms.  Patient has known history of heavy alcohol use.  Denies alcohol use in the past 2 days secondary to exacerbation of symptoms.  Does exhibit some shaking at this time.  Denies history of alcohol withdrawal in the past.  History of previous cholecystectomy.  Denies fevers or chills at home.  Denies dark stools since previous admission in April 2024.      History provided by:  Patient   used: No    Abdominal Pain  Pain location:  Epigastric  Pain quality: aching and cramping    Pain severity:  Moderate  Onset quality:  Gradual  Timing:  Constant  Progression:  Waxing and waning  Chronicity:  New  Relieved by:  Nothing  Worsened by:  Eating  Ineffective treatments:  None tried  Associated symptoms: no chest pain, no chills, no constipation, no cough, no diarrhea, no dysuria, no fever, no hematemesis, no hematochezia, no melena, no nausea, no shortness of breath, no sore throat and no vomiting        Prior to Admission Medications   Prescriptions Last Dose Informant Patient Reported? Taking?   FLUoxetine (PROzac) 40 MG capsule   No No   Sig: TAKE ONE CAPSULE BY MOUTH EVERY DAY   Multiple Vitamin (DAILY VALUE MULTIVITAMIN) TABS   Yes No   Sig: Take 1 tablet by mouth daily   albuterol " (PROVENTIL HFA,VENTOLIN HFA) 90 mcg/act inhaler   Yes No   Sig: Inhale 1-2 puffs   allopurinol (ZYLOPRIM) 300 mg tablet   No No   Sig: TAKE 1 TABLET EVERY DAY   cetirizine (ZyrTEC) 10 mg tablet   Yes No   Sig: Take one tablet by mouth daily as needed for allergies/congestion    folic acid (FOLVITE) 1 mg tablet   No No   Sig: Take 1 tablet (1 mg total) by mouth daily for 7 days Do not start before October 19, 2023.   levothyroxine 75 mcg tablet   No No   Sig: TAKE 1 TABLET IN THE MORNING ON AN EMPTY STOMACH FOR THYROID   pantoprazole (PROTONIX) 40 mg tablet   No No   Sig: Take 1 tablet (40 mg total) by mouth daily in the early morning   thiamine 100 MG tablet   No No   Sig: Take 1 tablet (100 mg total) by mouth daily for 5 days Do not start before October 19, 2023.      Facility-Administered Medications: None       Past Medical History:   Diagnosis Date    Medical history unknown        Past Surgical History:   Procedure Laterality Date    REPAIR / REINSERT BICEPS TENDON AT ELBOW      Last Assessed: 1/12/2016        Family History   Problem Relation Age of Onset    Diabetes Mother      I have reviewed and agree with the history as documented.    E-Cigarette/Vaping     E-Cigarette/Vaping Substances     Social History     Tobacco Use    Smoking status: Never    Smokeless tobacco: Never   Substance Use Topics    Alcohol use: Yes     Comment: social    Drug use: Yes     Types: Marijuana       Review of Systems   Constitutional:  Negative for activity change, appetite change, chills and fever.   HENT:  Negative for congestion, dental problem, drooling, ear discharge, ear pain, mouth sores, nosebleeds, rhinorrhea, sore throat and trouble swallowing.    Eyes:  Negative for pain, discharge and itching.   Respiratory:  Negative for cough, chest tightness, shortness of breath and wheezing.    Cardiovascular:  Negative for chest pain and palpitations.   Gastrointestinal:  Positive for abdominal pain. Negative for blood in  stool, constipation, diarrhea, hematemesis, hematochezia, melena, nausea and vomiting.   Endocrine: Negative for cold intolerance and heat intolerance.   Genitourinary:  Negative for difficulty urinating, dysuria, flank pain, frequency and urgency.   Skin:  Negative for rash and wound.   Allergic/Immunologic: Negative for food allergies and immunocompromised state.   Neurological:  Negative for dizziness, seizures, syncope, weakness, numbness and headaches.        Shaking   Psychiatric/Behavioral:  Negative for agitation, behavioral problems and confusion.        Physical Exam  Physical Exam  Vitals and nursing note reviewed.   Constitutional:       General: He is not in acute distress.     Appearance: He is diaphoretic.   HENT:      Head: Normocephalic and atraumatic.      Right Ear: External ear normal.      Left Ear: External ear normal.      Mouth/Throat:      Mouth: Oropharynx is clear and moist.      Pharynx: No oropharyngeal exudate.   Eyes:      Conjunctiva/sclera: Conjunctivae normal.   Neck:      Vascular: No JVD.      Trachea: No tracheal deviation.   Cardiovascular:      Rate and Rhythm: Regular rhythm. Tachycardia present.      Heart sounds: Normal heart sounds. No murmur heard.     No friction rub. No gallop.   Pulmonary:      Effort: Pulmonary effort is normal. No respiratory distress.      Breath sounds: Normal breath sounds. No wheezing or rales.   Chest:      Chest wall: No tenderness.   Abdominal:      General: Bowel sounds are normal. There is no distension.      Palpations: Abdomen is soft.      Tenderness: There is abdominal tenderness in the epigastric area, periumbilical area and left upper quadrant. There is no guarding.   Musculoskeletal:         General: No tenderness, deformity or edema. Normal range of motion.   Lymphadenopathy:      Cervical: No cervical adenopathy.   Skin:     General: Skin is warm.      Findings: No erythema or rash.   Neurological:      General: No focal deficit  present.      Mental Status: He is alert and oriented to person, place, and time.      Motor: Tremor present.   Psychiatric:         Mood and Affect: Mood and affect and mood normal.         Behavior: Behavior normal.         Vital Signs  ED Triage Vitals [06/06/24 0943]   Temperature Pulse Respirations Blood Pressure SpO2   98 °F (36.7 °C) (!) 110 19 136/94 98 %      Temp Source Heart Rate Source Patient Position - Orthostatic VS BP Location FiO2 (%)   Oral -- Lying Right arm --      Pain Score       8           Vitals:    06/06/24 1231 06/06/24 1301 06/06/24 1401 06/06/24 1500   BP: 157/96 158/96 (!) 143/102 152/98   Pulse: 103 104 103 103   Patient Position - Orthostatic VS:             Visual Acuity      ED Medications  Medications   sodium chloride 0.9 % bolus 500 mL (has no administration in time range)   ondansetron (ZOFRAN) injection 4 mg (4 mg Intravenous Given 6/6/24 1007)   sodium chloride 0.9 % bolus 500 mL (0 mL Intravenous Stopped 6/6/24 1210)   LORazepam (ATIVAN) injection 1 mg (1 mg Intravenous Given 6/6/24 1205)   potassium chloride 20 mEq IVPB (premix) (20 mEq Intravenous New Bag 6/6/24 1318)   iohexol (OMNIPAQUE) 350 MG/ML injection (MULTI-DOSE) 100 mL (100 mL Intravenous Given 6/6/24 1331)       Diagnostic Studies  Results Reviewed       Procedure Component Value Units Date/Time    UA w Reflex to Microscopic w Reflex to Culture [[id number]]  (Abnormal) Collected: 06/06/24 1357    Lab Status: Final result Specimen: Urine, Clean Catch Updated: 06/06/24 1427     Color, UA Yellow     Clarity, UA Clear     Specific Gravity, UA 1.012     pH, UA 8.5     Leukocytes, UA Negative     Nitrite, UA Negative     Protein, UA 30 (1+) mg/dl      Glucose, UA Trace mg/dl      Ketones, UA Negative mg/dl      Urobilinogen, UA 2.0 mg/dl      Bilirubin, UA Negative     Occult Blood, UA Negative    Urine Microscopic [[id number]] Collected: 06/06/24 1357    Lab Status: In process Specimen: Urine, Clean Catch Updated:  06/06/24 1427    HS Troponin I 2hr [[id number]]  (Normal) Collected: 06/06/24 1245    Lab Status: Final result Specimen: Blood from Arm, Left Updated: 06/06/24 1426     hs TnI 2hr 9 ng/L      Delta 2hr hsTnI -1 ng/L     Blood culture #1 [[id number]] Collected: 06/06/24 1005    Lab Status: Preliminary result Specimen: Blood from Hand, Left Updated: 06/06/24 1301     Blood Culture Received in Microbiology Lab. Culture in Progress.    Blood culture #2 [[id number]] Collected: 06/06/24 1005    Lab Status: Preliminary result Specimen: Blood from Arm, Left Updated: 06/06/24 1301     Blood Culture Received in Microbiology Lab. Culture in Progress.    Comprehensive metabolic panel [[id number]]  (Abnormal) Collected: 06/06/24 1005    Lab Status: Final result Specimen: Blood from Arm, Left Updated: 06/06/24 1211     Sodium 129 mmol/L      Potassium 2.9 mmol/L      Chloride 89 mmol/L      CO2 23 mmol/L      ANION GAP 17 mmol/L      BUN 3 mg/dL      Creatinine 0.57 mg/dL      Glucose 191 mg/dL      Calcium 7.8 mg/dL       U/L      ALT 65 U/L      Alkaline Phosphatase 176 U/L      Total Protein 6.3 g/dL      Albumin 3.6 g/dL      Total Bilirubin 1.86 mg/dL      eGFR 107 ml/min/1.73sq m     Narrative:      [employer] guidelines for Chronic Kidney Disease (CKD):     Stage 1 with normal or high GFR (GFR > 90 mL/min/1.73 square meters)    Stage 2 Mild CKD (GFR = 60-89 mL/min/1.73 square meters)    Stage 3A Moderate CKD (GFR = 45-59 mL/min/1.73 square meters)    Stage 3B Moderate CKD (GFR = 30-44 mL/min/1.73 square meters)    Stage 4 Severe CKD (GFR = 15-29 mL/min/1.73 square meters)    Stage 5 End Stage CKD (GFR <15 mL/min/1.73 square meters)  Note: GFR calculation is accurate only with a steady state creatinine    Lipase [[id number]]  (Abnormal) Collected: 06/06/24 1005    Lab Status: Final result Specimen: Blood from Arm, Left Updated: 06/06/24 1211     Lipase 549 u/L     Ethanol [[id number]]  (Normal)  Collected: 06/06/24 1005    Lab Status: Final result Specimen: Blood from Arm, Left Updated: 06/06/24 1143     Ethanol Lvl <10 mg/dL     HS Troponin 0hr (reflex protocol) [[id number]]  (Normal) Collected: 06/06/24 1005    Lab Status: Final result Specimen: Blood from Arm, Left Updated: 06/06/24 1039     hs TnI 0hr 10 ng/L     Protime-INR [[id number]]  (Normal) Collected: 06/06/24 1005    Lab Status: Final result Specimen: Blood from Arm, Left Updated: 06/06/24 1032     Protime 14.3 seconds      INR 1.12    APTT [[id number]]  (Normal) Collected: 06/06/24 1005    Lab Status: Final result Specimen: Blood from Arm, Left Updated: 06/06/24 1032     PTT 26 seconds     CBC and differential [[id number]]  (Abnormal) Collected: 06/06/24 1005    Lab Status: Final result Specimen: Blood from Arm, Left Updated: 06/06/24 1018     WBC 5.57 Thousand/uL      RBC 4.24 Million/uL      Hemoglobin 14.8 g/dL      Hematocrit 41.8 %      MCV 99 fL      MCH 34.9 pg      MCHC 35.4 g/dL      RDW 14.0 %      MPV 10.3 fL      Platelets 183 Thousands/uL      nRBC 0 /100 WBCs      Segmented % 73 %      Immature Grans % 1 %      Lymphocytes % 18 %      Monocytes % 7 %      Eosinophils Relative 0 %      Basophils Relative 1 %      Absolute Neutrophils 4.08 Thousands/µL      Absolute Immature Grans 0.04 Thousand/uL      Absolute Lymphocytes 0.99 Thousands/µL      Absolute Monocytes 0.41 Thousand/µL      Eosinophils Absolute 0.01 Thousand/µL      Basophils Absolute 0.04 Thousands/µL                    CT abdomen pelvis with contrast   Final Result by Beth Matute MD (06/06 1512)      Findings compatible with acute interstitial pancreatitis. No evidence of pancreatic necrosis. No peripancreatic fluid collection.      Increased pancreatic ductal dilatation when compared to prior studies which may be due to a stricture but given size increase, recommend further evaluation of the pancreas with an MRI using pancreatic mass/MRCP protocol.      Stable size  of cystic lesion in the tail the pancreas. This could also be further evaluated with pancreatic MRI      Hepatomegaly and diffuse hepatic steatosis.      The study was marked in EPIC for immediate notification.      Small volume ascites, splenomegaly, and varices compatible with portal hypertension.      The study was marked in EPIC for immediate notification.      Imaging follow-up reminder notification scheduled in the electronic medical record.         Workstation performed: [id number]                    Procedures  ECG 12 Lead Documentation Only    Date/Time: 6/6/2024 11:32 AM    Performed by: Za Cortez PA-C  Authorized by: Za Cortez PA-C    Indications / Diagnosis:  Epigastric pain  Patient location:  ED  Previous ECG:     Previous ECG:  Compared to current    Comparison ECG info:  4/20/24    Similarity:  No change  Interpretation:     Interpretation: abnormal    Rate:     ECG rate:  112    ECG rate assessment: tachycardic    Rhythm:     Rhythm: sinus tachycardia    Ectopy:     Ectopy: none    QRS:     QRS axis:  Normal    QRS intervals:  Normal  Conduction:     Conduction: normal    T waves:     T waves: non-specific             ED Course                               SBIRT 22yo+      Flowsheet Row Most Recent Value   Initial Alcohol Screen: US AUDIT-C     1. How often do you have a drink containing alcohol? 0 Filed at: 06/06/2024 0944   2. How many drinks containing alcohol do you have on a typical day you are drinking?  0 Filed at: 06/06/2024 0944   3a. Male UNDER 65: How often do you have five or more drinks on one occasion? 0 Filed at: 06/06/2024 0944   3b. FEMALE Any Age, or MALE 65+: How often do you have 4 or more drinks on one occassion? 0 Filed at: 06/06/2024 0944   Audit-C Score 0 Filed at: 06/06/2024 0944   JAZZ: How many times in the past year have you...    Used an illegal drug or used a prescription medication for non-medical reasons? Never Filed at: 06/06/2024 0944                       Medical Decision Making  Differential diagnosis includes but not limited to: Gastritis, esophagitis, pancreatitis, alcohol withdrawal    Amount and/or Complexity of Data Reviewed  Labs: ordered. Decision-making details documented in ED Course.  Radiology: ordered.    Risk  Prescription drug management.             Disposition  Final diagnoses:   Acute pancreatitis, unspecified complication status, unspecified pancreatitis type     Time reflects when diagnosis was documented in both MDM as applicable and the Disposition within this note       Time User Action Codes Description Comment    6/6/2024  3:24 PM Za Cortez Add [K85.90] Acute pancreatitis, unspecified complication status, unspecified pancreatitis type           ED Disposition       ED Disposition   Admit    Condition   Stable    Date/Time   Thu Jun 6, 2024 1524    Comment   Case was discussed with CRISTINO and the patient's admission status was agreed to be Admission Status: inpatient status to the service of Dr. Mccoy .               Follow-up Information    None         Patient's Medications   Discharge Prescriptions    No medications on file       No discharge procedures on file.    PDMP Review         Value Time User    PDMP Reviewed  Yes 7/9/2023  8:07 PM Daisy Dupree PA-C            ED Provider  Electronically Signed by             Za Cortez PA-C  06/06/24 1527

## 2024-06-06 NOTE — H&P
[facility]  H&P  Name: Sammy Mays 64 y.o. male I MRN: [id number]  Unit/Bed#: [facility] 929-01 I Date of Admission: 6/6/2024   Date of Service: 6/6/2024 I Hospital Day: 0      Assessment & Plan:    * Alcoholic pancreatitis  Assessment & Plan  Presents with acute on chronic abdominal pain/discomfort in setting of chronic alcoholism and CT imaging evidence of acute interstitial pancreatitis without evidence of necrosis or peripancreatic fluid collection  PRN pain/emesis control  Trend lipase - presented at 549  Remains NPO on IV fluids  Alcohol cessation counseling, however, unlikely to comply  If clinically worsens, should pursue gastroenterology evaluation    Alcohol abuse  Assessment & Plan  Acute on chronic issue  Cessation counseling  [facility] protocol  Supplemental thiamine/folic acid and multivitamin  PPI regimen  Monitor/replete electrolyte deficiencies if present    Hyponatremia  Assessment & Plan  Likely multifactorial secondary to alcoholism along with decreased oral solute intake  In light of presenting peritonitis, remains NPO on IV fluids  Monitor serum sodium -> if paradoxically worsens, may require fluid restriction  Check urine and serum osmolalities - check urine sodium    Hypokalemia  Assessment & Plan  Monitor/replete serum potassium and magnesium    Elevated LFTs  Assessment & Plan  In the setting of chronic alcoholism  Limit/avoid hepatotoxins as possible  Check RUQ ultrasound with liver dopplers  Encourage alcohol cessation    Depression  Assessment & Plan  Continue Prozac    Hypothyroidism  Assessment & Plan  Continue Synthroid      DVT Prophylaxis:  Heparin SC    Code Status:  Full code    Discussion with:  Patient at bedside    Anticipated Length of Stay:  Patient will be admitted on an Inpatient basis with an anticipated length of stay of greater than 2 midnights.   Justification for Hospital Stay: Alcoholic pancreatitis with abuse requiring withdrawal  monitoring, pain/emesis control, IV fluid hydration, and slow/progressive advancement of diet.    Total Time for Visit, including Counseling / Coordination of Care: 75 minutes. More than 50% of total time spent on counseling and coordination of care, on one or more of the following: performing physical exam; counseling and coordination of care; obtaining or reviewing history; documenting in the medical record; reviewing/ordering tests, medications or procedures; communicating with other healthcare professionals and discussing with patient's family/caregivers.      Chief Complaint:  Abdominal pain      History of Present Illness:    Sammy Mays is a 64 y.o. male who presents with complaints of abdominal pain found to have alcoholic pancreatitis on imaging.  Of note, he is a chronic alcoholic state that his last drink was just over a day ago.  At the time my encounter, he is anxious with mild/fine upper extremity extension tremors.  When asked about his drinking, he states that he drinks approximately 3 ounces of scotch/whiskey daily, and in the past at times has undergone periods of sobriety.  Denies other acute complaints at this time.      Review of Systems:    Review of Systems - A thorough 12 point review systems was conducted.  Pertinent positives and negatives are mentioned in the history of present illness.      Past Medical and Surgical History:     Past Medical History:   Diagnosis Date    Medical history unknown        Past Surgical History:   Procedure Laterality Date    REPAIR / REINSERT BICEPS TENDON AT ELBOW      Last Assessed: 1/12/2016          Medications & Allergies:    Prior to Admission medications    Medication Sig Start Date End Date Taking? Authorizing Provider   albuterol (PROVENTIL HFA,VENTOLIN HFA) 90 mcg/act inhaler Inhale 1-2 puffs    Historical Provider, MD   allopurinol (ZYLOPRIM) 300 mg tablet TAKE 1 TABLET EVERY DAY 8/15/19   Sarah Fiore MD   cetirizine (ZyrTEC) 10 mg tablet  Take one tablet by mouth daily as needed for allergies/congestion  5/2/14   Historical Provider, MD   FLUoxetine (PROzac) 40 MG capsule TAKE ONE CAPSULE BY MOUTH EVERY DAY 7/22/19   Sarah Fiore MD   folic acid (FOLVITE) 1 mg tablet Take 1 tablet (1 mg total) by mouth daily for 7 days Do not start before October 19, 2023. 10/19/23 10/26/23  Ronny Lofton PA-C   levothyroxine 75 mcg tablet TAKE 1 TABLET IN THE MORNING ON AN EMPTY STOMACH FOR THYROID 6/9/19   Sraah Fiore MD   Multiple Vitamin (DAILY VALUE MULTIVITAMIN) TABS Take 1 tablet by mouth daily    Historical Provider, MD   pantoprazole (PROTONIX) 40 mg tablet Take 1 tablet (40 mg total) by mouth daily in the early morning 4/23/24   ANI Barlow   thiamine 100 MG tablet Take 1 tablet (100 mg total) by mouth daily for 5 days Do not start before October 19, 2023. 10/19/23 10/24/23  Ronny Lofton PA-C         Allergies:   Allergies   Allergen Reactions    Amoxicillin Swelling and Fever     Other reaction(s): vertigo    Escitalopram      Other reaction(s): sweaty palms/hands, felt faint, passed out for a short time, had anxiety/panic attack    Gemfibrozil      Other reaction(s): Nausea and/or vomiting  Other reaction(s): Nausea and/or vomiting    Other      Other reaction(s): Other (See Comments)  rhinitis, itchy eyes         Social History:    Substance Use History:   Social History     Substance and Sexual Activity   Alcohol Use Yes    Comment: social     Social History     Tobacco Use   Smoking Status Never   Smokeless Tobacco Never     Social History     Substance and Sexual Activity   Drug Use Yes    Types: Marijuana         Family History:    Per medical chart, significant for diabetes mellitus in mother      Physical Exam:     Vitals:   Blood Pressure: (!) 169/101 (06/06/24 1833)  Pulse: 103 (06/06/24 1712)  Temperature: 98.1 °F (36.7 °C) (06/06/24 1833)  Temp Source: Oral (06/06/24 0943)  Respirations: 16 (06/06/24 1833)  SpO2: 96 %  (06/06/24 1500)      GENERAL Mildly weak/fatigued   HEAD   Normocephalic - atraumatic   EYES   PERRL - EOMI    MOUTH   Mucosa moist   NECK   Supple - full range of motion   CARDIAC Tachycardic   PULMONARY    Fairly clear to auscultation   ABDOMEN Nondistended but generally tender more prominent in the epigastric region   MUSCULOSKELETAL   Motor strength/range of motion deconditioned   NEUROLOGIC   Alert/oriented at baseline - fine upper extremity extension tremors   SKIN   Chronic wrinkles/blemishes mildly anxious   PSYCHIATRIC   Mood/affect stable         Additional Data:     Labs & Recent Cultures:    Results from last 7 days   Lab Units 06/06/24  1005   WBC Thousand/uL 5.57   HEMOGLOBIN g/dL 14.8   HEMATOCRIT % 41.8   PLATELETS Thousands/uL 183   SEGS PCT % 73   LYMPHO PCT % 18   MONO PCT % 7   EOS PCT % 0     Results from last 7 days   Lab Units 06/06/24  1005   SODIUM mmol/L 129*   POTASSIUM mmol/L 2.9*   CHLORIDE mmol/L 89*   CO2 mmol/L 23   BUN mg/dL 3*   CREATININE mg/dL 0.57*   ANION GAP mmol/L 17*   CALCIUM mg/dL 7.8*   ALBUMIN g/dL 3.6   TOTAL BILIRUBIN mg/dL 1.86*   ALK PHOS U/L 176*   ALT U/L 65*   AST U/L 123*   GLUCOSE RANDOM mg/dL 191*     Results from last 7 days   Lab Units 06/06/24  1005   INR  1.12                     Results from last 7 days   Lab Units 06/06/24  1005   BLOOD CULTURE  Received in Microbiology Lab. Culture in Progress.  Received in Microbiology Lab. Culture in Progress.         Lines/Drains:  Invasive Devices       Peripheral Intravenous Line  Duration             Peripheral IV 06/06/24 Left Antecubital <1 day                      Imaging:     CT abdomen pelvis with contrast    Result Date: 6/6/2024  Narrative: CT ABDOMEN AND PELVIS WITH IV CONTRAST INDICATION: pain. COMPARISON: Multiple prior studies, most recent CT scan is dated October 17, 2023. TECHNIQUE: CT examination of the abdomen and pelvis was performed. Multiplanar 2D reformatted images were created from the source  data. This examination, like all CT scans performed in the [facility], was performed utilizing techniques to minimize radiation dose exposure, including the use of iterative reconstruction and automated exposure control. Radiation dose length product (DLP) for this visit: 520.18 mGy-cm IV Contrast: 100 mL of iohexol (OMNIPAQUE) Enteric Contrast: Not administered. FINDINGS: ABDOMEN LOWER CHEST: No clinically significant abnormality in the visualized lower chest. LIVER/BILIARY TREE: Hepatomegaly and diffuse hepatic steatosis. No focal liver lesions. GALLBLADDER: Cholecystectomy SPLEEN: Splenomegaly with the spleen measuring 14 cm craniocaudally. Stable 1 cm low-attenuation likely benign lesion. PANCREAS: Peripancreatic fat stranding and small amount of fluid compatible with acute edematous pancreatitis. No evidence of pancreatic necrosis. No loculated peripancreatic fluid collection. The pancreatic duct is dilated measuring up to 1.3 cm in the neck of the pancreas with abrupt termination in the pancreatic head, as shown previously. However, the degree of pancreatic ductal dilatation has increased, now measuring up to 14 mm in the neck of the pancreas, previously measuring 9 mm. Cystic pancreatic tail lesion measures 2.2 x 1.3 cm, unchanged (series 2 images 64 and 65). ADRENAL GLANDS: Unremarkable. KIDNEYS/URETERS: Exophytic cyst upper pole left kidney is unchanged. STOMACH AND BOWEL: Diverticulosis. No evidence of acute diverticulitis. No bowel wall thickening. APPENDIX: No findings to suggest appendicitis. ABDOMINOPELVIC CAVITY: Trace perisplenic and perihepatic ascites. VESSELS: Atherosclerosis without abdominal aortic aneurysm. Omental, and perigastric varices compatible with portal hypertension. PELVIS REPRODUCTIVE ORGANS: Unremarkable for patient's age. URINARY BLADDER: Unremarkable. ABDOMINAL WALL/INGUINAL REGIONS: Unremarkable. BONES: No acute fracture or suspicious osseous lesion.      Impression: Findings compatible with acute interstitial pancreatitis. No evidence of pancreatic necrosis. No peripancreatic fluid collection. Increased pancreatic ductal dilatation when compared to prior studies which may be due to a stricture but given size increase, recommend further evaluation of the pancreas with an MRI using pancreatic mass/MRCP protocol. Stable size of cystic lesion in the tail the pancreas. This could also be further evaluated with pancreatic MRI Hepatomegaly and diffuse hepatic steatosis. The study was marked in EPIC for immediate notification. Small volume ascites, splenomegaly, and varices compatible with portal hypertension. The study was marked in EPIC for immediate notification. Imaging follow-up reminder notification scheduled in the electronic medical record. Workstation performed: [id number]                   ANGELES TATE MD   Hospitalist - [facility] Internal Medicine          ** Please Note: This note is constructed using a voice recognition dictation system.  An occasional wrong word/phrase or “sound-a-like” substitution may have been picked up by dictation device due to the inherent limitations of voice recognition software.  Read the chart carefully and recognize, using reasonable context, where substitutions may have occurred.**

## 2024-06-06 NOTE — ASSESSMENT & PLAN NOTE
In the setting of chronic alcoholism  Limit/avoid hepatotoxins as possible  Check RUQ ultrasound with liver dopplers  Encourage alcohol cessation

## 2024-06-06 NOTE — ASSESSMENT & PLAN NOTE
Likely multifactorial secondary to alcoholism along with decreased oral solute intake  In light of presenting peritonitis, remains NPO on IV fluids  Monitor serum sodium -> if paradoxically worsens, may require fluid restriction  Check urine and serum osmolalities - check urine sodium

## 2024-06-06 NOTE — ASSESSMENT & PLAN NOTE
Presents with acute on chronic abdominal pain/discomfort in setting of chronic alcoholism and CT imaging evidence of acute interstitial pancreatitis without evidence of necrosis or peripancreatic fluid collection  PRN pain/emesis control  Trend lipase - presented at 549  Remains NPO on IV fluids  Alcohol cessation counseling, however, unlikely to comply  If clinically worsens, should pursue gastroenterology evaluation

## 2024-06-07 ENCOUNTER — APPOINTMENT (INPATIENT)
Dept: RADIOLOGY | Facility: HOSPITAL | Age: 64
DRG: 439 | End: 2024-06-07
Payer: MEDICARE

## 2024-06-07 LAB
ALBUMIN SERPL BCP-MCNC: 3.2 G/DL (ref 3.5–5)
ALP SERPL-CCNC: 152 U/L (ref 34–104)
ALT SERPL W P-5'-P-CCNC: 53 U/L (ref 7–52)
ANION GAP SERPL CALCULATED.3IONS-SCNC: 13 MMOL/L (ref 4–13)
AST SERPL W P-5'-P-CCNC: 87 U/L (ref 13–39)
BASOPHILS # BLD AUTO: 0.03 THOUSANDS/ÂΜL (ref 0–0.1)
BASOPHILS NFR BLD AUTO: 1 % (ref 0–1)
BILIRUB SERPL-MCNC: 1.3 MG/DL (ref 0.2–1)
BUN SERPL-MCNC: 3 MG/DL (ref 5–25)
CALCIUM ALBUM COR SERPL-MCNC: 7.7 MG/DL (ref 8.3–10.1)
CALCIUM SERPL-MCNC: 7.1 MG/DL (ref 8.4–10.2)
CHLORIDE SERPL-SCNC: 96 MMOL/L (ref 96–108)
CO2 SERPL-SCNC: 25 MMOL/L (ref 21–32)
CREAT SERPL-MCNC: 0.37 MG/DL (ref 0.6–1.3)
EOSINOPHIL # BLD AUTO: 0.01 THOUSAND/ÂΜL (ref 0–0.61)
EOSINOPHIL NFR BLD AUTO: 0 % (ref 0–6)
ERYTHROCYTE [DISTWIDTH] IN BLOOD BY AUTOMATED COUNT: 14.2 % (ref 11.6–15.1)
GFR SERPL CREATININE-BSD FRML MDRD: 127 ML/MIN/1.73SQ M
GLUCOSE SERPL-MCNC: 124 MG/DL (ref 65–140)
HCT VFR BLD AUTO: 38.1 % (ref 36.5–49.3)
HGB BLD-MCNC: 13.2 G/DL (ref 12–17)
IMM GRANULOCYTES # BLD AUTO: 0.03 THOUSAND/UL (ref 0–0.2)
IMM GRANULOCYTES NFR BLD AUTO: 1 % (ref 0–2)
LIPASE SERPL-CCNC: 328 U/L (ref 11–82)
LYMPHOCYTES # BLD AUTO: 0.83 THOUSANDS/ÂΜL (ref 0.6–4.47)
LYMPHOCYTES NFR BLD AUTO: 20 % (ref 14–44)
MAGNESIUM SERPL-MCNC: 0.8 MG/DL (ref 1.9–2.7)
MCH RBC QN AUTO: 35 PG (ref 26.8–34.3)
MCHC RBC AUTO-ENTMCNC: 34.6 G/DL (ref 31.4–37.4)
MCV RBC AUTO: 101 FL (ref 82–98)
MONOCYTES # BLD AUTO: 0.42 THOUSAND/ÂΜL (ref 0.17–1.22)
MONOCYTES NFR BLD AUTO: 10 % (ref 4–12)
NEUTROPHILS # BLD AUTO: 2.79 THOUSANDS/ÂΜL (ref 1.85–7.62)
NEUTS SEG NFR BLD AUTO: 68 % (ref 43–75)
NRBC BLD AUTO-RTO: 0 /100 WBCS
PHOSPHATE SERPL-MCNC: 3.1 MG/DL (ref 2.3–4.1)
PLATELET # BLD AUTO: 124 THOUSANDS/UL (ref 149–390)
PMV BLD AUTO: 10.1 FL (ref 8.9–12.7)
POTASSIUM SERPL-SCNC: 3.1 MMOL/L (ref 3.5–5.3)
PROT SERPL-MCNC: 5.7 G/DL (ref 6.4–8.4)
RBC # BLD AUTO: 3.77 MILLION/UL (ref 3.88–5.62)
SODIUM SERPL-SCNC: 134 MMOL/L (ref 135–147)
WBC # BLD AUTO: 4.11 THOUSAND/UL (ref 4.31–10.16)

## 2024-06-07 PROCEDURE — 97167 OT EVAL HIGH COMPLEX 60 MIN: CPT

## 2024-06-07 PROCEDURE — 99232 SBSQ HOSP IP/OBS MODERATE 35: CPT | Performed by: INTERNAL MEDICINE

## 2024-06-07 PROCEDURE — 83690 ASSAY OF LIPASE: CPT | Performed by: INTERNAL MEDICINE

## 2024-06-07 PROCEDURE — 83735 ASSAY OF MAGNESIUM: CPT | Performed by: INTERNAL MEDICINE

## 2024-06-07 PROCEDURE — 80053 COMPREHEN METABOLIC PANEL: CPT | Performed by: INTERNAL MEDICINE

## 2024-06-07 PROCEDURE — 84100 ASSAY OF PHOSPHORUS: CPT | Performed by: INTERNAL MEDICINE

## 2024-06-07 PROCEDURE — 97163 PT EVAL HIGH COMPLEX 45 MIN: CPT

## 2024-06-07 PROCEDURE — 85025 COMPLETE CBC W/AUTO DIFF WBC: CPT | Performed by: INTERNAL MEDICINE

## 2024-06-07 PROCEDURE — 70450 CT HEAD/BRAIN W/O DYE: CPT

## 2024-06-07 RX ORDER — LORAZEPAM 2 MG/ML
2 INJECTION INTRAMUSCULAR ONCE
Status: COMPLETED | OUTPATIENT
Start: 2024-06-07 | End: 2024-06-07

## 2024-06-07 RX ORDER — LORAZEPAM 2 MG/ML
1 INJECTION INTRAMUSCULAR ONCE
Status: COMPLETED | OUTPATIENT
Start: 2024-06-07 | End: 2024-06-07

## 2024-06-07 RX ORDER — MAGNESIUM SULFATE HEPTAHYDRATE 40 MG/ML
2 INJECTION, SOLUTION INTRAVENOUS ONCE
Status: COMPLETED | OUTPATIENT
Start: 2024-06-07 | End: 2024-06-07

## 2024-06-07 RX ORDER — POTASSIUM CHLORIDE 20 MEQ/1
40 TABLET, EXTENDED RELEASE ORAL ONCE
Status: COMPLETED | OUTPATIENT
Start: 2024-06-07 | End: 2024-06-07

## 2024-06-07 RX ORDER — LANOLIN ALCOHOL/MO/W.PET/CERES
400 CREAM (GRAM) TOPICAL 2 TIMES DAILY
Status: DISCONTINUED | OUTPATIENT
Start: 2024-06-07 | End: 2024-06-10 | Stop reason: HOSPADM

## 2024-06-07 RX ORDER — SODIUM CHLORIDE, SODIUM LACTATE, POTASSIUM CHLORIDE, CALCIUM CHLORIDE 600; 310; 30; 20 MG/100ML; MG/100ML; MG/100ML; MG/100ML
125 INJECTION, SOLUTION INTRAVENOUS CONTINUOUS
Status: DISPENSED | OUTPATIENT
Start: 2024-06-07 | End: 2024-06-08

## 2024-06-07 RX ADMIN — POTASSIUM CHLORIDE 40 MEQ: 1500 TABLET, EXTENDED RELEASE ORAL at 09:57

## 2024-06-07 RX ADMIN — MAGNESIUM SULFATE HEPTAHYDRATE 2 G: 40 INJECTION, SOLUTION INTRAVENOUS at 09:57

## 2024-06-07 RX ADMIN — MAGNESIUM OXIDE TAB 400 MG (241.3 MG ELEMENTAL MG) 400 MG: 400 (241.3 MG) TAB at 17:01

## 2024-06-07 RX ADMIN — HEPARIN SODIUM 5000 UNITS: 5000 INJECTION INTRAVENOUS; SUBCUTANEOUS at 21:51

## 2024-06-07 RX ADMIN — PANTOPRAZOLE SODIUM 40 MG: 40 TABLET, DELAYED RELEASE ORAL at 06:04

## 2024-06-07 RX ADMIN — HEPARIN SODIUM 5000 UNITS: 5000 INJECTION INTRAVENOUS; SUBCUTANEOUS at 00:09

## 2024-06-07 RX ADMIN — SODIUM CHLORIDE, SODIUM LACTATE, POTASSIUM CHLORIDE, AND CALCIUM CHLORIDE 125 ML/HR: .6; .31; .03; .02 INJECTION, SOLUTION INTRAVENOUS at 20:33

## 2024-06-07 RX ADMIN — LORAZEPAM 1 MG: 2 INJECTION INTRAMUSCULAR; INTRAVENOUS at 12:10

## 2024-06-07 RX ADMIN — MAGNESIUM OXIDE TAB 400 MG (241.3 MG ELEMENTAL MG) 400 MG: 400 (241.3 MG) TAB at 09:57

## 2024-06-07 RX ADMIN — LORAZEPAM 1 MG: 2 INJECTION INTRAMUSCULAR; INTRAVENOUS at 01:02

## 2024-06-07 RX ADMIN — LEVOTHYROXINE SODIUM 75 MCG: 75 TABLET ORAL at 06:04

## 2024-06-07 RX ADMIN — HEPARIN SODIUM 5000 UNITS: 5000 INJECTION INTRAVENOUS; SUBCUTANEOUS at 13:49

## 2024-06-07 RX ADMIN — THIAMINE HYDROCHLORIDE: 100 INJECTION, SOLUTION INTRAMUSCULAR; INTRAVENOUS at 09:03

## 2024-06-07 RX ADMIN — POTASSIUM CHLORIDE AND SODIUM CHLORIDE 100 ML/HR: 900; 300 INJECTION, SOLUTION INTRAVENOUS at 09:04

## 2024-06-07 RX ADMIN — MAGNESIUM SULFATE HEPTAHYDRATE 2 G: 40 INJECTION, SOLUTION INTRAVENOUS at 20:37

## 2024-06-07 RX ADMIN — LORATADINE 10 MG: 10 TABLET ORAL at 09:12

## 2024-06-07 RX ADMIN — ALLOPURINOL 300 MG: 300 TABLET ORAL at 09:13

## 2024-06-07 RX ADMIN — LORAZEPAM 2 MG: 2 INJECTION INTRAMUSCULAR; INTRAVENOUS at 20:37

## 2024-06-07 RX ADMIN — HEPARIN SODIUM 5000 UNITS: 5000 INJECTION INTRAVENOUS; SUBCUTANEOUS at 06:04

## 2024-06-07 RX ADMIN — FLUOXETINE 40 MG: 20 CAPSULE ORAL at 09:12

## 2024-06-07 RX ADMIN — POTASSIUM CHLORIDE 40 MEQ: 1500 TABLET, EXTENDED RELEASE ORAL at 20:35

## 2024-06-07 NOTE — QUICK NOTE
Overnight, nursing noted horizontal nystagmus on neuro exam. Otherwise remainder of exam was largely nonfocal. Examined pt at bedside and agreed with this assessment. D/w neurology resident and sent for CTH which was negative. Overall appears benign as pt has no neurologic symptoms or deficits. Monitor.

## 2024-06-07 NOTE — PROGRESS NOTES
[facility]  Progress Note  Name: Sammy Mays I  MRN: [id number]  [facility] Date of Admission: 6/6/2024   Date of Service: 6/7/2024 I Hospital Day: 1    Assessment & Plan   Elevated LFTs  Assessment & Plan  In the setting of chronic alcoholism  Limit/avoid hepatotoxins as possible  Check RUQ ultrasound with liver dopplers  Encourage alcohol cessation    Hypokalemia  Assessment & Plan  Monitor/replete serum potassium and magnesium    Hyponatremia  Assessment & Plan  Likely multifactorial secondary to alcoholism along with decreased oral solute intake  In light of presenting peritonitis, remains NPO on IV fluids  Monitor serum sodium -> if paradoxically worsens, may require fluid restriction  Check urine and serum osmolalities - check urine sodium    Depression  Assessment & Plan  Continue Prozac    Alcohol abuse  Assessment & Plan  Acute on chronic issue  Cessation counseling  [facility] protocol  Supplemental thiamine/folic acid and multivitamin  PPI regimen  Monitor/replete electrolyte deficiencies if present  Replete potassium and magnesium, phosphorus within normal limits monitor for refeeding syndrome    Hypothyroidism  Assessment & Plan  Continue Synthroid    * Alcoholic pancreatitis  Assessment & Plan  Presents with acute on chronic abdominal pain/discomfort in setting of chronic alcoholism and CT imaging evidence of acute interstitial pancreatitis without evidence of necrosis or peripancreatic fluid collection  PRN pain/emesis control  Trend lipase - presented at 549  Resume clear liquid diet today  Adjust fluids to lactated Ringer's  Alcohol cessation counseling, however, unlikely to comply  If clinically worsens, should pursue gastroenterology evaluation               VTE Pharmacologic Prophylaxis:   Moderate Risk (Score 3-4) - Pharmacological DVT Prophylaxis Ordered: enoxaparin (Lovenox).    Mobility:   Basic Mobility Inpatient Raw Score: 13  -[facility] Goal: 4:  Move to chair/commode  JH-HLM Achieved: 6: Walk 10 steps or more  JH-HLM Goal achieved. Continue to encourage appropriate mobility.    Patient Centered Rounds: I performed bedside rounds with nursing staff today.   Discussions with Specialists or Other Care Team Provider:     Education and Discussions with Family / Patient: Patient declined call to .     Total Time Spent on Date of Encounter in care of patient:  mins. This time was spent on one or more of the following: performing physical exam; counseling and coordination of care; obtaining or reviewing history; documenting in the medical record; reviewing/ordering tests, medications or procedures; communicating with other healthcare professionals and discussing with patient's family/caregivers.    Current Length of Stay: 1 day(s)  Current Patient Status: Inpatient   Certification Statement: The patient will continue to require additional inpatient hospital stay due to abd pain, withdrawal  Discharge Plan: Anticipate discharge in 48-72 hrs to discharge location to be determined pending rehab evaluations.    Code Status: Level 1 - Full Code    Subjective:   Patient reports mild improvement in abdominal pain otherwise denies any new acute complaints    Objective:     Vitals:   Temp (24hrs), Av.7 °F (37.1 °C), Min:98.5 °F (36.9 °C), Max:98.8 °F (37.1 °C)    Temp:  [98.5 °F (36.9 °C)-98.8 °F (37.1 °C)] 98.6 °F (37 °C)  HR:  [100-102] 101  Resp:  [18-20] 20  BP: (130-166)/() 138/94  SpO2:  [96 %-99 %] 96 %  There is no height or weight on file to calculate BMI.     Input and Output Summary (last 24 hours):     Intake/Output Summary (Last 24 hours) at 2024 1933  Last data filed at 2024 0900  Gross per 24 hour   Intake 0 ml   Output 1 ml   Net -1 ml       Physical Exam:   Physical Exam  Vitals and nursing note reviewed.   Constitutional:       General: He is not in acute distress.     Appearance: He is well-developed. He is not  toxic-appearing or diaphoretic.   HENT:      Head: Normocephalic and atraumatic.   Eyes:      General: No scleral icterus.     Conjunctiva/sclera: Conjunctivae normal.   Cardiovascular:      Rate and Rhythm: Normal rate and regular rhythm.      Heart sounds: No murmur heard.     No friction rub. No gallop.   Pulmonary:      Effort: Pulmonary effort is normal. No respiratory distress.      Breath sounds: Normal breath sounds. No stridor. No wheezing, rhonchi or rales.   Chest:      Chest wall: No tenderness.   Abdominal:      General: There is no distension.      Palpations: Abdomen is soft. There is no mass.      Tenderness: There is abdominal tenderness. There is no guarding or rebound.      Hernia: No hernia is present.   Musculoskeletal:         General: No swelling.      Cervical back: Neck supple.   Skin:     General: Skin is warm and dry.      Capillary Refill: Capillary refill takes less than 2 seconds.   Neurological:      Mental Status: He is alert and oriented to person, place, and time.   Psychiatric:         Mood and Affect: Mood normal.          Additional Data:     Labs:  Results from last 7 days   Lab Units 06/07/24  0613   WBC Thousand/uL 4.11*   HEMOGLOBIN g/dL 13.2   HEMATOCRIT % 38.1   PLATELETS Thousands/uL 124*   SEGS PCT % 68   LYMPHO PCT % 20   MONO PCT % 10   EOS PCT % 0     Results from last 7 days   Lab Units 06/07/24  0613   SODIUM mmol/L 134*   POTASSIUM mmol/L 3.1*   CHLORIDE mmol/L 96   CO2 mmol/L 25   BUN mg/dL 3*   CREATININE mg/dL 0.37*   ANION GAP mmol/L 13   CALCIUM mg/dL 7.1*   ALBUMIN g/dL 3.2*   TOTAL BILIRUBIN mg/dL 1.30*   ALK PHOS U/L 152*   ALT U/L 53*   AST U/L 87*   GLUCOSE RANDOM mg/dL 124     Results from last 7 days   Lab Units 06/06/24  1005   INR  1.12                   Lines/Drains:  Invasive Devices       Peripheral Intravenous Line  Duration             Peripheral IV 06/06/24 Left Antecubital 1 day                      Telemetry:  Telemetry Orders (From admission,  onward)               24 Hour Telemetry Monitoring  Continuous x 24 Hours (Telem)        Expiring   Question:  Reason for 24 Hour Telemetry  Answer:  Decompensated CHF- and any one of the following: continuous diuretic infusion or total diuretic dose >200 mg daily, associated electrolyte derangement (I.e. K < 3.0), ionotropic drip (continuous infusion), hx of ventricular arrhythmia, or new EF < 35%                              Imaging: No pertinent imaging reviewed.    Recent Cultures (last 7 days):   Results from last 7 days   Lab Units 06/06/24  1005   BLOOD CULTURE  No Growth at 24 hrs.  No Growth at 24 hrs.       Last 24 Hours Medication List:   Current Facility-Administered Medications   Medication Dose Route Frequency Provider Last Rate    acetaminophen  650 mg Oral Q6H PRN Teagan Mccoy MD      albuterol  1 puff Inhalation Q4H PRN Teagan Mccoy MD      allopurinol  300 mg Oral Daily Teagan Mccoy MD      FLUoxetine  40 mg Oral Daily Teagan Mccoy MD      folic acid 1 mg, thiamine (VITAMIN B1) 100 mg in sodium chloride 0.9 % 100 mL IV piggyback   Intravenous Daily Teagan Mccoy MD Stopped (06/07/24 1211)    heparin (porcine)  5,000 Units Subcutaneous Q8H [facility] Teagan Mccoy MD      HYDROmorphone  0.3 mg Intravenous Q6H PRN Teagan Mccoy MD      HYDROmorphone  0.5 mg Intravenous Q6H PRN Teagan Mccoy MD      HYDROmorphone  0.2 mg Intravenous Q3H PRN Teagan Mccoy MD      lactated ringers  125 mL/hr Intravenous Continuous Alvaro Stock DO      levothyroxine  75 mcg Oral Early Morning Teagan Mccoy MD      loratadine  10 mg Oral Daily Teagan Mccoy MD      magnesium Oxide  400 mg Oral BID Alvaro Stock,       magnesium sulfate  2 g Intravenous Once Alvaro Stock,       pantoprazole  40 mg Oral Early Morning Teagan Mccoy MD      potassium chloride  40 mEq Oral Once Alvaro Stock, DO      trimethobenzamide  200 mg Intramuscular Q6H PRN Teagan Mccoy MD          Today, Patient Was Seen By: Alvaro Stock  DO    **Please Note: This note may have been constructed using a voice recognition system.**

## 2024-06-07 NOTE — OCCUPATIONAL THERAPY NOTE
"    Occupational Therapy Evaluation     Patient Name: Sammy Mays  Today's Date: 6/7/2024  Problem List  Principal Problem:    Alcoholic pancreatitis  Active Problems:    Hypothyroidism    Alcohol abuse    Depression    Hyponatremia    Hypokalemia    Elevated LFTs    Past Medical History  Past Medical History:   Diagnosis Date    Medical history unknown      Past Surgical History  Past Surgical History:   Procedure Laterality Date    REPAIR / REINSERT BICEPS TENDON AT ELBOW      Last Assessed: 1/12/2016 06/07/24 1044   OT Last Visit   OT Visit Date 06/07/24   Note Type   Note type Evaluation   Pain Assessment   Pain Assessment Tool 0-10   Pain Score No Pain   Restrictions/Precautions   Weight Bearing Precautions Per Order No   Other Precautions Cognitive;Chair Alarm;Bed Alarm;Multiple lines;Telemetry;Fall Risk   Home Living   Type of Home House   Home Layout One level;Performs ADLs on one level;Able to live on main level with bedroom/bathroom  (4 RAMÓN)   Bathroom Shower/Tub Tub/shower unit   Bathroom Toilet Standard   Bathroom Equipment Grab bars in shower   Home Equipment Walker;Cane  (did not use PTA)   Prior Function   Level of [place] Independent with ADLs;Independent with functional mobility;Independent with IADLS   Lives With Alone  (Pt reports that his friend owns the house and used to live w/ him. However, this friends has been living in a nursing home for ~2 years now.)   Receives Help From Friend(s)   IADLs Independent with driving;Independent with meal prep;Independent with medication management   Falls in the last 6 months 0   Vocational Retired   Lifestyle   Autonomy Pt reports I w/ ADLs, IADLs, and fxnl mobility w/o AD at baseline; + driving.   Reciprocal Relationships Pt lives alone; reports having local friends.   Service to Others Pt is retired from [employer].   Intrinsic Gratification Pt enjoys going to the gym.   General   Family/Caregiver Present No   Subjective   Subjective \"I have to " "go to the bathroom.\"   ADL   Eating Assistance 7  Independent   Grooming Assistance 5  Supervision/Setup   UB Bathing Assistance 5  Supervision/Setup   LB Bathing Assistance 4  Minimal Assistance   UB Dressing Assistance 5  Supervision/Setup   LB Dressing Assistance 3  Moderate Assistance   Toileting Assistance  3  Moderate Assistance   Toileting Deficit Setup;Verbal cueing;Supervison/safety;Increased time to complete;Grab bar use;Clothing management up;Clothing management down;Perineal hygiene   Bed Mobility   Supine to Sit 4  Minimal assistance   Additional items Assist x 1;HOB elevated;Bedrails;Increased time required;Verbal cues;LE management   Sit to Supine Unable to assess   Additional Comments Pt seated OOB in chair at end of OT evaluation w/ all needs within reach and alarm activated.   Transfers   Sit to Stand 3  Moderate assistance   Additional items Assist x 1;Increased time required;Verbal cues   Stand to Sit 3  Moderate assistance   Additional items Assist x 1;Armrests;Increased time required;Verbal cues   Toilet transfer 4  Minimal assistance   Additional items Assist x 1;Increased time required;Verbal cues;Standard toilet  (grab bar use)   Additional Comments w/ HHA for support; x2 STS trasnfers t/o session   Functional Mobility   Functional Mobility 3  Moderate assistance   Additional Comments Pt ambulated within his room w/ mod Ax1 using HHA for support.   Additional items Hand hold assistance   Balance   Static Sitting Fair   Dynamic Sitting Fair -   Static Standing Poor +   Dynamic Standing Poor   Ambulatory Poor   Activity Tolerance   Activity Tolerance Patient limited by fatigue;Other (Comment)  (impaired cognition)   Medical Staff Made Aware PTLala, due to pt's medical complexity and multiple comorbidities   Nurse Made Aware RN clearance prior to session   RUE Assessment   RUE Assessment WFL   LUE Assessment   LUE Assessment WFL   Hand Function   Gross Motor Coordination Functional   Fine " Motor Coordination Functional   Cognition   Overall Cognitive Status Impaired   Arousal/Participation Alert;Responsive;Cooperative   Attention Attends with cues to redirect   Orientation Level Oriented to person;Oriented to place;Oriented to time   Memory Decreased recall of precautions;Decreased recall of recent events;Decreased short term memory   Following Commands Follows one step commands with increased time or repetition   Comments Pt was pleasant, cooperative, and willing to participate in OT evaluation. Pt requried + processing and response time t/o session.   Assessment   Limitation Decreased ADL status;Decreased Safe judgement during ADL;Decreased cognition;Decreased endurance;Decreased self-care trans;Decreased high-level ADLs   Assessment Pt is a 64 y.o. male seen for OT evaluation s/p admission to [facility] on 6/6/2024. Pt diagnosed with Alcoholic pancreatitis. Pt has a significant PMH impacting occupational performance including: hypothyroidism, alcohol abuse, depression, hyponatremia, hypokalemia, elevated LFTs, HTN, HLD, GERD, and generalized anxiety disorder. Pt with active OT evaluation and treatment orders and activity orders. PTA, pt living alone in a 1 SH w/ 4 RAMÓN. Pt reports I w/ ADLs, IADLs, and fxnl mobility w/o AD at baseline; + driving. Pt agreeable and willing to participate in OT evaluation. During evaluation, pt was I for eating, S for grooming and UB ADLs, min-mod A for LB ADLs, and mod A for toileting. Pt was also min Ax1 for bed mobility and toilet transfer and mod Ax1 for STS transfer and fxnl mobility w/ HHA. Pt required + processing and response time t/o session. Performance deficits that affect the pt’s occupational performance during the initial evaluation include decreased ADL status, decreased activity tolerance, decreased endurance, decreased sitting tolerance, decreased sitting balance, decreased standing tolerance, decreased standing balance, decreased transfer  skills, decreased fxnl mobility, decreased insight into deficits, and impaired cognition . Based on pt’s functional performance and deficits the following occupations will be addressed in OT treatments in order to maximize pt’s independence and overall occupational performance: grooming, bathing/showering, toileting and toilet hygiene, dressing, and functional mobility. Goals are listed below.  From OT perspective, recommend Level II (Moderate Resource Intensity) upon d/c when pt medically stable to d/c from acute care. Will continue to follow.   Goals   Patient Goals to go to the bathroom   LT Time Frame 10-14   Plan   Treatment Interventions ADL retraining;Functional transfer training;Endurance training;Patient/family training;Equipment evaluation/education;Compensatory technique education;Continued evaluation;Energy conservation;Activityengagement   Goal Expiration Date 06/21/24   OT Treatment Day 0   OT Frequency 2-3x/wk   Discharge Recommendation   Rehab Resource Intensity Level, OT II (Moderate Resource Intensity)   -PAC Daily Activity Inpatient   Lower Body Dressing 2   Bathing 3   Toileting 2   Upper Body Dressing 3   Grooming 3   Eating 4   Daily Activity Raw Score 17   Daily Activity Standardized Score (Calc for Raw Score >=11) 37.26   -PAC Applied Cognition Inpatient   Following a Speech/Presentation 2   Understanding Ordinary Conversation 3   Taking Medications 2   Remembering Where Things Are Placed or Put Away 3   Remembering List of 4-5 Errands 2   Taking Care of Complicated Tasks 2   Applied Cognition Raw Score 14   Applied Cognition Standardized Score 32.02       The patient's raw score on the -PAC Daily Activity Inpatient Short Form is 17. A raw score of less than 19 suggests the patient may benefit from discharge to post-acute rehabilitation services. Please refer to the recommendation of the Occupational Therapist for safe discharge planning.    Goals:      - Pt will complete UB ADLs w/  mod I to maximize independence and return home.    - Pt will complete LB ADLs w/ mod I to maximize independence and return home.     - Pt will complete toileting routine (transfers, hygiene, and clothing management) w/ mod I to maximize independence and return to prior level of function.    - Pt will complete bed mobility supine >< sit w/ mod I to maximize independence and return home.    - Pt will transfer to bed, chair, and toilet w/ mod I using AD / DME as needed to maximize independence and reduce burden of care.     - Pt will ambulate household distances w/ mod I using least restrictive device to maximize independence and return home.     - Pt will increase activity tolerance (and sitting tolerance) by eating all meals OOB in the chair.     - Pt will increase standing tolerance to 5-7 minutes to maximize independence w/ grooming tasks standing at the sink.     - Pt will tolerate therapeutic activities for greater than 30 minutes in order to increase tolerance for functional activities.     - Pt will participate in ongoing OT evaluation of cognitive skills to assist with safe d/c planning/recommendations.      HAN Flor, OTR/L

## 2024-06-07 NOTE — PLAN OF CARE
Problem: PHYSICAL THERAPY ADULT  Goal: Performs mobility at highest level of function for planned discharge setting.  See evaluation for individualized goals.  Description: Treatment/Interventions: Functional transfer training, LE strengthening/ROM, Elevations, Therapeutic exercise, Endurance training, Patient/family training, Equipment eval/education, Bed mobility, Gait training, Spoke to nursing, Spoke to case management, OT          See flowsheet documentation for full assessment, interventions and recommendations.  6/7/2024 1516 by Lala Gonzalez PT  Note: Prognosis: Good  Problem List: Decreased strength, Decreased range of motion, Decreased endurance, Impaired balance, Decreased mobility, Decreased cognition, Impaired judgement, Decreased safety awareness  Assessment: Pt is a 64 y.o. male seen for PT evaluation s/p admit to [facility] on 6/6/2024. Pt was admitted with a primary dx of: alcoholic pancreatitis.  PT now consulted for assessment of mobility and d/c needs. Pt with  active PT eval/ treat  orders.  Pts current comorbidities effecting treatment include: hyponatremia, hypokalemia, elevated LFTs, alcohol abuse. Pt  has a past medical history of Medical history unknown. Pts current clinical presentation is Unstable/ Unpredictable (high complexity) due to Ongoing medical management for primary dx, Increased reliance on more restrictive AD compared to baseline, Fall risk, Ongoing telemetry monitoring, Cog status, Trending lab values, Continuous pulse oximetry monitoring . Prior to admission, pt was residing alone in 1  with 4 RAMÓN and was independent without AD use. Upon evaluation, pt currently is requiring mod A for bed mobility; min A for transfers; mod A for ambulation 2 x 10 ft w/  HHA . Pt presents at PT eval functioning below baseline and currently w/ overall mobility deficits 2* to: BLE weakness, impaired balance, decreased endurance, gait deviations, decreased activity tolerance  compared to baseline, decreased functional mobility tolerance compared to baseline, decreased safety awareness, impaired judgement, fall risk. Pt currently at a fall risk 2* to impairments listed above.  Pt will continue to benefit from skilled acute PT interventions to address stated impairments; to maximize functional mobility; for ongoing pt/ family training; and DME needs. At conclusion of PT session pt returned back in chair and chair alarm engaged with phone and call bell within reach. Pt denies any further questions at this time. The patient's AM-PAC Basic Mobility Inpatient Short Form Raw Score is 13. A Raw score of less than or equal to 16 suggests the patient may benefit from discharge to post-acute rehabilitation services. Please also refer to the recommendation of the Physical Therapist for safe discharge planning. PT to continue to follow pt t/o hospital stay, recommend level II resource intensity upon hospital D/C.  Barriers to Discharge: Inaccessible home environment     Rehab Resource Intensity Level, PT: II (Moderate Resource Intensity)    See flowsheet documentation for full assessment.

## 2024-06-07 NOTE — ASSESSMENT & PLAN NOTE
Presents with acute on chronic abdominal pain/discomfort in setting of chronic alcoholism and CT imaging evidence of acute interstitial pancreatitis without evidence of necrosis or peripancreatic fluid collection  PRN pain/emesis control  Trend lipase - presented at 549  Resume clear liquid diet today  Adjust fluids to lactated Ringer's  Alcohol cessation counseling, however, unlikely to comply  If clinically worsens, should pursue gastroenterology evaluation

## 2024-06-07 NOTE — PLAN OF CARE
Problem: OCCUPATIONAL THERAPY ADULT  Goal: Performs self-care activities at highest level of function for planned discharge setting.  See evaluation for individualized goals.  Description: Treatment Interventions: ADL retraining, Functional transfer training, Endurance training, Patient/family training, Equipment evaluation/education, Compensatory technique education, Continued evaluation, Energy conservation, Activityengagement          See flowsheet documentation for full assessment, interventions and recommendations.   Note: Limitation: Decreased ADL status, Decreased Safe judgement during ADL, Decreased cognition, Decreased endurance, Decreased self-care trans, Decreased high-level ADLs     Assessment: Pt is a 64 y.o. male seen for OT evaluation s/p admission to [facility] on 6/6/2024. Pt diagnosed with Alcoholic pancreatitis. Pt has a significant PMH impacting occupational performance including: hypothyroidism, alcohol abuse, depression, hyponatremia, hypokalemia, elevated LFTs, HTN, HLD, GERD, and generalized anxiety disorder. Pt with active OT evaluation and treatment orders and activity orders. PTA, pt living alone in a 1 SH w/ 4 RAMÓN. Pt reports I w/ ADLs, IADLs, and fxnl mobility w/o AD at baseline; + driving. Pt agreeable and willing to participate in OT evaluation. During evaluation, pt was I for eating, S for grooming and UB ADLs, min-mod A for LB ADLs, and mod A for toileting. Pt was also min Ax1 for bed mobility and toilet transfer and mod Ax1 for STS transfer and fxnl mobility w/ HHA. Pt required + processing and response time t/o session. Performance deficits that affect the pt’s occupational performance during the initial evaluation include decreased ADL status, decreased activity tolerance, decreased endurance, decreased sitting tolerance, decreased sitting balance, decreased standing tolerance, decreased standing balance, decreased transfer skills, decreased fxnl mobility, decreased  insight into deficits, and impaired cognition . Based on pt’s functional performance and deficits the following occupations will be addressed in OT treatments in order to maximize pt’s independence and overall occupational performance: grooming, bathing/showering, toileting and toilet hygiene, dressing, and functional mobility. Goals are listed below.  From OT perspective, recommend Level II (Moderate Resource Intensity) upon d/c when pt medically stable to d/c from acute care. Will continue to follow.     Rehab Resource Intensity Level, OT: II (Moderate Resource Intensity)

## 2024-06-07 NOTE — RESTORATIVE TECHNICIAN NOTE
Restorative Technician Note      Patient Name: Sammy Mays     Restorative Tech Visit Date: 06/07/24  Note Type: Mobility  Patient Position Upon Consult: Bedside chair (Responding to chair alarm)  Activity Performed: Ambulated (To BR)  Assistive Device: Roller walker  Patient Position at End of Consult: All needs within reach; Other (comment) (In BR; Left with PCA)    Tj Tejeda, Restorative Technician

## 2024-06-07 NOTE — CASE MANAGEMENT
Case Management Assessment & Discharge Planning Note    Patient name Sammy Mays  Location [facility] 929/[facility] 929-01 MRN [id number]  : 1960 Date 2024       Current Admission Date: 2024  Current Admission Diagnosis:Alcoholic pancreatitis   Patient Active Problem List    Diagnosis Date Noted Date Diagnosed    Hyponatremia 2024     Hypokalemia 2024     Elevated LFTs 2024     Marijuana smoker, continuous 2024     Severe protein-calorie malnutrition (HCC) 2024     Hyperkalemia 10/18/2023     Alcohol withdrawal syndrome with complication (HCC) 10/17/2023     Depression 10/17/2023     Diastasis recti 10/17/2023     Hypocalcemia 2023     Pancytopenia (HCC) 2023     Obese 2023     Hypomagnesemia 2023     Lightheaded 2023     Hypernatremia 07/10/2023     GI bleed 2023     Acute blood loss anemia 2023     Alcohol abuse 2023     SIRS (systemic inflammatory response syndrome) 2023     Transition of care performed with sharing of clinical summary 2018     Alcoholic pancreatitis 2018     Leukopenia 2018     Platelets decreased (HCC) 2018     Generalized anxiety disorder 2018     Hypertension 2018     Hyperlipidemia 2018     Impaired fasting glucose 2018     Hypothyroidism 2018     Habitual alcohol use 2018     Gastroesophageal reflux disease without esophagitis 2018       LOS (days): 1  Geometric Mean LOS (GMLOS) (days): 3.1  Days to GMLOS:2.1     OBJECTIVE:    Risk of Unplanned Readmission Score: 25.89         Current admission status: Inpatient       Preferred Pharmacy:   [id number] - [place] [facility] [address] [facility]  [address] [address]  Phone: [phone] Fax: [phone]    Primary Care Provider: Ankur Bustillo DO    Primary Insurance: MEDICARE  Secondary Insurance: AARP    ASSESSMENT:  Active Health Care Proxies        [facility] Representative - Friend   Primary Phone: [phone] (Mobile)                           Readmission Root Cause  30 Day Readmission: No    Patient Information  Admitted from:: Home  Mental Status: Alert  During Assessment patient was accompanied by: Not accompanied during assessment  Assessment information provided by:: Patient  Primary Caregiver: Self  Support Systems: Self, Friend  County of Residence: [place]  [place]  Type of Current Residence: [facility]  Living Arrangements: Lives Alone  Is patient a ?: No    Activities of Daily Living Prior to Admission  Functional Status: Independent  Completes ADLs independently?: Yes  Ambulates independently?: Yes  Does patient use assisted devices?: No  Does patient currently own DME?: Yes  What DME does the patient currently own?: Walker, Straight Cane, Crutches  Does patient have a history of Outpatient Therapy (PT/OT)?: No  Does the patient have a history of Short-Term Rehab?: Yes  Does patient have a history of HHC?: No  Does patient currently have HHC?: No         Patient Information Continued  Income Source: SSI/SSD  Does patient have prescription coverage?: Yes  Does patient receive dialysis treatments?: No  Does patient have a history of substance abuse?: Yes  Historical substance use preference: Alcohol/ETOH  Is patient currently in treatment for substance abuse?: No. Treatment options provided  Does patient have a history of Mental Health Diagnosis?: Yes  Is patient receiving treatment for mental health?: Yes  Has patient received inpatient treatment related to mental health in the last 2 years?: No    PHQ 2/9 Screening   Reviewed PHQ 2/9 Depression Screening Score?: No    Means of Transportation  Means of Transport to Appts:: Drives Self          DISCHARGE DETAILS:    Discharge planning discussed with:: pt at bedside  [place] of Choice: Yes  Comments - Freedom of Choice: CM discussed PT/OT  recommendation for STR. Pt is agreeable. [facility] referrals submitted via [facility] for STR. CM will continue to follow as needed.  CM contacted family/caregiver?: No- see comments  Were Treatment Team discharge recommendations reviewed with patient/caregiver?: Yes  Did patient/caregiver verbalize understanding of patient care needs?: N/A- going to facility  Were patient/caregiver advised of the risks associated with not following Treatment Team discharge recommendations?: Yes         Requested Home Health Care         Is the patient interested in HHC at discharge?: No    DME Referral Provided  Referral made for DME?: No    Other Referral/Resources/Interventions Provided:  Interventions: Short Term Rehab    Would you like to participate in our [facility] service program?  : No - Declined    Treatment Team Recommendation: Short Term Rehab  Discharge Destination Plan:: Short Term Rehab  Transport at Discharge : BLS Ambulance                                      Additional Comments: CM introduced herself and role and completed initial assessment at bedside. Pt reports he [place] alone [place]. Pt reports he drives. Pt reports he is not sure if he is interested in treatment for Alcohol Abuse at this time, but is willing to speak to CRS. pt reported he is independent at baseline with ADLS. Pt reports he drives and is retired. Pt reports he ambulates independently but has a walker, cane, and crutches. Pt reports he takes medication for Mental Health. CM discussed PT/OT recommendation for STR. Pt is agreeable. [facility] referrals submitted via [facility] for STR. CM will continue to follow as needed.

## 2024-06-07 NOTE — PLAN OF CARE
Problem: PAIN - ADULT  Goal: Verbalizes/displays adequate comfort level or baseline comfort level  Description: Interventions:  - Encourage patient to monitor pain and request assistance  - Assess pain using appropriate pain scale  - Administer analgesics based on type and severity of pain and evaluate response  - Implement non-pharmacological measures as appropriate and evaluate response  - Consider cultural and social influences on pain and pain management  - Notify physician/advanced practitioner if interventions unsuccessful or patient reports new pain  Outcome: Progressing     Problem: INFECTION - ADULT  Goal: Absence or prevention of progression during hospitalization  Description: INTERVENTIONS:  - Assess and monitor for signs and symptoms of infection  - Monitor lab/diagnostic results  - Monitor all insertion sites, i.e. indwelling lines, tubes, and drains  - Monitor endotracheal if appropriate and nasal secretions for changes in amount and color  - [place] appropriate cooling/warming therapies per order  - Administer medications as ordered  - Instruct and encourage patient and family to use good hand hygiene technique  - Identify and instruct in appropriate isolation precautions for identified infection/condition  Outcome: Progressing     Problem: SAFETY ADULT  Goal: Patient will remain free of falls  Description: INTERVENTIONS:  - Educate patient/family on patient safety including physical limitations  - Instruct patient to call for assistance with activity   - Consult OT/PT to assist with strengthening/mobility   - Keep Call bell within reach  - Keep bed low and locked with side rails adjusted as appropriate  - Keep care items and personal belongings within reach  - Initiate and maintain comfort rounds  - Make Fall Risk Sign visible to staff  - Offer Toileting every  Hours, in advance of need  - Initiate/Maintain alarm  - Obtain necessary fall risk management equipment:   - Apply yellow socks and  bracelet for high fall risk patients  - Consider moving patient to room near nurses station  Outcome: Progressing  Goal: Maintain or return to baseline ADL function  Description: INTERVENTIONS:  -  Assess patient's ability to carry out ADLs; assess patient's baseline for ADL function and identify physical deficits which impact ability to perform ADLs (bathing, care of mouth/teeth, toileting, grooming, dressing, etc.)  - Assess/evaluate cause of self-care deficits   - Assess range of motion  - Assess patient's mobility; develop plan if impaired  - Assess patient's need for assistive devices and provide as appropriate  - Encourage maximum independence but intervene and supervise when necessary  - Involve family in performance of ADLs  - Assess for home care needs following discharge   - Consider OT consult to assist with ADL evaluation and planning for discharge  - Provide patient education as appropriate  Outcome: Progressing  Goal: Maintains/Returns to pre admission functional level  Description: INTERVENTIONS:  - Perform AM-PAC 6 Click Basic Mobility/ Daily Activity assessment daily.  - Set and communicate daily mobility goal to care team and patient/family/caregiver.   - Collaborate with rehabilitation services on mobility goals if consulted  - Perform Range of Motion times a day.  - Reposition patient every  hours.  - Dangle patient  times a day  - Stand patient  times a day  - Ambulate patient  times a day  - Out of bed to chair times a day   - Out of bed for meals  times a day  - Out of bed for toileting  - Record patient progress and toleration of activity level   Outcome: Progressing     Problem: DISCHARGE PLANNING  Goal: Discharge to home or other facility with appropriate resources  Description: INTERVENTIONS:  - Identify barriers to discharge w/patient and caregiver  - Arrange for needed discharge resources and transportation as appropriate  - Identify discharge learning needs (meds, wound care, etc.)  -  Arrange for interpretive services to assist at discharge as needed  - Refer to Case Management Department for coordinating discharge planning if the patient needs post-hospital services based on physician/advanced practitioner order or complex needs related to functional status, cognitive ability, or social support system  Outcome: Progressing     Problem: Knowledge Deficit  Goal: Patient/family/caregiver demonstrates understanding of disease process, treatment plan, medications, and discharge instructions  Description: Complete learning assessment and assess knowledge base.  Interventions:  - Provide teaching at level of understanding  - Provide teaching via preferred learning methods  Outcome: Progressing

## 2024-06-07 NOTE — PHYSICAL THERAPY NOTE
Physical Therapy Evaluation     Patient's Name: Sammy Mays    Admitting Diagnosis  Abdominal pain [R10.9]  Acute pancreatitis, unspecified complication status, unspecified pancreatitis type [K85.90]    Problem List  Patient Active Problem List   Diagnosis    Hypertension    Hyperlipidemia    Impaired fasting glucose    Hypothyroidism    Habitual alcohol use    Gastroesophageal reflux disease without esophagitis    Transition of care performed with sharing of clinical summary    Alcoholic pancreatitis    Leukopenia    Platelets decreased (HCC)    Generalized anxiety disorder    GI bleed    Acute blood loss anemia    Alcohol abuse    SIRS (systemic inflammatory response syndrome)    Hypernatremia    Obese    Hypomagnesemia    Lightheaded    Pancytopenia (HCC)    Hypocalcemia    Alcohol withdrawal syndrome with complication (HCC)    Depression    Diastasis recti    Hyperkalemia    Marijuana smoker, continuous    Severe protein-calorie malnutrition (HCC)    Hyponatremia    Hypokalemia    Elevated LFTs       Past Medical History  Past Medical History:   Diagnosis Date    Medical history unknown        Past Surgical History  Past Surgical History:   Procedure Laterality Date    REPAIR / REINSERT BICEPS TENDON AT ELBOW      Last Assessed: 1/12/2016 06/07/24 1045   PT Last Visit   PT Visit Date 06/07/24   Note Type   Note type Evaluation   Pain Assessment   Pain Assessment Tool 0-10   Pain Score No Pain   Restrictions/Precautions   Weight Bearing Precautions Per Order No   Other Precautions Cognitive;Bed Alarm;Chair Alarm;Multiple lines;Fall Risk;Telemetry  (tremors)   Home Living   Type of Home House   Home Layout One level;Performs ADLs on one level;Able to live on main level with bedroom/bathroom  (4 RAMÓN)   Bathroom Shower/Tub Tub/shower unit   Bathroom Toilet Standard   Bathroom Equipment Grab bars in shower   Home Equipment Walker;Cane  (did not use PTA)   Prior Function   Level of [place]  Independent with ADLs;Independent with functional mobility;Independent with IADLS   Lives With Alone  (pt reports that friend owns house and used to live with him. Friend however has been in a nursing home for ~2 yrs.)   Receives Help From Friend(s)   IADLs Independent with driving;Independent with meal prep;Independent with medication management   Falls in the last 6 months 0   Vocational Retired   Comments pt ? historian at times- will need to confirm with CM   General   Family/Caregiver Present No   Cognition   Overall Cognitive Status Impaired   Attention Attends with cues to redirect   Orientation Level Oriented to person;Oriented to place;Oriented to time   Memory Decreased recall of precautions;Decreased recall of recent events;Decreased short term memory   Following Commands Follows one step commands with increased time or repetition   Comments pleasant and cooperative, increased time for processing + response required   RLE Assessment   RLE Assessment   (functionally 3+/5)   LLE Assessment   LLE Assessment   (fucntionally 3+/5)   Bed Mobility   Supine to Sit 4  Minimal assistance   Additional items Assist x 1;Increased time required;Verbal cues   Sit to Supine Unable to assess   Additional Comments pt supine in bed upon arrival. pt left sitting OOB in recliner with alarm on and all needs within reach   Transfers   Sit to Stand 3  Moderate assistance   Additional items Assist x 1;Increased time required;Verbal cues   Stand to Sit 3  Moderate assistance   Additional items Assist x 1;Increased time required;Verbal cues   Toilet transfer 4  Minimal assistance   Additional items Assist x 1;Increased time required;Verbal cues;Standard toilet   Additional Comments transfers with HHA   Ambulation/Elevation   Gait pattern Short stride;Foward flexed;Inconsistent fiordaliza;Decreased foot clearance;Improper Weight shift   Gait Assistance 3  Moderate assist   Additional items Assist x 1;Verbal cues   Assistive Device  Other (Comment)  (HHA)   Distance 2 x 10'   Stair Management Assistance Not tested   Balance   Static Sitting Fair   Dynamic Sitting Fair -   Static Standing Poor +   Dynamic Standing Poor   Ambulatory Poor   Activity Tolerance   Activity Tolerance Patient limited by fatigue  (cognition)   Medical Staff Made Aware LETICIA Hernandez; co-session completed this date 2* increased medical complexity and multiple co-morbidities   Nurse Made Aware RN cleared   Assessment   Prognosis Good   Problem List Decreased strength;Decreased range of motion;Decreased endurance;Impaired balance;Decreased mobility;Decreased cognition;Impaired judgement;Decreased safety awareness   Assessment Pt is a 64 y.o. male seen for PT evaluation s/p admit to [facility] on 6/6/2024. Pt was admitted with a primary dx of: alcoholic pancreatitis.  PT now consulted for assessment of mobility and d/c needs. Pt with  active PT eval/ treat  orders.  Pts current comorbidities effecting treatment include: hyponatremia, hypokalemia, elevated LFTs, alcohol abuse. Pt  has a past medical history of Medical history unknown. Pts current clinical presentation is Unstable/ Unpredictable (high complexity) due to Ongoing medical management for primary dx, Increased reliance on more restrictive AD compared to baseline, Fall risk, Ongoing telemetry monitoring, Cog status, Trending lab values, Continuous pulse oximetry monitoring . Prior to admission, pt was residing alone in 1  with 4 RAMÓN and was independent without AD use. Upon evaluation, pt currently is requiring mod A for bed mobility; min A for transfers; mod A for ambulation 2 x 10 ft w/  HHA . Pt presents at PT eval functioning below baseline and currently w/ overall mobility deficits 2* to: BLE weakness, impaired balance, decreased endurance, gait deviations, decreased activity tolerance compared to baseline, decreased functional mobility tolerance compared to baseline, decreased safety awareness,  impaired judgement, fall risk. Pt currently at a fall risk 2* to impairments listed above.  Pt will continue to benefit from skilled acute PT interventions to address stated impairments; to maximize functional mobility; for ongoing pt/ family training; and DME needs. At conclusion of PT session pt returned back in chair and chair alarm engaged with phone and call bell within reach. Pt denies any further questions at this time. The patient's AM-PAC Basic Mobility Inpatient Short Form Raw Score is 13. A Raw score of less than or equal to 16 suggests the patient may benefit from discharge to post-acute rehabilitation services. Please also refer to the recommendation of the Physical Therapist for safe discharge planning. PT to continue to follow pt t/o hospital stay, recommend level II resource intensity upon hospital D/C.   Barriers to Discharge Inaccessible home environment   Goals   Patient Goals to go to the bathroom   STG Expiration Date 06/21/24   Short Term Goal #1 STG 1. Pt will be able to perform bed mobility tasks with mod I level in order to improve overall functional mobility and assist in safe d/c. STG 2. Pt with sit EOB for at least 25 minutes at mod i level in order to strengthen abdominal musculature and assist in future transfers/ ambulation. STG 3. Pt will be able to perform functional transfer with mod I level in order to improve overall functional mobility and assist in safe d/c. STG 4. Pt will be able to ambulate at least 250 feet with least restrictive device with mod I A in order to improve overall functional mobility and assist in safe d/c. STG 5. Pt will improve sitting/standing static/dynamic balance 1/2 grade in order to improve functional mobility and assist in safe d/c. STG 6. Pt will improve LE strength by 1/2 grade in order to improve functional mobility and assist in safe d/c. STG 7. Pt will be able to negotiate at least 7 stairs with least restrictive device with S level A in order to  improve overall functional mobility and assist in safe d/c.   PT Treatment Day 0   Plan   Treatment/Interventions Functional transfer training;LE strengthening/ROM;Elevations;Therapeutic exercise;Endurance training;Patient/family training;Equipment eval/education;Bed mobility;Gait training;Spoke to nursing;Spoke to case management;OT   PT Frequency 2-3x/wk   Discharge Recommendation   Rehab Resource Intensity Level, PT II (Moderate Resource Intensity)   AM-PAC Basic Mobility Inpatient   Turning in Flat Bed Without Bedrails 3   Lying on Back to Sitting on Edge of Flat Bed Without Bedrails 2   Moving Bed to Chair 2   Standing Up From Chair Using Arms 2   Walk in Room 2   Climb 3-5 Stairs With Railing 2   Basic Mobility Inpatient Raw Score 13   Basic Mobility Standardized Score 33.99   [facility] Highest Level Of Mobility   -HLM Goal 4: Move to chair/commode   -HLM Achieved 6: Walk 10 steps or more   Modified [place] Scale   Modified Matt Scale 4           Lala Gonzalez, PT DPT

## 2024-06-07 NOTE — ASSESSMENT & PLAN NOTE
Acute on chronic issue  Cessation counseling  CIWA protocol  Supplemental thiamine/folic acid and multivitamin  PPI regimen  Monitor/replete electrolyte deficiencies if present  Replete potassium and magnesium, phosphorus within normal limits monitor for refeeding syndrome

## 2024-06-08 ENCOUNTER — APPOINTMENT (INPATIENT)
Dept: RADIOLOGY | Facility: HOSPITAL | Age: 64
DRG: 439 | End: 2024-06-08
Payer: MEDICARE

## 2024-06-08 LAB
ALBUMIN SERPL BCP-MCNC: 3 G/DL (ref 3.5–5)
ALP SERPL-CCNC: 134 U/L (ref 34–104)
ALT SERPL W P-5'-P-CCNC: 43 U/L (ref 7–52)
ANION GAP SERPL CALCULATED.3IONS-SCNC: 9 MMOL/L (ref 4–13)
AST SERPL W P-5'-P-CCNC: 69 U/L (ref 13–39)
BASOPHILS # BLD AUTO: 0.02 THOUSANDS/ÂΜL (ref 0–0.1)
BASOPHILS NFR BLD AUTO: 1 % (ref 0–1)
BILIRUB SERPL-MCNC: 0.96 MG/DL (ref 0.2–1)
BUN SERPL-MCNC: 3 MG/DL (ref 5–25)
CALCIUM ALBUM COR SERPL-MCNC: 7.6 MG/DL (ref 8.3–10.1)
CALCIUM SERPL-MCNC: 6.8 MG/DL (ref 8.4–10.2)
CHLORIDE SERPL-SCNC: 98 MMOL/L (ref 96–108)
CO2 SERPL-SCNC: 26 MMOL/L (ref 21–32)
CREAT SERPL-MCNC: 0.42 MG/DL (ref 0.6–1.3)
EOSINOPHIL # BLD AUTO: 0.05 THOUSAND/ÂΜL (ref 0–0.61)
EOSINOPHIL NFR BLD AUTO: 2 % (ref 0–6)
ERYTHROCYTE [DISTWIDTH] IN BLOOD BY AUTOMATED COUNT: 14.6 % (ref 11.6–15.1)
GFR SERPL CREATININE-BSD FRML MDRD: 121 ML/MIN/1.73SQ M
GLUCOSE SERPL-MCNC: 111 MG/DL (ref 65–140)
HCT VFR BLD AUTO: 36.7 % (ref 36.5–49.3)
HGB BLD-MCNC: 12.3 G/DL (ref 12–17)
IMM GRANULOCYTES # BLD AUTO: 0.03 THOUSAND/UL (ref 0–0.2)
IMM GRANULOCYTES NFR BLD AUTO: 1 % (ref 0–2)
LIPASE SERPL-CCNC: 201 U/L (ref 11–82)
LYMPHOCYTES # BLD AUTO: 0.95 THOUSANDS/ÂΜL (ref 0.6–4.47)
LYMPHOCYTES NFR BLD AUTO: 32 % (ref 14–44)
MAGNESIUM SERPL-MCNC: 1.9 MG/DL (ref 1.9–2.7)
MCH RBC QN AUTO: 35.2 PG (ref 26.8–34.3)
MCHC RBC AUTO-ENTMCNC: 33.5 G/DL (ref 31.4–37.4)
MCV RBC AUTO: 105 FL (ref 82–98)
MONOCYTES # BLD AUTO: 0.38 THOUSAND/ÂΜL (ref 0.17–1.22)
MONOCYTES NFR BLD AUTO: 13 % (ref 4–12)
NEUTROPHILS # BLD AUTO: 1.51 THOUSANDS/ÂΜL (ref 1.85–7.62)
NEUTS SEG NFR BLD AUTO: 51 % (ref 43–75)
NRBC BLD AUTO-RTO: 0 /100 WBCS
OSMOLALITY UR/SERPL-RTO: 277 MMOL/KG (ref 282–298)
PHOSPHATE SERPL-MCNC: 3.1 MG/DL (ref 2.3–4.1)
PLATELET # BLD AUTO: 113 THOUSANDS/UL (ref 149–390)
PMV BLD AUTO: 10.1 FL (ref 8.9–12.7)
POTASSIUM SERPL-SCNC: 3.7 MMOL/L (ref 3.5–5.3)
PROT SERPL-MCNC: 5.3 G/DL (ref 6.4–8.4)
RBC # BLD AUTO: 3.49 MILLION/UL (ref 3.88–5.62)
SODIUM SERPL-SCNC: 133 MMOL/L (ref 135–147)
WBC # BLD AUTO: 2.94 THOUSAND/UL (ref 4.31–10.16)

## 2024-06-08 PROCEDURE — 84100 ASSAY OF PHOSPHORUS: CPT | Performed by: INTERNAL MEDICINE

## 2024-06-08 PROCEDURE — 83930 ASSAY OF BLOOD OSMOLALITY: CPT | Performed by: INTERNAL MEDICINE

## 2024-06-08 PROCEDURE — 80053 COMPREHEN METABOLIC PANEL: CPT | Performed by: INTERNAL MEDICINE

## 2024-06-08 PROCEDURE — 83690 ASSAY OF LIPASE: CPT | Performed by: INTERNAL MEDICINE

## 2024-06-08 PROCEDURE — 85025 COMPLETE CBC W/AUTO DIFF WBC: CPT | Performed by: INTERNAL MEDICINE

## 2024-06-08 PROCEDURE — 99232 SBSQ HOSP IP/OBS MODERATE 35: CPT | Performed by: INTERNAL MEDICINE

## 2024-06-08 PROCEDURE — 83735 ASSAY OF MAGNESIUM: CPT | Performed by: INTERNAL MEDICINE

## 2024-06-08 PROCEDURE — 76705 ECHO EXAM OF ABDOMEN: CPT

## 2024-06-08 RX ORDER — LORAZEPAM 2 MG/ML
1 INJECTION INTRAMUSCULAR ONCE
Status: COMPLETED | OUTPATIENT
Start: 2024-06-08 | End: 2024-06-08

## 2024-06-08 RX ORDER — LORAZEPAM 2 MG/ML
2 INJECTION INTRAMUSCULAR ONCE
Status: COMPLETED | OUTPATIENT
Start: 2024-06-08 | End: 2024-06-08

## 2024-06-08 RX ADMIN — THIAMINE HYDROCHLORIDE: 100 INJECTION, SOLUTION INTRAMUSCULAR; INTRAVENOUS at 09:08

## 2024-06-08 RX ADMIN — ACETAMINOPHEN 650 MG: 325 TABLET, FILM COATED ORAL at 05:27

## 2024-06-08 RX ADMIN — FLUOXETINE 40 MG: 20 CAPSULE ORAL at 09:09

## 2024-06-08 RX ADMIN — SODIUM CHLORIDE, SODIUM LACTATE, POTASSIUM CHLORIDE, AND CALCIUM CHLORIDE 125 ML/HR: .6; .31; .03; .02 INJECTION, SOLUTION INTRAVENOUS at 05:30

## 2024-06-08 RX ADMIN — ALLOPURINOL 300 MG: 300 TABLET ORAL at 09:08

## 2024-06-08 RX ADMIN — LORAZEPAM 1 MG: 2 INJECTION INTRAMUSCULAR; INTRAVENOUS at 12:20

## 2024-06-08 RX ADMIN — DOXYLAMINE SUCCINATE 25 MG: 25 TABLET ORAL at 22:30

## 2024-06-08 RX ADMIN — LORAZEPAM 2 MG: 2 INJECTION INTRAMUSCULAR; INTRAVENOUS at 03:23

## 2024-06-08 RX ADMIN — HEPARIN SODIUM 5000 UNITS: 5000 INJECTION INTRAVENOUS; SUBCUTANEOUS at 22:30

## 2024-06-08 RX ADMIN — LEVOTHYROXINE SODIUM 75 MCG: 75 TABLET ORAL at 05:27

## 2024-06-08 RX ADMIN — MAGNESIUM OXIDE TAB 400 MG (241.3 MG ELEMENTAL MG) 400 MG: 400 (241.3 MG) TAB at 17:00

## 2024-06-08 RX ADMIN — HEPARIN SODIUM 5000 UNITS: 5000 INJECTION INTRAVENOUS; SUBCUTANEOUS at 13:06

## 2024-06-08 RX ADMIN — HEPARIN SODIUM 5000 UNITS: 5000 INJECTION INTRAVENOUS; SUBCUTANEOUS at 05:27

## 2024-06-08 RX ADMIN — MAGNESIUM OXIDE TAB 400 MG (241.3 MG ELEMENTAL MG) 400 MG: 400 (241.3 MG) TAB at 09:09

## 2024-06-08 RX ADMIN — LORATADINE 10 MG: 10 TABLET ORAL at 09:09

## 2024-06-08 RX ADMIN — PANTOPRAZOLE SODIUM 40 MG: 40 TABLET, DELAYED RELEASE ORAL at 05:27

## 2024-06-08 NOTE — ASSESSMENT & PLAN NOTE
Presents with acute on chronic abdominal pain/discomfort in setting of chronic alcoholism and CT imaging evidence of acute interstitial pancreatitis without evidence of necrosis or peripancreatic fluid collection  PRN pain/emesis control  Trend lipase - presented at 549  Patient's abdominal pain is improving we will advance to a regular diet restriction of low-fat

## 2024-06-08 NOTE — PLAN OF CARE
Problem: INFECTION - ADULT  Goal: Absence or prevention of progression during hospitalization  Description: INTERVENTIONS:  - Assess and monitor for signs and symptoms of infection  - Monitor lab/diagnostic results  - Monitor all insertion sites, i.e. indwelling lines, tubes, and drains  - Monitor endotracheal if appropriate and nasal secretions for changes in amount and color  - [place] appropriate cooling/warming therapies per order  - Administer medications as ordered  - Instruct and encourage patient and family to use good hand hygiene technique  - Identify and instruct in appropriate isolation precautions for identified infection/condition  Outcome: Progressing  Goal: Absence of fever/infection during neutropenic period  Description: INTERVENTIONS:  - Monitor WBC    Outcome: Progressing     Problem: SAFETY ADULT  Goal: Patient will remain free of falls  Description: INTERVENTIONS:  - Educate patient/family on patient safety including physical limitations  - Instruct patient to call for assistance with activity   - Consult OT/PT to assist with strengthening/mobility   - Keep Call bell within reach  - Keep bed low and locked with side rails adjusted as appropriate  - Keep care items and personal belongings within reach  - Initiate and maintain comfort rounds  - Make Fall Risk Sign visible to staff  - Offer Toileting every  Hours, in advance of need  - Initiate/Maintain alarm  - Obtain necessary fall risk management equipment:   - Apply yellow socks and bracelet for high fall risk patients  - Consider moving patient to room near nurses station  Outcome: Progressing  Goal: Maintain or return to baseline ADL function  Description: INTERVENTIONS:  -  Assess patient's ability to carry out ADLs; assess patient's baseline for ADL function and identify physical deficits which impact ability to perform ADLs (bathing, care of mouth/teeth, toileting, grooming, dressing, etc.)  - Assess/evaluate cause of self-care  deficits   - Assess range of motion  - Assess patient's mobility; develop plan if impaired  - Assess patient's need for assistive devices and provide as appropriate  - Encourage maximum independence but intervene and supervise when necessary  - Involve family in performance of ADLs  - Assess for home care needs following discharge   - Consider OT consult to assist with ADL evaluation and planning for discharge  - Provide patient education as appropriate  Outcome: Progressing  Goal: Maintains/Returns to pre admission functional level  Description: INTERVENTIONS:  - Perform AM-PAC 6 Click Basic Mobility/ Daily Activity assessment daily.  - Set and communicate daily mobility goal to care team and patient/family/caregiver.   - Collaborate with rehabilitation services on mobility goals if consulted  - Perform Range of Motion  times a day.  - Reposition patient every  hours.  - Dangle patient  times a day  - Stand patient  times a day  - Ambulate patient  times a day  - Out of bed to chair  times a day   - Out of bed for meals  times a day  - Out of bed for toileting  - Record patient progress and toleration of activity level   Outcome: Progressing

## 2024-06-08 NOTE — PROGRESS NOTES
[facility]  Progress Note  Name: Sammy Mays I  MRN: [id number]  [facility] I Date of Admission: 6/6/2024   Date of Service: 6/8/2024 I Hospital Day: 2    Assessment & Plan   Elevated LFTs  Assessment & Plan  In the setting of chronic alcoholism  Limit/avoid hepatotoxins as possible  Check RUQ ultrasound with liver dopplers-reviewed  Encourage alcohol cessation    Hypokalemia  Assessment & Plan  Monitor/replete serum potassium and magnesium    Hyponatremia  Assessment & Plan  Likely multifactorial secondary to alcoholism along with decreased oral solute intake  In light of presenting peritonitis, remains NPO on IV fluids  Monitor serum sodium -> if paradoxically worsens, may require fluid restriction  Check urine and serum osmolalities - check urine sodium    Depression  Assessment & Plan  Continue Prozac    Alcohol abuse  Assessment & Plan  Acute on chronic issue  Cessation counseling  [place] protocol  Supplemental thiamine/folic acid and multivitamin  PPI regimen  Monitor/replete electrolyte deficiencies if present  Replete potassium and magnesium, phosphorus within normal limits monitor for refeeding syndrome    Hypothyroidism  Assessment & Plan  Continue Synthroid    * Alcoholic pancreatitis  Assessment & Plan  Presents with acute on chronic abdominal pain/discomfort in setting of chronic alcoholism and CT imaging evidence of acute interstitial pancreatitis without evidence of necrosis or peripancreatic fluid collection  PRN pain/emesis control  Trend lipase - presented at 549  Patient's abdominal pain is improving we will advance to a regular diet restriction of low-fat                   VTE Pharmacologic Prophylaxis:   Moderate Risk (Score 3-4) - Pharmacological DVT Prophylaxis Ordered: heparin.    Mobility:   Basic Mobility Inpatient Raw Score: 13  JH-HLM Goal: 4: Move to chair/commode  JH-HLM Achieved: 6: Walk 10 steps or more  JH-HLM Goal achieved. Continue  to encourage appropriate mobility.    Patient Centered Rounds: I performed bedside rounds with nursing staff today.   Discussions with Specialists or Other Care Team Provider:     Education and Discussions with Family / Patient: Patient declined call to .     Total Time Spent on Date of Encounter in care of patient:  mins. This time was spent on one or more of the following: performing physical exam; counseling and coordination of care; obtaining or reviewing history; documenting in the medical record; reviewing/ordering tests, medications or procedures; communicating with other healthcare professionals and discussing with patient's family/caregivers.    Current Length of Stay: 2 day(s)  Current Patient Status: Inpatient   Certification Statement: The patient will continue to require additional inpatient hospital stay due to diet  Discharge Plan: Anticipate discharge tomorrow to home.    Code Status: Level 1 - Full Code    Subjective:   Patient reports improvement in his abdominal pain, tolerating clear liquid diet will advance to regular    Objective:     Vitals:   Temp (24hrs), Av.1 °F (36.7 °C), Min:97.9 °F (36.6 °C), Max:98.6 °F (37 °C)    Temp:  [97.9 °F (36.6 °C)-98.6 °F (37 °C)] 98 °F (36.7 °C)  HR:  [] 85  Resp:  [17-20] 19  BP: (104-140)/(74-95) 106/74  There is no height or weight on file to calculate BMI.     Input and Output Summary (last 24 hours):     Intake/Output Summary (Last 24 hours) at 2024 1525  Last data filed at 2024 0946  Gross per 24 hour   Intake 480 ml   Output 1500 ml   Net -1020 ml       Physical Exam:   Physical Exam  Vitals and nursing note reviewed.   Constitutional:       General: He is not in acute distress.     Appearance: He is well-developed. He is not toxic-appearing or diaphoretic.   HENT:      Head: Normocephalic and atraumatic.   Eyes:      General: No scleral icterus.     Conjunctiva/sclera: Conjunctivae normal.   Cardiovascular:      Rate and  Rhythm: Normal rate and regular rhythm.      Heart sounds: No murmur heard.     No friction rub. No gallop.   Pulmonary:      Effort: Pulmonary effort is normal. No respiratory distress.      Breath sounds: Normal breath sounds. No stridor. No wheezing, rhonchi or rales.   Chest:      Chest wall: No tenderness.   Abdominal:      General: There is no distension.      Palpations: Abdomen is soft. There is no mass.      Tenderness: There is abdominal tenderness. There is no guarding or rebound.      Hernia: No hernia is present.   Musculoskeletal:         General: No swelling.      Cervical back: Neck supple.   Skin:     General: Skin is warm and dry.      Capillary Refill: Capillary refill takes less than 2 seconds.   Neurological:      Mental Status: He is alert and oriented to person, place, and time.   Psychiatric:         Mood and Affect: Mood normal.          Additional Data:     Labs:  Results from last 7 days   Lab Units 06/08/24  0613   WBC Thousand/uL 2.94*   HEMOGLOBIN g/dL 12.3   HEMATOCRIT % 36.7   PLATELETS Thousands/uL 113*   SEGS PCT % 51   LYMPHO PCT % 32   MONO PCT % 13*   EOS PCT % 2     Results from last 7 days   Lab Units 06/08/24  0613   SODIUM mmol/L 133*   POTASSIUM mmol/L 3.7   CHLORIDE mmol/L 98   CO2 mmol/L 26   BUN mg/dL 3*   CREATININE mg/dL 0.42*   ANION GAP mmol/L 9   CALCIUM mg/dL 6.8*   ALBUMIN g/dL 3.0*   TOTAL BILIRUBIN mg/dL 0.96   ALK PHOS U/L 134*   ALT U/L 43   AST U/L 69*   GLUCOSE RANDOM mg/dL 111     Results from last 7 days   Lab Units 06/06/24  1005   INR  1.12                   Lines/Drains:  Invasive Devices       Peripheral Intravenous Line  Duration             Peripheral IV 06/06/24 Left Antecubital 2 days                      Telemetry:  Telemetry Orders (From admission, onward)               24 Hour Telemetry Monitoring  Continuous x 24 Hours (Telem)        Question:  Reason for 24 Hour Telemetry  Answer:  Decompensated CHF- and any one of the following: continuous  diuretic infusion or total diuretic dose >200 mg daily, associated electrolyte derangement (I.e. K < 3.0), ionotropic drip (continuous infusion), hx of ventricular arrhythmia, or new EF < 35%                              Imaging: No pertinent imaging reviewed.    Recent Cultures (last 7 days):   Results from last 7 days   Lab Units 06/06/24  1005   BLOOD CULTURE  No Growth at 48 hrs.  No Growth at 48 hrs.       Last 24 Hours Medication List:   Current Facility-Administered Medications   Medication Dose Route Frequency Provider Last Rate    acetaminophen  650 mg Oral Q6H PRN Teagan Mccoy MD      albuterol  1 puff Inhalation Q4H PRN Teagan Mccoy MD      allopurinol  300 mg Oral Daily Teagan Mccoy MD      FLUoxetine  40 mg Oral Daily Teagan Mccoy MD      folic acid 1 mg, thiamine (VITAMIN B1) 100 mg in sodium chloride 0.9 % 100 mL IV piggyback   Intravenous Daily Teagan Mccoy MD Stopped (06/08/24 0946)    heparin (porcine)  5,000 Units Subcutaneous Q8H [facility] Teagan Mccoy MD      HYDROmorphone  0.3 mg Intravenous Q6H PRN Teagan Mccoy MD      HYDROmorphone  0.5 mg Intravenous Q6H PRN Tegaan Mccoy MD      HYDROmorphone  0.2 mg Intravenous Q3H PRN Teagan Mccoy MD      lactated ringers  125 mL/hr Intravenous Continuous Alvaro Stock  mL/hr (06/08/24 0530)    levothyroxine  75 mcg Oral Early Morning Teagan Mccoy MD      loratadine  10 mg Oral Daily Teagan Mccoy MD      magnesium Oxide  400 mg Oral BID Alvaro Stock DO      pantoprazole  40 mg Oral Early Morning Teagan Mccoy MD      trimethobenzamide  200 mg Intramuscular Q6H PRN Teagan Mccoy MD          Today, Patient Was Seen By: Alvaro Stock DO    **Please Note: This note may have been constructed using a voice recognition system.**

## 2024-06-08 NOTE — ASSESSMENT & PLAN NOTE
In the setting of chronic alcoholism  Limit/avoid hepatotoxins as possible  Check RUQ ultrasound with liver dopplers-reviewed  Encourage alcohol cessation

## 2024-06-09 PROCEDURE — 99232 SBSQ HOSP IP/OBS MODERATE 35: CPT | Performed by: INTERNAL MEDICINE

## 2024-06-09 RX ORDER — LORAZEPAM 2 MG/ML
2 INJECTION INTRAMUSCULAR ONCE
Status: COMPLETED | OUTPATIENT
Start: 2024-06-09 | End: 2024-06-09

## 2024-06-09 RX ORDER — LORAZEPAM 2 MG/ML
1 INJECTION INTRAMUSCULAR ONCE
Status: COMPLETED | OUTPATIENT
Start: 2024-06-09 | End: 2024-06-09

## 2024-06-09 RX ADMIN — LORAZEPAM 1 MG: 2 INJECTION INTRAMUSCULAR; INTRAVENOUS at 17:23

## 2024-06-09 RX ADMIN — FLUOXETINE 40 MG: 20 CAPSULE ORAL at 10:10

## 2024-06-09 RX ADMIN — ALLOPURINOL 300 MG: 300 TABLET ORAL at 10:09

## 2024-06-09 RX ADMIN — PANTOPRAZOLE SODIUM 40 MG: 40 TABLET, DELAYED RELEASE ORAL at 05:43

## 2024-06-09 RX ADMIN — HEPARIN SODIUM 5000 UNITS: 5000 INJECTION INTRAVENOUS; SUBCUTANEOUS at 15:33

## 2024-06-09 RX ADMIN — MAGNESIUM OXIDE TAB 400 MG (241.3 MG ELEMENTAL MG) 400 MG: 400 (241.3 MG) TAB at 10:10

## 2024-06-09 RX ADMIN — HEPARIN SODIUM 5000 UNITS: 5000 INJECTION INTRAVENOUS; SUBCUTANEOUS at 05:43

## 2024-06-09 RX ADMIN — LEVOTHYROXINE SODIUM 75 MCG: 75 TABLET ORAL at 05:43

## 2024-06-09 RX ADMIN — LORAZEPAM 2 MG: 2 INJECTION INTRAMUSCULAR; INTRAVENOUS at 03:18

## 2024-06-09 RX ADMIN — MAGNESIUM OXIDE TAB 400 MG (241.3 MG ELEMENTAL MG) 400 MG: 400 (241.3 MG) TAB at 17:23

## 2024-06-09 RX ADMIN — LORATADINE 10 MG: 10 TABLET ORAL at 10:10

## 2024-06-09 RX ADMIN — THIAMINE HYDROCHLORIDE: 100 INJECTION, SOLUTION INTRAMUSCULAR; INTRAVENOUS at 10:10

## 2024-06-09 NOTE — ASSESSMENT & PLAN NOTE
Likely multifactorial secondary to alcoholism along with decreased oral solute intake  In light of presenting peritonitis, remains NPO on IV fluids  Monitor serum sodium -> if paradoxically worsens, may require fluid restriction

## 2024-06-09 NOTE — PROGRESS NOTES
[facility]  Progress Note  Name: Sammy Mays I  MRN: [id number]  [facility] Date of Admission: 6/6/2024   Date of Service: 6/9/2024 I Hospital Day: 3    Assessment & Plan   Elevated LFTs  Assessment & Plan  In the setting of chronic alcoholism  Limit/avoid hepatotoxins as possible  Check RUQ ultrasound with liver dopplers-reviewed  Encourage alcohol cessation    Hypokalemia  Assessment & Plan  Monitor/replete serum potassium and magnesium    Hyponatremia  Assessment & Plan  Likely multifactorial secondary to alcoholism along with decreased oral solute intake  In light of presenting peritonitis, remains NPO on IV fluids  Monitor serum sodium -> if paradoxically worsens, may require fluid restriction      Depression  Assessment & Plan  Continue Prozac    Alcohol abuse  Assessment & Plan  Acute on chronic issue  Cessation counseling  [facility] protocol  Supplemental thiamine/folic acid and multivitamin  PPI regimen  Monitor/replete electrolyte deficiencies if present  Replete potassium and magnesium, phosphorus within normal limits monitor for refeeding syndrome    Hypothyroidism  Assessment & Plan  Continue Synthroid    * Alcoholic pancreatitis  Assessment & Plan  Presents with acute on chronic abdominal pain/discomfort in setting of chronic alcoholism and CT imaging evidence of acute interstitial pancreatitis without evidence of necrosis or peripancreatic fluid collection  PRN pain/emesis control  Trend lipase - presented at 549  Patient reports continued improvement in his abdominal pain tolerating regular diet likely stable for discharge in next 24 hours               VTE Pharmacologic Prophylaxis:   Moderate Risk (Score 3-4) - Pharmacological DVT Prophylaxis Ordered: heparin.    Mobility:   Basic Mobility Inpatient Raw Score: 22  JH-HLM Goal: 7: Walk 25 feet or more  JH-HLM Achieved: 7: Walk 25 feet or more  JH-HLM Goal achieved. Continue to encourage appropriate  mobility.    Patient Centered Rounds: I performed bedside rounds with nursing staff today.   Discussions with Specialists or Other Care Team Provider:     Education and Discussions with Family / Patient: Patient declined call to .     Total Time Spent on Date of Encounter in care of patient:  mins. This time was spent on one or more of the following: performing physical exam; counseling and coordination of care; obtaining or reviewing history; documenting in the medical record; reviewing/ordering tests, medications or procedures; communicating with other healthcare professionals and discussing with patient's family/caregivers.    Current Length of Stay: 3 day(s)  Current Patient Status: Inpatient   Certification Statement: The patient will continue to require additional inpatient hospital stay due to monitoring abdominal pain, tolerance of diet  Discharge Plan: Anticipate discharge tomorrow to home.    Code Status: Level 1 - Full Code    Subjective:   Patient reports improvement in his abdominal pain, is now tolerating oral diet consistently, plan to discharge tomorrow if stable    Objective:     Vitals:   Temp (24hrs), Av.3 °F (36.8 °C), Min:97.7 °F (36.5 °C), Max:98.5 °F (36.9 °C)    Temp:  [97.7 °F (36.5 °C)-98.5 °F (36.9 °C)] 98.4 °F (36.9 °C)  HR:  [80-86] 80  Resp:  [15-18] 15  BP: ()/(55-79) 119/76  There is no height or weight on file to calculate BMI.     Input and Output Summary (last 24 hours):     Intake/Output Summary (Last 24 hours) at 2024  Last data filed at 2024 1700  Gross per 24 hour   Intake 120 ml   Output 750 ml   Net -630 ml       Physical Exam:   Physical Exam  Vitals and nursing note reviewed.   Constitutional:       General: He is not in acute distress.     Appearance: He is well-developed. He is not toxic-appearing or diaphoretic.   HENT:      Head: Normocephalic and atraumatic.   Eyes:      General: No scleral icterus.     Conjunctiva/sclera:  Conjunctivae normal.   Cardiovascular:      Rate and Rhythm: Normal rate and regular rhythm.      Heart sounds: No murmur heard.     No friction rub. No gallop.   Pulmonary:      Effort: Pulmonary effort is normal. No respiratory distress.      Breath sounds: Normal breath sounds. No stridor. No wheezing, rhonchi or rales.   Chest:      Chest wall: No tenderness.   Abdominal:      General: There is no distension.      Palpations: Abdomen is soft. There is no mass.      Tenderness: There is no abdominal tenderness. There is no guarding or rebound.      Hernia: No hernia is present.   Musculoskeletal:         General: No swelling or tenderness.      Cervical back: Neck supple.   Skin:     General: Skin is warm and dry.      Capillary Refill: Capillary refill takes less than 2 seconds.   Neurological:      Mental Status: He is alert and oriented to person, place, and time.   Psychiatric:         Mood and Affect: Mood normal.          Additional Data:     Labs:  Results from last 7 days   Lab Units 06/08/24  0613   WBC Thousand/uL 2.94*   HEMOGLOBIN g/dL 12.3   HEMATOCRIT % 36.7   PLATELETS Thousands/uL 113*   SEGS PCT % 51   LYMPHO PCT % 32   MONO PCT % 13*   EOS PCT % 2     Results from last 7 days   Lab Units 06/08/24  0613   SODIUM mmol/L 133*   POTASSIUM mmol/L 3.7   CHLORIDE mmol/L 98   CO2 mmol/L 26   BUN mg/dL 3*   CREATININE mg/dL 0.42*   ANION GAP mmol/L 9   CALCIUM mg/dL 6.8*   ALBUMIN g/dL 3.0*   TOTAL BILIRUBIN mg/dL 0.96   ALK PHOS U/L 134*   ALT U/L 43   AST U/L 69*   GLUCOSE RANDOM mg/dL 111     Results from last 7 days   Lab Units 06/06/24  1005   INR  1.12                   Lines/Drains:  Invasive Devices       Peripheral Intravenous Line  Duration             Peripheral IV 06/06/24 Left Antecubital 3 days                          Imaging: No pertinent imaging reviewed.    Recent Cultures (last 7 days):   Results from last 7 days   Lab Units 06/06/24  1005   BLOOD CULTURE  No Growth at 72 hrs.  No  Growth at 72 hrs.       Last 24 Hours Medication List:   Current Facility-Administered Medications   Medication Dose Route Frequency Provider Last Rate    acetaminophen  650 mg Oral Q6H PRN Teagan Mccoy MD      albuterol  1 puff Inhalation Q4H PRN Teagan Mccoy MD      allopurinol  300 mg Oral Daily Teagan Mccoy MD      doxylamine  25 mg Oral HS PRN Daisy Dupree PA-C      FLUoxetine  40 mg Oral Daily Teagan Mccoy MD      folic acid 1 mg, thiamine (VITAMIN B1) 100 mg in sodium chloride 0.9 % 100 mL IV piggyback   Intravenous Daily Teagan Mccoy MD 0 mL/hr at 06/08/24 0946    heparin (porcine)  5,000 Units Subcutaneous Q8H [facility] Teagan Mccoy MD      HYDROmorphone  0.3 mg Intravenous Q6H PRN Teagan Mccoy MD      HYDROmorphone  0.5 mg Intravenous Q6H PRN Teagan Mccoy MD      HYDROmorphone  0.2 mg Intravenous Q3H PRN Teagan Mccoy MD      levothyroxine  75 mcg Oral Early Morning Teagan Mccoy MD      loratadine  10 mg Oral Daily Teagan Mccoy MD      magnesium Oxide  400 mg Oral BID Alvaro Stock DO      pantoprazole  40 mg Oral Early Morning Teagan Mccoy MD      trimethobenzamide  200 mg Intramuscular Q6H PRN Teagan Mccoy MD          Today, Patient Was Seen By: Alvaro Stock DO    **Please Note: This note may have been constructed using a voice recognition system.**

## 2024-06-09 NOTE — ASSESSMENT & PLAN NOTE
Presents with acute on chronic abdominal pain/discomfort in setting of chronic alcoholism and CT imaging evidence of acute interstitial pancreatitis without evidence of necrosis or peripancreatic fluid collection  PRN pain/emesis control  Trend lipase - presented at 549  Patient reports continued improvement in his abdominal pain tolerating regular diet likely stable for discharge in next 24 hours

## 2024-06-09 NOTE — PLAN OF CARE
Problem: PAIN - ADULT  Goal: Verbalizes/displays adequate comfort level or baseline comfort level  Description: Interventions:  - Encourage patient to monitor pain and request assistance  - Assess pain using appropriate pain scale  - Administer analgesics based on type and severity of pain and evaluate response  - Implement non-pharmacological measures as appropriate and evaluate response  - Consider cultural and social influences on pain and pain management  - Notify physician/advanced practitioner if interventions unsuccessful or patient reports new pain  Outcome: Progressing     Problem: INFECTION - ADULT  Goal: Absence or prevention of progression during hospitalization  Description: INTERVENTIONS:  - Assess and monitor for signs and symptoms of infection  - Monitor lab/diagnostic results  - Monitor all insertion sites, i.e. indwelling lines, tubes, and drains  - Monitor endotracheal if appropriate and nasal secretions for changes in amount and color  - [place] appropriate cooling/warming therapies per order  - Administer medications as ordered  - Instruct and encourage patient and family to use good hand hygiene technique  - Identify and instruct in appropriate isolation precautions for identified infection/condition  Outcome: Progressing  Goal: Absence of fever/infection during neutropenic period  Description: INTERVENTIONS:  - Monitor WBC    Outcome: Progressing     Problem: SAFETY ADULT  Goal: Patient will remain free of falls  Description: INTERVENTIONS:  - Educate patient/family on patient safety including physical limitations  - Instruct patient to call for assistance with activity   - Consult OT/PT to assist with strengthening/mobility   - Keep Call bell within reach  - Keep bed low and locked with side rails adjusted as appropriate  - Keep care items and personal belongings within reach  - Initiate and maintain comfort rounds  - Make Fall Risk Sign visible to staff    - Apply yellow socks and  bracelet for high fall risk patients  - Consider moving patient to room near nurses station  Outcome: Progressing  Goal: Maintain or return to baseline ADL function  Description: INTERVENTIONS:  -  Assess patient's ability to carry out ADLs; assess patient's baseline for ADL function and identify physical deficits which impact ability to perform ADLs (bathing, care of mouth/teeth, toileting, grooming, dressing, etc.)  - Assess/evaluate cause of self-care deficits   - Assess range of motion  - Assess patient's mobility; develop plan if impaired  - Assess patient's need for assistive devices and provide as appropriate  - Encourage maximum independence but intervene and supervise when necessary  - Involve family in performance of ADLs  - Assess for home care needs following discharge   - Consider OT consult to assist with ADL evaluation and planning for discharge  - Provide patient education as appropriate  Outcome: Progressing  Goal: Maintains/Returns to pre admission functional level  Description: INTERVENTIONS:  - Perform AM-PAC 6 Click Basic Mobility/ Daily Activity assessment daily.  - Set and communicate daily mobility goal to care team and patient/family/caregiver.   - Collaborate with rehabilitation services on mobility goals if consulted    - Record patient progress and toleration of activity level   Outcome: Progressing     Problem: DISCHARGE PLANNING  Goal: Discharge to home or other facility with appropriate resources  Description: INTERVENTIONS:  - Identify barriers to discharge w/patient and caregiver  - Arrange for needed discharge resources and transportation as appropriate  - Identify discharge learning needs (meds, wound care, etc.)  - Arrange for interpretive services to assist at discharge as needed  - Refer to Case Management Department for coordinating discharge planning if the patient needs post-hospital services based on physician/advanced practitioner order or complex needs related to  functional status, cognitive ability, or social support system  Outcome: Progressing     Problem: Knowledge Deficit  Goal: Patient/family/caregiver demonstrates understanding of disease process, treatment plan, medications, and discharge instructions  Description: Complete learning assessment and assess knowledge base.  Interventions:  - Provide teaching at level of understanding  - Provide teaching via preferred learning methods  Outcome: Progressing

## 2024-06-10 VITALS
HEART RATE: 83 BPM | RESPIRATION RATE: 16 BRPM | DIASTOLIC BLOOD PRESSURE: 83 MMHG | OXYGEN SATURATION: 99 % | TEMPERATURE: 99 F | SYSTOLIC BLOOD PRESSURE: 121 MMHG

## 2024-06-10 LAB
ALBUMIN SERPL BCP-MCNC: 3 G/DL (ref 3.5–5)
ALP SERPL-CCNC: 135 U/L (ref 34–104)
ALT SERPL W P-5'-P-CCNC: 34 U/L (ref 7–52)
ANION GAP SERPL CALCULATED.3IONS-SCNC: 9 MMOL/L (ref 4–13)
AST SERPL W P-5'-P-CCNC: 37 U/L (ref 13–39)
BASOPHILS # BLD AUTO: 0.03 THOUSANDS/ÂΜL (ref 0–0.1)
BASOPHILS NFR BLD AUTO: 1 % (ref 0–1)
BILIRUB SERPL-MCNC: 0.7 MG/DL (ref 0.2–1)
BUN SERPL-MCNC: 4 MG/DL (ref 5–25)
CALCIUM ALBUM COR SERPL-MCNC: 8.4 MG/DL (ref 8.3–10.1)
CALCIUM SERPL-MCNC: 7.6 MG/DL (ref 8.4–10.2)
CHLORIDE SERPL-SCNC: 102 MMOL/L (ref 96–108)
CO2 SERPL-SCNC: 26 MMOL/L (ref 21–32)
CREAT SERPL-MCNC: 0.39 MG/DL (ref 0.6–1.3)
EOSINOPHIL # BLD AUTO: 0.05 THOUSAND/ÂΜL (ref 0–0.61)
EOSINOPHIL NFR BLD AUTO: 2 % (ref 0–6)
ERYTHROCYTE [DISTWIDTH] IN BLOOD BY AUTOMATED COUNT: 14.7 % (ref 11.6–15.1)
GFR SERPL CREATININE-BSD FRML MDRD: 125 ML/MIN/1.73SQ M
GLUCOSE SERPL-MCNC: 118 MG/DL (ref 65–140)
HCT VFR BLD AUTO: 37.9 % (ref 36.5–49.3)
HGB BLD-MCNC: 12.3 G/DL (ref 12–17)
IMM GRANULOCYTES # BLD AUTO: 0.02 THOUSAND/UL (ref 0–0.2)
IMM GRANULOCYTES NFR BLD AUTO: 1 % (ref 0–2)
LYMPHOCYTES # BLD AUTO: 0.84 THOUSANDS/ÂΜL (ref 0.6–4.47)
LYMPHOCYTES NFR BLD AUTO: 30 % (ref 14–44)
MAGNESIUM SERPL-MCNC: 1.6 MG/DL (ref 1.9–2.7)
MCH RBC QN AUTO: 34.9 PG (ref 26.8–34.3)
MCHC RBC AUTO-ENTMCNC: 32.5 G/DL (ref 31.4–37.4)
MCV RBC AUTO: 108 FL (ref 82–98)
MONOCYTES # BLD AUTO: 0.41 THOUSAND/ÂΜL (ref 0.17–1.22)
MONOCYTES NFR BLD AUTO: 15 % (ref 4–12)
NEUTROPHILS # BLD AUTO: 1.42 THOUSANDS/ÂΜL (ref 1.85–7.62)
NEUTS SEG NFR BLD AUTO: 51 % (ref 43–75)
NRBC BLD AUTO-RTO: 0 /100 WBCS
PHOSPHATE SERPL-MCNC: 3.9 MG/DL (ref 2.3–4.1)
PLATELET # BLD AUTO: 128 THOUSANDS/UL (ref 149–390)
PMV BLD AUTO: 10 FL (ref 8.9–12.7)
POTASSIUM SERPL-SCNC: 3.3 MMOL/L (ref 3.5–5.3)
PROT SERPL-MCNC: 5.4 G/DL (ref 6.4–8.4)
RBC # BLD AUTO: 3.52 MILLION/UL (ref 3.88–5.62)
SODIUM SERPL-SCNC: 137 MMOL/L (ref 135–147)
WBC # BLD AUTO: 2.77 THOUSAND/UL (ref 4.31–10.16)

## 2024-06-10 PROCEDURE — 85025 COMPLETE CBC W/AUTO DIFF WBC: CPT | Performed by: INTERNAL MEDICINE

## 2024-06-10 PROCEDURE — 97535 SELF CARE MNGMENT TRAINING: CPT

## 2024-06-10 PROCEDURE — 99239 HOSP IP/OBS DSCHRG MGMT >30: CPT | Performed by: INTERNAL MEDICINE

## 2024-06-10 PROCEDURE — 83735 ASSAY OF MAGNESIUM: CPT | Performed by: INTERNAL MEDICINE

## 2024-06-10 PROCEDURE — 97116 GAIT TRAINING THERAPY: CPT

## 2024-06-10 PROCEDURE — 84100 ASSAY OF PHOSPHORUS: CPT | Performed by: INTERNAL MEDICINE

## 2024-06-10 PROCEDURE — 80053 COMPREHEN METABOLIC PANEL: CPT | Performed by: INTERNAL MEDICINE

## 2024-06-10 PROCEDURE — 97530 THERAPEUTIC ACTIVITIES: CPT

## 2024-06-10 RX ORDER — LORAZEPAM 2 MG/ML
1 INJECTION INTRAMUSCULAR ONCE
Status: COMPLETED | OUTPATIENT
Start: 2024-06-10 | End: 2024-06-10

## 2024-06-10 RX ORDER — CHLORDIAZEPOXIDE HYDROCHLORIDE 25 MG/1
CAPSULE, GELATIN COATED ORAL
Qty: 10 CAPSULE | Refills: 0 | Status: SHIPPED | OUTPATIENT
Start: 2024-06-10 | End: 2024-06-16

## 2024-06-10 RX ADMIN — LEVOTHYROXINE SODIUM 75 MCG: 75 TABLET ORAL at 06:05

## 2024-06-10 RX ADMIN — PANTOPRAZOLE SODIUM 40 MG: 40 TABLET, DELAYED RELEASE ORAL at 06:05

## 2024-06-10 RX ADMIN — MAGNESIUM OXIDE TAB 400 MG (241.3 MG ELEMENTAL MG) 400 MG: 400 (241.3 MG) TAB at 08:33

## 2024-06-10 RX ADMIN — LORATADINE 10 MG: 10 TABLET ORAL at 08:33

## 2024-06-10 RX ADMIN — ALLOPURINOL 300 MG: 300 TABLET ORAL at 08:33

## 2024-06-10 RX ADMIN — THIAMINE HYDROCHLORIDE: 100 INJECTION, SOLUTION INTRAMUSCULAR; INTRAVENOUS at 08:33

## 2024-06-10 RX ADMIN — HEPARIN SODIUM 5000 UNITS: 5000 INJECTION INTRAVENOUS; SUBCUTANEOUS at 06:05

## 2024-06-10 RX ADMIN — LORAZEPAM 1 MG: 2 INJECTION INTRAMUSCULAR; INTRAVENOUS at 12:58

## 2024-06-10 RX ADMIN — HEPARIN SODIUM 5000 UNITS: 5000 INJECTION INTRAVENOUS; SUBCUTANEOUS at 12:58

## 2024-06-10 RX ADMIN — FLUOXETINE 40 MG: 20 CAPSULE ORAL at 08:33

## 2024-06-10 NOTE — DISCHARGE INSTR - OTHER ORDERS
Kelsea Gonzalez   Certified     [facility]  [phone]    AA meeting guide   [email or web address]    [address]  [place] Batista  [id number] [phone]

## 2024-06-10 NOTE — OCCUPATIONAL THERAPY NOTE
Occupational Therapy Progress Note     Patient Name: Sammy Mays  Today's Date: 6/10/2024  Problem List  Principal Problem:    Alcoholic pancreatitis  Active Problems:    Hypothyroidism    Alcohol abuse    Depression    Hyponatremia    Hypokalemia    Elevated LFTs       06/10/24 1150   OT Last Visit   OT Visit Date 06/10/24   Note Type   Note Type Treatment   Pain Assessment   Pain Assessment Tool 0-10   Pain Score No Pain   Restrictions/Precautions   Weight Bearing Precautions Per Order No   Other Precautions Fall Risk   Lifestyle   Autonomy Pt reports I w/ ADLs, IADLs, and fxnl mobility w/o AD at baseline; + driving.   Reciprocal Relationships Pt lives alone; reports having local friends.   Service to Others Pt is retired from [employer].   Intrinsic Gratification Pt enjoys going to the gym.   ADL   Where Assessed Chair   LB Dressing Assistance 5  Supervision/Setup   LB Dressing Deficit Setup;Don/doff R sock;Don/doff L sock;Thread RLE into underwear;Thread LLE into underwear;Pull up over hips   Bed Mobility   Supine to Sit Unable to assess   Sit to Supine Unable to assess   Additional Comments Pt seated OOB in chair upon therpaist's arrival and at end of OT tx session w/ all needs within reach.   Transfers   Sit to Stand 7  Independent   Stand to Sit 7  Independent   Additional Comments completed x2 STS trasnfers w/o AD   Functional Mobility   Functional Mobility 5  Supervision   Additional Comments Pt ambulated greater than household distance w/ S and w/o AD.   Additional items   (no AD)   Cognition   Overall Cognitive Status WFL   Arousal/Participation Alert;Responsive;Cooperative   Attention Within functional limits   Orientation Level Oriented X4   Memory Decreased short term memory   Following Commands Follows all commands and directions without difficulty   Comments Pt was pleasant, cooperative, and willing to participate in OT tx session. Pt was more alert this tx session. Pt participated in the Short  Blessed Test and scored a 2 indicating normal cognition.  (Pt demonstrated impairments in delayed recall t/o session.)   Activity Tolerance   Activity Tolerance Patient tolerated treatment well   Medical Staff Made Aware RN clearance prior to session   Assessment   Assessment Pt seen for skilled OT treatment session from 1133 to 1150 w/ interventions focusing on ADL participation, activity tolerance, standing tolerance, standing balance, transfer skills, fxnl mobility, and assessing cognition . Pt was agreeable and willing to participate in session. Pt engaged in the following tasks: S for LB dressing, I for transfers, and S for fxnl mobility w/o AD. Pt also participated in the Short Blessed Test and scored a 2 indicating normal cognition. In comparison to previous session, pt demonstrated improvements in ADLs and mobility task as he required less physical assistance to complete LB ADLs, transfers and fxnl mobility. Pt also also demonstrated improved cognition this tx session; he was more alert and oriented today. Pt completing ADLs and mobility tasks at/near baseline level of independence. No further acute OT needs identified at this time. Recommend continued active ADL participation and mobilization with hospital staff while in the hospital to increase pt’s endurance and strength upon D/C. From OT standpoint, recommend D/C to home w/ increased social support when medically cleared. D/C pt from OT caseload at this time. Pt seated OOB in chair at end of OT tx session w/ all needs within reach.   Plan   Treatment Interventions ADL retraining;Functional transfer training;Endurance training;Patient/family training;Equipment evaluation/education;Compensatory technique education;Continued evaluation;Energy conservation;Activityengagement   Goal Expiration Date 06/21/24   OT Treatment Day 1   OT Frequency 2-3x/wk   Discharge Recommendation   Rehab Resource Intensity Level, OT No post-acute rehabilitation needs   AM-PAC  Daily Activity Inpatient   Lower Body Dressing 3   Bathing 3   Toileting 3   Upper Body Dressing 3   Grooming 3   Eating 4   Daily Activity Raw Score 19   Daily Activity Standardized Score (Calc for Raw Score >=11) 40.22   -PAC Applied Cognition Inpatient   Following a Speech/Presentation 4   Understanding Ordinary Conversation 4   Taking Medications 4   Remembering Where Things Are Placed or Put Away 4   Remembering List of 4-5 Errands 3   Taking Care of Complicated Tasks 3   Applied Cognition Raw Score 22   Applied Cognition Standardized Score 47.83       The patient's raw score on the AM-PAC Daily Activity Inpatient Short Form is 19. A raw score of greater than or equal to 19 suggests the patient may benefit from discharge to home. Please refer to the recommendation of the Occupational Therapist for safe discharge planning.    HAN Flor, OTR/L

## 2024-06-10 NOTE — CERTIFIED RECOVERY SPECIALIST
Certified  Note    Patient name: Sammy Mays  Location: [facility] 929/[facility]  [place]: [facility]  Attending:  Alvaro Stock DO MRN [id number]  : 1960  Age: 64 y.o.    Sex: male Date 6/10/2024         Substance Use History:     Social History     Substance and Sexual Activity   Alcohol Use Yes    Comment: social        Social History     Substance and Sexual Activity   Drug Use Yes    Types: Marijuana     CRS attempted to engage, patient resting comfortably.     CRS will continue to follow until discharge     Resources provided         Kelsea Gonzalez

## 2024-06-10 NOTE — PHYSICAL THERAPY NOTE
PHYSICAL THERAPY NOTE          Patient Name: Sammy Mays  Today's Date: 6/10/2024       06/10/24 1049   PT Last Visit   PT Visit Date 06/10/24   Note Type   Note Type Treatment   Pain Assessment   Pain Assessment Tool 0-10   Pain Score No Pain   Restrictions/Precautions   Weight Bearing Precautions Per Order No   Other Precautions Fall Risk   General   Chart Reviewed Yes   Response to Previous Treatment Patient with no complaints from previous session.   Family/Caregiver Present No   Cognition   Overall Cognitive Status WFL   Arousal/Participation Cooperative   Attention Within functional limits   Orientation Level Oriented X4   Memory Within functional limits   Following Commands Follows all commands and directions without difficulty   Comments pleasant and cooperative   Bed Mobility   Supine to Sit 6  Modified independent   Additional items HOB elevated;Bedrails   Sit to Supine Unable to assess   Additional Comments pt supine in bed upon arrival. Pt left sitting OOB in recliner with all needs within reach   Transfers   Sit to Stand 7  Independent   Additional items Armrests   Stand to Sit 7  Independent   Additional items Trapeze bar   Additional Comments transfers without AD   Ambulation/Elevation   Gait pattern Decreased foot clearance;Inconsistent fiordaliza   Gait Assistance 5  Supervision   Assistive Device None   Distance 80' (seated rest) 200'   Stair Management Assistance 5  Supervision   Additional items Verbal cues   Stair Management Technique One rail R;Alternating pattern;Foreward   Number of Stairs 7   Balance   Static Sitting Fair +   Dynamic Sitting Fair +   Static Standing Fair   Dynamic Standing Fair   Ambulatory Fair   Activity Tolerance   Activity Tolerance Patient tolerated treatment well   Medical Staff Made Aware CM updated   Nurse Made Aware RN cleared   Assessment   Prognosis Good   Assessment Pt seen for PT  treatment session this date. Therapy session focused on bed mobility, functional transfers, gait training and stair training in order to improve overall mobility and independence. Pt performing bed mobility at mod I level, functional transfers at I level, and functional mobility at S level without AD. Pt steady t/o session, no LOB noted. Pt was left sitting OOB in recliner at the end of PT session with all needs in reach. Pt with no questions or concerns regarding d/c home; appears to be functioning at/ near baseline mobility levels. Pt with no further acute inpatient PT needs at this time- please re-consult if needed. PT to d/c pt and recommends level III resource intensity. Encourage pt to ambulate at least 3-4x/day with restorative/ nursing staff while remaining in hospital. The [facility] Basic Mobility Inpatient Short Form Raw Score is 22. A Raw score of greater than 16 suggests the patient may benefit from discharge to home. Please also refer to the recommendation of the Physical Therapist for safe discharge planning.   Barriers to Discharge Decreased caregiver support   Goals   Patient Goals to have some cookies   STG Expiration Date 06/21/24   PT Treatment Day 1   Plan   Treatment/Interventions Functional transfer training;LE strengthening/ROM;Elevations;Therapeutic exercise;Endurance training;Patient/family training;Equipment eval/education;Bed mobility;Gait training;Spoke to nursing;Spoke to case management;OT   Progress Discontinue PT   PT Frequency   (d/c PT)   Discharge Recommendation   Rehab Resource Intensity Level, PT III (Minimum Resource Intensity)  (OPPT)   AM-PAC Basic Mobility Inpatient   Turning in Flat Bed Without Bedrails 4   Lying on Back to Sitting on Edge of Flat Bed Without Bedrails 4   Moving Bed to Chair 4   Standing Up From Chair Using Arms 4   Walk in Room 3   Climb 3-5 Stairs With Railing 3   Basic Mobility Inpatient Raw Score 22   Basic Mobility Standardized Score 47.4   [place]i  Pete Highest Level Of Mobility   -HLM Goal 7: Walk 25 feet or more   JH-HLM Achieved 8: Walk 250 feet ot more   Education   Education Provided Mobility training   Patient Demonstrates acceptance/verbal understanding   End of Consult   Patient Position at End of Consult Bedside chair;All needs within reach     Lala Gonzalez, PT, DPT

## 2024-06-10 NOTE — CERTIFIED RECOVERY SPECIALIST
Certified  Note    Patient name: Sammy Mays  Location: [facility] 929/[facility] 929-01  [place]: [facility]  Attending:  Alvaro Stock DO MRN [id number]  : 1960  Age: 64 y.o.    Sex: male Date 6/10/2024         Substance Use History:     Social History     Substance and Sexual Activity   Alcohol Use Yes    Comment: social        Social History     Substance and Sexual Activity   Drug Use Yes    Types: Marijuana         CRS called patient in room. (Per CM, patient wanted to meet, CRS not in [place] currently) Patient didn't answer. Will try back          Kelsea Gonzalez

## 2024-06-10 NOTE — CASE MANAGEMENT
Case Management Discharge Planning Note    Patient name Sammy Mays  Location [facility] 929/[facility] 929-01 MRN [id number]  : 1960 Date 6/10/2024       Current Admission Date: 2024  Current Admission Diagnosis:Alcoholic pancreatitis   Patient Active Problem List    Diagnosis Date Noted Date Diagnosed    Hyponatremia 2024     Hypokalemia 2024     Elevated LFTs 2024     Marijuana smoker, continuous 2024     Severe protein-calorie malnutrition (HCC) 2024     Hyperkalemia 10/18/2023     Alcohol withdrawal syndrome with complication (HCC) 10/17/2023     Depression 10/17/2023     Diastasis recti 10/17/2023     Hypocalcemia 2023     Pancytopenia (HCC) 2023     Obese 2023     Hypomagnesemia 2023     Lightheaded 2023     Hypernatremia 07/10/2023     GI bleed 2023     Acute blood loss anemia 2023     Alcohol abuse 2023     SIRS (systemic inflammatory response syndrome) 2023     Transition of care performed with sharing of clinical summary 2018     Alcoholic pancreatitis 2018     Leukopenia 2018     Platelets decreased (HCC) 2018     Generalized anxiety disorder 2018     Hypertension 2018     Hyperlipidemia 2018     Impaired fasting glucose 2018     Hypothyroidism 2018     Habitual alcohol use 2018     Gastroesophageal reflux disease without esophagitis 2018       LOS (days): 4  Geometric Mean LOS (GMLOS) (days): 3.1  Days to GMLOS:-0.8     OBJECTIVE:  Risk of Unplanned Readmission Score: 24.74         Current admission status: Inpatient   Preferred Pharmacy:   [id number] - [place] - [address]  [facility] [place] [address]  Phone: [phone] Fax: [phone]    Primary Care Provider: Ankur Bustillo DO    Primary Insurance: MEDICARE  Secondary Insurance: AARP    DISCHARGE DETAILS:    Discharge planning discussed with:: pt at  bedside  Freedom of Choice: Yes  Comments - Freedom of Choice: CM discussed updated recommendation for OP PT at bedside. Pt verbalized understanding. CM will continue to follow as needed.  CM contacted family/caregiver?: No- see comments  Were Treatment Team discharge recommendations reviewed with patient/caregiver?: Yes  Did patient/caregiver verbalize understanding of patient care needs?: Yes  Were patient/caregiver advised of the risks associated with not following Treatment Team discharge recommendations?: Yes         Requested Home Health Care         Is the patient interested in HHC at discharge?: No    DME Referral Provided  Referral made for DME?: No    Other Referral/Resources/Interventions Provided:  Interventions: Outpatient PT    Would you like to participate in our [facility] service program?  : No - Declined    Treatment Team Recommendation: Home  Discharge Destination Plan:: Home  Transport at Discharge : Ride Share                                      Additional Comments: CM discussed updated recommendation for OP PT at bedside. Pt verbalized understanding. CM will continue to follow as needed.

## 2024-06-10 NOTE — PLAN OF CARE
Problem: DISCHARGE PLANNING  Goal: Discharge to home or other facility with appropriate resources  Description: INTERVENTIONS:  - Identify barriers to discharge w/patient and caregiver  - Arrange for needed discharge resources and transportation as appropriate  - Identify discharge learning needs (meds, wound care, etc.)  - Arrange for interpretive services to assist at discharge as needed  - Refer to Case Management Department for coordinating discharge planning if the patient needs post-hospital services based on physician/advanced practitioner order or complex needs related to functional status, cognitive ability, or social support system  Outcome: Progressing     Problem: INFECTION - ADULT  Goal: Absence or prevention of progression during hospitalization  Description: INTERVENTIONS:  - Assess and monitor for signs and symptoms of infection  - Monitor lab/diagnostic results  - Monitor all insertion sites, i.e. indwelling lines, tubes, and drains  - Monitor endotracheal if appropriate and nasal secretions for changes in amount and color  - [place] appropriate cooling/warming therapies per order  - Administer medications as ordered  - Instruct and encourage patient and family to use good hand hygiene technique  - Identify and instruct in appropriate isolation precautions for identified infection/condition  Outcome: Progressing  Goal: Absence of fever/infection during neutropenic period  Description: INTERVENTIONS:  - Monitor WBC    Outcome: Progressing

## 2024-06-10 NOTE — PLAN OF CARE
Problem: PHYSICAL THERAPY ADULT  Goal: Performs mobility at highest level of function for planned discharge setting.  See evaluation for individualized goals.  Description: Treatment/Interventions: Functional transfer training, LE strengthening/ROM, Elevations, Therapeutic exercise, Endurance training, Patient/family training, Equipment eval/education, Bed mobility, Gait training, Spoke to nursing, Spoke to case management, OT          See flowsheet documentation for full assessment, interventions and recommendations.  Outcome: Adequate for Discharge  Note: Prognosis: Good  Problem List: Decreased strength, Decreased range of motion, Decreased endurance, Impaired balance, Decreased mobility, Decreased cognition, Impaired judgement, Decreased safety awareness  Assessment: Pt seen for PT treatment session this date. Therapy session focused on bed mobility, functional transfers, gait training and stair training in order to improve overall mobility and independence. Pt performing bed mobility at mod I level, functional transfers at I level, and functional mobility at S level without AD. Pt steady t/o session, no LOB noted. Pt was left sitting OOB in recliner at the end of PT session with all needs in reach. Pt with no questions or concerns regarding d/c home; appears to be functioning at/ near baseline mobility levels. Pt with no further acute inpatient PT needs at this time- please re-consult if needed. PT to d/c pt and recommends level III resource intensity. Encourage pt to ambulate at least 3-4x/day with restorative/ nursing staff while remaining in hospital. The patient's AM-PAC Basic Mobility Inpatient Short Form Raw Score is 22. A Raw score of greater than 16 suggests the patient may benefit from discharge to home. Please also refer to the recommendation of the Physical Therapist for safe discharge planning.  Barriers to Discharge: Decreased caregiver support     Rehab Resource Intensity Level, PT: III (Minimum  Resource Intensity) (OPPT)    See flowsheet documentation for full assessment.

## 2024-06-10 NOTE — PLAN OF CARE
Problem: OCCUPATIONAL THERAPY ADULT  Goal: Performs self-care activities at highest level of function for planned discharge setting.  See evaluation for individualized goals.  Description: Treatment Interventions: ADL retraining, Functional transfer training, Endurance training, Patient/family training, Equipment evaluation/education, Compensatory technique education, Continued evaluation, Energy conservation, Activityengagement          See flowsheet documentation for full assessment, interventions and recommendations.   Outcome: Progressing  Note: Limitation: Decreased ADL status, Decreased Safe judgement during ADL, Decreased cognition, Decreased endurance, Decreased self-care trans, Decreased high-level ADLs     Assessment: Pt seen for skilled OT treatment session from 1133 to 1150 w/ interventions focusing on ADL participation, activity tolerance, standing tolerance, standing balance, transfer skills, fxnl mobility, and assessing cognition . Pt was agreeable and willing to participate in session. Pt engaged in the following tasks: S for LB dressing, I for transfers, and S for fxnl mobility w/o AD. Pt also participated in the Short Blessed Test and scored a 2 indicating normal cognition. In comparison to previous session, pt demonstrated improvements in ADLs and mobility task as he required less physical assistance to complete LB ADLs, transfers and fxnl mobility. Pt also also demonstrated improved cognition this tx session; he was more alert and oriented today. Pt completing ADLs and mobility tasks at/near baseline level of independence. No further acute OT needs identified at this time. Recommend continued active ADL participation and mobilization with hospital staff while in the hospital to increase pt’s endurance and strength upon D/C. From OT standpoint, recommend D/C to home w/ increased social support when medically cleared. D/C pt from OT caseload at this time. Pt seated OOB in chair at end of OT tx  session w/ all needs within reach.     Rehab Resource Intensity Level, OT: No post-acute rehabilitation needs

## 2024-06-11 LAB
BACTERIA BLD CULT: NORMAL
BACTERIA BLD CULT: NORMAL

## 2024-06-16 NOTE — ASSESSMENT & PLAN NOTE
Presents with acute on chronic abdominal pain/discomfort in setting of chronic alcoholism and CT imaging evidence of acute interstitial pancreatitis without evidence of necrosis or peripancreatic fluid collection  PRN pain/emesis control  Trend lipase - presented at 549  Reports resolution of his abdominal pain, he is tolerating a full diet, cleared for discharge with outpatient follow-up discussed importance of alcohol cessation

## 2024-06-16 NOTE — ASSESSMENT & PLAN NOTE
Likely multifactorial secondary to alcoholism along with decreased oral solute intake  Sodium improved  Monitor serum sodium -> if paradoxically worsens, may require fluid restriction

## 2024-06-16 NOTE — DISCHARGE SUMMARY
[facility]  Discharge- Sammy Mays 1960, 64 y.o. male MRN: [id number]  Unit/Bed#: [facility] 929-01 Encounter: [id number]  Primary Care Provider: Ankur Bustillo DO   Date and time admitted to hospital: 6/6/2024  9:31 AM    Elevated LFTs  Assessment & Plan  In the setting of chronic alcoholism  Limit/avoid hepatotoxins as possible  Check RUQ ultrasound with liver dopplers-reviewed  Encourage alcohol cessation    Hypokalemia  Assessment & Plan  Monitor/replete serum potassium and magnesium    Hyponatremia  Assessment & Plan  Likely multifactorial secondary to alcoholism along with decreased oral solute intake  Sodium improved  Monitor serum sodium -> if paradoxically worsens, may require fluid restriction      Depression  Assessment & Plan  Continue Prozac    Alcohol abuse  Assessment & Plan  Acute on chronic issue  Cessation counseling  [facility] protocol  Supplemental thiamine/folic acid and multivitamin  PPI regimen  Monitor/replete electrolyte deficiencies if present  Replete potassium and magnesium, phosphorus within normal limits monitor for refeeding syndrome    Hypothyroidism  Assessment & Plan  Continue Synthroid    * Alcoholic pancreatitis  Assessment & Plan  Presents with acute on chronic abdominal pain/discomfort in setting of chronic alcoholism and CT imaging evidence of acute interstitial pancreatitis without evidence of necrosis or peripancreatic fluid collection  PRN pain/emesis control  Trend lipase - presented at 549  Reports resolution of his abdominal pain, he is tolerating a full diet, cleared for discharge with outpatient follow-up discussed importance of alcohol cessation            Medical Problems       Resolved Problems  Date Reviewed: 6/16/2024   None       Discharging Physician / Practitioner: Alvaro Stock DO  PCP: Ankur Bustillo DO  Admission Date:   Admission Orders (From admission, onward)       Ordered        06/06/24 1526  INPATIENT ADMISSION   Once                          Discharge Date: 06/10/24        Reason for Admission:     Hospital Course:   Sammy Mays is a 64 y.o. male patient who originally presented to the hospital on 6/6/2024 due to abdominal pain presentation consistent with pancreatitis, he improved with bowel rest IV fluids and was discharged with outpatient follow-up            Please see above list of diagnoses and related plan for additional information.     Condition at Discharge: stable    Discharge Day Visit / Exam:   Subjective: Patient denies any acute complaints on day of discharge he is tolerating his diet denies any abdominal pain nausea  Vitals: Blood Pressure: 121/83 (06/10/24 1106)  Pulse: 83 (06/10/24 1106)  Temperature: 99 °F (37.2 °C) (06/10/24 1106)  Temp Source: Oral (06/10/24 1106)  Respirations: 16 (06/10/24 1106)  SpO2: 99 % (06/10/24 1106)  Exam:   Physical Exam  Vitals and nursing note reviewed.   Constitutional:       General: He is not in acute distress.     Appearance: He is well-developed. He is not toxic-appearing or diaphoretic.   HENT:      Head: Normocephalic and atraumatic.   Eyes:      General: No scleral icterus.     Conjunctiva/sclera: Conjunctivae normal.   Cardiovascular:      Rate and Rhythm: Normal rate and regular rhythm.      Heart sounds: No murmur heard.     No friction rub. No gallop.   Pulmonary:      Effort: Pulmonary effort is normal. No respiratory distress.      Breath sounds: Normal breath sounds. No stridor. No wheezing, rhonchi or rales.   Chest:      Chest wall: No tenderness.   Abdominal:      General: There is no distension.      Palpations: Abdomen is soft. There is no mass.      Tenderness: There is no abdominal tenderness. There is no guarding or rebound.      Hernia: No hernia is present.   Musculoskeletal:         General: No swelling or tenderness.      Cervical back: Neck supple.   Skin:     General: Skin is warm and dry.      Capillary Refill: Capillary refill takes less  than 2 seconds.   Neurological:      Mental Status: He is alert and oriented to person, place, and time.   Psychiatric:         Mood and Affect: Mood normal.          Discussion with Family: Patient declined call to .     Discharge instructions/Information to patient and family:   See after visit summary for information provided to patient and family.      Provisions for Follow-Up Care:  See after visit summary for information related to follow-up care and any pertinent home health orders.      Mobility at time of Discharge:   Basic Mobility Inpatient Raw Score: 22  JH-HLM Goal: 7: Walk 25 feet or more  JH-HLM Achieved: 7: Walk 25 feet or more  HLM Goal achieved. Continue to encourage appropriate mobility.     Disposition:   Home    Planned Readmission: no     Discharge Statement:  I spent  minutes discharging the patient. This time was spent on the day of discharge. I had direct contact with the patient on the day of discharge. Greater than 50% of the total time was spent examining patient, answering all patient questions, arranging and discussing plan of care with patient as well as directly providing post-discharge instructions.  Additional time then spent on discharge activities.    Discharge Medications:  See after visit summary for reconciled discharge medications provided to patient and/or family.      **Please Note: This note may have been constructed using a voice recognition system**

## 2024-08-11 ENCOUNTER — HOSPITAL ENCOUNTER (INPATIENT)
Facility: HOSPITAL | Age: 64
LOS: 3 days | Discharge: HOME | DRG: 439 | End: 2024-08-15
Attending: EMERGENCY MEDICINE | Admitting: STUDENT IN AN ORGANIZED HEALTH CARE EDUCATION/TRAINING PROGRAM
Payer: MEDICARE

## 2024-08-11 ENCOUNTER — APPOINTMENT (OUTPATIENT)
Dept: RADIOLOGY | Facility: HOSPITAL | Age: 64
DRG: 439 | End: 2024-08-11
Payer: MEDICARE

## 2024-08-11 DIAGNOSIS — F10.20 ALCOHOL USE DISORDER, SEVERE, DEPENDENCE (HCC): ICD-10-CM

## 2024-08-11 DIAGNOSIS — E83.42 HYPOMAGNESEMIA: ICD-10-CM

## 2024-08-11 DIAGNOSIS — K85.90 PANCREATITIS: Primary | ICD-10-CM

## 2024-08-11 LAB
ALBUMIN SERPL BCG-MCNC: 3 G/DL (ref 3.5–5)
ALP SERPL-CCNC: 219 U/L (ref 34–104)
ALT SERPL W P-5'-P-CCNC: 27 U/L (ref 7–52)
ANION GAP SERPL CALCULATED.3IONS-SCNC: 11 MMOL/L (ref 4–13)
AST SERPL W P-5'-P-CCNC: 59 U/L (ref 13–39)
BASOPHILS # BLD AUTO: 0.04 THOUSANDS/ÂΜL (ref 0–0.1)
BASOPHILS NFR BLD AUTO: 0 % (ref 0–1)
BILIRUB SERPL-MCNC: 1.87 MG/DL (ref 0.2–1)
BUN SERPL-MCNC: 4 MG/DL (ref 5–25)
CALCIUM ALBUM COR SERPL-MCNC: 7.6 MG/DL (ref 8.3–10.1)
CALCIUM SERPL-MCNC: 6.8 MG/DL (ref 8.4–10.2)
CARDIAC TROPONIN I PNL SERPL HS: 11 NG/L
CHLORIDE SERPL-SCNC: 102 MMOL/L (ref 96–108)
CO2 SERPL-SCNC: 22 MMOL/L (ref 21–32)
CREAT SERPL-MCNC: 0.48 MG/DL (ref 0.6–1.3)
EOSINOPHIL # BLD AUTO: 0.02 THOUSAND/ÂΜL (ref 0–0.61)
EOSINOPHIL NFR BLD AUTO: 0 % (ref 0–6)
ERYTHROCYTE [DISTWIDTH] IN BLOOD BY AUTOMATED COUNT: 15.6 % (ref 11.6–15.1)
GFR SERPL CREATININE-BSD FRML MDRD: 114 ML/MIN/1.73SQ M
GLUCOSE SERPL-MCNC: 149 MG/DL (ref 65–140)
HCT VFR BLD AUTO: 43.5 % (ref 36.5–49.3)
HGB BLD-MCNC: 14.8 G/DL (ref 12–17)
IMM GRANULOCYTES # BLD AUTO: 0.06 THOUSAND/UL (ref 0–0.2)
IMM GRANULOCYTES NFR BLD AUTO: 1 % (ref 0–2)
LIPASE SERPL-CCNC: 379 U/L (ref 11–82)
LYMPHOCYTES # BLD AUTO: 0.77 THOUSANDS/ÂΜL (ref 0.6–4.47)
LYMPHOCYTES NFR BLD AUTO: 7 % (ref 14–44)
MAGNESIUM SERPL-MCNC: 1.2 MG/DL (ref 1.9–2.7)
MCH RBC QN AUTO: 33.8 PG (ref 26.8–34.3)
MCHC RBC AUTO-ENTMCNC: 34 G/DL (ref 31.4–37.4)
MCV RBC AUTO: 99 FL (ref 82–98)
MONOCYTES # BLD AUTO: 0.62 THOUSAND/ÂΜL (ref 0.17–1.22)
MONOCYTES NFR BLD AUTO: 6 % (ref 4–12)
NEUTROPHILS # BLD AUTO: 8.98 THOUSANDS/ÂΜL (ref 1.85–7.62)
NEUTS SEG NFR BLD AUTO: 86 % (ref 43–75)
NRBC BLD AUTO-RTO: 0 /100 WBCS
PHOSPHATE SERPL-MCNC: 3.6 MG/DL (ref 2.3–4.1)
PLATELET # BLD AUTO: 166 THOUSANDS/UL (ref 149–390)
PMV BLD AUTO: 10.1 FL (ref 8.9–12.7)
POTASSIUM SERPL-SCNC: 2.6 MMOL/L (ref 3.5–5.3)
PROT SERPL-MCNC: 5.1 G/DL (ref 6.4–8.4)
RBC # BLD AUTO: 4.38 MILLION/UL (ref 3.88–5.62)
SODIUM SERPL-SCNC: 135 MMOL/L (ref 135–147)
WBC # BLD AUTO: 10.49 THOUSAND/UL (ref 4.31–10.16)

## 2024-08-11 PROCEDURE — 36415 COLL VENOUS BLD VENIPUNCTURE: CPT

## 2024-08-11 PROCEDURE — 93005 ELECTROCARDIOGRAM TRACING: CPT

## 2024-08-11 PROCEDURE — 99223 1ST HOSP IP/OBS HIGH 75: CPT | Performed by: INTERNAL MEDICINE

## 2024-08-11 PROCEDURE — 85025 COMPLETE CBC W/AUTO DIFF WBC: CPT | Performed by: EMERGENCY MEDICINE

## 2024-08-11 PROCEDURE — 83735 ASSAY OF MAGNESIUM: CPT

## 2024-08-11 PROCEDURE — 99284 EMERGENCY DEPT VISIT MOD MDM: CPT

## 2024-08-11 PROCEDURE — 96365 THER/PROPH/DIAG IV INF INIT: CPT

## 2024-08-11 PROCEDURE — 84100 ASSAY OF PHOSPHORUS: CPT

## 2024-08-11 PROCEDURE — 74176 CT ABD & PELVIS W/O CONTRAST: CPT

## 2024-08-11 PROCEDURE — 84484 ASSAY OF TROPONIN QUANT: CPT | Performed by: EMERGENCY MEDICINE

## 2024-08-11 PROCEDURE — 80053 COMPREHEN METABOLIC PANEL: CPT | Performed by: EMERGENCY MEDICINE

## 2024-08-11 PROCEDURE — 96366 THER/PROPH/DIAG IV INF ADDON: CPT

## 2024-08-11 PROCEDURE — 99285 EMERGENCY DEPT VISIT HI MDM: CPT | Performed by: EMERGENCY MEDICINE

## 2024-08-11 PROCEDURE — 96375 TX/PRO/DX INJ NEW DRUG ADDON: CPT

## 2024-08-11 PROCEDURE — 83690 ASSAY OF LIPASE: CPT | Performed by: EMERGENCY MEDICINE

## 2024-08-11 RX ORDER — CHLORDIAZEPOXIDE HYDROCHLORIDE 5 MG/1
25 CAPSULE, GELATIN COATED ORAL EVERY 8 HOURS SCHEDULED
Status: DISCONTINUED | OUTPATIENT
Start: 2024-08-11 | End: 2024-08-13

## 2024-08-11 RX ORDER — POTASSIUM CHLORIDE 14.9 MG/ML
20 INJECTION INTRAVENOUS
Status: COMPLETED | OUTPATIENT
Start: 2024-08-11 | End: 2024-08-12

## 2024-08-11 RX ORDER — ACETAMINOPHEN 325 MG/1
650 TABLET ORAL EVERY 6 HOURS PRN
Status: CANCELLED | OUTPATIENT
Start: 2024-08-11

## 2024-08-11 RX ORDER — LEVOTHYROXINE SODIUM 75 UG/1
75 TABLET ORAL
Status: CANCELLED | OUTPATIENT
Start: 2024-08-12

## 2024-08-11 RX ORDER — SODIUM CHLORIDE, SODIUM GLUCONATE, SODIUM ACETATE, POTASSIUM CHLORIDE, MAGNESIUM CHLORIDE, SODIUM PHOSPHATE, DIBASIC, AND POTASSIUM PHOSPHATE .53; .5; .37; .037; .03; .012; .00082 G/100ML; G/100ML; G/100ML; G/100ML; G/100ML; G/100ML; G/100ML
125 INJECTION, SOLUTION INTRAVENOUS CONTINUOUS
Status: CANCELLED | OUTPATIENT
Start: 2024-08-11

## 2024-08-11 RX ORDER — ENOXAPARIN SODIUM 100 MG/ML
40 INJECTION SUBCUTANEOUS DAILY
Status: CANCELLED | OUTPATIENT
Start: 2024-08-12

## 2024-08-11 RX ORDER — POTASSIUM CHLORIDE 29.8 MG/ML
40 INJECTION INTRAVENOUS ONCE
Status: DISCONTINUED | OUTPATIENT
Start: 2024-08-11 | End: 2024-08-11 | Stop reason: SDUPTHER

## 2024-08-11 RX ORDER — HYDROMORPHONE HCL IN WATER/PF 6 MG/30 ML
0.2 PATIENT CONTROLLED ANALGESIA SYRINGE INTRAVENOUS EVERY 4 HOURS PRN
Status: DISCONTINUED | OUTPATIENT
Start: 2024-08-11 | End: 2024-08-15 | Stop reason: HOSPADM

## 2024-08-11 RX ORDER — FOLIC ACID 1 MG/1
1 TABLET ORAL DAILY
Status: CANCELLED | OUTPATIENT
Start: 2024-08-12

## 2024-08-11 RX ORDER — LORAZEPAM 2 MG/ML
0.5 INJECTION INTRAMUSCULAR ONCE
Status: COMPLETED | OUTPATIENT
Start: 2024-08-12 | End: 2024-08-12

## 2024-08-11 RX ORDER — LANOLIN ALCOHOL/MO/W.PET/CERES
100 CREAM (GRAM) TOPICAL DAILY
Status: CANCELLED | OUTPATIENT
Start: 2024-08-12

## 2024-08-11 RX ORDER — PANTOPRAZOLE SODIUM 40 MG/1
40 TABLET, DELAYED RELEASE ORAL
Status: CANCELLED | OUTPATIENT
Start: 2024-08-12

## 2024-08-11 RX ORDER — ALLOPURINOL 300 MG/1
300 TABLET ORAL DAILY
Status: CANCELLED | OUTPATIENT
Start: 2024-08-12

## 2024-08-11 RX ORDER — DROPERIDOL 2.5 MG/ML
0.62 INJECTION, SOLUTION INTRAMUSCULAR; INTRAVENOUS EVERY 6 HOURS PRN
Status: DISCONTINUED | OUTPATIENT
Start: 2024-08-11 | End: 2024-08-11

## 2024-08-11 RX ORDER — DROPERIDOL 2.5 MG/ML
0.62 INJECTION, SOLUTION INTRAMUSCULAR; INTRAVENOUS ONCE
Status: COMPLETED | OUTPATIENT
Start: 2024-08-11 | End: 2024-08-11

## 2024-08-11 RX ORDER — ONDANSETRON 2 MG/ML
4 INJECTION INTRAMUSCULAR; INTRAVENOUS EVERY 6 HOURS PRN
Status: CANCELLED | OUTPATIENT
Start: 2024-08-11

## 2024-08-11 RX ORDER — DOCUSATE SODIUM 100 MG/1
100 CAPSULE, LIQUID FILLED ORAL 2 TIMES DAILY
Status: CANCELLED | OUTPATIENT
Start: 2024-08-11

## 2024-08-11 RX ORDER — POTASSIUM CHLORIDE 1500 MG/1
40 TABLET, EXTENDED RELEASE ORAL ONCE
Status: COMPLETED | OUTPATIENT
Start: 2024-08-11 | End: 2024-08-11

## 2024-08-11 RX ORDER — ALBUTEROL SULFATE 90 UG/1
1 AEROSOL, METERED RESPIRATORY (INHALATION) EVERY 4 HOURS PRN
Status: CANCELLED | OUTPATIENT
Start: 2024-08-11

## 2024-08-11 RX ORDER — MAGNESIUM SULFATE HEPTAHYDRATE 40 MG/ML
2 INJECTION, SOLUTION INTRAVENOUS ONCE
Status: COMPLETED | OUTPATIENT
Start: 2024-08-11 | End: 2024-08-11

## 2024-08-11 RX ADMIN — SODIUM CHLORIDE 1000 ML: 0.9 INJECTION, SOLUTION INTRAVENOUS at 16:23

## 2024-08-11 RX ADMIN — POTASSIUM CHLORIDE 20 MEQ: 14.9 INJECTION, SOLUTION INTRAVENOUS at 21:54

## 2024-08-11 RX ADMIN — DROPERIDOL 0.62 MG: 2.5 INJECTION, SOLUTION INTRAMUSCULAR; INTRAVENOUS at 16:25

## 2024-08-11 RX ADMIN — POTASSIUM CHLORIDE 40 MEQ: 1500 TABLET, EXTENDED RELEASE ORAL at 18:46

## 2024-08-11 RX ADMIN — MAGNESIUM SULFATE HEPTAHYDRATE 2 G: 40 INJECTION, SOLUTION INTRAVENOUS at 16:47

## 2024-08-11 RX ADMIN — CHLORDIAZEPOXIDE HYDROCHLORIDE 25 MG: 5 CAPSULE ORAL at 21:54

## 2024-08-11 RX ADMIN — POTASSIUM CHLORIDE 20 MEQ: 14.9 INJECTION, SOLUTION INTRAVENOUS at 19:24

## 2024-08-11 NOTE — ED PROVIDER NOTES
History  Chief Complaint   Patient presents with    Abdominal Pain     For the past few days. HX of GI issues. +nausea and loss of appetite. -vomitting -diarrhea     64-year-old male with past medical history alcohol abuse, alcoholic pancreatitis presents ED for evaluation of several days of epigastric abdominal pain.  Patient states pain is primarily in his epigastric area, is constant.  He also complains of associated anorexia and nausea.  He denies associated fever, chills, vomiting, diarrhea.  Patient states that he normally drinks several beers per day, but has not drank much over the past day because he has not felt well.  He has also not had much to eat because he has not felt well.  Patient was recently admitted on 6/6/2024 for acute pancreatitis        Prior to Admission Medications   Prescriptions Last Dose Informant Patient Reported? Taking?   FLUoxetine (PROzac) 40 MG capsule   No No   Sig: TAKE ONE CAPSULE BY MOUTH EVERY DAY   Multiple Vitamin (DAILY VALUE MULTIVITAMIN) TABS   Yes No   Sig: Take 1 tablet by mouth daily   albuterol (PROVENTIL HFA,VENTOLIN HFA) 90 mcg/act inhaler   Yes No   Sig: Inhale 1-2 puffs   allopurinol (ZYLOPRIM) 300 mg tablet   No No   Sig: TAKE 1 TABLET EVERY DAY   cetirizine (ZyrTEC) 10 mg tablet   Yes No   Sig: Take one tablet by mouth daily as needed for allergies/congestion    chlordiazePOXIDE (LIBRIUM) 25 mg capsule   No No   Sig: Take 1 capsule (25 mg total) by mouth 4 (four) times a day for 2 days, THEN 1 capsule (25 mg total) 2 (two) times a day for 2 days, THEN 1 capsule (25 mg total) in the morning for 2 days.   folic acid (FOLVITE) 1 mg tablet   No No   Sig: Take 1 tablet (1 mg total) by mouth daily for 7 days Do not start before October 19, 2023.   levothyroxine 75 mcg tablet   No No   Sig: TAKE 1 TABLET IN THE MORNING ON AN EMPTY STOMACH FOR THYROID   pantoprazole (PROTONIX) 40 mg tablet   No No   Sig: Take 1 tablet (40 mg total) by mouth daily in the early morning    thiamine 100 MG tablet   No No   Sig: Take 1 tablet (100 mg total) by mouth daily for 5 days Do not start before October 19, 2023.      Facility-Administered Medications: None       Past Medical History:   Diagnosis Date    Medical history unknown        Past Surgical History:   Procedure Laterality Date    CHOLECYSTECTOMY LAPAROSCOPIC      REPAIR / REINSERT BICEPS TENDON AT ELBOW      Last Assessed: 1/12/2016        Family History   Problem Relation Age of Onset    Diabetes Mother      I have reviewed and agree with the history as documented.    E-Cigarette/Vaping     E-Cigarette/Vaping Substances     Social History     Tobacco Use    Smoking status: Never    Smokeless tobacco: Never   Substance Use Topics    Alcohol use: Yes     Comment: social    Drug use: Yes     Types: Marijuana        Review of Systems   Constitutional:  Positive for appetite change. Negative for chills and fever.   HENT:  Negative for congestion.    Respiratory:  Negative for cough and shortness of breath.    Cardiovascular:  Negative for chest pain.   Gastrointestinal:  Positive for abdominal pain and nausea. Negative for abdominal distention, anal bleeding, blood in stool, constipation, diarrhea and vomiting.   Genitourinary:  Negative for difficulty urinating and hematuria.   Musculoskeletal:  Negative for back pain and neck pain.   Skin:  Negative for color change.   Neurological:  Positive for light-headedness. Negative for dizziness, weakness, numbness and headaches.       Physical Exam  ED Triage Vitals   Temperature Pulse Respirations Blood Pressure SpO2   08/11/24 1554 08/11/24 1554 08/11/24 1554 08/11/24 1554 08/11/24 1554   98.3 °F (36.8 °C) (!) 109 16 125/83 98 %      Temp Source Heart Rate Source Patient Position - Orthostatic VS BP Location FiO2 (%)   08/11/24 1554 08/11/24 1830 08/11/24 1830 08/11/24 1830 --   Oral Monitor Sitting Right arm       Pain Score       08/11/24 1554       7             Orthostatic Vital  Signs  Vitals:    08/14/24 0313 08/14/24 0659 08/14/24 1108 08/14/24 1437   BP: 124/77 115/68 124/77 123/77   Pulse: 72 65 74 80   Patient Position - Orthostatic VS:           Physical Exam  Vitals and nursing note reviewed.   Constitutional:       Appearance: He is well-developed and normal weight. He is not ill-appearing, toxic-appearing or diaphoretic.   HENT:      Head: Normocephalic and atraumatic.      Mouth/Throat:      Mouth: Mucous membranes are moist.   Eyes:      General: No scleral icterus.     Extraocular Movements: Extraocular movements intact.   Cardiovascular:      Rate and Rhythm: Regular rhythm. Tachycardia present.      Heart sounds: Normal heart sounds.   Pulmonary:      Effort: Pulmonary effort is normal.      Breath sounds: Normal breath sounds.   Abdominal:      General: Abdomen is flat.      Palpations: Abdomen is soft.      Tenderness: There is abdominal tenderness in the epigastric area and left upper quadrant. There is no guarding or rebound.   Neurological:      Mental Status: He is alert and oriented to person, place, and time.         ED Medications  Medications   HYDROmorphone HCl (DILAUDID) injection 0.2 mg (has no administration in time range)   trimethobenzamide (TIGAN) IM injection 200 mg (has no administration in time range)   multi-electrolyte (PLASMALYTE-A/ISOLYTE-S PH 7.4) IV solution (100 mL/hr Intravenous New Bag 8/14/24 1005)   melatonin tablet 3 mg (3 mg Oral Given 8/13/24 2105)   allopurinol (ZYLOPRIM) tablet 300 mg (300 mg Oral Given 8/14/24 0830)   folic acid 1 mg, thiamine (VITAMIN B1) 100 mg in sodium chloride 0.9 % 100 mL IV piggyback ( Intravenous New Bag 8/14/24 0830)   folic acid (FOLVITE) tablet 1 mg (1 mg Oral Given 8/14/24 0831)   thiamine tablet 100 mg (100 mg Oral Given 8/14/24 0830)   multivitamin-minerals (CENTRUM) tablet 1 tablet (1 tablet Oral Given 8/14/24 0830)   FLUoxetine (PROzac) capsule 40 mg (40 mg Oral Given 8/14/24 0830)   levothyroxine tablet 75  mcg (75 mcg Oral Given 8/14/24 0607)   pantoprazole (PROTONIX) EC tablet 40 mg (40 mg Oral Given 8/14/24 0607)   chlordiazePOXIDE (LIBRIUM) capsule 10 mg (10 mg Oral Given 8/14/24 1743)   sodium chloride 0.9 % bolus 1,000 mL (0 mL Intravenous Stopped 8/11/24 1842)   droperidol (INAPSINE) injection 0.625 mg (0.625 mg Intravenous Given 8/11/24 1625)   magnesium sulfate 2 g/50 mL IVPB (premix) 2 g (0 g Intravenous Stopped 8/11/24 1841)   potassium chloride (Klor-Con M20) CR tablet 40 mEq (40 mEq Oral Given 8/11/24 1846)   potassium chloride 20 mEq IVPB (premix) (0 mEq Intravenous Stopped 8/12/24 0001)   LORazepam (ATIVAN) injection 0.5 mg (0.5 mg Intravenous Given 8/12/24 0001)   magnesium sulfate 2 g/50 mL IVPB (premix) 2 g (0 g Intravenous Stopped 8/12/24 1222)   magnesium sulfate 2 g/50 mL IVPB (premix) 2 g (2 g Intravenous New Bag 8/13/24 1022)   potassium chloride (Klor-Con M20) CR tablet 40 mEq (40 mEq Oral Given 8/13/24 1022)   magnesium sulfate 2 g/50 mL IVPB (premix) 2 g (2 g Intravenous New Bag 8/14/24 1005)       Diagnostic Studies  Results Reviewed       Procedure Component Value Units Date/Time    Comprehensive metabolic panel [[id number]]  (Abnormal) Collected: 08/13/24 0559    Lab Status: Final result Specimen: Blood from Arm, Left Updated: 08/13/24 0708     Sodium 135 mmol/L      Potassium 3.2 mmol/L      Chloride 100 mmol/L      CO2 25 mmol/L      ANION GAP 10 mmol/L      BUN 5 mg/dL      Creatinine 0.41 mg/dL      Glucose 94 mg/dL      Calcium 7.5 mg/dL      Corrected Calcium 8.3 mg/dL      AST 23 U/L      ALT 19 U/L      Alkaline Phosphatase 188 U/L      Total Protein 5.3 g/dL      Albumin 3.0 g/dL      Total Bilirubin 1.01 mg/dL      eGFR 122 ml/min/1.73sq m     Narrative:      National Kid[employer] Disease Foundation guidelines for Chronic Kidney Disease (CKD):     Stage 1 with normal or high GFR (GFR > 90 mL/min/1.73 square meters)    Stage 2 Mild CKD (GFR = 60-89 mL/min/1.73 square meters)    Stage 3A  Moderate CKD (GFR = 45-59 mL/min/1.73 square meters)    Stage 3B Moderate CKD (GFR = 30-44 mL/min/1.73 square meters)    Stage 4 Severe CKD (GFR = 15-29 mL/min/1.73 square meters)    Stage 5 End Stage CKD (GFR <15 mL/min/1.73 square meters)  Note: GFR calculation is accurate only with a steady state creatinine    Magnesium [[id number]]  (Abnormal) Collected: 08/13/24 0559    Lab Status: Final result Specimen: Blood from Arm, Left Updated: 08/13/24 0708     Magnesium 1.6 mg/dL     CBC [[id number]]  (Abnormal) Collected: 08/13/24 0559    Lab Status: Final result Specimen: Blood from Arm, Left Updated: 08/13/24 0703     WBC 3.00 Thousand/uL      RBC 3.52 Million/uL      Hemoglobin 12.2 g/dL      Hematocrit 36.7 %       fL      MCH 34.7 pg      MCHC 33.2 g/dL      RDW 16.0 %      Platelets 65 Thousands/uL      MPV 11.0 fL     CBC and differential [[id number]]  (Abnormal) Collected: 08/12/24 0558    Lab Status: Final result Specimen: Blood from Arm, Right Updated: 08/12/24 0909     WBC 4.08 Thousand/uL      RBC 3.66 Million/uL      Hemoglobin 12.4 g/dL      Hematocrit 37.2 %       fL      MCH 33.9 pg      MCHC 33.3 g/dL      RDW 15.9 %      MPV 9.6 fL      Platelets 90 Thousands/uL      nRBC 0 /100 WBCs      Segmented % 71 %      Immature Grans % 1 %      Lymphocytes % 17 %      Monocytes % 9 %      Eosinophils Relative 1 %      Basophils Relative 1 %      Absolute Neutrophils 2.92 Thousands/µL      Absolute Immature Grans 0.03 Thousand/uL      Absolute Lymphocytes 0.69 Thousands/µL      Absolute Monocytes 0.37 Thousand/µL      Eosinophils Absolute 0.04 Thousand/µL      Basophils Absolute 0.03 Thousands/µL     Narrative:      This is an appended report.  These results have been appended to a previously verified report.    Comprehensive metabolic panel [[id number]]  (Abnormal) Collected: 08/12/24 0558    Lab Status: Final result Specimen: Blood from Arm, Right Updated: 08/12/24 0635     Sodium 135 mmol/L       Potassium 3.6 mmol/L      Chloride 100 mmol/L      CO2 26 mmol/L      ANION GAP 9 mmol/L      BUN 5 mg/dL      Creatinine 0.46 mg/dL      Glucose 111 mg/dL      Glucose, Fasting 111 mg/dL      Calcium 7.6 mg/dL      Corrected Calcium 8.2 mg/dL      AST 47 U/L      ALT 27 U/L      Alkaline Phosphatase 230 U/L      Total Protein 5.5 g/dL      Albumin 3.2 g/dL      Total Bilirubin 1.43 mg/dL      eGFR 116 ml/min/1.73sq m     Narrative:      National Kid[employer] Disease Foundation guidelines for Chronic Kidney Disease (CKD):     Stage 1 with normal or high GFR (GFR > 90 mL/min/1.73 square meters)    Stage 2 Mild CKD (GFR = 60-89 mL/min/1.73 square meters)    Stage 3A Moderate CKD (GFR = 45-59 mL/min/1.73 square meters)    Stage 3B Moderate CKD (GFR = 30-44 mL/min/1.73 square meters)    Stage 4 Severe CKD (GFR = 15-29 mL/min/1.73 square meters)    Stage 5 End Stage CKD (GFR <15 mL/min/1.73 square meters)  Note: GFR calculation is accurate only with a steady state creatinine    Magnesium [[id number]]  (Abnormal) Collected: 08/12/24 0558    Lab Status: Final result Specimen: Blood from Arm, Right Updated: 08/12/24 0635     Magnesium 1.4 mg/dL     Comprehensive metabolic panel [[id number]]  (Abnormal) Collected: 08/11/24 1646    Lab Status: Final result Specimen: Blood from Arm, Right Updated: 08/11/24 1735     Sodium 135 mmol/L      Potassium 2.6 mmol/L      Chloride 102 mmol/L      CO2 22 mmol/L      ANION GAP 11 mmol/L      BUN 4 mg/dL      Creatinine 0.48 mg/dL      Glucose 149 mg/dL      Calcium 6.8 mg/dL      Corrected Calcium 7.6 mg/dL      AST 59 U/L      ALT 27 U/L      Alkaline Phosphatase 219 U/L      Total Protein 5.1 g/dL      Albumin 3.0 g/dL      Total Bilirubin 1.87 mg/dL      eGFR 114 ml/min/1.73sq m     Narrative:      National Kidney Disease Foundation guidelines for Chronic Kidney Disease (CKD):     Stage 1 with normal or high GFR (GFR > 90 mL/min/1.73 square meters)    Stage 2 Mild CKD (GFR = 60-89 mL/min/1.73  square meters)    Stage 3A Moderate CKD (GFR = 45-59 mL/min/1.73 square meters)    Stage 3B Moderate CKD (GFR = 30-44 mL/min/1.73 square meters)    Stage 4 Severe CKD (GFR = 15-29 mL/min/1.73 square meters)    Stage 5 End Stage CKD (GFR <15 mL/min/1.73 square meters)  Note: GFR calculation is accurate only with a steady state creatinine    Magnesium [[id number]]  (Abnormal) Collected: 08/11/24 1557    Lab Status: Final result Specimen: Blood from Arm, Right Updated: 08/11/24 1636     Magnesium 1.2 mg/dL     Phosphorus [[id number]]  (Normal) Collected: 08/11/24 1557    Lab Status: Final result Specimen: Blood from Arm, Right Updated: 08/11/24 1636     Phosphorus 3.6 mg/dL     Lipase [[id number]]  (Abnormal) Collected: 08/11/24 1557    Lab Status: Final result Specimen: Blood from Arm, Right Updated: 08/11/24 1634     Lipase 379 u/L     HS Troponin 0hr (reflex protocol) [[id number]]  (Normal) Collected: 08/11/24 1557    Lab Status: Final result Specimen: Blood from Arm, Right Updated: 08/11/24 1632     hs TnI 0hr 11 ng/L     CBC and differential [[id number]]  (Abnormal) Collected: 08/11/24 1557    Lab Status: Final result Specimen: Blood from Arm, Right Updated: 08/11/24 1609     WBC 10.49 Thousand/uL      RBC 4.38 Million/uL      Hemoglobin 14.8 g/dL      Hematocrit 43.5 %      MCV 99 fL      MCH 33.8 pg      MCHC 34.0 g/dL      RDW 15.6 %      MPV 10.1 fL      Platelets 166 Thousands/uL      nRBC 0 /100 WBCs      Segmented % 86 %      Immature Grans % 1 %      Lymphocytes % 7 %      Monocytes % 6 %      Eosinophils Relative 0 %      Basophils Relative 0 %      Absolute Neutrophils 8.98 Thousands/µL      Absolute Immature Grans 0.06 Thousand/uL      Absolute Lymphocytes 0.77 Thousands/µL      Absolute Monocytes 0.62 Thousand/µL      Eosinophils Absolute 0.02 Thousand/µL      Basophils Absolute 0.04 Thousands/µL                    CT abdomen pelvis wo contrast   Final Result by Rosales Mccray MD (08/12 0814)       Findings consistent with acute pancreatitis versus duodenitis. 1.2 cm hypodense lesion in the pancreatic head. Would recommend an MRI/MRCP for further evaluation purposes.      The study was marked in EPIC for immediate notification.      Workstation performed: [id number]         MRI abdomen w wo contrast and mrcp    (Results Pending)         Procedures  Procedures      ED Course  ED Course as of 08/14/24 1831   Sun Aug 11, 2024   1643 LIPASE(!): 379   1643 MAGNESIUM(!): 1.2   1643 WBC(!): 10.49   1651 hs TnI 0hr: 11   1843 Will admit for pancreatitis. Patient reports his nausea has improved   Mon Aug 12, 2024   1650 SO: admitted to [facility] for pancreatitis                             SBIRT 20yo+      Flowsheet Row Most Recent Value   Initial Alcohol Screen: US AUDIT-C     1. How often do you have a drink containing alcohol? 0 Filed at: 08/11/2024 1602   2. How many drinks containing alcohol do you have on a typical day you are drinking?  0 Filed at: 08/11/2024 1602   3a. Male UNDER 65: How often do you have five or more drinks on one occasion? 0 Filed at: 08/11/2024 1602   3b. FEMALE Any Age, or MALE 65+: How often do you have 4 or more drinks on one occassion? 0 Filed at: 08/11/2024 1602   Audit-C Score 0 Filed at: 08/11/2024 1602   JAZZ: How many times in the past year have you...    Used an illegal drug or used a prescription medication for non-medical reasons? Never Filed at: 08/11/2024 1602                  Medical Decision Making  64-year-old male with past medical history alcohol abuse, alcoholic pancreatitis who presents to the ED for evaluation of several days of epigastric abdominal pain, anorexia, nausea. Patient states that this feels similar to prior episodes. On exam, no acute distress, mildly tachycardic, mild epigastric tenderness to palpation.  Plan: IV fluids, CBC, CMP, lipase, magnesium, phosphorus.  I do not believe imaging is necessary at this time based on patient's history of similar  symptoms.  He had CT abdomen pelvis and right upper quadrant ultrasound on last admission.  Reassessment: Lab work showed lipase 379. Patient was hypomagnesemic at 1.2. Hypokalemic at 2.6. We gave the patient a 1 L bolus of normal saline, mag sulfate 2 g, potassium 40 mill equivalents po.  Will admit the patient to  IM for pancreatitis.    Amount and/or Complexity of Data Reviewed  Labs: ordered. Decision-making details documented in ED Course.    Risk  Prescription drug management.  Decision regarding hospitalization.          Disposition  Final diagnoses:   Pancreatitis     Time reflects when diagnosis was documented in both MDM as applicable and the Disposition within this note       Time User Action Codes Description Comment    8/11/2024  5:09 PM Terrence Yates [K85.90] Pancreatitis           ED Disposition       ED Disposition   Admit    Condition   Stable    Date/Time   Sun Aug 11, 2024 2395    Comment   Case was discussed with CRISTINO and the patient's admission status was agreed to be Admission Status: observation status to the service of Dr. Leiva .               Follow-up Information    None         Current Discharge Medication List        CONTINUE these medications which have NOT CHANGED    Details   albuterol (PROVENTIL HFA,VENTOLIN HFA) 90 mcg/act inhaler Inhale 1-2 puffs      allopurinol (ZYLOPRIM) 300 mg tablet TAKE 1 TABLET EVERY DAY  Qty: 90 tablet, Refills: 3    Associated Diagnoses: Gout, unspecified cause, unspecified chronicity, unspecified site      cetirizine (ZyrTEC) 10 mg tablet Take one tablet by mouth daily as needed for allergies/congestion       chlordiazePOXIDE (LIBRIUM) 25 mg capsule Take 1 capsule (25 mg total) by mouth 4 (four) times a day for 2 days, THEN 1 capsule (25 mg total) 2 (two) times a day for 2 days, THEN 1 capsule (25 mg total) in the morning for 2 days.  Qty: 10 capsule, Refills: 0    Associated Diagnoses: Alcoholic pancreatitis      FLUoxetine (PROzac) 40 MG capsule  TAKE ONE CAPSULE BY MOUTH EVERY DAY  Qty: 90 capsule, Refills: 1    Associated Diagnoses: Other depression      folic acid (FOLVITE) 1 mg tablet Take 1 tablet (1 mg total) by mouth daily for 7 days Do not start before October 19, 2023.  Qty: 7 tablet, Refills: 0    Associated Diagnoses: Alcohol use disorder, severe, dependence (HCC)      levothyroxine 75 mcg tablet TAKE 1 TABLET IN THE MORNING ON AN EMPTY STOMACH FOR THYROID  Qty: 90 tablet, Refills: 1    Associated Diagnoses: Hypothyroidism, unspecified type      Multiple Vitamin (DAILY VALUE MULTIVITAMIN) TABS Take 1 tablet by mouth daily      pantoprazole (PROTONIX) 40 mg tablet Take 1 tablet (40 mg total) by mouth daily in the early morning  Qty: 30 tablet, Refills: 0    Associated Diagnoses: Alcohol use disorder, severe, dependence (HCC)      thiamine 100 MG tablet Take 1 tablet (100 mg total) by mouth daily for 5 days Do not start before October 19, 2023.  Qty: 5 tablet, Refills: 0    Associated Diagnoses: Alcohol use disorder, severe, dependence (HCC)           No discharge procedures on file.    PDMP Review         Value Time User    PDMP Reviewed  Yes 7/9/2023  8:07 PM Daisy Dupree PA-C             ED Provider  Attending physically available and evaluated Sammy Mays. I managed the patient along with the ED Attending.    Electronically Signed by           Terrence Yates DO  08/12/24 0115       Terrence Yates DO  08/14/24 5297

## 2024-08-11 NOTE — H&P
[facility]  H&P  Name: Sammy Mays 64 y.o. male I MRN: [id number]  Unit/Bed#: ED 26 I Date of Admission: 8/11/2024   Date of Service: 8/11/2024 I Hospital Day: 0      Assessment & Plan   * Pancreatitis  Assessment & Plan  Patient with known history of alcohol abuse.  Patient has known history of chronic abdominal pain/discomfort with known chronic alcohol use presenting to the ED with elevated lipase  Continue with IV fluids  DT prophylaxis  Pain meds  Monitor symptoms closely.  CT imaging given elevated bilirubin.       Hypokalemia  Assessment & Plan  Check magnesium.  Magnesium repleted in ED.  Placed on telemetry  Potassium ordered.  Will add IV    Alcohol abuse  Assessment & Plan  CIWA protocol  Will empirically start on Librium.  Patient with history of heavy alcohol use with last drink 2 days ago    Hypothyroidism  Assessment & Plan  Levothyroxine         VTE Prophylaxis: Enoxaparin (Lovenox)  / sequential compression device   Code Status: fc  POLST: There is no POLST form on file for this patient (pre-hospital)    Anticipated Length of Stay:  Patient will be admitted on an Observation basis with an anticipated length of stay of  < 2 midnights.   Justification for Hospital Stay: Need to monitor GI symptoms.  Continue with hydration and pain management.    Total Time for Visit, including Counseling / Coordination of Care: 90 minutes.  Greater than 50% of this total time spent on direct patient counseling and coordination of care.    Chief Complaint: Abdominal pain    History of Present Illness:    Sammy Mays is a 64 y.o. male who presents with symptoms of abdominal pain.  Patient presents with left upper quadrant discomfort.  Patient noted ongoing symptoms for 3 days.  Patient presenting for further workup evaluation with abdominal pain.  Patient was recently admitted between June 6 and Zaynab 10 for pancreatitis.  He presented with abdominal pain at that time.  He has  Dr. Soto patient. Please schedule any procedures with him. Dr. Winston covering in clinic. he did not physically evaluate patient. a history of cholecystectomy and heavy alcohol use.  Patient acknowledges resuming heavy alcohol use.  He reports that he has not drank in 2 days due to nausea vomiting symptoms.  Patient reports drinking hard liquor but  Review of Systems:    Review of Systems   Constitutional:  Negative for chills, diaphoresis and fatigue.   Respiratory:  Negative for chest tightness and shortness of breath.    Gastrointestinal:  Negative for abdominal distention.   Musculoskeletal:  Negative for arthralgias.   Hematological:  Negative for adenopathy.   Psychiatric/Behavioral:  Negative for agitation.    All other systems reviewed and are negative.      Past Medical and Surgical History:     Past Medical History:   Diagnosis Date    Medical history unknown        Past Surgical History:   Procedure Laterality Date    REPAIR / REINSERT BICEPS TENDON AT ELBOW      Last Assessed: 1/12/2016        Meds/Allergies:    Prior to Admission medications    Medication Sig Start Date End Date Taking? Authorizing Provider   albuterol (PROVENTIL HFA,VENTOLIN HFA) 90 mcg/act inhaler Inhale 1-2 puffs    Historical Provider, MD   allopurinol (ZYLOPRIM) 300 mg tablet TAKE 1 TABLET EVERY DAY 8/15/19   Sarah Fiore MD   cetirizine (ZyrTEC) 10 mg tablet Take one tablet by mouth daily as needed for allergies/congestion  5/2/14   Historical Provider, MD   chlordiazePOXIDE (LIBRIUM) 25 mg capsule Take 1 capsule (25 mg total) by mouth 4 (four) times a day for 2 days, THEN 1 capsule (25 mg total) 2 (two) times a day for 2 days, THEN 1 capsule (25 mg total) in the morning for 2 days. 6/10/24 6/16/24  Alvaro Stock DO   FLUoxetine (PROzac) 40 MG capsule TAKE ONE CAPSULE BY MOUTH EVERY DAY 7/22/19   Sarah Fiore MD   folic acid (FOLVITE) 1 mg tablet Take 1 tablet (1 mg total) by mouth daily for 7 days Do not start before October 19, 2023. 10/19/23 10/26/23  Ronny Lofton PA-C   levothyroxine 75 mcg tablet TAKE 1 TABLET IN THE MORNING ON AN EMPTY  STOMACH FOR THYROID 6/9/19   Sarah Fiore MD   Multiple Vitamin (DAILY VALUE MULTIVITAMIN) TABS Take 1 tablet by mouth daily    Historical Provider, MD   pantoprazole (PROTONIX) 40 mg tablet Take 1 tablet (40 mg total) by mouth daily in the early morning 4/23/24   Macrina POOLE BernabejohnANI   thiamine 100 MG tablet Take 1 tablet (100 mg total) by mouth daily for 5 days Do not start before October 19, 2023. 10/19/23 10/24/23  Ronny Lofton PA-C     I have reviewed home medications with patient personally.    Allergies:   Allergies   Allergen Reactions    Amoxicillin Swelling and Fever     Other reaction(s): vertigo    Escitalopram      Other reaction(s): sweaty palms/hands, felt faint, passed out for a short time, had anxiety/panic attack    Gemfibrozil      Other reaction(s): Nausea and/or vomiting  Other reaction(s): Nausea and/or vomiting    Other      Other reaction(s): Other (See Comments)  rhinitis, itchy eyes       Social History:     Marital Status: Single   Patient Pre-hospital Living Situation: Home  Patient Pre-hospital Level of Mobility: Ambulatory  Patient Pre-hospital Diet Restrictions: Denies  Substance Use History:   Social History     Substance and Sexual Activity   Alcohol Use Yes    Comment: social     Social History     Tobacco Use   Smoking Status Never   Smokeless Tobacco Never     Social History     Substance and Sexual Activity   Drug Use Yes    Types: Marijuana       Family History:    Family History   Problem Relation Age of Onset    Diabetes Mother        Physical Exam:     Vitals:   Blood Pressure: 140/81 (08/11/24 1830)  Pulse: 98 (08/11/24 1830)  Temperature: 98.3 °F (36.8 °C) (08/11/24 1554)  Temp Source: Oral (08/11/24 1554)  Respirations: 16 (08/11/24 1830)  SpO2: 97 % (08/11/24 1830)    Physical Exam  Constitutional:       Appearance: Normal appearance.   Cardiovascular:      Rate and Rhythm: Normal rate and regular rhythm.      Heart sounds: No murmur heard.  Pulmonary:       Effort: Pulmonary effort is normal.      Breath sounds: Normal breath sounds.   Abdominal:      General: Abdomen is flat.      Palpations: Abdomen is soft.   Musculoskeletal:         General: No swelling.      Cervical back: Normal range of motion and neck supple.   Neurological:      General: No focal deficit present.      Mental Status: He is alert and oriented to person, place, and time.   Psychiatric:         Mood and Affect: Mood normal.         Additional Data:     Lab Results: I have personally reviewed pertinent reports.      Results from last 7 days   Lab Units 08/11/24  1557   WBC Thousand/uL 10.49*   HEMOGLOBIN g/dL 14.8   HEMATOCRIT % 43.5   PLATELETS Thousands/uL 166   SEGS PCT % 86*   LYMPHO PCT % 7*   MONO PCT % 6   EOS PCT % 0     Results from last 7 days   Lab Units 08/11/24  1646   SODIUM mmol/L 135   POTASSIUM mmol/L 2.6*   CHLORIDE mmol/L 102   CO2 mmol/L 22   BUN mg/dL 4*   CREATININE mg/dL 0.48*   ANION GAP mmol/L 11   CALCIUM mg/dL 6.8*   ALBUMIN g/dL 3.0*   TOTAL BILIRUBIN mg/dL 1.87*   ALK PHOS U/L 219*   ALT U/L 27   AST U/L 59*   GLUCOSE RANDOM mg/dL 149*                       Imaging: I have personally reviewed pertinent reports.      CT abdomen pelvis wo contrast    (Results Pending)         ** Please Note: This note has been constructed using a voice recognition system. **

## 2024-08-11 NOTE — ASSESSMENT & PLAN NOTE
CIWA protocol  Will empirically start on Librium.  Patient with history of heavy alcohol use with last drink 2 days ago

## 2024-08-11 NOTE — ED ATTENDING ATTESTATION
Final Diagnoses:     1. Pancreatitis      ED Course as of 08/13/24 1536   Sun Aug 11, 2024   1636 MAGNESIUM(!): 1.2  Alcohol abuse?       I, Andrew Blake MD, saw and evaluated the patient. All available labs and X-rays were ordered by me or the resident / non-physician and have been reviewed by myself. I discussed the patient with the resident / non-physician and agree with the resident's / non-physician practitioner's findings and plan as documented in the resident's / non-physician practicitioner's note, except where noted.   At this point, I agree with the current assessment done in the ED.   I was present during key portions of all procedures performed unless otherwise stated.     HPI:  NURSING TRIAGE:    This is a 64 y.o. male presenting for evaluation of LUQ pain.   3 days ago was relaxing in house, started to feel this pain in the LUQ that feels like a gas pain. Hx of similar in the past.  Resolves within 3 days usually but this is persisting now.  So came in.  Constant initially.  Somewhat intermittent today.   No meds used for the past.  ROS: Last BM was today, normal.  Urinating normally.   Denies any urinary tract infection symptoms (burning, itching, pain, blood, frequency).  Denies any upper respiratory tract infection symptoms (cough, congestion, rhinorrhea, sore throat).   No f/ch/cp/sob.     PMH: HTN HLD Alcohol abuse Chief Complaint   Patient presents with    Abdominal Pain     For the past few days. HX of GI issues. +nausea and loss of appetite. -vomitting -diarrhea      PHYSICAL: ASSESSMENT + PLAN:   General: VSS, NAD, awake, alert.   Well-nourished, well-developed.   Appears stated age.   Head: Normocephalic, atraumatic, nontender.  Eyes: PERRL, EOM-I. No diplopia.   No hyphema.   No subconjunctival hemorrhages.  Symmetrical lids.   ENTAtraumatic external nose and ears.    Dry MM  No stridor.   Normal phonation.   No drooling.   Base of mouth is soft. No mastoid tenderness.   Neck:  Symmetric, trachea midline. No JVD.  CV: Peripheral pulses +2 throughout.   Mild tachycardia   No chest wall tenderness.   Lungs:   Unlabored   No retractions  No crepitus.   No tachypnea.   No paradoxical motion.  Abd: +BS, soft, NT/ND.   No guarding  No rigidity  No rebound  No peritoneal signs.   Heel strike negative  MSK:   FROM   No lower extremity edema.   Back:   No CVAT  Skin: Dry, intact.   Neuro: AAOx3, GCS 15, CN II-XII grossly intact.   Motor grossly intact.  Psychiatric/Behavioral: Appropriate mood and affect   Exam: deferred    Vitals:    08/12/24 2254 08/13/24 0234 08/13/24 0822 08/13/24 1157   BP: 130/73 111/64 111/66 116/66   BP Location:       Pulse: 72 74 74 77   Resp: 17 14 19 16   Temp: 98.5 °F (36.9 °C) 98.5 °F (36.9 °C) 98.3 °F (36.8 °C) 98.1 °F (36.7 °C)   TempSrc:       SpO2: 97% 97% 96% 97%   Weight:       Height:        - given the presentation, will check CBC for marked leukocytosis  - CMP for liver enzyme elevation that could signal cholecystitis, biliary obstructive disease. Check RFTs for SANTY / markers of dehydration.  - Lipase given abdominal pain to evaluate specifically for pancreatitis.  - Urine: will check for UTI or signs of pyelonephritis.   - Lastly, will consider abdominal imaging.  - Symptomatic measures  - Ree-valuate   - Disposition per workup.      There are no obvious limitations to social determinants of care.   Nursing note reviewed.   Vitals reviewed.   Orders placed by myself and/or advanced practitioner / resident.    Previous chart was reviewed  No language barrier.   History obtained from patient.    There are no limitations to the history obtained:     Past Medical: Past Surgical:    has a past medical history of Medical history unknown.  has a past surgical history that includes Repair / reinsert biceps tendon at elbow and CHOLECYSTECTOMY LAPAROSCOPIC.   Social: Cardiac (Echo/Cath)   Social History     Substance and Sexual Activity   Alcohol Use Yes     Comment: social     Social History     Tobacco Use   Smoking Status Never   Smokeless Tobacco Never     Social History     Substance and Sexual Activity   Drug Use Yes    Types: Marijuana    No results found for this or any previous visit.    No results found for this or any previous visit.    No results found for this or any previous visit.     Labs: Imaging:   Labs Reviewed   CBC AND DIFFERENTIAL - Abnormal       Result Value Ref Range Status    WBC 10.49 (*) 4.31 - 10.16 Thousand/uL Final    RBC 4.38  3.88 - 5.62 Million/uL Final    Hemoglobin 14.8  12.0 - 17.0 g/dL Final    Hematocrit 43.5  36.5 - 49.3 % Final    MCV 99 (*) 82 - 98 fL Final    MCH 33.8  26.8 - 34.3 pg Final    MCHC 34.0  31.4 - 37.4 g/dL Final    RDW 15.6 (*) 11.6 - 15.1 % Final    MPV 10.1  8.9 - 12.7 fL Final    Platelets 166  149 - 390 Thousands/uL Final    nRBC 0  /100 WBCs Final    Segmented % 86 (*) 43 - 75 % Final    Immature Grans % 1  0 - 2 % Final    Lymphocytes % 7 (*) 14 - 44 % Final    Monocytes % 6  4 - 12 % Final    Eosinophils Relative 0  0 - 6 % Final    Basophils Relative 0  0 - 1 % Final    Absolute Neutrophils 8.98 (*) 1.85 - 7.62 Thousands/µL Final    Absolute Immature Grans 0.06  0.00 - 0.20 Thousand/uL Final    Absolute Lymphocytes 0.77  0.60 - 4.47 Thousands/µL Final    Absolute Monocytes 0.62  0.17 - 1.22 Thousand/µL Final    Eosinophils Absolute 0.02  0.00 - 0.61 Thousand/µL Final    Basophils Absolute 0.04  0.00 - 0.10 Thousands/µL Final   COMPREHENSIVE METABOLIC PANEL - Abnormal    Sodium 135  135 - 147 mmol/L Final    Potassium 2.6 (*) 3.5 - 5.3 mmol/L Final    Chloride 102  96 - 108 mmol/L Final    CO2 22  21 - 32 mmol/L Final    ANION GAP 11  4 - 13 mmol/L Final    BUN 4 (*) 5 - 25 mg/dL Final    Creatinine 0.48 (*) 0.60 - 1.30 mg/dL Final    Comment: Standardized to IDMS reference method    Glucose 149 (*) 65 - 140 mg/dL Final    Comment: If the patient is fasting, the ADA then defines impaired fasting glucose as  > 100 mg/dL and diabetes as > or equal to 123 mg/dL.    Calcium 6.8 (*) 8.4 - 10.2 mg/dL Final    Corrected Calcium 7.6 (*) 8.3 - 10.1 mg/dL Final    AST 59 (*) 13 - 39 U/L Final    ALT 27  7 - 52 U/L Final    Comment: Specimen collection should occur prior to Sulfasalazine administration due to the potential for falsely depressed results.     Alkaline Phosphatase 219 (*) 34 - 104 U/L Final    Total Protein 5.1 (*) 6.4 - 8.4 g/dL Final    Albumin 3.0 (*) 3.5 - 5.0 g/dL Final    Total Bilirubin 1.87 (*) 0.20 - 1.00 mg/dL Final    Comment: Use of this assay is not recommended for patients undergoing treatment with eltrombopag due to the potential for falsely elevated results.  N-acetyl-p-benzoquinone imine (metabolite of Acetaminophen) will generate erroneously low results in samples for patients that have taken an overdose of Acetaminophen.    eGFR 114  ml/min/1.73sq m Final    Narrative:     National Kidney Disease Foundation guidelines for Chronic Kidney Disease (CKD):     Stage 1 with normal or high GFR (GFR > 90 mL/min/1.73 square meters)    Stage 2 Mild CKD (GFR = 60-89 mL/min/1.73 square meters)    Stage 3A Moderate CKD (GFR = 45-59 mL/min/1.73 square meters)    Stage 3B Moderate CKD (GFR = 30-44 mL/min/1.73 square meters)    Stage 4 Severe CKD (GFR = 15-29 mL/min/1.73 square meters)    Stage 5 End Stage CKD (GFR <15 mL/min/1.73 square meters)  Note: GFR calculation is accurate only with a steady state creatinine   LIPASE - Abnormal    Lipase 379 (*) 11 - 82 u/L Final   MAGNESIUM - Abnormal    Magnesium 1.2 (*) 1.9 - 2.7 mg/dL Final   CBC AND DIFFERENTIAL - Abnormal    WBC 4.08 (*) 4.31 - 10.16 Thousand/uL Final    RBC 3.66 (*) 3.88 - 5.62 Million/uL Final    Hemoglobin 12.4  12.0 - 17.0 g/dL Final    Hematocrit 37.2  36.5 - 49.3 % Final     (*) 82 - 98 fL Final    MCH 33.9  26.8 - 34.3 pg Final    MCHC 33.3  31.4 - 37.4 g/dL Final    RDW 15.9 (*) 11.6 - 15.1 % Final    MPV 9.6  8.9 - 12.7 fL Final     Platelets 90 (*) 149 - 390 Thousands/uL Final    Comment: Manual Review of Smear Performed    nRBC 0  /100 WBCs Final    Segmented % 71  43 - 75 % Final    Immature Grans % 1  0 - 2 % Final    Lymphocytes % 17  14 - 44 % Final    Monocytes % 9  4 - 12 % Final    Eosinophils Relative 1  0 - 6 % Final    Basophils Relative 1  0 - 1 % Final    Absolute Neutrophils 2.92  1.85 - 7.62 Thousands/µL Final    Absolute Immature Grans 0.03  0.00 - 0.20 Thousand/uL Final    Absolute Lymphocytes 0.69  0.60 - 4.47 Thousands/µL Final    Absolute Monocytes 0.37  0.17 - 1.22 Thousand/µL Final    Eosinophils Absolute 0.04  0.00 - 0.61 Thousand/µL Final    Basophils Absolute 0.03  0.00 - 0.10 Thousands/µL Final    Narrative:     This is an appended report.  These results have been appended to a previously verified report.   COMPREHENSIVE METABOLIC PANEL - Abnormal    Sodium 135  135 - 147 mmol/L Final    Potassium 3.6  3.5 - 5.3 mmol/L Final    Chloride 100  96 - 108 mmol/L Final    CO2 26  21 - 32 mmol/L Final    ANION GAP 9  4 - 13 mmol/L Final    BUN 5  5 - 25 mg/dL Final    Creatinine 0.46 (*) 0.60 - 1.30 mg/dL Final    Comment: Standardized to IDMS reference method    Glucose 111  65 - 140 mg/dL Final    Comment: If the patient is fasting, the ADA then defines impaired fasting glucose as > 100 mg/dL and diabetes as > or equal to 123 mg/dL.    Glucose, Fasting 111 (*) 65 - 99 mg/dL Final    Calcium 7.6 (*) 8.4 - 10.2 mg/dL Final    Corrected Calcium 8.2 (*) 8.3 - 10.1 mg/dL Final    AST 47 (*) 13 - 39 U/L Final    ALT 27  7 - 52 U/L Final    Comment: Specimen collection should occur prior to Sulfasalazine administration due to the potential for falsely depressed results.     Alkaline Phosphatase 230 (*) 34 - 104 U/L Final    Total Protein 5.5 (*) 6.4 - 8.4 g/dL Final    Albumin 3.2 (*) 3.5 - 5.0 g/dL Final    Total Bilirubin 1.43 (*) 0.20 - 1.00 mg/dL Final    Comment: Use of this assay is not recommended for patients undergoing  "treatment with eltrombopag due to the potential for falsely elevated results.  N-acetyl-p-benzoquinone imine (metabolite of Acetaminophen) will generate erroneously low results in samples for patients that have taken an overdose of Acetaminophen.    eGFR 116  ml/min/1.73sq m Final    Narrative:     National Kidney Disease Foundation guidelines for Chronic Kidney Disease (CKD):     Stage 1 with normal or high GFR (GFR > 90 mL/min/1.73 square meters)    Stage 2 Mild CKD (GFR = 60-89 mL/min/1.73 square meters)    Stage 3A Moderate CKD (GFR = 45-59 mL/min/1.73 square meters)    Stage 3B Moderate CKD (GFR = 30-44 mL/min/1.73 square meters)    Stage 4 Severe CKD (GFR = 15-29 mL/min/1.73 square meters)    Stage 5 End Stage CKD (GFR <15 mL/min/1.73 square meters)  Note: GFR calculation is accurate only with a steady state creatinine   MAGNESIUM - Abnormal    Magnesium 1.4 (*) 1.9 - 2.7 mg/dL Final   HS TROPONIN I 0HR - Normal    hs TnI 0hr 11  \"Refer to ACS Flowchart\"- see link ng/L Final    Comment:                                              Initial (time 0) result  If >=50 ng/L, Myocardial injury suggested ;  Type of myocardial injury and treatment strategy  to be determined.  If 5-49 ng/L, a delta result at 2 hours and or 4 hours will be needed to further evaluate.  If <4 ng/L, and chest pain has been >3 hours since onset, patient may qualify for discharge based on the HEART score in the ED.  If <5 ng/L and <3hours since onset of chest pain, a delta result at 2 hours will be needed to further evaluate.    HS Troponin 99th Percentile URL of a Health Population=12 ng/L with a 95% Confidence Interval of 8-18 ng/L.    Second Troponin (time 2 hours)  If calculated delta >= 20 ng/L,  Myocardial injury suggested ; Type of myocardial injury and treatment strategy to be determined.  If 5-49 ng/L and the calculated delta is 5-19 ng/L, consult medical service for evaluation.  Continue evaluation for ischemia on ecg and other " possible etiology and repeat hs troponin at 4 hours.  If delta is <5 ng/L at 2 hours, consider discharge based on risk stratification via the HEART score (if in ED), or JENNIFFER risk score in IP/Observation.    HS Troponin 99th Percentile URL of a Health Population=12 ng/L with a 95% Confidence Interval of 8-18 ng/L.   PHOSPHORUS - Normal    Phosphorus 3.6  2.3 - 4.1 mg/dL Final    CT abdomen pelvis wo contrast   Final Result      Findings consistent with acute pancreatitis versus duodenitis. 1.2 cm hypodense lesion in the pancreatic head. Would recommend an MRI/MRCP for further evaluation purposes.      The study was marked in EPIC for immediate notification.      Workstation performed: [id number]         MRI abdomen w wo contrast and mrcp    (Results Pending)      Medications: Code Status:   Medications   HYDROmorphone HCl (DILAUDID) injection 0.2 mg (has no administration in time range)   chlordiazePOXIDE (LIBRIUM) capsule 25 mg (25 mg Oral Given 8/13/24 1335)   trimethobenzamide (TIGAN) IM injection 200 mg (has no administration in time range)   multi-electrolyte (PLASMALYTE-A/ISOLYTE-S PH 7.4) IV solution (100 mL/hr Intravenous New Bag 8/13/24 1338)   melatonin tablet 3 mg (3 mg Oral Given 8/13/24 0035)   sodium chloride 0.9 % bolus 1,000 mL (0 mL Intravenous Stopped 8/11/24 1842)   droperidol (INAPSINE) injection 0.625 mg (0.625 mg Intravenous Given 8/11/24 1625)   magnesium sulfate 2 g/50 mL IVPB (premix) 2 g (0 g Intravenous Stopped 8/11/24 1841)   potassium chloride (Klor-Con M20) CR tablet 40 mEq (40 mEq Oral Given 8/11/24 1846)   potassium chloride 20 mEq IVPB (premix) (0 mEq Intravenous Stopped 8/12/24 0001)   LORazepam (ATIVAN) injection 0.5 mg (0.5 mg Intravenous Given 8/12/24 0001)   magnesium sulfate 2 g/50 mL IVPB (premix) 2 g (0 g Intravenous Stopped 8/12/24 1222)   magnesium sulfate 2 g/50 mL IVPB (premix) 2 g (2 g Intravenous New Bag 8/13/24 1022)   potassium chloride (Klor-Con M20) CR tablet 40 mEq  (40 mEq Oral Given 8/13/24 1022)    Code Status: Level 1 - Full Code  Advance Directive and Living Will:      Power of :    POLST:       Orders Placed This Encounter   Procedures    CT abdomen pelvis wo contrast    MRI abdomen w wo contrast and mrcp    CBC and differential    Comprehensive metabolic panel    Lipase    HS Troponin 0hr (reflex protocol)    Magnesium    Phosphorus    CBC and differential    Comprehensive metabolic panel    Magnesium    Comprehensive metabolic panel    CBC    Magnesium    Diet GI; Lo Fat    Insert peripheral IV    Notify physician    Follow CIWA-Ar Nursing Protocol    Continuous Pulse Oximetry    Notify physician to discontinue CIWA protocol if CIWA score remains 0-7 for 72 consecutive hours    Level 1-Full Code: all life saving measures are indicated    Inpatient consult to gastroenterology    ECG 12 lead    ECG 12 lead    Place in Observation    INPATIENT ADMISSION     Time reflects when diagnosis was documented in both MDM as applicable and the Disposition within this note       Time User Action Codes Description Comment    8/11/2024  5:09 PM Terrence Yates [K85.90] Pancreatitis           ED Disposition       ED Disposition   Admit    Condition   Stable    Date/Time   Sun Aug 11, 2024  6:45 PM    Comment   Case was discussed with CRISTINO and the patient's admission status was agreed to be Admission Status: observation status to the service of Dr. Leiva .               Follow-up Information    None       Current Discharge Medication List        CONTINUE these medications which have NOT CHANGED    Details   albuterol (PROVENTIL HFA,VENTOLIN HFA) 90 mcg/act inhaler Inhale 1-2 puffs      allopurinol (ZYLOPRIM) 300 mg tablet TAKE 1 TABLET EVERY DAY  Qty: 90 tablet, Refills: 3    Associated Diagnoses: Gout, unspecified cause, unspecified chronicity, unspecified site      cetirizine (ZyrTEC) 10 mg tablet Take one tablet by mouth daily as needed for allergies/congestion        chlordiazePOXIDE (LIBRIUM) 25 mg capsule Take 1 capsule (25 mg total) by mouth 4 (four) times a day for 2 days, THEN 1 capsule (25 mg total) 2 (two) times a day for 2 days, THEN 1 capsule (25 mg total) in the morning for 2 days.  Qty: 10 capsule, Refills: 0    Associated Diagnoses: Alcoholic pancreatitis      FLUoxetine (PROzac) 40 MG capsule TAKE ONE CAPSULE BY MOUTH EVERY DAY  Qty: 90 capsule, Refills: 1    Associated Diagnoses: Other depression      folic acid (FOLVITE) 1 mg tablet Take 1 tablet (1 mg total) by mouth daily for 7 days Do not start before October 19, 2023.  Qty: 7 tablet, Refills: 0    Associated Diagnoses: Alcohol use disorder, severe, dependence (HCC)      levothyroxine 75 mcg tablet TAKE 1 TABLET IN THE MORNING ON AN EMPTY STOMACH FOR THYROID  Qty: 90 tablet, Refills: 1    Associated Diagnoses: Hypothyroidism, unspecified type      Multiple Vitamin (DAILY VALUE MULTIVITAMIN) TABS Take 1 tablet by mouth daily      pantoprazole (PROTONIX) 40 mg tablet Take 1 tablet (40 mg total) by mouth daily in the early morning  Qty: 30 tablet, Refills: 0    Associated Diagnoses: Alcohol use disorder, severe, dependence (HCC)      thiamine 100 MG tablet Take 1 tablet (100 mg total) by mouth daily for 5 days Do not start before October 19, 2023.  Qty: 5 tablet, Refills: 0    Associated Diagnoses: Alcohol use disorder, severe, dependence (HCC)           No discharge procedures on file.  Prior to Admission Medications   Prescriptions Last Dose Informant Patient Reported? Taking?   FLUoxetine (PROzac) 40 MG capsule   No No   Sig: TAKE ONE CAPSULE BY MOUTH EVERY DAY   Multiple Vitamin (DAILY VALUE MULTIVITAMIN) TABS   Yes No   Sig: Take 1 tablet by mouth daily   albuterol (PROVENTIL HFA,VENTOLIN HFA) 90 mcg/act inhaler   Yes No   Sig: Inhale 1-2 puffs   allopurinol (ZYLOPRIM) 300 mg tablet   No No   Sig: TAKE 1 TABLET EVERY DAY   cetirizine (ZyrTEC) 10 mg tablet   Yes No   Sig: Take one tablet by mouth daily as  "needed for allergies/congestion    chlordiazePOXIDE (LIBRIUM) 25 mg capsule   No No   Sig: Take 1 capsule (25 mg total) by mouth 4 (four) times a day for 2 days, THEN 1 capsule (25 mg total) 2 (two) times a day for 2 days, THEN 1 capsule (25 mg total) in the morning for 2 days.   folic acid (FOLVITE) 1 mg tablet   No No   Sig: Take 1 tablet (1 mg total) by mouth daily for 7 days Do not start before October 19, 2023.   levothyroxine 75 mcg tablet   No No   Sig: TAKE 1 TABLET IN THE MORNING ON AN EMPTY STOMACH FOR THYROID   pantoprazole (PROTONIX) 40 mg tablet   No No   Sig: Take 1 tablet (40 mg total) by mouth daily in the early morning   thiamine 100 MG tablet   No No   Sig: Take 1 tablet (100 mg total) by mouth daily for 5 days Do not start before October 19, 2023.      Facility-Administered Medications: None                        Portions of the record may have been created with voice recognition software. Occasional wrong word or \"sound a like\" substitutions may have occurred due to the inherent limitations of voice recognition software. Read the chart carefully and recognize, using context, where substitutions have occurred.    Electronically signed by:  Andrew Blake  "

## 2024-08-11 NOTE — ASSESSMENT & PLAN NOTE
Patient with known history of alcohol abuse.  Patient has known history of chronic abdominal pain/discomfort with known chronic alcohol use presenting to the ED with elevated lipase  Continue with IV fluids  DT prophylaxis  Pain meds  Monitor symptoms closely.  CT imaging given elevated bilirubin.

## 2024-08-12 LAB
ALBUMIN SERPL BCG-MCNC: 3.2 G/DL (ref 3.5–5)
ALP SERPL-CCNC: 230 U/L (ref 34–104)
ALT SERPL W P-5'-P-CCNC: 27 U/L (ref 7–52)
ANION GAP SERPL CALCULATED.3IONS-SCNC: 9 MMOL/L (ref 4–13)
AST SERPL W P-5'-P-CCNC: 47 U/L (ref 13–39)
ATRIAL RATE: 107 BPM
BASOPHILS # BLD AUTO: 0.03 THOUSANDS/ÂΜL (ref 0–0.1)
BASOPHILS NFR BLD AUTO: 1 % (ref 0–1)
BILIRUB SERPL-MCNC: 1.43 MG/DL (ref 0.2–1)
BUN SERPL-MCNC: 5 MG/DL (ref 5–25)
CALCIUM ALBUM COR SERPL-MCNC: 8.2 MG/DL (ref 8.3–10.1)
CALCIUM SERPL-MCNC: 7.6 MG/DL (ref 8.4–10.2)
CHLORIDE SERPL-SCNC: 100 MMOL/L (ref 96–108)
CO2 SERPL-SCNC: 26 MMOL/L (ref 21–32)
CREAT SERPL-MCNC: 0.46 MG/DL (ref 0.6–1.3)
EOSINOPHIL # BLD AUTO: 0.04 THOUSAND/ÂΜL (ref 0–0.61)
EOSINOPHIL NFR BLD AUTO: 1 % (ref 0–6)
ERYTHROCYTE [DISTWIDTH] IN BLOOD BY AUTOMATED COUNT: 15.9 % (ref 11.6–15.1)
GFR SERPL CREATININE-BSD FRML MDRD: 116 ML/MIN/1.73SQ M
GLUCOSE P FAST SERPL-MCNC: 111 MG/DL (ref 65–99)
GLUCOSE SERPL-MCNC: 111 MG/DL (ref 65–140)
HCT VFR BLD AUTO: 37.2 % (ref 36.5–49.3)
HGB BLD-MCNC: 12.4 G/DL (ref 12–17)
IMM GRANULOCYTES # BLD AUTO: 0.03 THOUSAND/UL (ref 0–0.2)
IMM GRANULOCYTES NFR BLD AUTO: 1 % (ref 0–2)
LYMPHOCYTES # BLD AUTO: 0.69 THOUSANDS/ÂΜL (ref 0.6–4.47)
LYMPHOCYTES NFR BLD AUTO: 17 % (ref 14–44)
MAGNESIUM SERPL-MCNC: 1.4 MG/DL (ref 1.9–2.7)
MCH RBC QN AUTO: 33.9 PG (ref 26.8–34.3)
MCHC RBC AUTO-ENTMCNC: 33.3 G/DL (ref 31.4–37.4)
MCV RBC AUTO: 102 FL (ref 82–98)
MONOCYTES # BLD AUTO: 0.37 THOUSAND/ÂΜL (ref 0.17–1.22)
MONOCYTES NFR BLD AUTO: 9 % (ref 4–12)
NEUTROPHILS # BLD AUTO: 2.92 THOUSANDS/ÂΜL (ref 1.85–7.62)
NEUTS SEG NFR BLD AUTO: 71 % (ref 43–75)
NRBC BLD AUTO-RTO: 0 /100 WBCS
P AXIS: 44 DEGREES
PLATELET # BLD AUTO: 90 THOUSANDS/UL (ref 149–390)
PMV BLD AUTO: 9.6 FL (ref 8.9–12.7)
POTASSIUM SERPL-SCNC: 3.6 MMOL/L (ref 3.5–5.3)
PR INTERVAL: 164 MS
PROT SERPL-MCNC: 5.5 G/DL (ref 6.4–8.4)
QRS AXIS: 91 DEGREES
QRSD INTERVAL: 80 MS
QT INTERVAL: 356 MS
QTC INTERVAL: 475 MS
RBC # BLD AUTO: 3.66 MILLION/UL (ref 3.88–5.62)
SODIUM SERPL-SCNC: 135 MMOL/L (ref 135–147)
T WAVE AXIS: 44 DEGREES
VENTRICULAR RATE: 107 BPM
WBC # BLD AUTO: 4.08 THOUSAND/UL (ref 4.31–10.16)

## 2024-08-12 PROCEDURE — 99232 SBSQ HOSP IP/OBS MODERATE 35: CPT | Performed by: PHYSICIAN ASSISTANT

## 2024-08-12 PROCEDURE — 93010 ELECTROCARDIOGRAM REPORT: CPT | Performed by: INTERNAL MEDICINE

## 2024-08-12 PROCEDURE — 85025 COMPLETE CBC W/AUTO DIFF WBC: CPT | Performed by: INTERNAL MEDICINE

## 2024-08-12 PROCEDURE — 99223 1ST HOSP IP/OBS HIGH 75: CPT | Performed by: INTERNAL MEDICINE

## 2024-08-12 PROCEDURE — 36415 COLL VENOUS BLD VENIPUNCTURE: CPT | Performed by: INTERNAL MEDICINE

## 2024-08-12 PROCEDURE — 83735 ASSAY OF MAGNESIUM: CPT | Performed by: INTERNAL MEDICINE

## 2024-08-12 PROCEDURE — 80053 COMPREHEN METABOLIC PANEL: CPT | Performed by: INTERNAL MEDICINE

## 2024-08-12 RX ORDER — MAGNESIUM SULFATE HEPTAHYDRATE 40 MG/ML
2 INJECTION, SOLUTION INTRAVENOUS ONCE
Status: COMPLETED | OUTPATIENT
Start: 2024-08-12 | End: 2024-08-12

## 2024-08-12 RX ORDER — SODIUM CHLORIDE, SODIUM GLUCONATE, SODIUM ACETATE, POTASSIUM CHLORIDE, MAGNESIUM CHLORIDE, SODIUM PHOSPHATE, DIBASIC, AND POTASSIUM PHOSPHATE .53; .5; .37; .037; .03; .012; .00082 G/100ML; G/100ML; G/100ML; G/100ML; G/100ML; G/100ML; G/100ML
100 INJECTION, SOLUTION INTRAVENOUS CONTINUOUS
Status: DISCONTINUED | OUTPATIENT
Start: 2024-08-12 | End: 2024-08-15 | Stop reason: HOSPADM

## 2024-08-12 RX ORDER — SODIUM CHLORIDE 9 MG/ML
100 INJECTION, SOLUTION INTRAVENOUS CONTINUOUS
Status: DISCONTINUED | OUTPATIENT
Start: 2024-08-12 | End: 2024-08-12

## 2024-08-12 RX ADMIN — CHLORDIAZEPOXIDE HYDROCHLORIDE 25 MG: 5 CAPSULE ORAL at 13:11

## 2024-08-12 RX ADMIN — LORAZEPAM 0.5 MG: 2 INJECTION INTRAMUSCULAR; INTRAVENOUS at 00:01

## 2024-08-12 RX ADMIN — CHLORDIAZEPOXIDE HYDROCHLORIDE 25 MG: 5 CAPSULE ORAL at 05:58

## 2024-08-12 RX ADMIN — MAGNESIUM SULFATE HEPTAHYDRATE 2 G: 40 INJECTION, SOLUTION INTRAVENOUS at 10:22

## 2024-08-12 RX ADMIN — CHLORDIAZEPOXIDE HYDROCHLORIDE 25 MG: 5 CAPSULE ORAL at 22:21

## 2024-08-12 RX ADMIN — SODIUM CHLORIDE 100 ML/HR: 0.9 INJECTION, SOLUTION INTRAVENOUS at 10:19

## 2024-08-12 RX ADMIN — SODIUM CHLORIDE, SODIUM GLUCONATE, SODIUM ACETATE, POTASSIUM CHLORIDE AND MAGNESIUM CHLORIDE 100 ML/HR: 526; 502; 368; 37; 30 INJECTION, SOLUTION INTRAVENOUS at 15:02

## 2024-08-12 NOTE — PROGRESS NOTES
"[facility], [place]  Progress Note  Name: Sammy Mays I  MRN: [id number]  Unit/Bed#: ED 26 I Date of Admission: 8/11/2024   Date of Service: 8/12/2024 I Hospital Day: 0    Assessment & Plan   * Pancreatitis  Assessment & Plan  Patient with known history of alcohol abuse and chronic abdominal pain/discomfort  Lipase elevated at 379  CT revealed, \"Findings consistent with acute pancreatitis versus duodenitis. 1.2 cm hypodense lesion in the pancreatic head. \"  NPO  IVF  Pain control  GI consult pending        Alcohol abuse  Assessment & Plan  CIWA protocol  Was started on Librium on admission, continue for now and consider beginning taper tomorrow  Per record review and patient report, appears last drink was around 3 days ago    Hypokalemia  Assessment & Plan  Replete and monitor    Hypothyroidism  Assessment & Plan  Levothyroxine    Hypomagnesemia  Assessment & Plan  Replete and monitor              VTE Pharmacologic Prophylaxis:         Mobility:          Patient Centered Rounds: I performed bedside rounds with nursing staff today.   Discussions with Specialists or Other Care Team Provider: Messaged GI fellow via EPIC secure chat    Education and Discussions with Family / Patient: Patient declined call to .     Total Time Spent on Date of Encounter in care of patient: 28 mins. This time was spent on one or more of the following: performing physical exam; counseling and coordination of care; obtaining or reviewing history; documenting in the medical record; reviewing/ordering tests, medications or procedures; communicating with other healthcare professionals and discussing with patient's family/caregivers.    Current Length of Stay: 0 day(s)  Current Patient Status: Observation   Certification Statement: The patient, admitted on an observation basis, will now require > 2 midnight hospital stay due to IVF, pain control, GI consult, acute pancreatitis   Discharge Plan: " Anticipate discharge in 48 hrs to home.    Code Status: Prior    Subjective:   Mr. Mays reports feeling better today, less abdominal pain. Reports feeling tired.     Objective:     Vitals:   Temp (24hrs), Av.3 °F (36.8 °C), Min:98.3 °F (36.8 °C), Max:98.3 °F (36.8 °C)    Temp:  [98.3 °F (36.8 °C)] 98.3 °F (36.8 °C)  HR:  [] 86  Resp:  [16-18] 17  BP: (119-146)/(69-91) 131/79  SpO2:  [94 %-98 %] 96 %  There is no height or weight on file to calculate BMI.     Input and Output Summary (last 24 hours):     Intake/Output Summary (Last 24 hours) at 2024 0944  Last data filed at 2024 1842  Gross per 24 hour   Intake 1050 ml   Output --   Net 1050 ml       Physical Exam:   Physical Exam  Vitals reviewed.   Constitutional:       Comments: Patient seen lying in ED bed, NAD   Cardiovascular:      Rate and Rhythm: Normal rate and regular rhythm.   Pulmonary:      Effort: Pulmonary effort is normal. No respiratory distress.      Breath sounds: Normal breath sounds.   Abdominal:      General: Bowel sounds are normal.      Palpations: Abdomen is soft.      Tenderness: There is no abdominal tenderness.   Musculoskeletal:      Right lower leg: No edema.      Left lower leg: No edema.   Skin:     General: Skin is warm.   Neurological:      Mental Status: He is alert and oriented to person, place, and time.   Psychiatric:         Mood and Affect: Mood normal.          Additional Data:     Labs:  Results from last 7 days   Lab Units 24  0558   WBC Thousand/uL 4.08*   HEMOGLOBIN g/dL 12.4   HEMATOCRIT % 37.2   PLATELETS Thousands/uL 90*   SEGS PCT % 71   LYMPHO PCT % 17   MONO PCT % 9   EOS PCT % 1     Results from last 7 days   Lab Units 24  0558   SODIUM mmol/L 135   POTASSIUM mmol/L 3.6   CHLORIDE mmol/L 100   CO2 mmol/L 26   BUN mg/dL 5   CREATININE mg/dL 0.46*   ANION GAP mmol/L 9   CALCIUM mg/dL 7.6*   ALBUMIN g/dL 3.2*   TOTAL BILIRUBIN mg/dL 1.43*   ALK PHOS U/L 230*   ALT U/L 27   AST U/L 47*    GLUCOSE RANDOM mg/dL 111                       Lines/Drains:  Invasive Devices       Peripheral Intravenous Line  Duration             Peripheral IV 06/06/24 Left Antecubital 66 days                      Telemetry:  Telemetry Orders (From admission, onward)               24 Hour Telemetry Monitoring  Continuous x 24 Hours (Telem)        Question:  Reason for 24 Hour Telemetry  Answer:  Alcohol withdrawal and CIWA >7, electrolyte abnormalities, abnormal ECG and/or heart disease                     Telemetry Reviewed: Normal Sinus Rhythm  Indication for Continued Telemetry Use: Metabolic/electrolyte disturbance with high probability of dysrhythmia             Imaging: Reviewed radiology reports from this admission including: abdominal/pelvic CT    Recent Cultures (last 7 days):         Last 24 Hours Medication List:   Current Facility-Administered Medications   Medication Dose Route Frequency Provider Last Rate    chlordiazePOXIDE  25 mg Oral Q8H CHERYL Hetul Leiva, DO      HYDROmorphone  0.2 mg Intravenous Q4H PRN Hetul Leiva, DO      magnesium sulfate  2 g Intravenous Once Tj Zuñiga PA-C      sodium chloride  100 mL/hr Intravenous Continuous Tj Zuñiga PA-C      trimethobenzamide  200 mg Intramuscular Q6H PRN Hetul Leiva, DO          Today, Patient Was Seen By: Tj Zuñiga PA-C    **Please Note: This note may have been constructed using a voice recognition system.**

## 2024-08-12 NOTE — ASSESSMENT & PLAN NOTE
"Patient with known history of alcohol abuse and chronic abdominal pain/discomfort  Lipase elevated at 379  CT revealed, \"Findings consistent with acute pancreatitis versus duodenitis. 1.2 cm hypodense lesion in the pancreatic head. \"  NPO  IVF  Pain control  GI consult pending      "

## 2024-08-12 NOTE — CONSULTS
[facility] - Inpatient Consultation    PATIENT INFORMATION      Sammy Mays 64 y.o. male MRN: [id number]  Unit/Bed#: ED 26 Encounter: [id number]  PCP: Ankur Bustillo DO  Date of Admission:  8/11/2024  Date of Consultation: 08/12/24  Requesting Physician: King Romo DO       ASSESSMENT & PLAN     Sammy Mays is a 64 y.o. old male with PMH of chronic pancreatitis secondary to alcohol use who presented with abdominal pain.    Acute on chronic pancreatitis 2/2 alcohol use  New hypo-dense lesion (1.2 cm) in the pancreatic head  Patient is presenting with acute on chronic pancreatitis due to ongoing alcohol use.  Patient states his abdominal discomfort has improved since being admitted.  CT imaging is concerning for a new hypodense lesion in the pancreatic head which was not present on CT imaging obtained in June. Will recommend MRCP outpatient for further visualization.    -MRCP outpatient once inflammation is improved, in about 4 weeks for visualization of hypodense lesion.  -Agree to continue IV fluids, plasmalyte  -Pain control per primary team  -Continue counseling on alcohol cessation      REASON FOR CONSULTATION      Pancreatitis      HISTORY OF PRESENT ILLNESS      Sammy Mays is a 64 y.o. male with PMH significant for chronic pancreatitis due to alcohol use.  Patient is presenting with a few day history of nausea, vomiting, and significant abdominal pain. Patient states he routinely drinks alcohol daily usually has a couple glasses of whiskey. However yesterday he started to feel unwell with abdominal discomfort.  He notes being previously hospitalized due to pancreatitis once back in July 2023 with concerns of an upper GI bleed.  At that time he underwent an EGD which was notable for grade a esophagitis with mucosal breaks. Was also hospitalized in April of this year due to hematemesis and melena. He underwent an EGD which showed a single submucosal nodule in  the antrum and moderate erythematous mucosa in the duodenal bulb.    Today patient appears to be feeling better since being admitted.  He continues to endorse alcohol use daily.  He usually drinks multiple glasses of whiskey every day.  Also endorses daily marijuana use.          REVIEW OF SYSTEMS     CONSTITUTIONAL: Denies any fever, chills, rigors, and weight loss  HEENT: No earache or tinnitus, denies hearing loss or visual disturbances  CARDIOVASCULAR: No chest pain or palpitations   RESPIRATORY: Denies any cough, hemoptysis, shortness of breath or dyspnea on exertion  GASTROINTESTINAL: Mild abdominal discomfort on palpation  GENITOURINARY: No problems with urination, denies any hematuria or dysuria  NEUROLOGIC: No dizziness or vertigo, denies headaches   MUSCULOSKELETAL: Denies any muscle or joint pain   SKIN: Denies skin rashes or itching   ENDOCRINE: Denies excessive thirst, denies intolerance to heat or cold  PSYCHOSOCIAL: Denies depression or anxiety, denies any recent memory loss     PAST MEDICAL & SURGICAL HISTORY      Past Medical History:   Diagnosis Date    Medical history unknown        Past Surgical History:   Procedure Laterality Date    REPAIR / REINSERT BICEPS TENDON AT ELBOW      Last Assessed: 1/12/2016        MEDICATIONS & ALLERGIES       Medications:   Not in a hospital admission.  Current Facility-Administered Medications   Medication Dose Route Frequency    chlordiazePOXIDE (LIBRIUM) capsule 25 mg  25 mg Oral Q8H CHERYL    HYDROmorphone HCl (DILAUDID) injection 0.2 mg  0.2 mg Intravenous Q4H PRN    magnesium sulfate 2 g/50 mL IVPB (premix) 2 g  2 g Intravenous Once    trimethobenzamide (TIGAN) IM injection 200 mg  200 mg Intramuscular Q6H PRN       Allergies:   Allergies   Allergen Reactions    Amoxicillin Swelling and Fever     Other reaction(s): vertigo    Escitalopram      Other reaction(s): sweaty palms/hands, felt faint, passed out for a short time, had anxiety/panic attack    Gemfibrozil       Other reaction(s): Nausea and/or vomiting  Other reaction(s): Nausea and/or vomiting    Other      Other reaction(s): Other (See Comments)  rhinitis, itchy eyes       SOCIAL HISTORY      Social History     Marital Status: Single    Substance Use History:   Social History     Substance and Sexual Activity   Alcohol Use Yes    Comment: social     Social History     Tobacco Use   Smoking Status Never   Smokeless Tobacco Never     Social History     Substance and Sexual Activity   Drug Use Yes    Types: Marijuana       FAMILY HISTORY      Family History   Problem Relation Age of Onset    Diabetes Mother        PHYSICAL EXAM     Objective   Blood pressure 131/79, pulse 86, temperature 98.3 °F (36.8 °C), temperature source Oral, resp. rate 17, SpO2 96%. There is no height or weight on file to calculate BMI.    Intake/Output Summary (Last 24 hours) at 8/12/2024 0941  Last data filed at 8/11/2024 1842  Gross per 24 hour   Intake 1050 ml   Output --   Net 1050 ml       General Appearance:   Alert, cooperative, no distress   HEENT:   Normocephalic, atraumatic, anicteric     Neck:   Supple, symmetrical, trachea midline   Lungs:   Equal chest rise, respirations unlabored    Heart:   Regular rate and rhythm   Abdomen:   Soft, non-tender, non-distended; normal bowel sounds; no masses, no organomegaly    Rectal:   Deferred    Extremities:   No cyanosis, clubbing or edema    Neuro:   Moves all 4 extremities    Skin:   No jaundice, rashes, or lesions      ADDITIONAL DATA     Lab Results:     Results from last 7 days   Lab Units 08/12/24  0558   WBC Thousand/uL 4.08*   HEMOGLOBIN g/dL 12.4   HEMATOCRIT % 37.2   PLATELETS Thousands/uL 90*   SEGS PCT % 71   LYMPHO PCT % 17   MONO PCT % 9   EOS PCT % 1     Results from last 7 days   Lab Units 08/12/24  0558   POTASSIUM mmol/L 3.6   CHLORIDE mmol/L 100   CO2 mmol/L 26   BUN mg/dL 5   CREATININE mg/dL 0.46*   CALCIUM mg/dL 7.6*   ALK PHOS U/L 230*   ALT U/L 27   AST U/L 47*            Imaging:    CT abdomen pelvis wo contrast    Result Date: 8/12/2024  Narrative: CT ABDOMEN AND PELVIS WITHOUT IV CONTRAST INDICATION: Abdominal pain. COMPARISON: 6/6/2024 TECHNIQUE: CT examination of the abdomen and pelvis was performed without intravenous contrast. Lack of IV contrast limits the sensitivity for pathology. Multiplanar 2D reformatted images were created from the source data. This examination, like all CT scans performed in the [facility], was performed utilizing techniques to minimize radiation dose exposure, including the use of iterative reconstruction and automated exposure control. Radiation dose length product (DLP) for this visit: 748 mGy-cm Enteric Contrast: Not administered. Lack of oral contrast limits the sensitivity for pathology within the bowel. FINDINGS: ABDOMEN LOWER CHEST: Redemonstration of left-sided gynecomastia. There are coronary artery calcifications. LIVER/BILIARY TREE: Unremarkable. GALLBLADDER: Post cholecystectomy. SPLEEN: Enlarged spleen measuring 14.7 cm. PANCREAS: There is chronic dilatation of the pancreatic duct with abrupt cut off at the pancreatic head. There is a 1.2 cm hypodense lesion in the pancreatic head (2/67). The known low-density lesion at the pancreatic tail is not well delineated on this study without IV contrast. There is evidence of acute pancreatitis with fat stranding around the pancreatic head, which will be described based on the revised [place] Classification of Acute Pancreatitis: - TIME OF ONSET: less than or equal to 4 weeks - NECROSIS: There is peripancreatic edema but no evidence of pancreatic or peripancreatic necrosis, therefore this is interstitial edematous pancreatitis (IEP.) - LOCAL COMPLICATIONS: None - INFECTION: There is no abnormal gas in or around the pancreas to suggest infection. ADRENAL GLANDS: Unremarkable. KIDNEYS/URETERS: Simple renal cyst(s). Otherwise unremarkable kidneys. No hydronephrosis. STOMACH AND  BOWEL: Colonic diverticulosis without findings of acute diverticulitis. There is fat stranding adjacent to the duodenum. APPENDIX: No findings to suggest appendicitis. ABDOMINOPELVIC CAVITY: No ascites. No pneumoperitoneum. No lymphadenopathy. VESSELS: Atherosclerosis without abdominal aortic aneurysm. Redemonstration of intra-abdominal varices. PELVIS REPRODUCTIVE ORGANS: Unremarkable for patient's age. URINARY BLADDER: Mild diffuse wall thickening. ABDOMINAL WALL/INGUINAL REGIONS: Unremarkable. BONES: No acute fracture or suspicious osseous lesion.     Impression: Findings consistent with acute pancreatitis versus duodenitis. 1.2 cm hypodense lesion in the pancreatic head. Would recommend an MRI/MRCP for further evaluation purposes. The study was marked in EPIC for immediate notification. Workstation performed: [id number]       EKG, Pathology, and Other Studies Reviewed on Admission:   EKG: Reviewed      Counseling / Coordination of Care Time: 30 total mins spent in consult. Greater than 50% of total time spent on patient counseling and coordination of care.    Melinda Stephenson MD  Gastroenterology Fellow  [facility]  Division of Gastroenterology and Hepatology    ...............................................................................................................................................  ** Please Note: This note is constructed using a voice recognition dictation system. **

## 2024-08-13 PROBLEM — K86.1 ACUTE ON CHRONIC PANCREATITIS (HCC): Status: ACTIVE | Noted: 2018-05-11

## 2024-08-13 PROBLEM — K86.9 PANCREATIC LESION: Status: ACTIVE | Noted: 2024-08-13

## 2024-08-13 PROBLEM — K21.9 GERD (GASTROESOPHAGEAL REFLUX DISEASE): Status: ACTIVE | Noted: 2024-08-13

## 2024-08-13 LAB
ALBUMIN SERPL BCG-MCNC: 3 G/DL (ref 3.5–5)
ALP SERPL-CCNC: 188 U/L (ref 34–104)
ALT SERPL W P-5'-P-CCNC: 19 U/L (ref 7–52)
ANION GAP SERPL CALCULATED.3IONS-SCNC: 10 MMOL/L (ref 4–13)
AST SERPL W P-5'-P-CCNC: 23 U/L (ref 13–39)
BILIRUB SERPL-MCNC: 1.01 MG/DL (ref 0.2–1)
BUN SERPL-MCNC: 5 MG/DL (ref 5–25)
CALCIUM ALBUM COR SERPL-MCNC: 8.3 MG/DL (ref 8.3–10.1)
CALCIUM SERPL-MCNC: 7.5 MG/DL (ref 8.4–10.2)
CHLORIDE SERPL-SCNC: 100 MMOL/L (ref 96–108)
CO2 SERPL-SCNC: 25 MMOL/L (ref 21–32)
CREAT SERPL-MCNC: 0.41 MG/DL (ref 0.6–1.3)
ERYTHROCYTE [DISTWIDTH] IN BLOOD BY AUTOMATED COUNT: 16 % (ref 11.6–15.1)
GFR SERPL CREATININE-BSD FRML MDRD: 122 ML/MIN/1.73SQ M
GLUCOSE SERPL-MCNC: 94 MG/DL (ref 65–140)
HCT VFR BLD AUTO: 36.7 % (ref 36.5–49.3)
HGB BLD-MCNC: 12.2 G/DL (ref 12–17)
MAGNESIUM SERPL-MCNC: 1.6 MG/DL (ref 1.9–2.7)
MCH RBC QN AUTO: 34.7 PG (ref 26.8–34.3)
MCHC RBC AUTO-ENTMCNC: 33.2 G/DL (ref 31.4–37.4)
MCV RBC AUTO: 104 FL (ref 82–98)
PLATELET # BLD AUTO: 65 THOUSANDS/UL (ref 149–390)
PMV BLD AUTO: 11 FL (ref 8.9–12.7)
POTASSIUM SERPL-SCNC: 3.2 MMOL/L (ref 3.5–5.3)
PROT SERPL-MCNC: 5.3 G/DL (ref 6.4–8.4)
RBC # BLD AUTO: 3.52 MILLION/UL (ref 3.88–5.62)
SODIUM SERPL-SCNC: 135 MMOL/L (ref 135–147)
WBC # BLD AUTO: 3 THOUSAND/UL (ref 4.31–10.16)

## 2024-08-13 PROCEDURE — 80053 COMPREHEN METABOLIC PANEL: CPT | Performed by: PHYSICIAN ASSISTANT

## 2024-08-13 PROCEDURE — 83735 ASSAY OF MAGNESIUM: CPT | Performed by: PHYSICIAN ASSISTANT

## 2024-08-13 PROCEDURE — 99232 SBSQ HOSP IP/OBS MODERATE 35: CPT | Performed by: INTERNAL MEDICINE

## 2024-08-13 PROCEDURE — 85027 COMPLETE CBC AUTOMATED: CPT | Performed by: PHYSICIAN ASSISTANT

## 2024-08-13 RX ORDER — LEVOTHYROXINE SODIUM 75 UG/1
75 TABLET ORAL
Status: DISCONTINUED | OUTPATIENT
Start: 2024-08-13 | End: 2024-08-15 | Stop reason: HOSPADM

## 2024-08-13 RX ORDER — LANOLIN ALCOHOL/MO/W.PET/CERES
3 CREAM (GRAM) TOPICAL
Status: DISCONTINUED | OUTPATIENT
Start: 2024-08-13 | End: 2024-08-15 | Stop reason: HOSPADM

## 2024-08-13 RX ORDER — FOLIC ACID 1 MG/1
1 TABLET ORAL DAILY
Status: DISCONTINUED | OUTPATIENT
Start: 2024-08-14 | End: 2024-08-15 | Stop reason: HOSPADM

## 2024-08-13 RX ORDER — CHLORDIAZEPOXIDE HYDROCHLORIDE 5 MG/1
25 CAPSULE, GELATIN COATED ORAL EVERY 12 HOURS
Status: DISCONTINUED | OUTPATIENT
Start: 2024-08-14 | End: 2024-08-14

## 2024-08-13 RX ORDER — MAGNESIUM SULFATE HEPTAHYDRATE 40 MG/ML
2 INJECTION, SOLUTION INTRAVENOUS ONCE
Status: COMPLETED | OUTPATIENT
Start: 2024-08-13 | End: 2024-08-13

## 2024-08-13 RX ORDER — PANTOPRAZOLE SODIUM 40 MG/1
40 TABLET, DELAYED RELEASE ORAL
Status: DISCONTINUED | OUTPATIENT
Start: 2024-08-14 | End: 2024-08-15 | Stop reason: HOSPADM

## 2024-08-13 RX ORDER — ALLOPURINOL 300 MG/1
300 TABLET ORAL DAILY
Status: DISCONTINUED | OUTPATIENT
Start: 2024-08-14 | End: 2024-08-15 | Stop reason: HOSPADM

## 2024-08-13 RX ORDER — POTASSIUM CHLORIDE 1500 MG/1
40 TABLET, EXTENDED RELEASE ORAL ONCE
Status: COMPLETED | OUTPATIENT
Start: 2024-08-13 | End: 2024-08-13

## 2024-08-13 RX ORDER — LANOLIN ALCOHOL/MO/W.PET/CERES
100 CREAM (GRAM) TOPICAL DAILY
Status: DISCONTINUED | OUTPATIENT
Start: 2024-08-14 | End: 2024-08-15 | Stop reason: HOSPADM

## 2024-08-13 RX ADMIN — POTASSIUM CHLORIDE 40 MEQ: 1500 TABLET, EXTENDED RELEASE ORAL at 10:22

## 2024-08-13 RX ADMIN — LEVOTHYROXINE SODIUM 75 MCG: 75 TABLET ORAL at 16:14

## 2024-08-13 RX ADMIN — CHLORDIAZEPOXIDE HYDROCHLORIDE 25 MG: 5 CAPSULE ORAL at 05:19

## 2024-08-13 RX ADMIN — CHLORDIAZEPOXIDE HYDROCHLORIDE 25 MG: 5 CAPSULE ORAL at 13:35

## 2024-08-13 RX ADMIN — SODIUM CHLORIDE, SODIUM GLUCONATE, SODIUM ACETATE, POTASSIUM CHLORIDE AND MAGNESIUM CHLORIDE 100 ML/HR: 526; 502; 368; 37; 30 INJECTION, SOLUTION INTRAVENOUS at 13:38

## 2024-08-13 RX ADMIN — THIAMINE HYDROCHLORIDE: 100 INJECTION, SOLUTION INTRAMUSCULAR; INTRAVENOUS at 18:37

## 2024-08-13 RX ADMIN — Medication 3 MG: at 00:35

## 2024-08-13 RX ADMIN — Medication 3 MG: at 21:05

## 2024-08-13 RX ADMIN — SODIUM CHLORIDE, SODIUM GLUCONATE, SODIUM ACETATE, POTASSIUM CHLORIDE AND MAGNESIUM CHLORIDE 100 ML/HR: 526; 502; 368; 37; 30 INJECTION, SOLUTION INTRAVENOUS at 03:20

## 2024-08-13 RX ADMIN — FLUOXETINE HYDROCHLORIDE 40 MG: 20 CAPSULE ORAL at 16:14

## 2024-08-13 RX ADMIN — MAGNESIUM SULFATE HEPTAHYDRATE 2 G: 40 INJECTION, SOLUTION INTRAVENOUS at 10:22

## 2024-08-13 NOTE — ASSESSMENT & PLAN NOTE
CIWA protocol  Was started on Librium on admission, -Taper   Per record review and patient report, appears last drink was around 3 days ago

## 2024-08-13 NOTE — PROGRESS NOTES
[facility]  Progress Note  Name: Sammy Mays I  MRN: [id number]  Unit/Bed#: [facility] 816-01 I Date of Admission: 8/11/2024   Date of Service: 8/13/2024 I Hospital Day: 1    Assessment & Plan   * Acute on chronic pancreatitis due to alcohol  Assessment & Plan  Presented on 8/11/2024 with abdominal pain  Lipase elevated at 379  CT abdomen/pelvis - acute pancreatitis.  New 1.2 cm lesion in the pancreatic head  Abdominal pain resolved  Diet advanced to low-fat  Plan for MRI/MRCP per GI - input appreciated    Pancreatic lesion  Assessment & Plan  Plan as above    Alcohol abuse  Assessment & Plan  CIWA protocol  Was started on Librium on admission, -Taper   Per record review and patient report, appears last drink was around 3 days ago    Hypokalemia  Assessment & Plan  Replete and monitor    Hypomagnesemia  Assessment & Plan  Replete and monitor     Hypothyroidism  Assessment & Plan  Continue home levothyroxine 75 mcg daily  Check TSH    GERD (gastroesophageal reflux disease)  Assessment & Plan  Continue PPI    Depression  Assessment & Plan  Continue home med fluoxetine 40 mg daily               VTE Pharmacologic Prophylaxis: VTE Score: 3 Moderate Risk (Score 3-4) - Pharmacological DVT Prophylaxis Contraindicated. Sequential Compression Devices Ordered.    Mobility:   Basic Mobility Inpatient Raw Score: 20  JH-HLM Goal: 6: Walk 10 steps or more  JH-HLM Achieved: 6: Walk 10 steps or more  JH-HLM Goal achieved. Continue to encourage appropriate mobility.    Patient Centered Rounds: Discussed with RN  Discussions with Specialists or Other Care Team Provider: Discussed with gastroenterology    Education and Discussions with Family / Patient: Updated  (brother) via phone.    Total Time Spent on Date of Encounter in care of patient: 20 mins. This time was spent on one or more of the following: performing physical exam; counseling and coordination of care; obtaining or reviewing  history; documenting in the medical record; reviewing/ordering tests, medications or procedures; communicating with other healthcare professionals and discussing with patient's family/caregivers.    Current Length of Stay: 1 day(s)  Current Patient Status: Inpatient   Certification Statement: The patient will continue to require additional inpatient hospital stay due to awaiting MRI/MRCP    Code Status: Level 1 - Full Code    Subjective:   No abdominal pain, nausea or vomiting    Objective:     Vitals:   Temp (24hrs), Av.4 °F (36.9 °C), Min:98.1 °F (36.7 °C), Max:98.6 °F (37 °C)    Temp:  [98.1 °F (36.7 °C)-98.6 °F (37 °C)] 98.6 °F (37 °C)  HR:  [72-77] 76  Resp:  [14-19] 17  BP: (111-130)/(64-73) 112/65  SpO2:  [94 %-97 %] 95 %  Body mass index is 28.18 kg/m².     Physical Exam:   Physical Exam  Vitals reviewed.   HENT:      Head: Normocephalic.      Nose: Nose normal.      Mouth/Throat:      Mouth: Mucous membranes are moist.   Eyes:      Extraocular Movements: Extraocular movements intact.   Cardiovascular:      Rate and Rhythm: Normal rate and regular rhythm.   Pulmonary:      Effort: Pulmonary effort is normal. No respiratory distress.      Breath sounds: Normal breath sounds. No wheezing.   Abdominal:      General: Bowel sounds are normal. There is no distension.      Palpations: Abdomen is soft.      Tenderness: There is no abdominal tenderness.   Musculoskeletal:         General: No swelling.      Cervical back: Neck supple.   Skin:     General: Skin is warm and dry.   Neurological:      General: No focal deficit present.      Mental Status: He is alert and oriented to person, place, and time.   Psychiatric:         Mood and Affect: Mood normal.         Behavior: Behavior normal.          Additional Data:     Labs:  Results from last 7 days   Lab Units 24  0559 24  0558   WBC Thousand/uL 3.00* 4.08*   HEMOGLOBIN g/dL 12.2 12.4   HEMATOCRIT % 36.7 37.2   PLATELETS Thousands/uL 65* 90*   SEGS  PCT %  --  71   LYMPHO PCT %  --  17   MONO PCT %  --  9   EOS PCT %  --  1     Results from last 7 days   Lab Units 08/13/24  0559   SODIUM mmol/L 135   POTASSIUM mmol/L 3.2*   CHLORIDE mmol/L 100   CO2 mmol/L 25   BUN mg/dL 5   CREATININE mg/dL 0.41*   ANION GAP mmol/L 10   CALCIUM mg/dL 7.5*   ALBUMIN g/dL 3.0*   TOTAL BILIRUBIN mg/dL 1.01*   ALK PHOS U/L 188*   ALT U/L 19   AST U/L 23   GLUCOSE RANDOM mg/dL 94                       Lines/Drains:  Invasive Devices       Peripheral Intravenous Line  Duration             Peripheral IV 08/11/24 Distal;Right;Upper;Ventral (anterior) Arm 2 days                      Last 24 Hours Medication List:   Current Facility-Administered Medications   Medication Dose Route Frequency Provider Last Rate    [START ON 8/14/2024] allopurinol  300 mg Oral Daily Nikia Wilcox MD      [START ON 8/14/2024] chlordiazePOXIDE  25 mg Oral Q12H Nikia Wilcox MD      FLUoxetine  40 mg Oral Daily Nikia Wilcox MD      [START ON 8/14/2024] folic acid  1 mg Oral Daily Nikia Wilcox MD      folic acid 1 mg, thiamine (VITAMIN B1) 100 mg in sodium chloride 0.9 % 100 mL IV piggyback   Intravenous Daily Nikia Wilcox MD      HYDROmorphone  0.2 mg Intravenous Q4H PRN Hetul Leiva, DO      levothyroxine  75 mcg Oral Early Morning Nikia Wilcox MD      melatonin  3 mg Oral HS Daisy Dupree PA-C      multi-electrolyte  100 mL/hr Intravenous Continuous Melinda Stephenson  mL/hr (08/13/24 1338)    [START ON 8/14/2024] multivitamin-minerals  1 tablet Oral Daily Nikia Wilcox MD      [START ON 8/14/2024] pantoprazole  40 mg Oral Daily Before Breakfast Nikia Wilcox MD      [START ON 8/14/2024] thiamine  100 mg Oral Daily Nikia Wilcox MD      trimethobenzamide  200 mg Intramuscular Q6H PRN Hetul Leiva, DO          Today, Patient Was Seen By: Nikia Wilcox MD    **Please Note: This note may have been constructed using a voice recognition system.**

## 2024-08-13 NOTE — ASSESSMENT & PLAN NOTE
Presented on 8/11/2024 with abdominal pain  Lipase elevated at 379  CT abdomen/pelvis - acute pancreatitis.  New 1.2 cm lesion in the pancreatic head  Abdominal pain resolved  Diet advanced to low-fat  Plan for MRI/MRCP per GI - input appreciated

## 2024-08-13 NOTE — PLAN OF CARE
Problem: PAIN - ADULT  Goal: Verbalizes/displays adequate comfort level or baseline comfort level  Description: Interventions:  - Encourage patient to monitor pain and request assistance  - Assess pain using appropriate pain scale  - Administer analgesics based on type and severity of pain and evaluate response  - Implement non-pharmacological measures as appropriate and evaluate response  - Consider cultural and social influences on pain and pain management  - Notify physician/advanced practitioner if interventions unsuccessful or patient reports new pain  Outcome: Progressing     Problem: NEUROSENSORY - ADULT  Goal: Achieves stable or improved neurological status  Description: INTERVENTIONS  - Monitor and report changes in neurological status  - Monitor vital signs such as temperature, blood pressure, glucose, and any other labs ordered   - Initiate measures to prevent increased intracranial pressure  - Monitor for seizure activity and implement precautions if appropriate      Outcome: Progressing     Problem: CARDIOVASCULAR - ADULT  Goal: Maintains optimal cardiac output and hemodynamic stability  Description: INTERVENTIONS:  - Monitor I/O, vital signs and rhythm  - Monitor for S/S and trends of decreased cardiac output  - Administer and titrate ordered vasoactive medications to optimize hemodynamic stability  - Assess quality of pulses, skin color and temperature  - Assess for signs of decreased coronary artery perfusion  - Instruct patient to report change in severity of symptoms  Outcome: Progressing     Problem: CARDIOVASCULAR - ADULT  Goal: Absence of cardiac dysrhythmias or at baseline rhythm  Description: INTERVENTIONS:  - Continuous cardiac monitoring, vital signs, obtain 12 lead EKG if ordered  - Administer antiarrhythmic and heart rate control medications as ordered  - Monitor electrolytes and administer replacement therapy as ordered  Outcome: Progressing

## 2024-08-13 NOTE — CASE MANAGEMENT
Case Management Assessment & Discharge Planning Note    Patient name Sammy Mays  Location [facility] 816/[facility] 816-01 MRN [id number]  : 1960 Date 2024       Current Admission Date: 2024  Current Admission Diagnosis:Pancreatitis   Patient Active Problem List    Diagnosis Date Noted Date Diagnosed    Hyponatremia 2024     Hypokalemia 2024     Elevated LFTs 2024     Marijuana smoker, continuous 2024     Severe protein-calorie malnutrition (HCC) 2024     Hyperkalemia 10/18/2023     Alcohol withdrawal syndrome with complication (HCC) 10/17/2023     Depression 10/17/2023     Diastasis recti 10/17/2023     Hypocalcemia 2023     Pancytopenia (HCC) 2023     Obese 2023     Hypomagnesemia 2023     Lightheaded 2023     Hypernatremia 07/10/2023     GI bleed 2023     Acute blood loss anemia 2023     Alcohol abuse 2023     SIRS (systemic inflammatory response syndrome) 2023     Transition of care performed with sharing of clinical summary 2018     Pancreatitis 2018     Leukopenia 2018     Platelets decreased (HCC) 2018     Generalized anxiety disorder 2018     Hypertension 2018     Hyperlipidemia 2018     Impaired fasting glucose 2018     Hypothyroidism 2018     Habitual alcohol use 2018     Gastroesophageal reflux disease without esophagitis 2018       LOS (days): 1  Geometric Mean LOS (GMLOS) (days): 3.1  Days to GMLOS:2.1     OBJECTIVE:    Risk of Unplanned Readmission Score: 22.18         Current admission status: Inpatient       Preferred Pharmacy:   [id number] - [place] - [address]  [facility] [place] [address]  Phone: [phone] Fax: [phone]    Primary Care Provider: Ankur Bustillo DO    Primary Insurance: MEDICARE  Secondary Insurance: AARP    ASSESSMENT:  Active Health Care Proxies       Seth Pedraza  Health Care Representative - Friend   Primary Phone: [phone] (Mobile)                           Readmission Root Cause  30 Day Readmission: No    Patient Information  Admitted from:: Home  Mental Status: Alert  During Assessment patient was accompanied by: Not accompanied during assessment  Assessment information provided by:: Patient  Primary Caregiver: Self  Support Systems: Spouse/significant other  Home entry access options. Select all that apply.: Stairs  Number of steps to enter home.: 5  Do the steps have railings?: Yes  Type of Current Residence: [facility]  Living Arrangements: Lives Alone    Activities of Daily Living Prior to Admission  Functional Status: Independent  Completes ADLs independently?: Yes  Ambulates independently?: Yes  Does patient use assisted devices?: No  Does patient currently own DME?: Yes  What DME does the patient currently own?: Walker  Does patient have a history of Outpatient Therapy (PT/OT)?: No  Does the patient have a history of Short-Term Rehab?: No  Does patient have a history of HHC?: No         Patient Information Continued  Income Source: SSI/SSD  Does patient have prescription coverage?: Yes  Does patient have a history of substance abuse?: Yes  Historical substance use preference: Alcohol/ETOH, Marijuana  Is patient currently in treatment for substance abuse?: No. Treatment options provided (went to [facility] Detox unit in October)  Does patient have a history of Mental Health Diagnosis?: Yes  Has patient received inpatient treatment related to mental health in the last 2 years?: No         Means of Transportation  Means of Transport to [facility]:: Drives Self      Social Determinants of Health (SDOH)      Flowsheet Row Most Recent Value   Housing Stability    In the last 12 months, was there a time when you were not able to pay the mortgage or rent on time? N   In the past 12 months, how many times have you moved where you were living? 0   At any time in the past 12 months,  were you homeless or living in a shelter (including now)? N   Transportation Needs    In the past 12 months, has lack of transportation kept you from medical appointments or from getting medications? no   In the past 12 months, has lack of transportation kept you from meetings, work, or from getting things needed for daily living? No   Food Insecurity    Within the past 12 months, you worried that your food would run out before you got the money to buy more. Never true   Within the past 12 months, the food you bought just didn't last and you didn't have money to get more. Never true   Utilities    In the past 12 months has the electric, gas, oil, or water company threatened to shut off services in your home? No            DISCHARGE DETAILS:    Discharge planning discussed with:: patient  Freedom of Choice: Yes     CM contacted family/caregiver?: No- see comments  Were Treatment Team discharge recommendations reviewed with patient/caregiver?: Yes  Did patient/caregiver verbalize understanding of patient care needs?: Yes  Were patient/caregiver advised of the risks associated with not following Treatment Team discharge recommendations?: Yes    Contacts  Patient Contacts: Seth bermudez  Contact Method: Phone  Phone Number: [phone]  Reason/Outcome: Emergency Contact       Met with pt & explained CM role. Pt lives alone in ran home. No dme. Has rw available. H/o etoh &marijuana use. Was at [facility] detox unit for 1 night in October. Pt reports he still drinks alcohol. Offered HOST referral & pt declines at this time. CM to continue to follow. Likely uber home.          4606 update: S/w SLIM & informed emergency contact update is pt's brother Flakito [phone].

## 2024-08-13 NOTE — PROGRESS NOTES
[facility] - Inpatient Progress Note    PATIENT INFORMATION      Sammy Mays 64 y.o. male MRN: [id number]  Unit/Bed#: [facility] 816-01 Encounter: [id number]    ASSESSMENT & PLAN   Sammy Mays is a 64 y.o. old male with PMH of chronic pancreatitis secondary to alcohol use who presented with abdominal pain.     Acute on chronic pancreatitis 2/2 alcohol use  New hypo-dense lesion (1.2 cm) in the pancreatic head  Patient is presenting with acute on chronic pancreatitis due to ongoing alcohol use.  Patient states his abdominal discomfort has improved since being admitted. CT imaging is concerning for a new hypodense lesion in the pancreatic head which was not present on CT imaging obtained in June. Will recommend MRCP inpatient as patient is hesitant to follow up outpatient.      -MRCP ordered inpatient due to concern for patient being lost to follow up.   -Advance diet to low fat diet  -Pain control per primary team  -Continue counseling on alcohol cessation      SUBJECTIVE     Patient seen and evaluated at bedside. Doing well, denies abdominal pain.     MEDICATIONS & ALLERGIES       Medications:     Medications Prior to Admission:     albuterol (PROVENTIL HFA,VENTOLIN HFA) 90 mcg/act inhaler    allopurinol (ZYLOPRIM) 300 mg tablet    cetirizine (ZyrTEC) 10 mg tablet    chlordiazePOXIDE (LIBRIUM) 25 mg capsule    FLUoxetine (PROzac) 40 MG capsule    folic acid (FOLVITE) 1 mg tablet    levothyroxine 75 mcg tablet    Multiple Vitamin (DAILY VALUE MULTIVITAMIN) TABS    pantoprazole (PROTONIX) 40 mg tablet    thiamine 100 MG tablet  Current Facility-Administered Medications   Medication Dose Route Frequency    chlordiazePOXIDE (LIBRIUM) capsule 25 mg  25 mg Oral Q8H CHERYL    HYDROmorphone HCl (DILAUDID) injection 0.2 mg  0.2 mg Intravenous Q4H PRN    melatonin tablet 3 mg  3 mg Oral HS    multi-electrolyte (PLASMALYTE-A/ISOLYTE-S PH 7.4) IV solution  100 mL/hr Intravenous Continuous     "trimethobenzamide (TIGAN) IM injection 200 mg  200 mg Intramuscular Q6H PRN       Allergies:   Allergies   Allergen Reactions    Amoxicillin Swelling and Fever     Other reaction(s): vertigo    Escitalopram      Other reaction(s): sweaty palms/hands, felt faint, passed out for a short time, had anxiety/panic attack    Gemfibrozil      Other reaction(s): Nausea and/or vomiting  Other reaction(s): Nausea and/or vomiting    Other      Other reaction(s): Other (See Comments)  rhinitis, itchy eyes       PHYSICAL EXAM     Objective   Blood pressure 111/66, pulse 74, temperature 98.3 °F (36.8 °C), resp. rate 19, height 5' 8.5\" (1.74 m), weight 85.3 kg (188 lb 0.8 oz), SpO2 96%. Body mass index is 28.18 kg/m².    Intake/Output Summary (Last 24 hours) at 8/13/2024 0851  Last data filed at 8/13/2024 0519  Gross per 24 hour   Intake 1000 ml   Output 500 ml   Net 500 ml       General Appearance:   Alert, cooperative, no distress   HEENT:   Normocephalic, atraumatic, anicteric     Neck:   Supple, symmetrical, trachea midline   Lungs:   Equal chest rise, respirations unlabored    Heart:   Regular rate and rhythm   Abdomen:   Soft, non-tender, non-distended; normal bowel sounds; no masses, no organomegaly    Rectal:   Deferred    Extremities:   No cyanosis, clubbing or edema    Neuro:   Moves all 4 extremities    Skin:   No jaundice, rashes, or lesions      ADDITIONAL DATA     Lab Results:     Results from last 7 days   Lab Units 08/13/24  0559 08/12/24  0558   WBC Thousand/uL 3.00* 4.08*   HEMOGLOBIN g/dL 12.2 12.4   HEMATOCRIT % 36.7 37.2   PLATELETS Thousands/uL 65* 90*   SEGS PCT %  --  71   LYMPHO PCT %  --  17   MONO PCT %  --  9   EOS PCT %  --  1     Results from last 7 days   Lab Units 08/13/24  0559   POTASSIUM mmol/L 3.2*   CHLORIDE mmol/L 100   CO2 mmol/L 25   BUN mg/dL 5   CREATININE mg/dL 0.41*   CALCIUM mg/dL 7.5*   ALK PHOS U/L 188*   ALT U/L 19   AST U/L 23           Imaging:    CT abdomen pelvis wo " contrast    Result Date: 8/12/2024  Narrative: CT ABDOMEN AND PELVIS WITHOUT IV CONTRAST INDICATION: Abdominal pain. COMPARISON: 6/6/2024 TECHNIQUE: CT examination of the abdomen and pelvis was performed without intravenous contrast. Lack of IV contrast limits the sensitivity for pathology. Multiplanar 2D reformatted images were created from the source data. This examination, like all CT scans performed in the [facility], was performed utilizing techniques to minimize radiation dose exposure, including the use of iterative reconstruction and automated exposure control. Radiation dose length product (DLP) for this visit: 748 mGy-cm Enteric Contrast: Not administered. Lack of oral contrast limits the sensitivity for pathology within the bowel. FINDINGS: ABDOMEN LOWER CHEST: Redemonstration of left-sided gynecomastia. There are coronary artery calcifications. LIVER/BILIARY TREE: Unremarkable. GALLBLADDER: Post cholecystectomy. SPLEEN: Enlarged spleen measuring 14.7 cm. PANCREAS: There is chronic dilatation of the pancreatic duct with abrupt cut off at the pancreatic head. There is a 1.2 cm hypodense lesion in the pancreatic head (2/67). The known low-density lesion at the pancreatic tail is not well delineated on this study without IV contrast. There is evidence of acute pancreatitis with fat stranding around the pancreatic head, which will be described based on the revised [place] Classification of Acute Pancreatitis: - TIME OF ONSET: less than or equal to 4 weeks - NECROSIS: There is peripancreatic edema but no evidence of pancreatic or peripancreatic necrosis, therefore this is interstitial edematous pancreatitis (IEP.) - LOCAL COMPLICATIONS: None - INFECTION: There is no abnormal gas in or around the pancreas to suggest infection. ADRENAL GLANDS: Unremarkable. KIDNEYS/URETERS: Simple renal cyst(s). Otherwise unremarkable kidneys. No hydronephrosis. STOMACH AND BOWEL: Colonic diverticulosis without  findings of acute diverticulitis. There is fat stranding adjacent to the duodenum. APPENDIX: No findings to suggest appendicitis. ABDOMINOPELVIC CAVITY: No ascites. No pneumoperitoneum. No lymphadenopathy. VESSELS: Atherosclerosis without abdominal aortic aneurysm. Redemonstration of intra-abdominal varices. PELVIS REPRODUCTIVE ORGANS: Unremarkable for patient's age. URINARY BLADDER: Mild diffuse wall thickening. ABDOMINAL WALL/INGUINAL REGIONS: Unremarkable. BONES: No acute fracture or suspicious osseous lesion.     Impression: Findings consistent with acute pancreatitis versus duodenitis. 1.2 cm hypodense lesion in the pancreatic head. Would recommend an MRI/MRCP for further evaluation purposes. The study was marked in EPIC for immediate notification. Workstation performed: [id number]       EKG, Pathology, and Other Studies Reviewed on Admission:   EKG: Reviewed      Counseling / Coordination of Care Time: 30 total mins spent n consult. Greater than 50% of total time spent on patient counseling and coordination of care.    Melinda Stephenson MD  Gastroenterology Fellow  [facility]  Division of Gastroenterology and Hepatology    ...............................................................................................................................................  ** Please Note: This note is constructed using a voice recognition dictation system. **

## 2024-08-14 LAB
ALBUMIN SERPL BCG-MCNC: 3 G/DL (ref 3.5–5)
ALP SERPL-CCNC: 164 U/L (ref 34–104)
ALT SERPL W P-5'-P-CCNC: 14 U/L (ref 7–52)
ANION GAP SERPL CALCULATED.3IONS-SCNC: 6 MMOL/L (ref 4–13)
AST SERPL W P-5'-P-CCNC: 14 U/L (ref 13–39)
BASOPHILS # BLD AUTO: 0.03 THOUSANDS/ÂΜL (ref 0–0.1)
BASOPHILS NFR BLD AUTO: 1 % (ref 0–1)
BILIRUB SERPL-MCNC: 0.62 MG/DL (ref 0.2–1)
BUN SERPL-MCNC: 4 MG/DL (ref 5–25)
CALCIUM ALBUM COR SERPL-MCNC: 8.5 MG/DL (ref 8.3–10.1)
CALCIUM SERPL-MCNC: 7.7 MG/DL (ref 8.4–10.2)
CHLORIDE SERPL-SCNC: 104 MMOL/L (ref 96–108)
CO2 SERPL-SCNC: 28 MMOL/L (ref 21–32)
CREAT SERPL-MCNC: 0.46 MG/DL (ref 0.6–1.3)
EOSINOPHIL # BLD AUTO: 0.05 THOUSAND/ÂΜL (ref 0–0.61)
EOSINOPHIL NFR BLD AUTO: 2 % (ref 0–6)
ERYTHROCYTE [DISTWIDTH] IN BLOOD BY AUTOMATED COUNT: 15.8 % (ref 11.6–15.1)
GFR SERPL CREATININE-BSD FRML MDRD: 116 ML/MIN/1.73SQ M
GLUCOSE SERPL-MCNC: 116 MG/DL (ref 65–140)
HCT VFR BLD AUTO: 37.6 % (ref 36.5–49.3)
HGB BLD-MCNC: 12.1 G/DL (ref 12–17)
IMM GRANULOCYTES # BLD AUTO: 0.02 THOUSAND/UL (ref 0–0.2)
IMM GRANULOCYTES NFR BLD AUTO: 1 % (ref 0–2)
LYMPHOCYTES # BLD AUTO: 0.74 THOUSANDS/ÂΜL (ref 0.6–4.47)
LYMPHOCYTES NFR BLD AUTO: 29 % (ref 14–44)
MAGNESIUM SERPL-MCNC: 1.6 MG/DL (ref 1.9–2.7)
MCH RBC QN AUTO: 33.8 PG (ref 26.8–34.3)
MCHC RBC AUTO-ENTMCNC: 32.2 G/DL (ref 31.4–37.4)
MCV RBC AUTO: 105 FL (ref 82–98)
MONOCYTES # BLD AUTO: 0.33 THOUSAND/ÂΜL (ref 0.17–1.22)
MONOCYTES NFR BLD AUTO: 13 % (ref 4–12)
NEUTROPHILS # BLD AUTO: 1.4 THOUSANDS/ÂΜL (ref 1.85–7.62)
NEUTS SEG NFR BLD AUTO: 54 % (ref 43–75)
NRBC BLD AUTO-RTO: 0 /100 WBCS
PLATELET # BLD AUTO: 88 THOUSANDS/UL (ref 149–390)
PMV BLD AUTO: 10.1 FL (ref 8.9–12.7)
POTASSIUM SERPL-SCNC: 3.5 MMOL/L (ref 3.5–5.3)
PROT SERPL-MCNC: 5.2 G/DL (ref 6.4–8.4)
RBC # BLD AUTO: 3.58 MILLION/UL (ref 3.88–5.62)
SODIUM SERPL-SCNC: 138 MMOL/L (ref 135–147)
T4 FREE SERPL-MCNC: 0.95 NG/DL (ref 0.61–1.12)
TSH SERPL DL<=0.05 MIU/L-ACNC: 5.27 UIU/ML (ref 0.45–4.5)
WBC # BLD AUTO: 2.57 THOUSAND/UL (ref 4.31–10.16)

## 2024-08-14 PROCEDURE — 84443 ASSAY THYROID STIM HORMONE: CPT | Performed by: INTERNAL MEDICINE

## 2024-08-14 PROCEDURE — 99232 SBSQ HOSP IP/OBS MODERATE 35: CPT | Performed by: INTERNAL MEDICINE

## 2024-08-14 PROCEDURE — 80053 COMPREHEN METABOLIC PANEL: CPT | Performed by: INTERNAL MEDICINE

## 2024-08-14 PROCEDURE — 83735 ASSAY OF MAGNESIUM: CPT | Performed by: INTERNAL MEDICINE

## 2024-08-14 PROCEDURE — 84439 ASSAY OF FREE THYROXINE: CPT | Performed by: INTERNAL MEDICINE

## 2024-08-14 PROCEDURE — 85025 COMPLETE CBC W/AUTO DIFF WBC: CPT | Performed by: INTERNAL MEDICINE

## 2024-08-14 RX ORDER — CHLORDIAZEPOXIDE HYDROCHLORIDE 5 MG/1
10 CAPSULE, GELATIN COATED ORAL EVERY 12 HOURS
Status: DISCONTINUED | OUTPATIENT
Start: 2024-08-14 | End: 2024-08-15

## 2024-08-14 RX ORDER — MAGNESIUM SULFATE HEPTAHYDRATE 40 MG/ML
2 INJECTION, SOLUTION INTRAVENOUS ONCE
Status: COMPLETED | OUTPATIENT
Start: 2024-08-14 | End: 2024-08-14

## 2024-08-14 RX ADMIN — THIAMINE HYDROCHLORIDE: 100 INJECTION, SOLUTION INTRAMUSCULAR; INTRAVENOUS at 08:30

## 2024-08-14 RX ADMIN — SODIUM CHLORIDE, SODIUM GLUCONATE, SODIUM ACETATE, POTASSIUM CHLORIDE AND MAGNESIUM CHLORIDE 100 ML/HR: 526; 502; 368; 37; 30 INJECTION, SOLUTION INTRAVENOUS at 10:05

## 2024-08-14 RX ADMIN — THIAMINE HCL TAB 100 MG 100 MG: 100 TAB at 08:30

## 2024-08-14 RX ADMIN — Medication 1 TABLET: at 08:30

## 2024-08-14 RX ADMIN — SODIUM CHLORIDE, SODIUM GLUCONATE, SODIUM ACETATE, POTASSIUM CHLORIDE AND MAGNESIUM CHLORIDE 100 ML/HR: 526; 502; 368; 37; 30 INJECTION, SOLUTION INTRAVENOUS at 22:12

## 2024-08-14 RX ADMIN — FLUOXETINE HYDROCHLORIDE 40 MG: 20 CAPSULE ORAL at 08:30

## 2024-08-14 RX ADMIN — PANTOPRAZOLE SODIUM 40 MG: 40 TABLET, DELAYED RELEASE ORAL at 06:07

## 2024-08-14 RX ADMIN — FOLIC ACID 1 MG: 1 TABLET ORAL at 08:31

## 2024-08-14 RX ADMIN — CHLORDIAZEPOXIDE HYDROCHLORIDE 25 MG: 5 CAPSULE ORAL at 06:07

## 2024-08-14 RX ADMIN — ALLOPURINOL 300 MG: 300 TABLET ORAL at 08:30

## 2024-08-14 RX ADMIN — Medication 3 MG: at 21:30

## 2024-08-14 RX ADMIN — MAGNESIUM SULFATE HEPTAHYDRATE 2 G: 40 INJECTION, SOLUTION INTRAVENOUS at 10:05

## 2024-08-14 RX ADMIN — CHLORDIAZEPOXIDE HYDROCHLORIDE 10 MG: 5 CAPSULE ORAL at 17:43

## 2024-08-14 RX ADMIN — LEVOTHYROXINE SODIUM 75 MCG: 75 TABLET ORAL at 06:07

## 2024-08-14 NOTE — ASSESSMENT & PLAN NOTE
TSH 5.271 with normal free T4  Continue home levothyroxine 75 mcg daily  Check TSH, free T4 in 6 weeks

## 2024-08-14 NOTE — PROGRESS NOTES
[facility]  Progress Note  Name: Sammy Mays I  MRN: [id number]  Unit/Bed#: [facility] 816-01 I Date of Admission: 8/11/2024   Date of Service: 8/14/2024 I Hospital Day: 2    Assessment & Plan   * Acute on chronic pancreatitis due to alcohol  Assessment & Plan  Presented on 8/11/2024 with abdominal pain  Lipase elevated at 379  CT abdomen/pelvis - acute pancreatitis.  New 1.2 cm lesion in the pancreatic head  Abdominal pain resolved  Diet advanced to low-fat on 8/13  Plan for MRI/MRCP per GI - input appreciated    Pancreatic lesion  Assessment & Plan  Plan as above    Alcohol abuse  Assessment & Plan  CIWA protocol  Was started on Librium on admission - being tapered    Per record review and patient report, appears last drink was around 3 days prior to admission  Ct thiamine, folic acid    Hypomagnesemia  Assessment & Plan  Replete and monitor     Hypothyroidism  Assessment & Plan  TSH 5.271 with normal free T4  Continue home levothyroxine 75 mcg daily  Check TSH, free T4 in 6 weeks    GERD (gastroesophageal reflux disease)  Assessment & Plan  Continue PPI    Depression  Assessment & Plan  Continue home med fluoxetine 40 mg daily         VTE Pharmacologic Prophylaxis: VTE Score: 3 Moderate Risk (Score 3-4) - Pharmacological DVT Prophylaxis Contraindicated. Sequential Compression Devices Ordered.    Mobility:   Basic Mobility Inpatient Raw Score: 20  JH-HLM Goal: 6: Walk 10 steps or more  JH-HLM Achieved: 6: Walk 10 steps or more  JH-HLM Goal achieved. Continue to encourage appropriate mobility.    Patient Centered Rounds: Discussed with RN     Education and Discussions with Family / Patient: Attempted to update  (brother) via phone. Left voicemail.     Total Time Spent on Date of Encounter in care of patient: 20 mins. This time was spent on one or more of the following: performing physical exam; counseling and coordination of care; obtaining or reviewing history;  documenting in the medical record; reviewing/ordering tests, medications or procedures; communicating with other healthcare professionals and discussing with patient's family/caregivers.    Current Length of Stay: 2 day(s)  Current Patient Status: Inpatient   Certification Statement: The patient will continue to require additional inpatient hospital stay due to awaiting MRCP    Code Status: Level 1 - Full Code    Subjective:   No abdominal pain, nausea or vomiting.     Objective:     Vitals:   Temp (24hrs), Av.3 °F (36.8 °C), Min:97.6 °F (36.4 °C), Max:98.6 °F (37 °C)    Temp:  [97.6 °F (36.4 °C)-98.6 °F (37 °C)] 98.5 °F (36.9 °C)  HR:  [65-85] 80  Resp:  [14-19] 14  BP: (112-124)/(65-77) 123/77  SpO2:  [94 %-97 %] 94 %  Body mass index is 28.18 kg/m².     Physical Exam:   Physical Exam  Vitals reviewed.   HENT:      Head: Normocephalic.      Nose: Nose normal.      Mouth/Throat:      Mouth: Mucous membranes are moist.   Eyes:      Extraocular Movements: Extraocular movements intact.   Cardiovascular:      Rate and Rhythm: Normal rate and regular rhythm.   Pulmonary:      Effort: Pulmonary effort is normal. No respiratory distress.      Breath sounds: Normal breath sounds. No wheezing.   Abdominal:      General: Bowel sounds are normal. There is no distension.      Palpations: Abdomen is soft.      Tenderness: There is no abdominal tenderness.   Musculoskeletal:         General: No swelling.      Cervical back: Neck supple.   Skin:     General: Skin is warm and dry.   Neurological:      General: No focal deficit present.      Mental Status: He is alert and oriented to person, place, and time.   Psychiatric:         Mood and Affect: Mood normal.         Behavior: Behavior normal.          Additional Data:     Labs:  Results from last 7 days   Lab Units 24  0603   WBC Thousand/uL 2.57*   HEMOGLOBIN g/dL 12.1   HEMATOCRIT % 37.6   PLATELETS Thousands/uL 88*   SEGS PCT % 54   LYMPHO PCT % 29   MONO PCT % 13*    EOS PCT % 2     Results from last 7 days   Lab Units 08/14/24  0603   SODIUM mmol/L 138   POTASSIUM mmol/L 3.5   CHLORIDE mmol/L 104   CO2 mmol/L 28   BUN mg/dL 4*   CREATININE mg/dL 0.46*   ANION GAP mmol/L 6   CALCIUM mg/dL 7.7*   ALBUMIN g/dL 3.0*   TOTAL BILIRUBIN mg/dL 0.62   ALK PHOS U/L 164*   ALT U/L 14   AST U/L 14   GLUCOSE RANDOM mg/dL 116                       Lines/Drains:  Invasive Devices       Peripheral Intravenous Line  Duration             Peripheral IV 08/11/24 Distal;Right;Upper;Ventral (anterior) Arm 3 days                    Last 24 Hours Medication List:   Current Facility-Administered Medications   Medication Dose Route Frequency Provider Last Rate    allopurinol  300 mg Oral Daily Nikia Wilcox MD      chlordiazePOXIDE  10 mg Oral Q12H Nikia Wilcox MD      FLUoxetine  40 mg Oral Daily Nikia Wilcox MD      folic acid  1 mg Oral Daily Nikia Wilcox MD      folic acid 1 mg, thiamine (VITAMIN B1) 100 mg in sodium chloride 0.9 % 100 mL IV piggyback   Intravenous Daily Nikia Wilcox MD      HYDROmorphone  0.2 mg Intravenous Q4H PRN Hetul Leiva, DO      levothyroxine  75 mcg Oral Early Morning Nikia Wilcox MD      melatonin  3 mg Oral HS Daisy Dupree PA-C      multi-electrolyte  100 mL/hr Intravenous Continuous Melinda Stephenson  mL/hr (08/14/24 1005)    multivitamin-minerals  1 tablet Oral Daily Nikia Wilcox MD      pantoprazole  40 mg Oral Daily Before Breakfast Nikia Wilcox MD      thiamine  100 mg Oral Daily Nikia Wilcox MD      trimethobenzamide  200 mg Intramuscular Q6H PRN Hetul Leiva, DO          Today, Patient Was Seen By: Nikia Wilcox MD    **Please Note: This note may have been constructed using a voice recognition system.**

## 2024-08-14 NOTE — PROGRESS NOTES
[facility]ology Specialists - Inpatient Progress Note    PATIENT INFORMATION      Sammy Mays 64 y.o. male MRN: [id number]  Unit/Bed#: [facility] 816-01 Encounter: [id number]    ASSESSMENT & PLAN   Sammy Mays is a 64 y.o. old male with PMH of chronic pancreatitis secondary to alcohol use who presented with abdominal pain.     Acute on chronic pancreatitis 2/2 alcohol use  New hypo-dense lesion (1.2 cm) in the pancreatic head  Patient is presenting with acute on chronic pancreatitis due to ongoing alcohol use.  Patient states his abdominal discomfort has improved since being admitted. CT imaging is concerning for a new hypodense lesion in the pancreatic head which was not present on CT imaging obtained in June. Patient continues to feel better, is tolerating a diet.  Awaiting MRCP     -Awaiting MRCP results  -Continue low-fat diet  -Pain control per primary team  -Continue counseling on alcohol cessation      SUBJECTIVE     Patient seen and evaluated at bedside.  Patient states he is feeling well denies any abdominal discomfort and is tolerating a diet.    MEDICATIONS & ALLERGIES       Medications:     Medications Prior to Admission:     albuterol (PROVENTIL HFA,VENTOLIN HFA) 90 mcg/act inhaler    allopurinol (ZYLOPRIM) 300 mg tablet    cetirizine (ZyrTEC) 10 mg tablet    chlordiazePOXIDE (LIBRIUM) 25 mg capsule    FLUoxetine (PROzac) 40 MG capsule    folic acid (FOLVITE) 1 mg tablet    levothyroxine 75 mcg tablet    Multiple Vitamin (DAILY VALUE MULTIVITAMIN) TABS    pantoprazole (PROTONIX) 40 mg tablet    thiamine 100 MG tablet  Current Facility-Administered Medications   Medication Dose Route Frequency    allopurinol (ZYLOPRIM) tablet 300 mg  300 mg Oral Daily    chlordiazePOXIDE (LIBRIUM) capsule 25 mg  25 mg Oral Q12H    FLUoxetine (PROzac) capsule 40 mg  40 mg Oral Daily    folic acid (FOLVITE) tablet 1 mg  1 mg Oral Daily    folic acid 1 mg, thiamine (VITAMIN B1) 100 mg in sodium chloride 0.9 %  "100 mL IV piggyback   Intravenous Daily    HYDROmorphone HCl (DILAUDID) injection 0.2 mg  0.2 mg Intravenous Q4H PRN    levothyroxine tablet 75 mcg  75 mcg Oral Early Morning    melatonin tablet 3 mg  3 mg Oral HS    multi-electrolyte (PLASMALYTE-A/ISOLYTE-S PH 7.4) IV solution  100 mL/hr Intravenous Continuous    multivitamin-minerals (CENTRUM) tablet 1 tablet  1 tablet Oral Daily    pantoprazole (PROTONIX) EC tablet 40 mg  40 mg Oral Daily Before Breakfast    thiamine tablet 100 mg  100 mg Oral Daily    trimethobenzamide (TIGAN) IM injection 200 mg  200 mg Intramuscular Q6H PRN       Allergies:   Allergies   Allergen Reactions    Amoxicillin Swelling and Fever     Other reaction(s): vertigo    Escitalopram      Other reaction(s): sweaty palms/hands, felt faint, passed out for a short time, had anxiety/panic attack    Gemfibrozil      Other reaction(s): Nausea and/or vomiting  Other reaction(s): Nausea and/or vomiting    Other      Other reaction(s): Other (See Comments)  rhinitis, itchy eyes       PHYSICAL EXAM     Objective   Blood pressure 115/68, pulse 65, temperature 98.2 °F (36.8 °C), resp. rate 15, height 5' 8.5\" (1.74 m), weight 85.3 kg (188 lb 0.8 oz), SpO2 97%. Body mass index is 28.18 kg/m².    Intake/Output Summary (Last 24 hours) at 8/14/2024 0821  Last data filed at 8/14/2024 0348  Gross per 24 hour   Intake 2209.33 ml   Output 850 ml   Net 1359.33 ml       General Appearance:   Alert, cooperative, no distress   HEENT:   Normocephalic, atraumatic, anicteric     Neck:   Supple, symmetrical, trachea midline   Lungs:   Equal chest rise, respirations unlabored    Heart:   Regular rate and rhythm   Abdomen:   Soft, non-tender   Rectal:   Deferred    Extremities:   No cyanosis, clubbing or edema    Neuro:   Moves all 4 extremities    Skin:   No jaundice, rashes, or lesions      ADDITIONAL DATA     Lab Results:     Results from last 7 days   Lab Units 08/14/24  0603   WBC Thousand/uL 2.57*   HEMOGLOBIN g/dL " 12.1   HEMATOCRIT % 37.6   PLATELETS Thousands/uL 88*   SEGS PCT % 54   LYMPHO PCT % 29   MONO PCT % 13*   EOS PCT % 2     Results from last 7 days   Lab Units 08/14/24  0603   POTASSIUM mmol/L 3.5   CHLORIDE mmol/L 104   CO2 mmol/L 28   BUN mg/dL 4*   CREATININE mg/dL 0.46*   CALCIUM mg/dL 7.7*   ALK PHOS U/L 164*   ALT U/L 14   AST U/L 14           Imaging:    CT abdomen pelvis wo contrast    Result Date: 8/12/2024  Narrative: CT ABDOMEN AND PELVIS WITHOUT IV CONTRAST INDICATION: Abdominal pain. COMPARISON: 6/6/2024 TECHNIQUE: CT examination of the abdomen and pelvis was performed without intravenous contrast. Lack of IV contrast limits the sensitivity for pathology. Multiplanar 2D reformatted images were created from the source data. This examination, like all CT scans performed in the [facility], was performed utilizing techniques to minimize radiation dose exposure, including the use of iterative reconstruction and automated exposure control. Radiation dose length product (DLP) for this visit: 748 mGy-cm Enteric Contrast: Not administered. Lack of oral contrast limits the sensitivity for pathology within the bowel. FINDINGS: ABDOMEN LOWER CHEST: Redemonstration of left-sided gynecomastia. There are coronary artery calcifications. LIVER/BILIARY TREE: Unremarkable. GALLBLADDER: Post cholecystectomy. SPLEEN: Enlarged spleen measuring 14.7 cm. PANCREAS: There is chronic dilatation of the pancreatic duct with abrupt cut off at the pancreatic head. There is a 1.2 cm hypodense lesion in the pancreatic head (2/67). The known low-density lesion at the pancreatic tail is not well delineated on this study without IV contrast. There is evidence of acute pancreatitis with fat stranding around the pancreatic head, which will be described based on the revised Jing Classification of Acute Pancreatitis: - TIME OF ONSET: less than or equal to 4 weeks - NECROSIS: There is peripancreatic edema but no evidence  of pancreatic or peripancreatic necrosis, therefore this is interstitial edematous pancreatitis (IEP.) - LOCAL COMPLICATIONS: None - INFECTION: There is no abnormal gas in or around the pancreas to suggest infection. ADRENAL GLANDS: Unremarkable. KIDNEYS/URETERS: Simple renal cyst(s). Otherwise unremarkable kidneys. No hydronephrosis. STOMACH AND BOWEL: Colonic diverticulosis without findings of acute diverticulitis. There is fat stranding adjacent to the duodenum. APPENDIX: No findings to suggest appendicitis. ABDOMINOPELVIC CAVITY: No ascites. No pneumoperitoneum. No lymphadenopathy. VESSELS: Atherosclerosis without abdominal aortic aneurysm. Redemonstration of intra-abdominal varices. PELVIS REPRODUCTIVE ORGANS: Unremarkable for patient's age. URINARY BLADDER: Mild diffuse wall thickening. ABDOMINAL WALL/INGUINAL REGIONS: Unremarkable. BONES: No acute fracture or suspicious osseous lesion.     Impression: Findings consistent with acute pancreatitis versus duodenitis. 1.2 cm hypodense lesion in the pancreatic head. Would recommend an MRI/MRCP for further evaluation purposes. The study was marked in EPIC for immediate notification. Workstation performed: [id number]       EKG, Pathology, and Other Studies Reviewed on Admission:   EKG: Reviewed      Counseling / Coordination of Care Time: 30 total mins spent n consult. Greater than 50% of total time spent on patient counseling and coordination of care.    Melinda Stephenson MD  Gastroenterology Fellow  [facility]  Division of Gastroenterology and Hepatology    ...............................................................................................................................................  ** Please Note: This note is constructed using a voice recognition dictation system. **

## 2024-08-14 NOTE — ASSESSMENT & PLAN NOTE
CIWA protocol  Was started on Librium on admission - being tapered    Per record review and patient report, appears last drink was around 3 days prior to admission  Ct thiamine, folic acid

## 2024-08-14 NOTE — CASE MANAGEMENT
Case Management Discharge Planning Note    Patient name Sammy Mays  Location [facility] 816/[facility] 816-01 MRN [id number]  : 1960 Date 2024       Current Admission Date: 2024  Current Admission Diagnosis:Acute on chronic pancreatitis due to alcohol   Patient Active Problem List    Diagnosis Date Noted Date Diagnosed    Pancreatic lesion 2024     GERD (gastroesophageal reflux disease) 2024     Hyponatremia 2024     Hypokalemia 2024     Elevated LFTs 2024     Marijuana smoker, continuous 2024     Severe protein-calorie malnutrition (HCC) 2024     Hyperkalemia 10/18/2023     Alcohol withdrawal syndrome with complication (HCC) 10/17/2023     Depression 10/17/2023     Diastasis recti 10/17/2023     Hypocalcemia 2023     Pancytopenia (HCC) 2023     Obese 2023     Hypomagnesemia 2023     Lightheaded 2023     Hypernatremia 07/10/2023     GI bleed 2023     Acute blood loss anemia 2023     Alcohol abuse 2023     SIRS (systemic inflammatory response syndrome) 2023     Transition of care performed with sharing of clinical summary 2018     Acute on chronic pancreatitis due to alcohol 2018     Leukopenia 2018     Platelets decreased (HCC) 2018     Generalized anxiety disorder 2018     Hypertension 2018     Hyperlipidemia 2018     Impaired fasting glucose 2018     Hypothyroidism 2018     Habitual alcohol use 2018     Gastroesophageal reflux disease without esophagitis 2018       LOS (days): 2  Geometric Mean LOS (GMLOS) (days): 3.1  Days to GMLOS:0.9     OBJECTIVE:  Risk of Unplanned Readmission Score: 24.57      Current admission status: Inpatient   Preferred Pharmacy:   [id number] - [place] - [address].  [address] [place] [address]  Phone: [phone] Fax: [phone]    Primary Care Provider: Ankur Bustillo  DO    Primary Insurance: MEDICARE  Secondary Insurance: AARP    DISCHARGE DETAILS:    Discharge planning discussed with:: pt at bedside  [place] of Choice: Yes     CM contacted family/caregiver?: No- see comments (A&Ox4)    Would you like to participate in our [facility] service program?  : No - Declined    Additional Comments: CM informed by provider that pt had concerns regarding housing and requested speaking with CM.  CM met with pt to discuss housing and d/c plan.  Per pt, he has somewhere to go at d/c.  Pt is currently living in a home with a friend, however the home is being sold on 8/28 and pt needs to leave that day.  Pt states that he is concerned about having nowhere to go.  CM informed pt of OP CM and the benefits, however [facility] so would need to [facility] referral.  CM provided pt with name and phone number of family doc so that he can call and request an OP CM.  Pt appreciative.  CM provided pt with housing resources including shelter list, rooms for rent list, and housing programs from [facility].  Pt very appreciative of resources.  CM to follow.

## 2024-08-14 NOTE — PLAN OF CARE
Problem: PAIN - ADULT  Goal: Verbalizes/displays adequate comfort level or baseline comfort level  Description: Interventions:  - Encourage patient to monitor pain and request assistance  - Assess pain using appropriate pain scale  - Administer analgesics based on type and severity of pain and evaluate response  - Implement non-pharmacological measures as appropriate and evaluate response  - Consider cultural and social influences on pain and pain management  - Notify physician/advanced practitioner if interventions unsuccessful or patient reports new pain  Outcome: Progressing     Problem: NEUROSENSORY - ADULT  Goal: Achieves stable or improved neurological status  Description: INTERVENTIONS  - Monitor and report changes in neurological status  - Monitor vital signs such as temperature, blood pressure, glucose, and any other labs ordered   - Initiate measures to prevent increased intracranial pressure  - Monitor for seizure activity and implement precautions if appropriate      Outcome: Progressing     Problem: CARDIOVASCULAR - ADULT  Goal: Maintains optimal cardiac output and hemodynamic stability  Description: INTERVENTIONS:  - Monitor I/O, vital signs and rhythm  - Monitor for S/S and trends of decreased cardiac output  - Administer and titrate ordered vasoactive medications to optimize hemodynamic stability  - Assess quality of pulses, skin color and temperature  - Assess for signs of decreased coronary artery perfusion  - Instruct patient to report change in severity of symptoms  Outcome: Progressing  Goal: Absence of cardiac dysrhythmias or at baseline rhythm  Description: INTERVENTIONS:  - Continuous cardiac monitoring, vital signs, obtain 12 lead EKG if ordered  - Administer antiarrhythmic and heart rate control medications as ordered  - Monitor electrolytes and administer replacement therapy as ordered  Outcome: Progressing

## 2024-08-14 NOTE — ASSESSMENT & PLAN NOTE
Presented on 8/11/2024 with abdominal pain  Lipase elevated at 379  CT abdomen/pelvis - acute pancreatitis.  New 1.2 cm lesion in the pancreatic head  Abdominal pain resolved  Diet advanced to low-fat on 8/13  Plan for MRI/MRCP per GI - input appreciated

## 2024-08-15 ENCOUNTER — APPOINTMENT (OUTPATIENT)
Dept: RADIOLOGY | Facility: HOSPITAL | Age: 64
DRG: 439 | End: 2024-08-15
Payer: MEDICARE

## 2024-08-15 VITALS
BODY MASS INDEX: 27.85 KG/M2 | OXYGEN SATURATION: 96 % | DIASTOLIC BLOOD PRESSURE: 65 MMHG | WEIGHT: 188.05 LBS | TEMPERATURE: 97.5 F | HEART RATE: 81 BPM | SYSTOLIC BLOOD PRESSURE: 117 MMHG | RESPIRATION RATE: 20 BRPM | HEIGHT: 69 IN

## 2024-08-15 LAB
ANION GAP SERPL CALCULATED.3IONS-SCNC: 7 MMOL/L (ref 4–13)
BASOPHILS # BLD AUTO: 0.03 THOUSANDS/ÂΜL (ref 0–0.1)
BASOPHILS NFR BLD AUTO: 1 % (ref 0–1)
BUN SERPL-MCNC: 3 MG/DL (ref 5–25)
CALCIUM SERPL-MCNC: 7.7 MG/DL (ref 8.4–10.2)
CHLORIDE SERPL-SCNC: 106 MMOL/L (ref 96–108)
CO2 SERPL-SCNC: 25 MMOL/L (ref 21–32)
CREAT SERPL-MCNC: 0.43 MG/DL (ref 0.6–1.3)
EOSINOPHIL # BLD AUTO: 0.05 THOUSAND/ÂΜL (ref 0–0.61)
EOSINOPHIL NFR BLD AUTO: 1 % (ref 0–6)
ERYTHROCYTE [DISTWIDTH] IN BLOOD BY AUTOMATED COUNT: 15.6 % (ref 11.6–15.1)
GFR SERPL CREATININE-BSD FRML MDRD: 120 ML/MIN/1.73SQ M
GLUCOSE SERPL-MCNC: 108 MG/DL (ref 65–140)
HCT VFR BLD AUTO: 39 % (ref 36.5–49.3)
HGB BLD-MCNC: 12.3 G/DL (ref 12–17)
IMM GRANULOCYTES # BLD AUTO: 0.02 THOUSAND/UL (ref 0–0.2)
IMM GRANULOCYTES NFR BLD AUTO: 1 % (ref 0–2)
LYMPHOCYTES # BLD AUTO: 1.12 THOUSANDS/ÂΜL (ref 0.6–4.47)
LYMPHOCYTES NFR BLD AUTO: 31 % (ref 14–44)
MAGNESIUM SERPL-MCNC: 1.6 MG/DL (ref 1.9–2.7)
MCH RBC QN AUTO: 34.6 PG (ref 26.8–34.3)
MCHC RBC AUTO-ENTMCNC: 31.5 G/DL (ref 31.4–37.4)
MCV RBC AUTO: 110 FL (ref 82–98)
MONOCYTES # BLD AUTO: 0.49 THOUSAND/ÂΜL (ref 0.17–1.22)
MONOCYTES NFR BLD AUTO: 14 % (ref 4–12)
NEUTROPHILS # BLD AUTO: 1.87 THOUSANDS/ÂΜL (ref 1.85–7.62)
NEUTS SEG NFR BLD AUTO: 52 % (ref 43–75)
NRBC BLD AUTO-RTO: 0 /100 WBCS
PLATELET # BLD AUTO: 100 THOUSANDS/UL (ref 149–390)
PMV BLD AUTO: 10.5 FL (ref 8.9–12.7)
POTASSIUM SERPL-SCNC: 3.4 MMOL/L (ref 3.5–5.3)
RBC # BLD AUTO: 3.55 MILLION/UL (ref 3.88–5.62)
SODIUM SERPL-SCNC: 138 MMOL/L (ref 135–147)
WBC # BLD AUTO: 3.58 THOUSAND/UL (ref 4.31–10.16)

## 2024-08-15 PROCEDURE — 99232 SBSQ HOSP IP/OBS MODERATE 35: CPT | Performed by: INTERNAL MEDICINE

## 2024-08-15 PROCEDURE — A9585 GADOBUTROL INJECTION: HCPCS | Performed by: INTERNAL MEDICINE

## 2024-08-15 PROCEDURE — 83735 ASSAY OF MAGNESIUM: CPT | Performed by: INTERNAL MEDICINE

## 2024-08-15 PROCEDURE — 99239 HOSP IP/OBS DSCHRG MGMT >30: CPT | Performed by: INTERNAL MEDICINE

## 2024-08-15 PROCEDURE — 80048 BASIC METABOLIC PNL TOTAL CA: CPT | Performed by: INTERNAL MEDICINE

## 2024-08-15 PROCEDURE — 85025 COMPLETE CBC W/AUTO DIFF WBC: CPT | Performed by: INTERNAL MEDICINE

## 2024-08-15 PROCEDURE — 74183 MRI ABD W/O CNTR FLWD CNTR: CPT

## 2024-08-15 RX ORDER — MAGNESIUM SULFATE HEPTAHYDRATE 40 MG/ML
2 INJECTION, SOLUTION INTRAVENOUS ONCE
Status: COMPLETED | OUTPATIENT
Start: 2024-08-15 | End: 2024-08-15

## 2024-08-15 RX ORDER — POTASSIUM CHLORIDE 1500 MG/1
40 TABLET, EXTENDED RELEASE ORAL ONCE
Status: COMPLETED | OUTPATIENT
Start: 2024-08-15 | End: 2024-08-15

## 2024-08-15 RX ORDER — LANOLIN ALCOHOL/MO/W.PET/CERES
400 CREAM (GRAM) TOPICAL 2 TIMES DAILY
Qty: 6 TABLET | Refills: 0 | Status: SHIPPED | OUTPATIENT
Start: 2024-08-15 | End: 2024-08-21

## 2024-08-15 RX ORDER — LANOLIN ALCOHOL/MO/W.PET/CERES
100 CREAM (GRAM) TOPICAL DAILY
Qty: 30 TABLET | Refills: 0 | Status: ON HOLD | OUTPATIENT
Start: 2024-08-15 | End: 2024-08-21

## 2024-08-15 RX ORDER — FOLIC ACID 1 MG/1
1 TABLET ORAL DAILY
Qty: 30 TABLET | Refills: 0 | Status: ON HOLD | OUTPATIENT
Start: 2024-08-15 | End: 2024-08-21

## 2024-08-15 RX ORDER — GADOBUTROL 604.72 MG/ML
8 INJECTION INTRAVENOUS
Status: COMPLETED | OUTPATIENT
Start: 2024-08-15 | End: 2024-08-15

## 2024-08-15 RX ADMIN — FLUOXETINE HYDROCHLORIDE 40 MG: 20 CAPSULE ORAL at 09:53

## 2024-08-15 RX ADMIN — POTASSIUM CHLORIDE 40 MEQ: 1500 TABLET, EXTENDED RELEASE ORAL at 09:53

## 2024-08-15 RX ADMIN — GADOBUTROL 8 ML: 604.72 INJECTION INTRAVENOUS at 02:39

## 2024-08-15 RX ADMIN — ALLOPURINOL 300 MG: 300 TABLET ORAL at 09:53

## 2024-08-15 RX ADMIN — THIAMINE HCL TAB 100 MG 100 MG: 100 TAB at 09:55

## 2024-08-15 RX ADMIN — FOLIC ACID 1 MG: 1 TABLET ORAL at 09:54

## 2024-08-15 RX ADMIN — PANTOPRAZOLE SODIUM 40 MG: 40 TABLET, DELAYED RELEASE ORAL at 06:12

## 2024-08-15 RX ADMIN — MAGNESIUM SULFATE HEPTAHYDRATE 2 G: 40 INJECTION, SOLUTION INTRAVENOUS at 11:41

## 2024-08-15 RX ADMIN — THIAMINE HYDROCHLORIDE: 100 INJECTION, SOLUTION INTRAMUSCULAR; INTRAVENOUS at 10:02

## 2024-08-15 RX ADMIN — CHLORDIAZEPOXIDE HYDROCHLORIDE 10 MG: 5 CAPSULE ORAL at 06:12

## 2024-08-15 RX ADMIN — LEVOTHYROXINE SODIUM 75 MCG: 75 TABLET ORAL at 06:12

## 2024-08-15 RX ADMIN — Medication 1 TABLET: at 09:54

## 2024-08-15 RX ADMIN — SODIUM CHLORIDE, SODIUM GLUCONATE, SODIUM ACETATE, POTASSIUM CHLORIDE AND MAGNESIUM CHLORIDE 100 ML/HR: 526; 502; 368; 37; 30 INJECTION, SOLUTION INTRAVENOUS at 09:57

## 2024-08-15 NOTE — CASE MANAGEMENT
Case Management Discharge Planning Note    Patient name Sammy Mays  Location [facility] 816/[facility] 816-01 MRN [id number]  : 1960 Date 8/15/2024       Current Admission Date: 2024  Current Admission Diagnosis:Acute on chronic pancreatitis due to alcohol   Patient Active Problem List    Diagnosis Date Noted Date Diagnosed    Pancreatic lesion 2024     GERD (gastroesophageal reflux disease) 2024     Hyponatremia 2024     Elevated LFTs 2024     Marijuana smoker, continuous 2024     Severe protein-calorie malnutrition (HCC) 2024     Hyperkalemia 10/18/2023     Alcohol withdrawal syndrome with complication (HCC) 10/17/2023     Depression 10/17/2023     Diastasis recti 10/17/2023     Hypocalcemia 2023     Pancytopenia (HCC) 2023     Obese 2023     Hypomagnesemia 2023     Lightheaded 2023     Hypernatremia 07/10/2023     GI bleed 2023     Acute blood loss anemia 2023     Alcohol abuse 2023     SIRS (systemic inflammatory response syndrome) 2023     Transition of care performed with sharing of clinical summary 2018     Acute on chronic pancreatitis due to alcohol 2018     Leukopenia 2018     Platelets decreased (HCC) 2018     Generalized anxiety disorder 2018     Hypertension 2018     Hyperlipidemia 2018     Impaired fasting glucose 2018     Hypothyroidism 2018     Habitual alcohol use 2018     Gastroesophageal reflux disease without esophagitis 2018       LOS (days): 3  Geometric Mean LOS (GMLOS) (days): 3.1  Days to GMLOS:0.1     OBJECTIVE:  Risk of Unplanned Readmission Score: 22.87      Current admission status: Inpatient   Preferred Pharmacy:   [id number] - [place] - [address].  [facility] [place] [address]  Phone: [phone] Fax: [phone]    Primary Care Provider: Ankur Bustillo DO    Primary Insurance:  MEDICARE  Secondary Insurance: AARP    DISCHARGE DETAILS:    Discharge planning discussed with:: Pt at bedside  [place] of Choice: Yes     IMM Given (Date):: 08/15/24 (0900)     CM reviewed IMM with pt; No questions or concerns. Pt in agreement with rights, and is aware of the right to appeal d/c once medically stable if desired. IMM signed.

## 2024-08-15 NOTE — PROGRESS NOTES
[facility] Gastroenterology Specialists - Inpatient Progress Note    PATIENT INFORMATION      Sammy Mays 64 y.o. male MRN: [id number]  Unit/Bed#: [facility] 816-01 Encounter: [id number]    ASSESSMENT & PLAN     Sammy Mays is a 64 y.o. old male with PMH of chronic pancreatitis secondary to alcohol use who presented with abdominal pain.     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 Patient states his abdominal discomfort has improved since being admitted. CT imaging is concerning for a new hypodense lesion in the pancreatic head which was not present on CT imaging obtained in June. Patient underwent an MRCP which was notable for necrotizing pancreatitis, as of now since patient is not having symptoms he is okay to discharge from GI standpoint. He will need an outpatient appointment with the advanced gastroenterology specialist to discuss whether or not he needs an EUS.     -Will reach out to schedulers to set up appointment with advanced gastroenterologist  -Continue low-fat diet  -Pain control per primary team  -Continue counseling on alcohol cessation  -Ok to discharge from GI perspective      SUBJECTIVE     Patient seen and evaluated at bedside.  Patient doing well, no abdominal pain.     MEDICATIONS & ALLERGIES       Medications:     Medications Prior to Admission:     albuterol (PROVENTIL HFA,VENTOLIN HFA) 90 mcg/act inhaler    allopurinol (ZYLOPRIM) 300 mg tablet    cetirizine (ZyrTEC) 10 mg tablet    chlordiazePOXIDE (LIBRIUM) 25 mg capsule    FLUoxetine (PROzac) 40 MG capsule    folic acid (FOLVITE) 1 mg tablet    levothyroxine 75 mcg tablet    Multiple Vitamin (DAILY VALUE MULTIVITAMIN) TABS    pantoprazole (PROTONIX) 40 mg tablet    thiamine 100 MG tablet  Current Facility-Administered Medications   Medication Dose Route Frequency    allopurinol (ZYLOPRIM) tablet 300 mg  300  "mg Oral Daily    chlordiazePOXIDE (LIBRIUM) capsule 10 mg  10 mg Oral Q12H    FLUoxetine (PROzac) capsule 40 mg  40 mg Oral Daily    folic acid (FOLVITE) tablet 1 mg  1 mg Oral Daily    folic acid 1 mg, thiamine (VITAMIN B1) 100 mg in sodium chloride 0.9 % 100 mL IV piggyback   Intravenous Daily    HYDROmorphone HCl (DILAUDID) injection 0.2 mg  0.2 mg Intravenous Q4H PRN    levothyroxine tablet 75 mcg  75 mcg Oral Early Morning    melatonin tablet 3 mg  3 mg Oral HS    multi-electrolyte (PLASMALYTE-A/ISOLYTE-S PH 7.4) IV solution  100 mL/hr Intravenous Continuous    multivitamin-minerals (CENTRUM) tablet 1 tablet  1 tablet Oral Daily    pantoprazole (PROTONIX) EC tablet 40 mg  40 mg Oral Daily Before Breakfast    thiamine tablet 100 mg  100 mg Oral Daily    trimethobenzamide (TIGAN) IM injection 200 mg  200 mg Intramuscular Q6H PRN       Allergies:   Allergies   Allergen Reactions    Amoxicillin Swelling and Fever     Other reaction(s): vertigo    Escitalopram      Other reaction(s): sweaty palms/hands, felt faint, passed out for a short time, had anxiety/panic attack    Gemfibrozil      Other reaction(s): Nausea and/or vomiting  Other reaction(s): Nausea and/or vomiting    Other      Other reaction(s): Other (See Comments)  rhinitis, itchy eyes       PHYSICAL EXAM     Objective   Blood pressure 102/54, pulse 64, temperature 98 °F (36.7 °C), resp. rate 16, height 5' 8.5\" (1.74 m), weight 85.3 kg (188 lb 0.8 oz), SpO2 100%. Body mass index is 28.18 kg/m².    Intake/Output Summary (Last 24 hours) at 8/15/2024 0747  Last data filed at 8/14/2024 2212  Gross per 24 hour   Intake 3063.33 ml   Output 300 ml   Net 2763.33 ml       General Appearance:   Alert, cooperative, no distress   HEENT:   Normocephalic, atraumatic, anicteric     Neck:   Supple, symmetrical, trachea midline   Lungs:   Equal chest rise, respirations unlabored    Heart:   Regular rate and rhythm   Abdomen:   Non tender    Rectal:   Deferred  "   Extremities:   No cyanosis, clubbing or edema    Neuro:   Moves all 4 extremities    Skin:   No jaundice, rashes, or lesions      ADDITIONAL DATA     Lab Results:     Results from last 7 days   Lab Units 08/15/24  0620   WBC Thousand/uL 3.58*   HEMOGLOBIN g/dL 12.3   HEMATOCRIT % 39.0   PLATELETS Thousands/uL 100*   SEGS PCT % 52   LYMPHO PCT % 31   MONO PCT % 14*   EOS PCT % 1     Results from last 7 days   Lab Units 08/15/24  0620 08/14/24  0603   POTASSIUM mmol/L 3.4* 3.5   CHLORIDE mmol/L 106 104   CO2 mmol/L 25 28   BUN mg/dL 3* 4*   CREATININE mg/dL 0.43* 0.46*   CALCIUM mg/dL 7.7* 7.7*   ALK PHOS U/L  --  164*   ALT U/L  --  14   AST U/L  --  14           Imaging:    CT abdomen pelvis wo contrast    Result Date: 8/12/2024  Narrative: CT ABDOMEN AND PELVIS WITHOUT IV CONTRAST INDICATION: Abdominal pain. COMPARISON: 6/6/2024 TECHNIQUE: CT examination of the abdomen and pelvis was performed without intravenous contrast. Lack of IV contrast limits the sensitivity for pathology. Multiplanar 2D reformatted images were created from the source data. This examination, like all CT scans performed in the [facility], was performed utilizing techniques to minimize radiation dose exposure, including the use of iterative reconstruction and automated exposure control. Radiation dose length product (DLP) for this visit: 748 mGy-cm Enteric Contrast: Not administered. Lack of oral contrast limits the sensitivity for pathology within the bowel. FINDINGS: ABDOMEN LOWER CHEST: Redemonstration of left-sided gynecomastia. There are coronary artery calcifications. LIVER/BILIARY TREE: Unremarkable. GALLBLADDER: Post cholecystectomy. SPLEEN: Enlarged spleen measuring 14.7 cm. PANCREAS: There is chronic dilatation of the pancreatic duct with abrupt cut off at the pancreatic head. There is a 1.2 cm hypodense lesion in the pancreatic head (2/67). The known low-density lesion at the pancreatic tail is not well delineated  on this study without IV contrast. There is evidence of acute pancreatitis with fat stranding around the pancreatic head, which will be described based on the revised Hiller Classification of Acute Pancreatitis: - TIME OF ONSET: less than or equal to 4 weeks - NECROSIS: There is peripancreatic edema but no evidence of pancreatic or peripancreatic necrosis, therefore this is interstitial edematous pancreatitis (IEP.) - LOCAL COMPLICATIONS: None - INFECTION: There is no abnormal gas in or around the pancreas to suggest infection. ADRENAL GLANDS: Unremarkable. KIDNEYS/URETERS: Simple renal cyst(s). Otherwise unremarkable kidneys. No hydronephrosis. STOMACH AND BOWEL: Colonic diverticulosis without findings of acute diverticulitis. There is fat stranding adjacent to the duodenum. APPENDIX: No findings to suggest appendicitis. ABDOMINOPELVIC CAVITY: No ascites. No pneumoperitoneum. No lymphadenopathy. VESSELS: Atherosclerosis without abdominal aortic aneurysm. Redemonstration of intra-abdominal varices. PELVIS REPRODUCTIVE ORGANS: Unremarkable for patient's age. URINARY BLADDER: Mild diffuse wall thickening. ABDOMINAL WALL/INGUINAL REGIONS: Unremarkable. BONES: No acute fracture or suspicious osseous lesion.     Impression: Findings consistent with acute pancreatitis versus duodenitis. 1.2 cm hypodense lesion in the pancreatic head. Would recommend an MRI/MRCP for further evaluation purposes. The study was marked in EPIC for immediate notification. Workstation performed: [id number]       EKG, Pathology, and Other Studies Reviewed on Admission:   EKG: Reviewed      Counseling / Coordination of Care Time: 30 total mins spent n consult. Greater than 50% of total time spent on patient counseling and coordination of care.    Melinda Stephenson MD  Gastroenterology Fellow  [facility]  Division of Gastroenterology and  Hepatology    ...............................................................................................................................................  ** Please Note: This note is constructed using a voice recognition dictation system. **

## 2024-08-15 NOTE — DISCHARGE SUMMARY
Discharging Physician / Practitioner: Nikia Wilcox MD  PCP: Ankur Bustillo DO  Admission Date:   Admission Orders (From admission, onward)       Ordered        08/12/24 0945  INPATIENT ADMISSION  Once            08/11/24 1845  Place in Observation  Once                          Discharge Date: 08/15/24    Principal discharge diagnoses:  Acute on chronic pancreatitis due to alcohol with small area of pancreatic necrosis noted on MRI/MRCP  Alcohol abuse  Hypomagnesemia    Secondary diagnoses:  Depression  GERD    Consultations During Hospital Stay:  Gastroenterology    Procedures Performed:   CT abdomen/pelvis without contrast -acute pancreatitis.  1.2 cm hypodense lesion in the pancreatic head.  MRI abdomen/MRCP - The hypodense region in the pancreatic head detected on recent CT is a 1.0 x 0.9 x 0.9 cm region of nonenhancing pancreatic tissue consistent with pancreas necrosis (series 12 image 85.) Therefore patient's pancreatitis is now characterized as necrotizing pancreatitis. There is severe pancreatic and biliary ductal dilatation, with abrupt narrowing in the pancreatic head. This is a chronic finding, unchanged dating back to 8/16/2019. Again seen are mesenteric collaterals and splenomegaly.      Hospital Course:   Sammy Mays is a 64 y.o. male patient who originally presented to the hospital on 8/11/2024 with nausea, vomiting and abdominal pain in the setting of alcohol abuse.  He has a history of chronic pancreatitis due to alcohol use.  CT abdomen/pelvis done on admission showed evidence of acute pancreatitis and there was concern for a 1.2 cm hypodense lesion in the pancreatic head for which he underwent an MRI/MRCP.  MRCP showed that the hypodense region was an area of nonenhancing pancreatic tissue consistent with pancreatic necrosis.  His abdominal pain, nausea and vomiting resolved and he was tolerating a low-fat diet at the time of discharge.  He was strongly counseled to avoid alcohol.  He  "was placed on Librium on admission which was tapered off.  He had no evidence of withdrawal.  Gastroenterology will set up an appointment with an advanced gastroenterologist to discuss whether he needs an EUS.    Condition at Discharge: stable    Discharge Day Visit / Exam:   Subjective:    Vitals: Blood Pressure: 117/65 (08/15/24 1435)  Pulse: 81 (08/15/24 1435)  Temperature: 97.5 °F (36.4 °C) (08/15/24 1441)  Temp Source: Oral (08/15/24 1441)  Respirations: 20 (08/15/24 1435)  Height: 5' 8.5\" (174 cm) (08/12/24 1731)  Weight - Scale: 85.3 kg (188 lb 0.8 oz) (08/12/24 1731)  SpO2: 96 % (08/15/24 1435)  Exam:   Physical Exam  Vitals reviewed.   HENT:      Head: Normocephalic.      Nose: Nose normal.      Mouth/Throat:      Mouth: Mucous membranes are moist.   Eyes:      Extraocular Movements: Extraocular movements intact.   Cardiovascular:      Rate and Rhythm: Normal rate and regular rhythm.   Pulmonary:      Effort: Pulmonary effort is normal. No respiratory distress.      Breath sounds: Normal breath sounds. No wheezing.   Abdominal:      General: Bowel sounds are normal. There is no distension.      Palpations: Abdomen is soft.      Tenderness: There is no abdominal tenderness.   Musculoskeletal:         General: No swelling.      Cervical back: Neck supple.   Skin:     General: Skin is warm and dry.   Neurological:      General: No focal deficit present.      Mental Status: He is alert and oriented to person, place, and time.   Psychiatric:         Mood and Affect: Mood normal.         Behavior: Behavior normal.          Discussion with Family: Patient declined call to .     Discharge instructions/Information to patient and family:   See after visit summary for information provided to patient and family.      Provisions for Follow-Up Care:  See after visit summary for information related to follow-up care and any pertinent home health orders.      Mobility at time of Discharge:   Basic Mobility " Inpatient Raw Score: 20  JH-HLM Goal: 6: Walk 10 steps or more  JH-HLM Achieved: 6: Walk 10 steps or more  HLM Goal achieved. Continue to encourage appropriate mobility.     Disposition:   Home    Planned Readmission: No     Discharge Statement:  I spent 35 minutes discharging the patient. This time was spent on the day of discharge. I had direct contact with the patient on the day of discharge. Greater than 50% of the total time was spent examining patient, answering all patient questions, arranging and discussing plan of care with patient as well as directly providing post-discharge instructions.  Additional time then spent on discharge activities.    Discharge Medications:  See after visit summary for reconciled discharge medications provided to patient and/or family.      **Please Note: This note may have been constructed using a voice recognition system**

## 2024-08-18 ENCOUNTER — HOSPITAL ENCOUNTER (INPATIENT)
Facility: HOSPITAL | Age: 64
LOS: 3 days | Discharge: HOME | DRG: 378 | End: 2024-08-21
Attending: EMERGENCY MEDICINE | Admitting: FAMILY MEDICINE
Payer: MEDICARE

## 2024-08-18 ENCOUNTER — APPOINTMENT (EMERGENCY)
Dept: RADIOLOGY | Facility: HOSPITAL | Age: 64
DRG: 378 | End: 2024-08-18
Payer: MEDICARE

## 2024-08-18 DIAGNOSIS — F10.20 ALCOHOL USE DISORDER, SEVERE, DEPENDENCE (HCC): ICD-10-CM

## 2024-08-18 DIAGNOSIS — K92.0 GASTROINTESTINAL HEMORRHAGE WITH HEMATEMESIS: Primary | ICD-10-CM

## 2024-08-18 LAB
ABO GROUP BLD: NORMAL
ALBUMIN SERPL BCG-MCNC: 3 G/DL (ref 3.5–5)
ALP SERPL-CCNC: 111 U/L (ref 34–104)
ALT SERPL W P-5'-P-CCNC: 9 U/L (ref 7–52)
AMMONIA PLAS-SCNC: 57 UMOL/L (ref 18–72)
ANION GAP SERPL CALCULATED.3IONS-SCNC: 10 MMOL/L (ref 4–13)
APTT PPP: 25 SECONDS (ref 23–34)
AST SERPL W P-5'-P-CCNC: 12 U/L (ref 13–39)
ATRIAL RATE: 97 BPM
BASOPHILS # BLD AUTO: 0.06 THOUSANDS/ÂΜL (ref 0–0.1)
BASOPHILS NFR BLD AUTO: 1 % (ref 0–1)
BILIRUB SERPL-MCNC: 0.56 MG/DL (ref 0.2–1)
BLD GP AB SCN SERPL QL: NEGATIVE
BUN SERPL-MCNC: 24 MG/DL (ref 5–25)
CALCIUM ALBUM COR SERPL-MCNC: 8.7 MG/DL (ref 8.3–10.1)
CALCIUM SERPL-MCNC: 7.9 MG/DL (ref 8.4–10.2)
CHLORIDE SERPL-SCNC: 105 MMOL/L (ref 96–108)
CO2 SERPL-SCNC: 26 MMOL/L (ref 21–32)
CREAT SERPL-MCNC: 0.53 MG/DL (ref 0.6–1.3)
EOSINOPHIL # BLD AUTO: 0.07 THOUSAND/ÂΜL (ref 0–0.61)
EOSINOPHIL NFR BLD AUTO: 1 % (ref 0–6)
ERYTHROCYTE [DISTWIDTH] IN BLOOD BY AUTOMATED COUNT: 15.3 % (ref 11.6–15.1)
ETHANOL SERPL-MCNC: <10 MG/DL
GFR SERPL CREATININE-BSD FRML MDRD: 110 ML/MIN/1.73SQ M
GLUCOSE SERPL-MCNC: 113 MG/DL (ref 65–140)
HCT VFR BLD AUTO: 37.1 % (ref 36.5–49.3)
HGB BLD-MCNC: 12 G/DL (ref 12–17)
IMM GRANULOCYTES # BLD AUTO: 0.03 THOUSAND/UL (ref 0–0.2)
IMM GRANULOCYTES NFR BLD AUTO: 0 % (ref 0–2)
INR PPP: 1.19 (ref 0.85–1.19)
LACTATE SERPL-SCNC: 1 MMOL/L (ref 0.5–2)
LACTATE SERPL-SCNC: 2.2 MMOL/L (ref 0.5–2)
LIPASE SERPL-CCNC: 131 U/L (ref 11–82)
LYMPHOCYTES # BLD AUTO: 1.56 THOUSANDS/ÂΜL (ref 0.6–4.47)
LYMPHOCYTES NFR BLD AUTO: 18 % (ref 14–44)
MAGNESIUM SERPL-MCNC: 1.4 MG/DL (ref 1.9–2.7)
MCH RBC QN AUTO: 33.6 PG (ref 26.8–34.3)
MCHC RBC AUTO-ENTMCNC: 32.3 G/DL (ref 31.4–37.4)
MCV RBC AUTO: 104 FL (ref 82–98)
MONOCYTES # BLD AUTO: 0.8 THOUSAND/ÂΜL (ref 0.17–1.22)
MONOCYTES NFR BLD AUTO: 9 % (ref 4–12)
NEUTROPHILS # BLD AUTO: 6.28 THOUSANDS/ÂΜL (ref 1.85–7.62)
NEUTS SEG NFR BLD AUTO: 71 % (ref 43–75)
NRBC BLD AUTO-RTO: 0 /100 WBCS
P AXIS: 52 DEGREES
PLATELET # BLD AUTO: 278 THOUSANDS/UL (ref 149–390)
PMV BLD AUTO: 10.3 FL (ref 8.9–12.7)
POTASSIUM SERPL-SCNC: 3.5 MMOL/L (ref 3.5–5.3)
PR INTERVAL: 164 MS
PROCALCITONIN SERPL-MCNC: <0.05 NG/ML
PROT SERPL-MCNC: 5.4 G/DL (ref 6.4–8.4)
PROTHROMBIN TIME: 15.3 SECONDS (ref 12.3–15)
QRS AXIS: 82 DEGREES
QRSD INTERVAL: 74 MS
QT INTERVAL: 364 MS
QTC INTERVAL: 462 MS
RBC # BLD AUTO: 3.57 MILLION/UL (ref 3.88–5.62)
RH BLD: POSITIVE
SODIUM SERPL-SCNC: 141 MMOL/L (ref 135–147)
SPECIMEN EXPIRATION DATE: NORMAL
T WAVE AXIS: 66 DEGREES
VENTRICULAR RATE: 97 BPM
WBC # BLD AUTO: 8.8 THOUSAND/UL (ref 4.31–10.16)

## 2024-08-18 PROCEDURE — 71045 X-RAY EXAM CHEST 1 VIEW: CPT

## 2024-08-18 PROCEDURE — 36415 COLL VENOUS BLD VENIPUNCTURE: CPT | Performed by: STUDENT IN AN ORGANIZED HEALTH CARE EDUCATION/TRAINING PROGRAM

## 2024-08-18 PROCEDURE — 86850 RBC ANTIBODY SCREEN: CPT | Performed by: EMERGENCY MEDICINE

## 2024-08-18 PROCEDURE — 83690 ASSAY OF LIPASE: CPT | Performed by: STUDENT IN AN ORGANIZED HEALTH CARE EDUCATION/TRAINING PROGRAM

## 2024-08-18 PROCEDURE — 82077 ASSAY SPEC XCP UR&BREATH IA: CPT | Performed by: STUDENT IN AN ORGANIZED HEALTH CARE EDUCATION/TRAINING PROGRAM

## 2024-08-18 PROCEDURE — 85610 PROTHROMBIN TIME: CPT | Performed by: STUDENT IN AN ORGANIZED HEALTH CARE EDUCATION/TRAINING PROGRAM

## 2024-08-18 PROCEDURE — 96368 THER/DIAG CONCURRENT INF: CPT

## 2024-08-18 PROCEDURE — 84145 PROCALCITONIN (PCT): CPT | Performed by: STUDENT IN AN ORGANIZED HEALTH CARE EDUCATION/TRAINING PROGRAM

## 2024-08-18 PROCEDURE — 83735 ASSAY OF MAGNESIUM: CPT | Performed by: STUDENT IN AN ORGANIZED HEALTH CARE EDUCATION/TRAINING PROGRAM

## 2024-08-18 PROCEDURE — 99285 EMERGENCY DEPT VISIT HI MDM: CPT | Performed by: EMERGENCY MEDICINE

## 2024-08-18 PROCEDURE — 80053 COMPREHEN METABOLIC PANEL: CPT | Performed by: STUDENT IN AN ORGANIZED HEALTH CARE EDUCATION/TRAINING PROGRAM

## 2024-08-18 PROCEDURE — 85025 COMPLETE CBC W/AUTO DIFF WBC: CPT | Performed by: STUDENT IN AN ORGANIZED HEALTH CARE EDUCATION/TRAINING PROGRAM

## 2024-08-18 PROCEDURE — 96367 TX/PROPH/DG ADDL SEQ IV INF: CPT

## 2024-08-18 PROCEDURE — 82140 ASSAY OF AMMONIA: CPT | Performed by: STUDENT IN AN ORGANIZED HEALTH CARE EDUCATION/TRAINING PROGRAM

## 2024-08-18 PROCEDURE — 85730 THROMBOPLASTIN TIME PARTIAL: CPT | Performed by: STUDENT IN AN ORGANIZED HEALTH CARE EDUCATION/TRAINING PROGRAM

## 2024-08-18 PROCEDURE — 96365 THER/PROPH/DIAG IV INF INIT: CPT

## 2024-08-18 PROCEDURE — 99285 EMERGENCY DEPT VISIT HI MDM: CPT

## 2024-08-18 PROCEDURE — 93005 ELECTROCARDIOGRAM TRACING: CPT

## 2024-08-18 PROCEDURE — 93010 ELECTROCARDIOGRAM REPORT: CPT | Performed by: INTERNAL MEDICINE

## 2024-08-18 PROCEDURE — 99223 1ST HOSP IP/OBS HIGH 75: CPT | Performed by: FAMILY MEDICINE

## 2024-08-18 PROCEDURE — 86900 BLOOD TYPING SEROLOGIC ABO: CPT | Performed by: EMERGENCY MEDICINE

## 2024-08-18 PROCEDURE — 86901 BLOOD TYPING SEROLOGIC RH(D): CPT | Performed by: EMERGENCY MEDICINE

## 2024-08-18 PROCEDURE — 0DB98ZX EXCISION OF DUODENUM, VIA NATURAL OR ARTIFICIAL OPENING ENDOSCOPIC, DIAGNOSTIC: ICD-10-PCS | Performed by: INTERNAL MEDICINE

## 2024-08-18 PROCEDURE — 83605 ASSAY OF LACTIC ACID: CPT | Performed by: STUDENT IN AN ORGANIZED HEALTH CARE EDUCATION/TRAINING PROGRAM

## 2024-08-18 RX ORDER — ZOLPIDEM TARTRATE 5 MG/1
5 TABLET ORAL
Status: DISCONTINUED | OUTPATIENT
Start: 2024-08-18 | End: 2024-08-19

## 2024-08-18 RX ORDER — ONDANSETRON 2 MG/ML
4 INJECTION INTRAMUSCULAR; INTRAVENOUS EVERY 6 HOURS PRN
Status: DISCONTINUED | OUTPATIENT
Start: 2024-08-18 | End: 2024-08-21 | Stop reason: HOSPADM

## 2024-08-18 RX ORDER — PANTOPRAZOLE SODIUM 40 MG/10ML
40 INJECTION, POWDER, LYOPHILIZED, FOR SOLUTION INTRAVENOUS ONCE
Status: DISCONTINUED | OUTPATIENT
Start: 2024-08-18 | End: 2024-08-18

## 2024-08-18 RX ORDER — ALBUTEROL SULFATE 90 UG/1
1 AEROSOL, METERED RESPIRATORY (INHALATION) EVERY 6 HOURS PRN
Status: DISCONTINUED | OUTPATIENT
Start: 2024-08-18 | End: 2024-08-21 | Stop reason: HOSPADM

## 2024-08-18 RX ORDER — MAGNESIUM SULFATE HEPTAHYDRATE 40 MG/ML
2 INJECTION, SOLUTION INTRAVENOUS ONCE
Status: COMPLETED | OUTPATIENT
Start: 2024-08-18 | End: 2024-08-18

## 2024-08-18 RX ADMIN — ZOLPIDEM TARTRATE 5 MG: 5 TABLET ORAL at 21:26

## 2024-08-18 RX ADMIN — SODIUM CHLORIDE 8 MG/HR: 9 INJECTION, SOLUTION INTRAVENOUS at 17:53

## 2024-08-18 RX ADMIN — SODIUM CHLORIDE 80 MG: 9 INJECTION, SOLUTION INTRAVENOUS at 17:54

## 2024-08-18 RX ADMIN — CEFTRIAXONE SODIUM 1000 MG: 10 INJECTION, POWDER, FOR SOLUTION INTRAVENOUS at 17:44

## 2024-08-18 RX ADMIN — SODIUM CHLORIDE, SODIUM LACTATE, POTASSIUM CHLORIDE, AND CALCIUM CHLORIDE 1000 ML: .6; .31; .03; .02 INJECTION, SOLUTION INTRAVENOUS at 18:37

## 2024-08-18 RX ADMIN — OCTREOTIDE ACETATE 50 MCG/HR: 500 INJECTION, SOLUTION INTRAVENOUS; SUBCUTANEOUS at 17:56

## 2024-08-18 RX ADMIN — SODIUM CHLORIDE 8 MG/HR: 9 INJECTION, SOLUTION INTRAVENOUS at 22:44

## 2024-08-18 RX ADMIN — MAGNESIUM SULFATE HEPTAHYDRATE 2 G: 40 INJECTION, SOLUTION INTRAVENOUS at 18:22

## 2024-08-18 RX ADMIN — DOXYLAMINE SUCCINATE 25 MG: 25 TABLET ORAL at 22:53

## 2024-08-18 RX ADMIN — OCTREOTIDE ACETATE 50 MCG/HR: 500 INJECTION, SOLUTION INTRAVENOUS; SUBCUTANEOUS at 21:27

## 2024-08-18 NOTE — H&P
[facility]  H&P  Name: Sammy Mays 64 y.o. male I MRN: [id number]  Unit/Bed#: [facility] I Date of Admission: 8/18/2024   Date of Service: 8/18/2024 I Hospital Day: 0      Assessment & Plan   * GI bleed  Assessment & Plan  65 y/o male with PMH of alcohol abuse, recent admission for necrotizing pancreatitis, esophagitis/duodenitis, presented to ED only 3-days after discharge with c/o melena and coffee ground emesis. He had few episodes of melena and emesis today. None since arriving to ED.  Hemodynamically stable  Hemoglobin 12  Reviewed previous EGD, did not see any sign of esophageal varices.  Reviewed recent imaging, no signs or symptoms of liver cirrhosis.   However recent MRI shows splenic varices.   Start patient on Protonix infusion and octreotide infusion,   Trend CBC  Clear liquid diet  Consult gastroenterology for possible endoscopy    Depression  Assessment & Plan  Holding home Prozac until seen by GI    Hypomagnesemia  Assessment & Plan  Replace and repeat    Alcohol abuse  Assessment & Plan  Last alcohol intake was prior to last admission, before August 11.   Last admission patient was treated with Librium which was weaned off.  Continue IV thiamine/folic acid.  Currently does not need CIWA protocol    Hypothyroidism  Assessment & Plan  Hold home levothyroxine until seen by GI         VTE Prophylaxis: Pharmacologic VTE Prophylaxis contraindicated due to GI bleed   / sequential compression device   Code Status: full code     Anticipated Length of Stay:  Patient will be admitted on an Inpatient basis with an anticipated length of stay of  > 2 midnights.   Justification for Hospital Stay: GI bleed    Total Time for Visit, including Counseling / Coordination of Care: 60 minutes.  Greater than 50% of this total time spent on direct patient counseling and coordination of care.    Chief Complaint:   melena    History of Present Illness:    Sammy Mays is a 64 y.o. male 64  y/o male with PMH of alcohol abuse, recent admission for necrotizing pancreatitis, esophagitis/duodenitis, presented to ED only 3-days after discharge with c/o melena and coffee ground emesis. He had few episodes of melena and emesis today. No emesis since arriving to ED but had one episode of melena. Hemodynamically stable, Hemoglobin 12. Reviewed previous EGD, did not see any sign of esophageal varices. Reviewed recent imaging, no signs or symptoms of liver cirrhosis.    Review of Systems:    Review of Systems   Constitutional:  Positive for fatigue. Negative for chills and fever.   HENT:  Negative for ear pain and sore throat.    Eyes:  Negative for pain and visual disturbance.   Respiratory:  Negative for cough and shortness of breath.    Cardiovascular:  Negative for chest pain and palpitations.   Gastrointestinal:  Positive for abdominal pain, blood in stool and vomiting.   Genitourinary:  Negative for dysuria and hematuria.   Musculoskeletal:  Negative for arthralgias and back pain.   Skin:  Negative for color change and rash.   Neurological:  Negative for seizures and syncope.   All other systems reviewed and are negative.      Past Medical and Surgical History:     Past Medical History:   Diagnosis Date    Medical history unknown     Pancreatitis        Past Surgical History:   Procedure Laterality Date    CHOLECYSTECTOMY LAPAROSCOPIC      REPAIR / REINSERT BICEPS TENDON AT ELBOW      Last Assessed: 1/12/2016        Meds/Allergies:    Prior to Admission medications    Medication Sig Start Date End Date Taking? Authorizing Provider   albuterol (PROVENTIL HFA,VENTOLIN HFA) 90 mcg/act inhaler Inhale 1-2 puffs    Historical Provider, MD   allopurinol (ZYLOPRIM) 300 mg tablet TAKE 1 TABLET EVERY DAY 8/15/19   Sarah Fiore MD   cetirizine (ZyrTEC) 10 mg tablet Take one tablet by mouth daily as needed for allergies/congestion  5/2/14   Historical Provider, MD   FLUoxetine (PROzac) 40 MG capsule TAKE ONE CAPSULE  BY MOUTH EVERY DAY 7/22/19   Sarah Fiore MD   folic acid (FOLVITE) 1 mg tablet Take 1 tablet (1 mg total) by mouth daily 8/15/24   Nikia Wilcox MD   levothyroxine 75 mcg tablet TAKE 1 TABLET IN THE MORNING ON AN EMPTY STOMACH FOR THYROID 6/9/19   Sarah Fiore MD   magnesium Oxide (MagOx 400) 400 mg TABS Take 1 tablet (400 mg total) by mouth 2 (two) times a day for 3 days 8/15/24 8/18/24  Nikia Wilcox MD   Multiple Vitamin (DAILY VALUE MULTIVITAMIN) TABS Take 1 tablet by mouth daily    Historical Provider, MD   pantoprazole (PROTONIX) 40 mg tablet Take 1 tablet (40 mg total) by mouth daily in the early morning 4/23/24   ANI Barlow   thiamine 100 MG tablet Take 1 tablet (100 mg total) by mouth daily 8/15/24   Nikia Wilcox MD     I have reviewed home medications using allscripts.    Allergies:   Allergies   Allergen Reactions    Amoxicillin Swelling and Fever     Other reaction(s): vertigo    Escitalopram      Other reaction(s): sweaty palms/hands, felt faint, passed out for a short time, had anxiety/panic attack    Gemfibrozil      Other reaction(s): Nausea and/or vomiting  Other reaction(s): Nausea and/or vomiting    Other      Other reaction(s): Other (See Comments)  rhinitis, itchy eyes       Social History:     Marital Status: Single    Substance Use History:   Social History     Substance and Sexual Activity   Alcohol Use Yes    Comment: social     Social History     Tobacco Use   Smoking Status Never   Smokeless Tobacco Never     Social History     Substance and Sexual Activity   Drug Use Yes    Types: Marijuana       Family History:    Family History   Problem Relation Age of Onset    Diabetes Mother        Physical Exam:     Vitals:   Blood Pressure: 114/68 (08/18/24 1839)  Pulse: 86 (08/18/24 1839)  Temperature: 98.6 °F (37 °C) (08/18/24 1651)  Temp Source: Oral (08/18/24 1651)  Respirations: 18 (08/18/24 1839)  SpO2: 96 % (08/18/24 1839)    Physical Exam  Vitals and nursing  note reviewed.   Constitutional:       General: He is not in acute distress.     Appearance: He is well-developed.   HENT:      Head: Normocephalic and atraumatic.   Eyes:      Conjunctiva/sclera: Conjunctivae normal.   Cardiovascular:      Rate and Rhythm: Normal rate and regular rhythm.      Heart sounds: No murmur heard.  Pulmonary:      Effort: Pulmonary effort is normal. No respiratory distress.      Breath sounds: Normal breath sounds.   Abdominal:      Palpations: Abdomen is soft.      Tenderness: There is no abdominal tenderness.   Musculoskeletal:         General: No swelling.      Cervical back: Neck supple.   Skin:     General: Skin is warm and dry.      Capillary Refill: Capillary refill takes less than 2 seconds.   Neurological:      Mental Status: He is alert.   Psychiatric:         Mood and Affect: Mood normal.          Results from last 7 days   Lab Units 08/18/24  1721   WBC Thousand/uL 8.80   HEMOGLOBIN g/dL 12.0   HEMATOCRIT % 37.1   PLATELETS Thousands/uL 278   SEGS PCT % 71   LYMPHO PCT % 18   MONO PCT % 9   EOS PCT % 1     Results from last 7 days   Lab Units 08/18/24  1721   SODIUM mmol/L 141   POTASSIUM mmol/L 3.5   CHLORIDE mmol/L 105   CO2 mmol/L 26   BUN mg/dL 24   CREATININE mg/dL 0.53*   ANION GAP mmol/L 10   CALCIUM mg/dL 7.9*   ALBUMIN g/dL 3.0*   TOTAL BILIRUBIN mg/dL 0.56   ALK PHOS U/L 111*   ALT U/L 9   AST U/L 12*   GLUCOSE RANDOM mg/dL 113     Results from last 7 days   Lab Units 08/18/24  1721   INR  1.19             Results from last 7 days   Lab Units 08/18/24  1721   LACTIC ACID mmol/L 2.2*   PROCALCITONIN ng/ml <0.05       Imaging: I have personally reviewed pertinent reports.      XR chest 1 view portable    (Results Pending)         ** Please Note: This note has been constructed using a voice recognition system. **

## 2024-08-18 NOTE — ASSESSMENT & PLAN NOTE
65 y/o male with PMH of alcohol abuse, recent admission for necrotizing pancreatitis, esophagitis/duodenitis, presented to ED only 3-days after discharge with c/o melena and coffee ground emesis. He had few episodes of melena and emesis today. None since arriving to ED.  Hemodynamically stable  Hemoglobin 12  Reviewed previous EGD, did not see any sign of esophageal varices.  Reviewed recent imaging, no signs or symptoms of liver cirrhosis.  Start patient on Protonix infusion  Trend CBC  Clear liquid diet, NPO midnight/.  Consult gastroenterology for possible endoscopy

## 2024-08-18 NOTE — ASSESSMENT & PLAN NOTE
Last alcohol intake was prior to last admission, before August 11.   Last admission patient was treated with Librium which was weaned off.  Continue IV thiamine/folic acid.  Currently does not need [facility] protocol

## 2024-08-18 NOTE — ASSESSMENT & PLAN NOTE
63 y/o male with PMH of alcohol abuse, recent admission for necrotizing pancreatitis, esophagitis/duodenitis, presented to ED only 3-days after discharge with c/o melena and coffee ground emesis. He had few episodes of melena and emesis today. None since arriving to ED.  Hemodynamically stable  Hemoglobin 12  Trend H&H q8  Reviewed previous EGD, did not see any sign of esophageal varices.  Reviewed recent imaging, no signs or symptoms of liver cirrhosis. However recent MRI shows splenic varices.   Start patient on Protonix infusion and octreotide infusion  Trend CBC  Currently NPO, awaiting possible GI intervention  Consult gastroenterology for possible endoscopy

## 2024-08-18 NOTE — ED PROVIDER NOTES
"History  Chief Complaint   Patient presents with    GI Bleeding     Pt presents by [facility] with c/o dark tarry stools since Thursday, vomited dark x1. Recently admitted for pancreatitis      HPI  64M with portal HTN with esophageal and gastric varices, hiatal hernia and recent admission for acute on chronic alcoholic pancreatitis, here with black diarrhea and emesis. Has trouble answering Qs/giving hx, says he feels foggy, its hard for him to think. Was admitted 8/11-8/15 for acute on chronic pancreatitis, 8/15 MRCP showed necrotizing pancreatitis. Says he has been v tired since dc, slept for 2 days after getting dc'd, hasn't had appetite and describes minimal PO intake, feels weak and feels unsteady when he walks, but no syncope. Says he had a black coffee ground emesis and diarrhea yesterday, a couple more similar diarrhea episodes today. Has felt too weak to take his pills and \"didn't want to upset [his] stomach.\"  Denies abdominal pain, chest pain or tightness, cough or SOB, fevers or chills (epigastric & back pain from last admission is resolved). No hematemesis or hematochezia. Was also hospitalized 4/2024 for hematemesis/melena; EGD showed a submucosal nodule in gastric antrum and duodenitis.    Says he hasn't had etoh in 2 weeks bc he detoxed over last admission.    On arrival is tired, HR 100s, BP 93/65, SpO2 96% on RA. No current bleeding.       Prior to Admission Medications   Prescriptions Last Dose Informant Patient Reported? Taking?   FLUoxetine (PROzac) 40 MG capsule   No No   Sig: TAKE ONE CAPSULE BY MOUTH EVERY DAY   Multiple Vitamin (DAILY VALUE MULTIVITAMIN) TABS   Yes No   Sig: Take 1 tablet by mouth daily   albuterol (PROVENTIL HFA,VENTOLIN HFA) 90 mcg/act inhaler   Yes No   Sig: Inhale 1-2 puffs   allopurinol (ZYLOPRIM) 300 mg tablet   No No   Sig: TAKE 1 TABLET EVERY DAY   cetirizine (ZyrTEC) 10 mg tablet   Yes No   Sig: Take one tablet by mouth daily as needed for allergies/congestion  "   folic acid (FOLVITE) 1 mg tablet   No No   Sig: Take 1 tablet (1 mg total) by mouth daily   levothyroxine 75 mcg tablet   No No   Sig: TAKE 1 TABLET IN THE MORNING ON AN EMPTY STOMACH FOR THYROID   magnesium Oxide (MagOx 400) 400 mg TABS   No No   Sig: Take 1 tablet (400 mg total) by mouth 2 (two) times a day for 3 days   pantoprazole (PROTONIX) 40 mg tablet   No No   Sig: Take 1 tablet (40 mg total) by mouth daily in the early morning   thiamine 100 MG tablet   No No   Sig: Take 1 tablet (100 mg total) by mouth daily      Facility-Administered Medications: None       Past Medical History:   Diagnosis Date    Medical history unknown     Pancreatitis        Past Surgical History:   Procedure Laterality Date    CHOLECYSTECTOMY LAPAROSCOPIC      REPAIR / REINSERT BICEPS TENDON AT ELBOW      Last Assessed: 1/12/2016        Family History   Problem Relation Age of Onset    Diabetes Mother      I have reviewed and agree with the history as documented.    E-Cigarette/Vaping     E-Cigarette/Vaping Substances     Social History     Tobacco Use    Smoking status: Never    Smokeless tobacco: Never   Substance Use Topics    Alcohol use: Yes     Comment: social    Drug use: Yes     Types: Marijuana        Review of Systems  As per HPI    Physical Exam  ED Triage Vitals   Temperature Pulse Respirations Blood Pressure SpO2   08/18/24 1651 08/18/24 1651 08/18/24 1651 08/18/24 1651 08/18/24 1651   98.6 °F (37 °C) 104 20 93/65 98 %      Temp Source Heart Rate Source Patient Position - Orthostatic VS BP Location FiO2 (%)   08/18/24 1651 08/18/24 1651 08/18/24 1700 08/18/24 1700 --   Oral Monitor Lying Right arm       Pain Score       08/18/24 1700       No Pain             Orthostatic Vital Signs  Vitals:    08/18/24 1651 08/18/24 1700 08/18/24 1839 08/18/24 1930   BP: 93/65 93/65 114/68 115/69   Pulse: 104 (!) 108 86 79   Patient Position - Orthostatic VS:  Lying Lying Lying       Physical Exam  Gen: No acute distress; appears  stated age  Neuro: Tired but oriented and interactive, follows commands  HEENT: Atraumatic, normocephalic, EOMI, PERRLA  CV: sinus tachy; normal S1 and S2; no murmurs, rubs, or gallops  Pulm: No increased work of breathing, CTAB, no wheezes, no ronchi, no rales  GI: Soft, nontender, mildly distended  MSK: Moves all extremities, 5/5 strength in all major groups, 2+ distal pulses, no edema     ED Medications  Medications   pantoprazole (PROTONIX) 80 mg in sodium chloride 0.9 % 100 mL infusion (8 mg/hr Intravenous New Bag 8/18/24 1753)   magnesium sulfate 2 g/50 mL IVPB (premix) 2 g (2 g Intravenous New Bag 8/18/24 1822)   folic acid 1 mg, thiamine (VITAMIN B1) 100 mg in sodium chloride 0.9 % 100 mL IV piggyback (has no administration in time range)   lactated ringers bolus 1,000 mL (1,000 mL Intravenous New Bag 8/18/24 1837)   albuterol (PROVENTIL HFA,VENTOLIN HFA) inhaler 1 puff (has no administration in time range)   ondansetron (ZOFRAN) injection 4 mg (has no administration in time range)   octreotide (SandoSTATIN) 500 mcg in sodium chloride 0.9 % 250 mL infusion (has no administration in time range)   zolpidem (AMBIEN) tablet 5 mg (has no administration in time range)   pantoprazole (PROTONIX) 80 mg in sodium chloride 0.9 % 100 mL IVPB (0 mg Intravenous Stopped 8/18/24 1816)   ceftriaxone (ROCEPHIN) 1 g/50 mL in dextrose IVPB (0 mg Intravenous Stopped 8/18/24 1816)       Diagnostic Studies  Results Reviewed       Procedure Component Value Units Date/Time    Lactic acid 2 Hours [[id number]]  (Normal) Collected: 08/18/24 1936    Lab Status: Final result Specimen: Blood from Arm, Right Updated: 08/18/24 2005     LACTIC ACID 1.0 mmol/L     Narrative:      Result may be elevated if tourniquet was used during collection.    Ethanol [[id number]]  (Normal) Collected: 08/18/24 1757    Lab Status: Final result Specimen: Blood from Arm, Right Updated: 08/18/24 1824     Ethanol Lvl <10 mg/dL     Procalcitonin [[id number]]   (Normal) Collected: 08/18/24 1721    Lab Status: Final result Specimen: Blood from Arm, Left Updated: 08/18/24 1759     Procalcitonin <0.05 ng/ml     Protime-INR [[id number]]  (Abnormal) Collected: 08/18/24 1721    Lab Status: Final result Specimen: Blood from Arm, Left Updated: 08/18/24 1753     Protime 15.3 seconds      INR 1.19    Narrative:      INR Therapeutic Range    Indication                                             INR Range      Atrial Fibrillation                                               2.0-3.0  Hypercoagulable State                                    2.0.2.3  Left Ventricular Asist Device                            2.0-3.0  Mechanical Heart Valve                                  -    Aortic(with afib, MI, embolism, HF, LA enlargement,    and/or coagulopathy)                                     2.0-3.0 (2.5-3.5)     Mitral                                                             2.5-3.5  Prosthetic/Bioprosthetic Heart Valve               2.0-3.0  Venous thromboembolism (VTE: VT, PE        2.0-3.0    APTT [[id number]]  (Normal) Collected: 08/18/24 1721    Lab Status: Final result Specimen: Blood from Arm, Left Updated: 08/18/24 1753     PTT 25 seconds     Comprehensive metabolic panel [[id number]]  (Abnormal) Collected: 08/18/24 1721    Lab Status: Final result Specimen: Blood from Arm, Left Updated: 08/18/24 1751     Sodium 141 mmol/L      Potassium 3.5 mmol/L      Chloride 105 mmol/L      CO2 26 mmol/L      ANION GAP 10 mmol/L      BUN 24 mg/dL      Creatinine 0.53 mg/dL      Glucose 113 mg/dL      Calcium 7.9 mg/dL      Corrected Calcium 8.7 mg/dL      AST 12 U/L      ALT 9 U/L      Alkaline Phosphatase 111 U/L      Total Protein 5.4 g/dL      Albumin 3.0 g/dL      Total Bilirubin 0.56 mg/dL      eGFR 110 ml/min/1.73sq m     Narrative:      [employer] guidelines for Chronic Kidney Disease (CKD):     Stage 1 with normal or high GFR (GFR > 90 mL/min/1.73 square meters)     Stage 2 Mild CKD (GFR = 60-89 mL/min/1.73 square meters)    Stage 3A Moderate CKD (GFR = 45-59 mL/min/1.73 square meters)    Stage 3B Moderate CKD (GFR = 30-44 mL/min/1.73 square meters)    Stage 4 Severe CKD (GFR = 15-29 mL/min/1.73 square meters)    Stage 5 End Stage CKD (GFR <15 mL/min/1.73 square meters)  Note: GFR calculation is accurate only with a steady state creatinine    Magnesium [[id number]]  (Abnormal) Collected: 08/18/24 1721    Lab Status: Final result Specimen: Blood from Arm, Left Updated: 08/18/24 1751     Magnesium 1.4 mg/dL     Lipase [[id number]]  (Abnormal) Collected: 08/18/24 1721    Lab Status: Final result Specimen: Blood from Arm, Left Updated: 08/18/24 1751     Lipase 131 u/L     Lactic acid, plasma (w/reflex if result > 2.0) [[id number]]  (Abnormal) Collected: 08/18/24 1721    Lab Status: Final result Specimen: Blood from Arm, Left Updated: 08/18/24 1750     LACTIC ACID 2.2 mmol/L     Narrative:      Result may be elevated if tourniquet was used during collection.    Ammonia [[id number]]  (Normal) Collected: 08/18/24 1721    Lab Status: Final result Specimen: Blood from Arm, Left Updated: 08/18/24 1741     Ammonia 57 umol/L     CBC and differential [[id number]]  (Abnormal) Collected: 08/18/24 1721    Lab Status: Final result Specimen: Blood from Arm, Left Updated: 08/18/24 1734     WBC 8.80 Thousand/uL      RBC 3.57 Million/uL      Hemoglobin 12.0 g/dL      Hematocrit 37.1 %       fL      MCH 33.6 pg      MCHC 32.3 g/dL      RDW 15.3 %      MPV 10.3 fL      Platelets 278 Thousands/uL      nRBC 0 /100 WBCs      Segmented % 71 %      Immature Grans % 0 %      Lymphocytes % 18 %      Monocytes % 9 %      Eosinophils Relative 1 %      Basophils Relative 1 %      Absolute Neutrophils 6.28 Thousands/µL      Absolute Immature Grans 0.03 Thousand/uL      Absolute Lymphocytes 1.56 Thousands/µL      Absolute Monocytes 0.80 Thousand/µL      Eosinophils Absolute 0.07 Thousand/µL      Basophils  Absolute 0.06 Thousands/µL                    XR chest 1 view portable    (Results Pending)         Procedures  Procedures      ED Course  ED Course as of 08/18/24 2022   Sun Aug 18, 2024   1710 64M with alcoholic cirrhosis/pancreatitis here with melena/CGE. Initial VS tachy 109, hypotensive 90s/60s  Says he hasn't had etoh in 2 weeks  HR 100s  BP 93/65  SpO2 96% on RA   1723 Given UGIB w hx varices add protonix 80mg now + gtt at 8mg/hr, as well as octreotide gtt and rocephin IV   1802 LIPASE(!): 131  Possibly still declinig from prior (379 on 8/11)   1803 LACTIC ACID(!): 2.2  Mildly elevated   1803 Comprehensive metabolic panel(!)  Liver labs all improving from 8/11 1803 MAGNESIUM(!): 1.4  Giving 2g IV mag now           Medical Decision Making  Amount and/or Complexity of Data Reviewed  Labs: ordered. Decision-making details documented in ED Course.  Radiology: ordered.    Risk  Prescription drug management.      64M pt w alcoholic liver/pancreatic dz here with melena/coffee ground emesis. Had a workup for same in April showing nodular antral lesion, duodenitis, also has a hiatal hernia, and had recent admission for necrotizing pancreatitis; portal gastropathy and esophageal/gastric varices previously noted on prior workups. Has multiple possible sites for UGIB but do not think currently has a brisk bleed. Currently not actively bleeding, Hb is 12, similar to prior, VS have normalized w IVF.  Would trend Hb since it is possible he has not yet equilibrated following some bleeding, arabella given his complaint of fatigue. Has also had very little PO intake in the last few days, suspect panc pseudocyst may be causing discomfort with eating and have given 1L LR while in the ED. Started IV protonix, octreotide, empiric rocephin given known variceal disease, thiamine/b12 and will admit to hospitalist with possible GI consult for further management. Currently not intoxicated, do not think he is in withdrawal as he is not  agitated, VS have normalized with IVF, and appropriate time since last drink with librium taper during last admission, but if sx change would consider CIWA protocol.     IV protonix, octreotide, rocephin, thiamine/b12  1L LR  Trend Hb  Follow hemodynamics   Admit to [facility]      Disposition  Final diagnoses:   Gastrointestinal hemorrhage with hematemesis     Time reflects when diagnosis was documented in both MDM as applicable and the Disposition within this note       Time User Action Codes Description Comment    8/18/2024  7:15 PM Patrick Lopez Add [K92.0] Gastrointestinal hemorrhage with hematemesis           ED Disposition       None          Follow-up Information    None         Patient's Medications   Discharge Prescriptions    No medications on file     No discharge procedures on file.    PDMP Review         Value Time User    PDMP Reviewed  Yes 8/18/2024  8:20 PM Barry Sow DO             ED Provider  Attending physically available and evaluated Sammy Mays. I managed the patient along with the ED Attending.    Electronically Signed by         Tomeka Marroquin MD  08/18/24 2033

## 2024-08-19 LAB
ANION GAP SERPL CALCULATED.3IONS-SCNC: 9 MMOL/L (ref 4–13)
ATRIAL RATE: 109 BPM
BUN SERPL-MCNC: 24 MG/DL (ref 5–25)
CALCIUM SERPL-MCNC: 8.1 MG/DL (ref 8.4–10.2)
CHLORIDE SERPL-SCNC: 104 MMOL/L (ref 96–108)
CO2 SERPL-SCNC: 26 MMOL/L (ref 21–32)
CREAT SERPL-MCNC: 0.54 MG/DL (ref 0.6–1.3)
ERYTHROCYTE [DISTWIDTH] IN BLOOD BY AUTOMATED COUNT: 15.2 % (ref 11.6–15.1)
ERYTHROCYTE [DISTWIDTH] IN BLOOD BY AUTOMATED COUNT: 15.3 % (ref 11.6–15.1)
GFR SERPL CREATININE-BSD FRML MDRD: 109 ML/MIN/1.73SQ M
GLUCOSE SERPL-MCNC: 128 MG/DL (ref 65–140)
GLUCOSE SERPL-MCNC: 136 MG/DL (ref 65–140)
HCT VFR BLD AUTO: 31.9 % (ref 36.5–49.3)
HCT VFR BLD AUTO: 33.7 % (ref 36.5–49.3)
HGB BLD-MCNC: 10.3 G/DL (ref 12–17)
HGB BLD-MCNC: 10.7 G/DL (ref 12–17)
MAGNESIUM SERPL-MCNC: 1.8 MG/DL (ref 1.9–2.7)
MCH RBC QN AUTO: 33.8 PG (ref 26.8–34.3)
MCH RBC QN AUTO: 34.5 PG (ref 26.8–34.3)
MCHC RBC AUTO-ENTMCNC: 31.8 G/DL (ref 31.4–37.4)
MCHC RBC AUTO-ENTMCNC: 32.3 G/DL (ref 31.4–37.4)
MCV RBC AUTO: 105 FL (ref 82–98)
MCV RBC AUTO: 109 FL (ref 82–98)
P AXIS: 46 DEGREES
PLATELET # BLD AUTO: 186 THOUSANDS/UL (ref 149–390)
PLATELET # BLD AUTO: 211 THOUSANDS/UL (ref 149–390)
PMV BLD AUTO: 10.1 FL (ref 8.9–12.7)
PMV BLD AUTO: 9.6 FL (ref 8.9–12.7)
POTASSIUM SERPL-SCNC: 4 MMOL/L (ref 3.5–5.3)
PR INTERVAL: 162 MS
QRS AXIS: 85 DEGREES
QRSD INTERVAL: 72 MS
QT INTERVAL: 344 MS
QTC INTERVAL: 463 MS
RBC # BLD AUTO: 3.05 MILLION/UL (ref 3.88–5.62)
RBC # BLD AUTO: 3.1 MILLION/UL (ref 3.88–5.62)
SODIUM SERPL-SCNC: 139 MMOL/L (ref 135–147)
T WAVE AXIS: 50 DEGREES
VENTRICULAR RATE: 109 BPM
WBC # BLD AUTO: 4.51 THOUSAND/UL (ref 4.31–10.16)
WBC # BLD AUTO: 4.78 THOUSAND/UL (ref 4.31–10.16)

## 2024-08-19 PROCEDURE — 99223 1ST HOSP IP/OBS HIGH 75: CPT | Performed by: INTERNAL MEDICINE

## 2024-08-19 PROCEDURE — 83735 ASSAY OF MAGNESIUM: CPT | Performed by: FAMILY MEDICINE

## 2024-08-19 PROCEDURE — 85027 COMPLETE CBC AUTOMATED: CPT | Performed by: FAMILY MEDICINE

## 2024-08-19 PROCEDURE — 99232 SBSQ HOSP IP/OBS MODERATE 35: CPT | Performed by: PHYSICIAN ASSISTANT

## 2024-08-19 PROCEDURE — 80048 BASIC METABOLIC PNL TOTAL CA: CPT | Performed by: FAMILY MEDICINE

## 2024-08-19 PROCEDURE — 93010 ELECTROCARDIOGRAM REPORT: CPT | Performed by: INTERNAL MEDICINE

## 2024-08-19 PROCEDURE — 82948 REAGENT STRIP/BLOOD GLUCOSE: CPT

## 2024-08-19 PROCEDURE — 36415 COLL VENOUS BLD VENIPUNCTURE: CPT | Performed by: FAMILY MEDICINE

## 2024-08-19 RX ORDER — LORAZEPAM 0.5 MG/1
0.5 TABLET ORAL
Status: DISCONTINUED | OUTPATIENT
Start: 2024-08-19 | End: 2024-08-19

## 2024-08-19 RX ORDER — MAGNESIUM SULFATE HEPTAHYDRATE 40 MG/ML
2 INJECTION, SOLUTION INTRAVENOUS ONCE
Status: COMPLETED | OUTPATIENT
Start: 2024-08-19 | End: 2024-08-20

## 2024-08-19 RX ORDER — LORAZEPAM 0.5 MG/1
0.5 TABLET ORAL
Status: DISCONTINUED | OUTPATIENT
Start: 2024-08-19 | End: 2024-08-21 | Stop reason: HOSPADM

## 2024-08-19 RX ADMIN — OCTREOTIDE ACETATE 50 MCG/HR: 500 INJECTION, SOLUTION INTRAVENOUS; SUBCUTANEOUS at 23:48

## 2024-08-19 RX ADMIN — LORAZEPAM 0.5 MG: 0.5 TABLET ORAL at 20:31

## 2024-08-19 RX ADMIN — THIAMINE HYDROCHLORIDE: 100 INJECTION, SOLUTION INTRAMUSCULAR; INTRAVENOUS at 10:06

## 2024-08-19 RX ADMIN — MAGNESIUM SULFATE HEPTAHYDRATE 2 G: 40 INJECTION, SOLUTION INTRAVENOUS at 11:53

## 2024-08-19 RX ADMIN — SODIUM CHLORIDE 8 MG/HR: 9 INJECTION, SOLUTION INTRAVENOUS at 18:16

## 2024-08-19 RX ADMIN — ONDANSETRON 4 MG: 2 INJECTION INTRAMUSCULAR; INTRAVENOUS at 13:59

## 2024-08-19 RX ADMIN — OCTREOTIDE ACETATE 50 MCG/HR: 500 INJECTION, SOLUTION INTRAVENOUS; SUBCUTANEOUS at 13:48

## 2024-08-19 RX ADMIN — SODIUM CHLORIDE 8 MG/HR: 9 INJECTION, SOLUTION INTRAVENOUS at 08:11

## 2024-08-19 NOTE — CONSULTS
[facility] - Inpatient Consultation    PATIENT INFORMATION      Sammy Mays 64 y.o. male MRN: [id number]  Unit/Bed#: -01 Encounter: [id number]  PCP: Ankur Bustillo DO  Date of Admission:  8/18/2024  Date of Consultation: 08/19/24  Requesting Physician: Patrick Lopez MD       ASSESSMENT & PLAN     Sammy Mays is a 64 y.o. old male with PMH of chronic pancreatitis, patient admitted for necrotizing pancreatitis recently, and history of alcohol use, who presented with melena after discharge from the hospital.    Melena  Necrotizing pancreatitis  History of alcohol use  Is presenting with 2-day history of melena after recent hospitalization for necrotizing pancreatitis.  Patient had multiple episodes of melena and 1 episode of coffee-ground emesis while at home.  He denied any alcohol use however prior EGD has shown inflammation in the duodenum.  Will schedule patient for an EGD tomorrow.    -EGD tomorrow 8/20  -Clear liquid diet today, NPO at midnight  -Continue IV PPI  -Ensure patient has 2 large bore IV's     REASON FOR CONSULTATION      Melena      HISTORY OF PRESENT ILLNESS      Sammy Mays is a 64 y.o. old male with PMH of chronic pancreatitis, patient admitted for necrotizing pancreatitis recently, and history of alcohol use, who presented with melena after discharge from the hospital.    Patient was recently admitted from 8/11-8/15 due to abdominal pain and he was found to have acute on chronic pancreatitis due to alcohol use.  Patient was admitted for 4 days and monitored, he had resolution of his abdominal discomfort and MRCP showed necrotizing pancreatitis. Since the patient was feeling better he was discharged home.  Upon arrival to his house patient stated he had a Gyro and does not clearly remember the next 2 days as he slept for most of it.  He woke up this morning and had concern for vomiting which was coffee-ground and dark stools. Upon arrival to the ED  patient was noted to have a slightly decreased hemoglobin compared to his baseline and an elevated BUN.    This morning patient denies any further episodes of vomiting however does endorse ongoing melena with a bowel movement in the hospital. He denies any alcohol use since being discharged.         REVIEW OF SYSTEMS     CONSTITUTIONAL: Denies any fever, chills, rigors, and weight loss  HEENT: No earache or tinnitus, denies hearing loss or visual disturbances  CARDIOVASCULAR: No chest pain or palpitations   RESPIRATORY: Denies any cough, hemoptysis, shortness of breath or dyspnea on exertion  GASTROINTESTINAL: As noted in the History of Present Illness   GENITOURINARY: No problems with urination, denies any hematuria or dysuria  NEUROLOGIC: No dizziness or vertigo, denies headaches   MUSCULOSKELETAL: Denies any muscle or joint pain   SKIN: Denies skin rashes or itching   ENDOCRINE: Denies excessive thirst, denies intolerance to heat or cold  PSYCHOSOCIAL: Denies depression or anxiety, denies any recent memory loss     PAST MEDICAL & SURGICAL HISTORY      Past Medical History:   Diagnosis Date    Medical history unknown     Pancreatitis        Past Surgical History:   Procedure Laterality Date    CHOLECYSTECTOMY LAPAROSCOPIC      REPAIR / REINSERT BICEPS TENDON AT ELBOW      Last Assessed: 1/12/2016        MEDICATIONS & ALLERGIES       Medications:     Medications Prior to Admission:     albuterol (PROVENTIL HFA,VENTOLIN HFA) 90 mcg/act inhaler    allopurinol (ZYLOPRIM) 300 mg tablet    cetirizine (ZyrTEC) 10 mg tablet    FLUoxetine (PROzac) 40 MG capsule    folic acid (FOLVITE) 1 mg tablet    levothyroxine 75 mcg tablet    magnesium Oxide (MagOx 400) 400 mg TABS    Multiple Vitamin (DAILY VALUE MULTIVITAMIN) TABS    pantoprazole (PROTONIX) 40 mg tablet    thiamine 100 MG tablet  Current Facility-Administered Medications   Medication Dose Route Frequency    albuterol (PROVENTIL HFA,VENTOLIN HFA) inhaler 1 puff  1  "puff Inhalation Q6H PRN    folic acid 1 mg, thiamine (VITAMIN B1) 100 mg in sodium chloride 0.9 % 100 mL IV piggyback   Intravenous Daily    magnesium sulfate 2 g/50 mL IVPB (premix) 2 g  2 g Intravenous Once    octreotide (SandoSTATIN) 500 mcg in sodium chloride 0.9 % 250 mL infusion  50 mcg/hr Intravenous Continuous    ondansetron (ZOFRAN) injection 4 mg  4 mg Intravenous Q6H PRN    pantoprazole (PROTONIX) 80 mg in sodium chloride 0.9 % 100 mL infusion  8 mg/hr Intravenous Continuous    zolpidem (AMBIEN) tablet 5 mg  5 mg Oral HS       Allergies:   Allergies   Allergen Reactions    Amoxicillin Swelling and Fever     Other reaction(s): vertigo    Escitalopram      Other reaction(s): sweaty palms/hands, felt faint, passed out for a short time, had anxiety/panic attack    Gemfibrozil      Other reaction(s): Nausea and/or vomiting  Other reaction(s): Nausea and/or vomiting    Other      Other reaction(s): Other (See Comments)  rhinitis, itchy eyes       SOCIAL HISTORY      Social History     Marital Status: Single    Substance Use History:   Social History     Substance and Sexual Activity   Alcohol Use Yes    Comment: social     Social History     Tobacco Use   Smoking Status Never   Smokeless Tobacco Never     Social History     Substance and Sexual Activity   Drug Use Yes    Types: Marijuana       FAMILY HISTORY      Family History   Problem Relation Age of Onset    Diabetes Mother        PHYSICAL EXAM     Objective   Blood pressure 130/73, pulse 71, temperature (!) 97.3 °F (36.3 °C), temperature source Oral, resp. rate 14, height 5' 9\" (1.753 m), weight 86.3 kg (190 lb 3.2 oz), SpO2 99%. Body mass index is 28.09 kg/m².    Intake/Output Summary (Last 24 hours) at 8/19/2024 1053  Last data filed at 8/19/2024 0807  Gross per 24 hour   Intake 0 ml   Output 0 ml   Net 0 ml       General Appearance:   Alert, cooperative, no distress   HEENT:   Normocephalic, atraumatic, anicteric     Neck:   Supple, symmetrical, trachea " midline   Lungs:   Equal chest rise, respirations unlabored    Heart:   Regular rate and rhythm   Abdomen:   Soft, non-tender,    Rectal:   Deferred    Extremities:   No cyanosis, clubbing or edema    Neuro:   Moves all 4 extremities    Skin:   No jaundice, rashes, or lesions      ADDITIONAL DATA     Lab Results:     Results from last 7 days   Lab Units 08/19/24  0434 08/18/24  1721   WBC Thousand/uL 4.51 8.80   HEMOGLOBIN g/dL 10.3* 12.0   HEMATOCRIT % 31.9* 37.1   PLATELETS Thousands/uL 186 278   SEGS PCT %  --  71   LYMPHO PCT %  --  18   MONO PCT %  --  9   EOS PCT %  --  1     Results from last 7 days   Lab Units 08/19/24  0434 08/18/24  1721   POTASSIUM mmol/L 4.0 3.5   CHLORIDE mmol/L 104 105   CO2 mmol/L 26 26   BUN mg/dL 24 24   CREATININE mg/dL 0.54* 0.53*   CALCIUM mg/dL 8.1* 7.9*   ALK PHOS U/L  --  111*   ALT U/L  --  9   AST U/L  --  12*     Results from last 7 days   Lab Units 08/18/24  1721   INR  1.19       Imaging:    MRI abdomen w wo contrast and mrcp    Result Date: 8/15/2024  Narrative: MRI OF THE ABDOMEN WITH AND WITHOUT CONTRAST WITH MRCP INDICATION: 64 years / Male. Pancreatic head mass. Gastroenterology note from 8/14/2024 reviewed for history. Patient has history of chronic pancreatitis secondary to alcohol use presenting with abdominal pain. Patient has acute on chronic pancreatitis, and new 1.2 cm hypodense lesion in the pancreatic head. COMPARISON: Abdomen and pelvic CT from 8/11/2024. Right upper quadrant abdominal ultrasound from 6/8/2024. Abdomen and pelvic CT from 6/6/2024. Abdomen MR from 8/16/2019. TECHNIQUE: Multiplanar/multisequence MRI of the abdomen with 3D MRCP was performed before and after administration of contrast. IV Contrast: 8 mL of Gadobutrol injection (SINGLE-DOSE) FINDINGS: LOWER CHEST: Trace bilateral pleural effusions. LIVER: Normal in size and configuration. No suspicious mass. Tiny posterior right hepatic lobe simple cyst (series 8 image 16.) Patent hepatic and  portal veins. There are splenic varices, and recanalized umbilical vein. BILE DUCTS: There is severe pancreatic and biliary ductal dilatation, with abrupt narrowing in the pancreatic head (series 9 image 8.) This is a chronic finding, unchanged dating back to 8/16/2019. GALLBLADDER: Post cholecystectomy. PANCREAS: The hypodense region in the pancreatic head detected on recent CT is a 1.0 x 0.9 x 0.9 cm region of nonenhancing pancreatic tissue consistent with pancreas necrosis (series 12 image 85.) There is evidence of acute pancreatitis, which will be described based on the revised [place] Classification of Acute Pancreatitis: - TIME OF ONSET: Around 8/11/2024 - NECROSIS: There is evidence of pancreatic parenchyma necrosis as above, therefore this is necrotizing pancreatitis. This involves only a small area in the pancreatic head, less than 30% of the pancreatic parenchyma. - LOCAL COMPLICATIONS: None. - INFECTION: There is no abnormal gas in or around the pancreas to suggest infection. ADRENAL GLANDS: Unremarkable. SPLEEN: Unchanged splenomegaly. KIDNEYS/PROXIMAL URETERS: No hydroureteronephrosis. No suspicious renal mass. BOWEL: No dilated loops of bowel. PERITONEUM/RETROPERITONEUM: No ascites. LYMPH NODES: No abdominal lymphadenopathy. VESSELS: No aneurysm. ABDOMINAL WALL: Unremarkable BONES: No suspicious osseous lesion.     Impression: The hypodense region in the pancreatic head detected on recent CT is a 1.0 x 0.9 x 0.9 cm region of nonenhancing pancreatic tissue consistent with pancreas necrosis (series 12 image 85.) Therefore patient's pancreatitis is now characterized as necrotizing pancreatitis. There is severe pancreatic and biliary ductal dilatation, with abrupt narrowing in the pancreatic head (series 9 image 8.) This is a chronic finding, unchanged dating back to 8/16/2019. Again seen are mesenteric collaterals and splenomegaly. Workstation performed: [id number]     CT abdomen pelvis wo  contrast    Result Date: 8/12/2024  Narrative: CT ABDOMEN AND PELVIS WITHOUT IV CONTRAST INDICATION: Abdominal pain. COMPARISON: 6/6/2024 TECHNIQUE: CT examination of the abdomen and pelvis was performed without intravenous contrast. Lack of IV contrast limits the sensitivity for pathology. Multiplanar 2D reformatted images were created from the source data. This examination, like all CT scans performed in the [facility], was performed utilizing techniques to minimize radiation dose exposure, including the use of iterative reconstruction and automated exposure control. Radiation dose length product (DLP) for this visit: 748 mGy-cm Enteric Contrast: Not administered. Lack of oral contrast limits the sensitivity for pathology within the bowel. FINDINGS: ABDOMEN LOWER CHEST: Redemonstration of left-sided gynecomastia. There are coronary artery calcifications. LIVER/BILIARY TREE: Unremarkable. GALLBLADDER: Post cholecystectomy. SPLEEN: Enlarged spleen measuring 14.7 cm. PANCREAS: There is chronic dilatation of the pancreatic duct with abrupt cut off at the pancreatic head. There is a 1.2 cm hypodense lesion in the pancreatic head (2/67). The known low-density lesion at the pancreatic tail is not well delineated on this study without IV contrast. There is evidence of acute pancreatitis with fat stranding around the pancreatic head, which will be described based on the revised [place] Classification of Acute Pancreatitis: - TIME OF ONSET: less than or equal to 4 weeks - NECROSIS: There is peripancreatic edema but no evidence of pancreatic or peripancreatic necrosis, therefore this is interstitial edematous pancreatitis (IEP.) - LOCAL COMPLICATIONS: None - INFECTION: There is no abnormal gas in or around the pancreas to suggest infection. ADRENAL GLANDS: Unremarkable. KIDNEYS/URETERS: Simple renal cyst(s). Otherwise unremarkable kidneys. No hydronephrosis. STOMACH AND BOWEL: Colonic diverticulosis without  findings of acute diverticulitis. There is fat stranding adjacent to the duodenum. APPENDIX: No findings to suggest appendicitis. ABDOMINOPELVIC CAVITY: No ascites. No pneumoperitoneum. No lymphadenopathy. VESSELS: Atherosclerosis without abdominal aortic aneurysm. Redemonstration of intra-abdominal varices. PELVIS REPRODUCTIVE ORGANS: Unremarkable for patient's age. URINARY BLADDER: Mild diffuse wall thickening. ABDOMINAL WALL/INGUINAL REGIONS: Unremarkable. BONES: No acute fracture or suspicious osseous lesion.     Impression: Findings consistent with acute pancreatitis versus duodenitis. 1.2 cm hypodense lesion in the pancreatic head. Would recommend an MRI/MRCP for further evaluation purposes. The study was marked in EPIC for immediate notification. Workstation performed: [id number]       EKG, Pathology, and Other Studies Reviewed on Admission:   EKG: Reviewed      Counseling / Coordination of Care Time: 30 total mins spent in consult. Greater than 50% of total time spent on patient counseling and coordination of care.    Melinda Stephenson MD  Gastroenterology Fellow  [facility]  Division of Gastroenterology and Hepatology    ...............................................................................................................................................  ** Please Note: This note is constructed using a voice recognition dictation system. **

## 2024-08-19 NOTE — ED ATTENDING ATTESTATION
8/18/2024  I, Maxx Lopez MD, saw and evaluated the patient. I have discussed the patient with the resident/non-physician practitioner and agree with the resident's/non-physician practitioner's findings, Plan of Care, and MDM as documented in the resident's/non-physician practitioner's note, except where noted. All available labs and Radiology studies were reviewed.  I was present for key portions of any procedure(s) performed by the resident/non-physician practitioner and I was immediately available to provide assistance.       At this point I agree with the current assessment done in the Emergency Department.  I have conducted an independent evaluation of this patient a history and physical is as follows:    ED Course     64-year-old male with history of alcohol abuse esophagitis and gastritis presents with melanotic stools since Thursday.  Patient admits to 2 episodes of coffee-ground emesis at home.  Patient was recently admitted for alcoholic pancreatitis and completed a Librium taper.     Vitals reviewed patient noted to be mildly hypotensive upon arrival.  Abdomen soft nontender nondistended normal bowel sounds no signs of peritonitis.  Guaiac performed by resident positive    Impression: GI bleed  Differential diagnosis: Gastritis, peptic ulcer disease, varices    Plan check screening labs type and screen IV fluids Protonix and octreotide drips.  Patient consented for possible blood transfusion as needed anticipate admission for GI evaluation      Critical Care Time  Procedures

## 2024-08-19 NOTE — ASSESSMENT & PLAN NOTE
Last alcohol intake was prior to last admission, before August 11.   Last admission patient was treated with Librium which was weaned off.  Continue IV thiamine/folic acid.  Currently does not need [facility] protocol

## 2024-08-19 NOTE — PROGRESS NOTES
[facility]  Progress Note  Name: Sammy Mays I  MRN: [id number]  Unit/Bed#: -01 I Date of Admission: 8/18/2024   Date of Service: 8/19/2024 I Hospital Day: 1    Assessment & Plan   * GI bleed  Assessment & Plan  65 y/o male with PMH of alcohol abuse, recent admission for necrotizing pancreatitis, esophagitis/duodenitis, presented to ED only 3-days after discharge with c/o melena and coffee ground emesis. He had few episodes of melena and emesis today. None since arriving to ED.  Hemodynamically stable  Hemoglobin 12  Trend H&H q8  Reviewed previous EGD, did not see any sign of esophageal varices.  Reviewed recent imaging, no signs or symptoms of liver cirrhosis. However recent MRI shows splenic varices.   Start patient on Protonix infusion and octreotide infusion  Trend CBC  Currently NPO, awaiting possible GI intervention  Consult gastroenterology for possible endoscopy    Depression  Assessment & Plan  Holding home Prozac until seen by GI    Hypomagnesemia  Assessment & Plan  Replace and repeat    Alcohol abuse  Assessment & Plan  Last alcohol intake was prior to last admission, before August 11.   Last admission patient was treated with Librium which was weaned off.  Continue IV thiamine/folic acid.  Currently does not need [facility]WA protocol    Hypothyroidism  Assessment & Plan  Hold home levothyroxine until seen by GI         VTE Pharmacologic Prophylaxis: VTE Score: 3 Moderate Risk (Score 3-4) - Pharmacological DVT Prophylaxis Contraindicated. Sequential Compression Devices Ordered.    Mobility:      JH-HLM Goal NOT achieved. Continue with multidisciplinary rounding and encourage appropriate mobility to improve upon JH-HLM goals.    Patient Centered Rounds: I performed bedside rounds with nursing staff today.   Discussions with Specialists or Other Care Team Provider: None    Education and Discussions with Family / Patient: Patient declined call to .  "    Total Time Spent on Date of Encounter in care of patient: 35 mins. This time was spent on one or more of the following: performing physical exam; counseling and coordination of care; obtaining or reviewing history; documenting in the medical record; reviewing/ordering tests, medications or procedures; communicating with other healthcare professionals and discussing with patient's family/caregivers.    Current Length of Stay: 1 day(s)  Current Patient Status: Inpatient   Certification Statement: The patient will continue to require additional inpatient hospital stay due to Pending GI evaluation and recommendations  Discharge Plan: Anticipate discharge in 48 hrs to home.    Code Status: Level 1 - Full Code    Subjective:   Patient reports continued episodes of passing blood with stool and emesis since admission. He is hopeful to have EGD performed today. Reports he just feels overall \"wiped out.\"    Objective:     Vitals:   Temp (24hrs), Av °F (36.7 °C), Min:97.3 °F (36.3 °C), Max:98.6 °F (37 °C)    Temp:  [97.3 °F (36.3 °C)-98.6 °F (37 °C)] 97.3 °F (36.3 °C)  HR:  [] 71  Resp:  [8-20] 14  BP: ()/(65-75) 130/73  SpO2:  [96 %-99 %] 99 %  Body mass index is 28.09 kg/m².     Input and Output Summary (last 24 hours):     Intake/Output Summary (Last 24 hours) at 2024 0840  Last data filed at 2024 0807  Gross per 24 hour   Intake 0 ml   Output 0 ml   Net 0 ml       Physical Exam:   Physical Exam  Vitals and nursing note reviewed.   Constitutional:       General: He is not in acute distress.     Appearance: Normal appearance. He is well-developed.   HENT:      Head: Normocephalic and atraumatic.   Eyes:      General: No scleral icterus.     Conjunctiva/sclera: Conjunctivae normal.   Cardiovascular:      Rate and Rhythm: Normal rate and regular rhythm.      Heart sounds: No murmur heard.  Pulmonary:      Effort: Pulmonary effort is normal.      Breath sounds: No wheezing, rhonchi or rales. "   Abdominal:      General: There is no distension.      Palpations: Abdomen is soft.   Skin:     General: Skin is warm and dry.      Coloration: Skin is pale.   Neurological:      Mental Status: He is alert. Mental status is at baseline.   Psychiatric:         Mood and Affect: Mood normal.        Additional Data:     Labs:  Results from last 7 days   Lab Units 08/19/24  0434 08/18/24  1721   WBC Thousand/uL 4.51 8.80   HEMOGLOBIN g/dL 10.3* 12.0   HEMATOCRIT % 31.9* 37.1   PLATELETS Thousands/uL 186 278   SEGS PCT %  --  71   LYMPHO PCT %  --  18   MONO PCT %  --  9   EOS PCT %  --  1     Results from last 7 days   Lab Units 08/19/24  0434 08/18/24  1721   SODIUM mmol/L 139 141   POTASSIUM mmol/L 4.0 3.5   CHLORIDE mmol/L 104 105   CO2 mmol/L 26 26   BUN mg/dL 24 24   CREATININE mg/dL 0.54* 0.53*   ANION GAP mmol/L 9 10   CALCIUM mg/dL 8.1* 7.9*   ALBUMIN g/dL  --  3.0*   TOTAL BILIRUBIN mg/dL  --  0.56   ALK PHOS U/L  --  111*   ALT U/L  --  9   AST U/L  --  12*   GLUCOSE RANDOM mg/dL 128 113     Results from last 7 days   Lab Units 08/18/24  1721   INR  1.19             Results from last 7 days   Lab Units 08/18/24  1936 08/18/24  1721   LACTIC ACID mmol/L 1.0 2.2*   PROCALCITONIN ng/ml  --  <0.05       Lines/Drains:  Invasive Devices       Peripheral Intravenous Line  Duration             Peripheral IV 08/18/24 Left;Proximal;Ventral (anterior) Forearm <1 day    Peripheral IV 08/18/24 Right Antecubital <1 day                          Imaging: No pertinent imaging reviewed.    Recent Cultures (last 7 days):         Last 24 Hours Medication List:   Current Facility-Administered Medications   Medication Dose Route Frequency Provider Last Rate    albuterol  1 puff Inhalation Q6H PRN Patrick Lopez MD      folic acid 1 mg, thiamine (VITAMIN B1) 100 mg in sodium chloride 0.9 % 100 mL IV piggyback   Intravenous Daily Tomeka Marroquin MD      magnesium sulfate  2 g Intravenous Once Francia Samuel PA-C      octreotide  50  mcg/hr Intravenous Continuous Patrick Lopez MD 50 mcg/hr (08/18/24 2127)    ondansetron  4 mg Intravenous Q6H PRN Patrick Lopez MD      pantoprazole (PROTONIX) 80 mg in sodium chloride 0.9 % 100 mL infusion  8 mg/hr Intravenous Continuous Tomeka Marroquin MD 8 mg/hr (08/19/24 0811)    zolpidem  5 mg Oral HS Patrick Lopez MD          Today, Patient Was Seen By: Francia Samuel PA-C    **Please Note: This note may have been constructed using a voice recognition system.**

## 2024-08-20 ENCOUNTER — ANESTHESIA EVENT (INPATIENT)
Dept: GASTROENTEROLOGY | Facility: HOSPITAL | Age: 64
DRG: 378 | End: 2024-08-20
Payer: MEDICARE

## 2024-08-20 ENCOUNTER — ANESTHESIA (OUTPATIENT)
Dept: ANESTHESIOLOGY | Facility: HOSPITAL | Age: 64
End: 2024-08-20

## 2024-08-20 ENCOUNTER — ANESTHESIA (INPATIENT)
Dept: GASTROENTEROLOGY | Facility: HOSPITAL | Age: 64
DRG: 378 | End: 2024-08-20
Payer: MEDICARE

## 2024-08-20 ENCOUNTER — APPOINTMENT (INPATIENT)
Dept: GASTROENTEROLOGY | Facility: HOSPITAL | Age: 64
DRG: 378 | End: 2024-08-20
Payer: MEDICARE

## 2024-08-20 ENCOUNTER — ANESTHESIA EVENT (OUTPATIENT)
Dept: ANESTHESIOLOGY | Facility: HOSPITAL | Age: 64
End: 2024-08-20

## 2024-08-20 LAB
ANION GAP SERPL CALCULATED.3IONS-SCNC: 8 MMOL/L (ref 4–13)
BASOPHILS # BLD AUTO: 0.07 THOUSANDS/ÂΜL (ref 0–0.1)
BASOPHILS NFR BLD AUTO: 2 % (ref 0–1)
BUN SERPL-MCNC: 9 MG/DL (ref 5–25)
CALCIUM SERPL-MCNC: 8.2 MG/DL (ref 8.4–10.2)
CHLORIDE SERPL-SCNC: 105 MMOL/L (ref 96–108)
CO2 SERPL-SCNC: 26 MMOL/L (ref 21–32)
CREAT SERPL-MCNC: 0.65 MG/DL (ref 0.6–1.3)
EOSINOPHIL # BLD AUTO: 0.07 THOUSAND/ÂΜL (ref 0–0.61)
EOSINOPHIL NFR BLD AUTO: 2 % (ref 0–6)
ERYTHROCYTE [DISTWIDTH] IN BLOOD BY AUTOMATED COUNT: 14.9 % (ref 11.6–15.1)
GFR SERPL CREATININE-BSD FRML MDRD: 101 ML/MIN/1.73SQ M
GLUCOSE SERPL-MCNC: 122 MG/DL (ref 65–140)
GLUCOSE SERPL-MCNC: 131 MG/DL (ref 65–140)
HCT VFR BLD AUTO: 32.4 % (ref 36.5–49.3)
HGB BLD-MCNC: 10.3 G/DL (ref 12–17)
IMM GRANULOCYTES # BLD AUTO: 0.03 THOUSAND/UL (ref 0–0.2)
IMM GRANULOCYTES NFR BLD AUTO: 1 % (ref 0–2)
LYMPHOCYTES # BLD AUTO: 1.26 THOUSANDS/ÂΜL (ref 0.6–4.47)
LYMPHOCYTES NFR BLD AUTO: 26 % (ref 14–44)
MAGNESIUM SERPL-MCNC: 1.7 MG/DL (ref 1.9–2.7)
MCH RBC QN AUTO: 33.7 PG (ref 26.8–34.3)
MCHC RBC AUTO-ENTMCNC: 31.8 G/DL (ref 31.4–37.4)
MCV RBC AUTO: 106 FL (ref 82–98)
MONOCYTES # BLD AUTO: 0.34 THOUSAND/ÂΜL (ref 0.17–1.22)
MONOCYTES NFR BLD AUTO: 7 % (ref 4–12)
NEUTROPHILS # BLD AUTO: 3 THOUSANDS/ÂΜL (ref 1.85–7.62)
NEUTS SEG NFR BLD AUTO: 62 % (ref 43–75)
NRBC BLD AUTO-RTO: 0 /100 WBCS
PLATELET # BLD AUTO: 210 THOUSANDS/UL (ref 149–390)
PMV BLD AUTO: 9.8 FL (ref 8.9–12.7)
POTASSIUM SERPL-SCNC: 3.7 MMOL/L (ref 3.5–5.3)
RBC # BLD AUTO: 3.06 MILLION/UL (ref 3.88–5.62)
SODIUM SERPL-SCNC: 139 MMOL/L (ref 135–147)
WBC # BLD AUTO: 4.77 THOUSAND/UL (ref 4.31–10.16)

## 2024-08-20 PROCEDURE — NC001 PR NO CHARGE: Performed by: PHYSICIAN ASSISTANT

## 2024-08-20 PROCEDURE — 80048 BASIC METABOLIC PNL TOTAL CA: CPT | Performed by: PHYSICIAN ASSISTANT

## 2024-08-20 PROCEDURE — 85025 COMPLETE CBC W/AUTO DIFF WBC: CPT | Performed by: PHYSICIAN ASSISTANT

## 2024-08-20 PROCEDURE — 83735 ASSAY OF MAGNESIUM: CPT | Performed by: PHYSICIAN ASSISTANT

## 2024-08-20 PROCEDURE — 99232 SBSQ HOSP IP/OBS MODERATE 35: CPT | Performed by: PHYSICIAN ASSISTANT

## 2024-08-20 PROCEDURE — 88305 TISSUE EXAM BY PATHOLOGIST: CPT | Performed by: PATHOLOGY

## 2024-08-20 PROCEDURE — 43239 EGD BIOPSY SINGLE/MULTIPLE: CPT | Performed by: INTERNAL MEDICINE

## 2024-08-20 PROCEDURE — 82948 REAGENT STRIP/BLOOD GLUCOSE: CPT

## 2024-08-20 RX ORDER — PANTOPRAZOLE SODIUM 40 MG/1
40 TABLET, DELAYED RELEASE ORAL
Status: DISCONTINUED | OUTPATIENT
Start: 2024-08-20 | End: 2024-08-21 | Stop reason: HOSPADM

## 2024-08-20 RX ORDER — MAGNESIUM SULFATE HEPTAHYDRATE 40 MG/ML
4 INJECTION, SOLUTION INTRAVENOUS ONCE
Status: COMPLETED | OUTPATIENT
Start: 2024-08-20 | End: 2024-08-20

## 2024-08-20 RX ORDER — LIDOCAINE HYDROCHLORIDE 10 MG/ML
INJECTION, SOLUTION EPIDURAL; INFILTRATION; INTRACAUDAL; PERINEURAL AS NEEDED
Status: DISCONTINUED | OUTPATIENT
Start: 2024-08-20 | End: 2024-08-20

## 2024-08-20 RX ORDER — ACETAMINOPHEN 325 MG/1
650 TABLET ORAL EVERY 6 HOURS PRN
Status: DISCONTINUED | OUTPATIENT
Start: 2024-08-20 | End: 2024-08-21 | Stop reason: HOSPADM

## 2024-08-20 RX ORDER — CALCIUM CARBONATE 500 MG/1
1000 TABLET, CHEWABLE ORAL ONCE
Status: COMPLETED | OUTPATIENT
Start: 2024-08-20 | End: 2024-08-20

## 2024-08-20 RX ORDER — SODIUM CHLORIDE 9 MG/ML
INJECTION, SOLUTION INTRAVENOUS CONTINUOUS PRN
Status: DISCONTINUED | OUTPATIENT
Start: 2024-08-20 | End: 2024-08-20

## 2024-08-20 RX ORDER — PROPOFOL 10 MG/ML
INJECTION, EMULSION INTRAVENOUS AS NEEDED
Status: DISCONTINUED | OUTPATIENT
Start: 2024-08-20 | End: 2024-08-20

## 2024-08-20 RX ADMIN — PROPOFOL 150 MG: 10 INJECTION, EMULSION INTRAVENOUS at 09:49

## 2024-08-20 RX ADMIN — ONDANSETRON 4 MG: 2 INJECTION INTRAMUSCULAR; INTRAVENOUS at 01:42

## 2024-08-20 RX ADMIN — LORAZEPAM 0.5 MG: 0.5 TABLET ORAL at 23:50

## 2024-08-20 RX ADMIN — CALCIUM CARBONATE (ANTACID) CHEW TAB 500 MG 1000 MG: 500 CHEW TAB at 11:52

## 2024-08-20 RX ADMIN — PROPOFOL 50 MG: 10 INJECTION, EMULSION INTRAVENOUS at 09:54

## 2024-08-20 RX ADMIN — PROPOFOL 50 MG: 10 INJECTION, EMULSION INTRAVENOUS at 09:57

## 2024-08-20 RX ADMIN — THIAMINE HYDROCHLORIDE: 100 INJECTION, SOLUTION INTRAMUSCULAR; INTRAVENOUS at 09:10

## 2024-08-20 RX ADMIN — ONDANSETRON 4 MG: 2 INJECTION INTRAMUSCULAR; INTRAVENOUS at 09:10

## 2024-08-20 RX ADMIN — MAGNESIUM SULFATE HEPTAHYDRATE 4 G: 40 INJECTION, SOLUTION INTRAVENOUS at 13:49

## 2024-08-20 RX ADMIN — SODIUM CHLORIDE 8 MG/HR: 9 INJECTION, SOLUTION INTRAVENOUS at 07:09

## 2024-08-20 RX ADMIN — ACETAMINOPHEN 650 MG: 325 TABLET ORAL at 03:52

## 2024-08-20 RX ADMIN — PANTOPRAZOLE SODIUM 40 MG: 40 TABLET, DELAYED RELEASE ORAL at 11:52

## 2024-08-20 RX ADMIN — LIDOCAINE HYDROCHLORIDE 80 MG: 10 INJECTION, SOLUTION EPIDURAL; INFILTRATION; INTRACAUDAL; PERINEURAL at 09:49

## 2024-08-20 RX ADMIN — SODIUM CHLORIDE: 0.9 INJECTION, SOLUTION INTRAVENOUS at 09:44

## 2024-08-20 NOTE — PLAN OF CARE
Problem: Prexisting or High Potential for Compromised Skin Integrity  Goal: Skin integrity is maintained or improved  Description: INTERVENTIONS:  - Identify patients at risk for skin breakdown  - Assess and monitor skin integrity  - Assess and monitor nutrition and hydration status  - Monitor labs   - Assess for incontinence   - Turn and reposition patient  - Assist with mobility/ambulation  - Relieve pressure over bony prominences  - Avoid friction and shearing  - Provide appropriate hygiene as needed including keeping skin clean and dry  - Evaluate need for skin moisturizer/barrier cream  - Collaborate with interdisciplinary team   - Patient/family teaching  - Consider wound care consult   Outcome: Progressing     Problem: PAIN - ADULT  Goal: Verbalizes/displays adequate comfort level or baseline comfort level  Description: Interventions:  - Encourage patient to monitor pain and request assistance  - Assess pain using appropriate pain scale  - Administer analgesics based on type and severity of pain and evaluate response  - Implement non-pharmacological measures as appropriate and evaluate response  - Consider cultural and social influences on pain and pain management  - Notify physician/advanced practitioner if interventions unsuccessful or patient reports new pain  Outcome: Progressing     Problem: INFECTION - ADULT  Goal: Absence of fever/infection during neutropenic period  Description: INTERVENTIONS:  - Monitor WBC    Outcome: Progressing

## 2024-08-20 NOTE — ANESTHESIA PREPROCEDURE EVALUATION
Procedure:  EGD    Relevant Problems   ANESTHESIA (within normal limits)      CARDIO   (+) Hyperlipidemia   (+) Hypertension      ENDO   (+) Hypothyroidism      GI/HEPATIC   (+) Acute on chronic pancreatitis due to alcohol   (+) GERD (gastroesophageal reflux disease)   (+) GI bleed   (+) Gastroesophageal reflux disease without esophagitis      HEMATOLOGY   (+) Acute blood loss anemia   (+) Pancytopenia (HCC)      MUSCULOSKELETAL   (+) Diastasis recti      NEURO/PSYCH   (+) Depression   (+) Generalized anxiety disorder      PULMONARY (within normal limits)      Behavioral Health   (+) Alcohol abuse      Other   (+) SIRS (systemic inflammatory response syndrome)   (+) Severe protein-calorie malnutrition (HCC)        Physical Exam    Airway    Mallampati score: II  TM Distance: >3 FB  Neck ROM: full     Dental   No notable dental hx     Cardiovascular      Pulmonary      Other Findings        Anesthesia Plan  ASA Score- 3     Anesthesia Type- IV sedation with anesthesia with ASA Monitors.         Additional Monitors:     Airway Plan:            Plan Factors-Exercise tolerance (METS): >4 METS.    Chart reviewed. EKG reviewed.  Existing labs reviewed. Patient summary reviewed.    Patient is not a current smoker.              Induction-     Postoperative Plan-     Perioperative Resuscitation Plan - Level 1 - Full Code.       Informed Consent- Anesthetic plan and risks discussed with patient.  I personally reviewed this patient with the CRNA. Discussed and agreed on the Anesthesia Plan with the CRNA..

## 2024-08-20 NOTE — CASE MANAGEMENT
Case Management Assessment & Discharge Planning Note    Patient name Sammy Mays  Location /-01 MRN [id number]  : 1960 Date 2024       Current Admission Date: 2024  Current Admission Diagnosis:GI bleed   Patient Active Problem List    Diagnosis Date Noted Date Diagnosed    GERD (gastroesophageal reflux disease) 2024     Hyponatremia 2024     Elevated LFTs 2024     Marijuana smoker, continuous 2024     Severe protein-calorie malnutrition (HCC) 2024     Hyperkalemia 10/18/2023     Alcohol withdrawal syndrome with complication (HCC) 10/17/2023     Depression 10/17/2023     Diastasis recti 10/17/2023     Hypocalcemia 2023     Pancytopenia (HCC) 2023     Obese 2023     Hypomagnesemia 2023     Lightheaded 2023     Hypernatremia 07/10/2023     GI bleed 2023     Acute blood loss anemia 2023     Alcohol abuse 2023     SIRS (systemic inflammatory response syndrome) 2023     Transition of care performed with sharing of clinical summary 2018     Acute on chronic pancreatitis due to alcohol 2018     Leukopenia 2018     Platelets decreased (HCC) 2018     Generalized anxiety disorder 2018     Hypertension 2018     Hyperlipidemia 2018     Impaired fasting glucose 2018     Hypothyroidism 2018     Habitual alcohol use 2018     Gastroesophageal reflux disease without esophagitis 2018       LOS (days): 2  Geometric Mean LOS (GMLOS) (days): 3  Days to GMLOS:1.1     OBJECTIVE:  PATIENT READMITTED TO HOSPITAL  Risk of Unplanned Readmission Score: 26.42         Current admission status: Inpatient       Preferred Pharmacy:   [id number] - [place] - [address]  [address] [place] [address]  Phone: [phone] Fax: [phone]    Primary Care Provider: Ankur Bustillo DO    Primary Insurance: MEDICARE  Secondary  Insurance: AARP    ASSESSMENT:  Active Health Care Proxies       Seth Pedraza [facility] Representative - Friend   Primary Phone: [phone] (Mobile)                           Readmission Root Cause  30 Day Readmission: Yes  Who directed you to return to the hospital?: Self  Did you understand whom to contact if you had questions or problems?: Yes  Did you get your prescriptions before you left the hospital?: No  Reason:: Preference for own pharmacy  Were you able to get your prescriptions filled when you left the hospital?: Yes  Did you take your medications as prescribed?: Yes  Were you able to get to your follow-up appointments?: Yes  During previous admission, was a post-acute recommendation made?: No  Patient was readmitted due to: GI bleed  Action Plan: EGD    Patient Information  Admitted from:: Home  Mental Status: Alert  During Assessment patient was accompanied by: Not accompanied during assessment  Assessment information provided by:: Patient  Primary Caregiver: Self                                   DISCHARGE DETAILS:    Discharge planning discussed with:: Pt  Freedom of Choice: Yes  Comments - Freedom of Choice: Discussed FOC  CM contacted family/caregiver?: No- see comments (Pt alert and oriented)  Were Treatment Team discharge recommendations reviewed with patient/caregiver?: Yes  Did patient/caregiver verbalize understanding of patient care needs?: Yes  Were patient/caregiver advised of the risks associated with not following Treatment Team discharge recommendations?: Yes    Contacts  Patient Contacts: stacy Christianson  Relationship to Patient:: Other (Comment) (Self)  Contact Method: In Person  Reason/Outcome: Continuity of Care, Discharge Planning    Requested Home Health Care         Is the patient interested in HHC at discharge?: No    DME Referral Provided  Referral made for DME?: No    Other Referral/Resources/Interventions Provided:  Financial Resources Provided: Indigent  Transportation  Referral Comments: No current post-acute recs. Pt would like CM to assist with transportation upon d/c.                  IMM Given (Date):: 08/20/24  IMM Given to:: Family     Additional Comments: Pt was readmitted due to GI bleed, s/p EGD and cleared by GI. Pt originally cleared by primary team to be d/c'd home, but due to having abdominal pain, is now staying for another night. Upon discharge, pt would like CM to assist with transportation. Pt declined referral to HOST, CM explained IMM and pt verbalized understanding. IMM signed. For complete assessment information, please see assessment information obtained on 8/13.

## 2024-08-20 NOTE — PROGRESS NOTES
Progress note entered as discharge summary.  Patient was unable to tolerate diet following EGD.  Advance gradually and consider discharge tomorrow if patient is able to eat.

## 2024-08-20 NOTE — ANESTHESIA POSTPROCEDURE EVALUATION
Post-Op Assessment Note    CV Status:  Stable  Pain Score: 0    Pain management: adequate       Mental Status:  Arousable   Hydration Status:  Stable   PONV Controlled:  None   Airway Patency:  Patent     Post Op Vitals Reviewed: Yes    No anethesia notable event occurred.    Staff: Anesthesiologist, CRNA               /75 (08/20/24 1022)    Temp      Pulse 69 (08/20/24 1022)   Resp 18 (08/20/24 1022)    SpO2 97 % (08/20/24 1022)

## 2024-08-20 NOTE — ASSESSMENT & PLAN NOTE
65 y/o male with PMH of alcohol abuse, recent admission for necrotizing pancreatitis, esophagitis/duodenitis, presented to ED only 3-days after discharge with c/o melena and coffee ground emesis. He had few episodes of melena and emesis today. None since arriving to ED.  Hemodynamically stable  Hemoglobin 12  Trend H&H q8  Reviewed previous EGD, did not see any sign of esophageal varices.  Reviewed recent imaging, no signs or symptoms of liver cirrhosis. However recent MRI shows splenic varices.   Start patient on Protonix infusion and octreotide infusion while admitted, transition to oral PPI for discharge   Trend CBC  Currently NPO, awaiting possible GI intervention  Consult gastroenterology- EGD stable  Clear for discharge, follow-up outpatient

## 2024-08-20 NOTE — DISCHARGE SUMMARY
Chiqui [address]   Date:   11/10/2020     Name:   Lexus Mena    YOB: 1956   MRN:   [id number]       WHERE IS YOUR PAIN NOW? Felipe the areas on your body where you feel the described sensations.   Use the appropriat [facility]  Discharge- Sammy Mays 1960, 64 y.o. male MRN: [id number]  Unit/Bed#: MS 57Damion01 Encounter: [id number]  Primary Care Provider: Ankur Bustillo DO   Date and time admitted to hospital: 8/18/2024  4:42 PM    * GI bleed  Assessment & Plan  65 y/o male with PMH of alcohol abuse, recent admission for necrotizing pancreatitis, esophagitis/duodenitis, presented to ED only 3-days after discharge with c/o melena and coffee ground emesis. He had few episodes of melena and emesis today. None since arriving to ED.  Hemodynamically stable  Hemoglobin 12  Trend H&H q8  Reviewed previous EGD, did not see any sign of esophageal varices.  Reviewed recent imaging, no signs or symptoms of liver cirrhosis. However recent MRI shows splenic varices.   Start patient on Protonix infusion and octreotide infusion while admitted, transition to oral PPI for discharge   Trend CBC  Currently NPO, awaiting possible GI intervention  Consult gastroenterology- EGD stable  Clear for discharge, follow-up outpatient    Depression  Assessment & Plan  Holding home Prozac until seen by GI    Hypomagnesemia  Assessment & Plan  Replace and repeat    Alcohol abuse  Assessment & Plan  Last alcohol intake was prior to last admission, before August 11.   Last admission patient was treated with Librium which was weaned off.  Continue IV thiamine/folic acid while admitted  Currently does not need CIWA protocol    Hypothyroidism  Assessment & Plan  Held home levothyroxine until seen by GI - can resume      Medical Problems       Resolved Problems  Date Reviewed: 8/20/2024   None       Discharging Physician / Practitioner: Francia Samuel PA-C  PCP: Ankur Bustillo DO  Admission Date:   Admission Orders (From admission, onward)       Ordered        08/18/24 1918  Inpatient Admission  Once                          Discharge Date: 08/20/24    Consultations During Hospital Stay:  GI    Procedures Performed:   EGD  "8/20:  The esophagus appeared normal.  Medium hiatal hernia  The stomach appeared normal.  2 cm submucosal nodule with central umbilication in the gastric antrum adjacent to the pylorus.  Large edematous area in the duodenum in the distal bulb at the start of the sweep. This was not completely occluding the duodenal lumen, scope was able to pass.  This could represent auto-fistulization from pancreatic necrotic collection to the duodenum. No cratered area to suggest ulceration was present. Biopsies of this area were obtained.    Significant Findings / Test Results:   CXR 8/18: No acute cardiopulmonary disease.     Incidental Findings:   None     Test Results Pending at Discharge (will require follow up):   Pathology from EGD     Outpatient Tests Requested:  Follow-up EUS    Complications:  None    Reason for Admission: GI bleed    Hospital Course:   Sammy Mays is a 64 y.o. male patient with a past medical history of alcohol abuse, recent admission for necrotizing pancreatitis, esophagitis/duodenitis who originally presented to the hospital on 8/18/2024 due to complaints of melena with coffee-ground emesis.  GI was consulted and repeat EGD was performed this admission with results as above.  Biopsies were taken and patient may follow-up on results outpatient.  Hemodynamically stable for discharge and cleared by GI for outpatient follow-up.    Hospital Course: No notes on file    Please see above list of diagnoses and related plan for additional information.     Condition at Discharge: stable    Discharge Day Visit / Exam:   Subjective:  Patient   Vitals: Blood Pressure: 122/75 (08/20/24 1022)  Pulse: 69 (08/20/24 1022)  Temperature: (!) 96.6 °F (35.9 °C) (08/20/24 1007)  Temp Source: Tympanic (08/20/24 1007)  Respirations: 18 (08/20/24 1022)  Height: 5' 9\" (175.3 cm) (08/19/24 0802)  Weight - Scale: 86.3 kg (190 lb 3.2 oz) (08/19/24 0802)  SpO2: 97 % (08/20/24 1022)  Exam:   Physical Exam  Vitals and nursing note " reviewed.   Constitutional:       General: He is not in acute distress.     Appearance: Normal appearance. He is well-developed.   HENT:      Head: Normocephalic and atraumatic.   Eyes:      General: No scleral icterus.     Conjunctiva/sclera: Conjunctivae normal.   Cardiovascular:      Rate and Rhythm: Normal rate and regular rhythm.      Heart sounds: No murmur heard.  Pulmonary:      Effort: Pulmonary effort is normal.      Breath sounds: No wheezing, rhonchi or rales.   Abdominal:      General: There is no distension.      Palpations: Abdomen is soft.   Skin:     General: Skin is warm and dry.   Neurological:      Mental Status: He is alert. Mental status is at baseline.   Psychiatric:         Mood and Affect: Mood normal.        Discussion with Family: Patient declined call to .     Discharge instructions/Information to patient and family:   See after visit summary for information provided to patient and family.      Provisions for Follow-Up Care:  See after visit summary for information related to follow-up care and any pertinent home health orders.      Mobility at time of Discharge:   Basic Mobility Inpatient Raw Score: 21  JH-HLM Goal: 6: Walk 10 steps or more  JH-HLM Achieved: 6: Walk 10 steps or more  HLM Goal achieved. Continue to encourage appropriate mobility.     Disposition:   Home    Planned Readmission: None     Discharge Statement:  I spent 65 minutes discharging the patient. This time was spent on the day of discharge. I had direct contact with the patient on the day of discharge. Greater than 50% of the total time was spent examining patient, answering all patient questions, arranging and discussing plan of care with patient as well as directly providing post-discharge instructions.  Additional time then spent on discharge activities.    Discharge Medications:  See after visit summary for reconciled discharge medications provided to patient and/or family.      **Please Note: This  note may have been constructed using a voice recognition system**

## 2024-08-20 NOTE — ASSESSMENT & PLAN NOTE
Last alcohol intake was prior to last admission, before August 11.   Last admission patient was treated with Librium which was weaned off.  Continue IV thiamine/folic acid while admitted  Currently does not need [facility] protocol

## 2024-08-21 VITALS
RESPIRATION RATE: 18 BRPM | DIASTOLIC BLOOD PRESSURE: 81 MMHG | WEIGHT: 190.2 LBS | HEIGHT: 69 IN | BODY MASS INDEX: 28.17 KG/M2 | HEART RATE: 73 BPM | SYSTOLIC BLOOD PRESSURE: 133 MMHG | OXYGEN SATURATION: 97 % | TEMPERATURE: 98.6 F

## 2024-08-21 PROBLEM — K92.1 MELENA: Status: ACTIVE | Noted: 2023-07-09

## 2024-08-21 LAB
ANION GAP SERPL CALCULATED.3IONS-SCNC: 7 MMOL/L (ref 4–13)
BASOPHILS # BLD AUTO: 0.05 THOUSANDS/ÂΜL (ref 0–0.1)
BASOPHILS NFR BLD AUTO: 1 % (ref 0–1)
BUN SERPL-MCNC: 7 MG/DL (ref 5–25)
CALCIUM SERPL-MCNC: 8.2 MG/DL (ref 8.4–10.2)
CHLORIDE SERPL-SCNC: 104 MMOL/L (ref 96–108)
CO2 SERPL-SCNC: 29 MMOL/L (ref 21–32)
CREAT SERPL-MCNC: 0.57 MG/DL (ref 0.6–1.3)
EOSINOPHIL # BLD AUTO: 0.06 THOUSAND/ÂΜL (ref 0–0.61)
EOSINOPHIL NFR BLD AUTO: 1 % (ref 0–6)
ERYTHROCYTE [DISTWIDTH] IN BLOOD BY AUTOMATED COUNT: 14.6 % (ref 11.6–15.1)
GFR SERPL CREATININE-BSD FRML MDRD: 107 ML/MIN/1.73SQ M
GLUCOSE SERPL-MCNC: 120 MG/DL (ref 65–140)
HCT VFR BLD AUTO: 30.1 % (ref 36.5–49.3)
HGB BLD-MCNC: 9.6 G/DL (ref 12–17)
IMM GRANULOCYTES # BLD AUTO: 0.03 THOUSAND/UL (ref 0–0.2)
IMM GRANULOCYTES NFR BLD AUTO: 1 % (ref 0–2)
LYMPHOCYTES # BLD AUTO: 1.2 THOUSANDS/ÂΜL (ref 0.6–4.47)
LYMPHOCYTES NFR BLD AUTO: 21 % (ref 14–44)
MCH RBC QN AUTO: 33.4 PG (ref 26.8–34.3)
MCHC RBC AUTO-ENTMCNC: 31.9 G/DL (ref 31.4–37.4)
MCV RBC AUTO: 105 FL (ref 82–98)
MONOCYTES # BLD AUTO: 0.44 THOUSAND/ÂΜL (ref 0.17–1.22)
MONOCYTES NFR BLD AUTO: 8 % (ref 4–12)
NEUTROPHILS # BLD AUTO: 3.87 THOUSANDS/ÂΜL (ref 1.85–7.62)
NEUTS SEG NFR BLD AUTO: 68 % (ref 43–75)
NRBC BLD AUTO-RTO: 0 /100 WBCS
PLATELET # BLD AUTO: 218 THOUSANDS/UL (ref 149–390)
PMV BLD AUTO: 10 FL (ref 8.9–12.7)
POTASSIUM SERPL-SCNC: 3.4 MMOL/L (ref 3.5–5.3)
RBC # BLD AUTO: 2.87 MILLION/UL (ref 3.88–5.62)
SODIUM SERPL-SCNC: 140 MMOL/L (ref 135–147)
WBC # BLD AUTO: 5.65 THOUSAND/UL (ref 4.31–10.16)

## 2024-08-21 PROCEDURE — 80048 BASIC METABOLIC PNL TOTAL CA: CPT | Performed by: PHYSICIAN ASSISTANT

## 2024-08-21 PROCEDURE — 99238 HOSP IP/OBS DSCHRG MGMT 30/<: CPT | Performed by: PHYSICIAN ASSISTANT

## 2024-08-21 PROCEDURE — 85025 COMPLETE CBC W/AUTO DIFF WBC: CPT | Performed by: PHYSICIAN ASSISTANT

## 2024-08-21 PROCEDURE — 99232 SBSQ HOSP IP/OBS MODERATE 35: CPT | Performed by: INTERNAL MEDICINE

## 2024-08-21 RX ORDER — POTASSIUM CHLORIDE 1500 MG/1
40 TABLET, EXTENDED RELEASE ORAL ONCE
Status: COMPLETED | OUTPATIENT
Start: 2024-08-21 | End: 2024-08-21

## 2024-08-21 RX ORDER — LANOLIN ALCOHOL/MO/W.PET/CERES
100 CREAM (GRAM) TOPICAL DAILY
Qty: 30 TABLET | Refills: 0 | Status: SHIPPED | OUTPATIENT
Start: 2024-08-21 | End: 2024-09-20

## 2024-08-21 RX ORDER — FOLIC ACID 1 MG/1
1 TABLET ORAL DAILY
Qty: 30 TABLET | Refills: 0 | Status: SHIPPED | OUTPATIENT
Start: 2024-08-21 | End: 2024-09-20

## 2024-08-21 RX ADMIN — THIAMINE HYDROCHLORIDE: 100 INJECTION, SOLUTION INTRAMUSCULAR; INTRAVENOUS at 08:51

## 2024-08-21 RX ADMIN — POTASSIUM CHLORIDE 40 MEQ: 1500 TABLET, EXTENDED RELEASE ORAL at 10:52

## 2024-08-21 RX ADMIN — PANTOPRAZOLE SODIUM 40 MG: 40 TABLET, DELAYED RELEASE ORAL at 06:07

## 2024-08-21 NOTE — ASSESSMENT & PLAN NOTE
63 y/o male with PMH of alcohol abuse, recent admission for necrotizing pancreatitis, esophagitis/duodenitis, presented to ED only 3-days after discharge with c/o melena and coffee ground emesis.   Reviewed previous EGD, did not see any sign of esophageal varices.  Reviewed recent imaging, no signs or symptoms of liver cirrhosis. However recent MRI shows splenic varices.   Initially was managed with Protonix infusion and octreotide infusion   GI consult and recommendations appreciated  Status post EGD which was concerning for a submucosal nodule in the stomach as well as concern for an auto fistulization of the duodenum area secondary to the necrotizing pancreatitis  Will need non-urgent outpatient EUS to evaluate submucosal nodule and can further evaluate pancreas at that time as well  Recommended adhering to low-fat diet, alcohol cessation; no need for PPI  Hemoglobin remained stable, did not require blood transfusion this admission

## 2024-08-21 NOTE — DISCHARGE SUMMARY
[facility]  Discharge- Sammy Mays 1960, 64 y.o. male MRN: [id number]  Unit/Bed#: -01 Encounter: [id number]  Primary Care Provider: Ankur Bustillo DO   Date and time admitted to hospital: 8/18/2024  4:42 PM    * Melena  Assessment & Plan  65 y/o male with PMH of alcohol abuse, recent admission for necrotizing pancreatitis, esophagitis/duodenitis, presented to ED only 3-days after discharge with c/o melena and coffee ground emesis.   Reviewed previous EGD, did not see any sign of esophageal varices.  Reviewed recent imaging, no signs or symptoms of liver cirrhosis. However recent MRI shows splenic varices.   Initially was managed with Protonix infusion and octreotide infusion   GI consult and recommendations appreciated  Status post EGD which was concerning for a submucosal nodule in the stomach as well as concern for an auto fistulization of the duodenum area secondary to the necrotizing pancreatitis  Will need non-urgent outpatient EUS to evaluate submucosal nodule and can further evaluate pancreas at that time as well  Recommended adhering to low-fat diet, alcohol cessation; no need for PPI  Hemoglobin remained stable, did not require blood transfusion this admission    Alcohol abuse  Assessment & Plan  Last alcohol intake was prior to last admission, before August 11th  Last admission patient was treated with Librium, which was weaned off  Continue IV thiamine/folic acid while admitted, transition to p.o. on discharge  Encourage cessation    Depression  Assessment & Plan  Resume home Prozac on discharge    Hypothyroidism  Assessment & Plan  Resumed on home levothyroxine       Medical Problems       Resolved Problems  Date Reviewed: 8/21/2024   None       Discharging Physician / Practitioner: Lina Lora PA-C  PCP: Ankur Bustillo DO  Admission Date:   Admission Orders (From admission, onward)       Ordered        08/18/24 1918  Inpatient Admission  Once      "                     Discharge Date: 08/21/24    Consultations During Hospital Stay:  GI     Procedures Performed:   EGD    Significant Findings / Test Results:   Outlined above     Incidental Findings:   None      Test Results Pending at Discharge (will require follow up):   None      Outpatient Tests Requested:  Follow-up with GI     Complications:  none     Reason for Admission: melena    Hospital Course:   Sammy Mays is a 64 y.o. male patient with PMH of alcohol abuse, recent admission for necrotizing pancreatitis, esophagitis/duodenitis who originally presented to the hospital on 8/18/2024 due to complaints of melena with coffee-ground emesis.  GI was consulted and repeat EGD was performed this admission with results as above.  Biopsies were taken and patient may follow-up on results outpatient.  Hemodynamically stable for discharge and cleared by GI for outpatient follow-up.  He was provided new Rx for p.o. folic acid and thiamine supplementation and encouraged to follow-up with his PCP after discharge.    Please see above list of diagnoses and related plan for additional information.     Condition at Discharge: fair    Discharge Day Visit / Exam:   Subjective: Patient noting mild abdominal discomfort this morning, overall improved and was able to tolerate breakfast.  Agreeable to discharge.  Again reiterated importance of alcohol cessation.  Vitals: Blood Pressure: 133/81 (08/21/24 0734)  Pulse: 73 (08/21/24 0734)  Temperature: 98.6 °F (37 °C) (08/21/24 0734)  Temp Source: Tympanic (08/20/24 1007)  Respirations: 18 (08/20/24 2226)  Height: 5' 9\" (175.3 cm) (08/19/24 0802)  Weight - Scale: 86.3 kg (190 lb 3.2 oz) (08/19/24 0802)  SpO2: 97 % (08/21/24 0734)  Exam:   Physical Exam  Vitals and nursing note reviewed.   Constitutional:       General: He is not in acute distress.  Cardiovascular:      Rate and Rhythm: Normal rate.   Pulmonary:      Effort: Pulmonary effort is normal. No respiratory distress. "   Abdominal:      General: There is no distension.      Tenderness: There is no abdominal tenderness.   Neurological:      Mental Status: He is alert and oriented to person, place, and time. Mental status is at baseline.          Discussion with Family: Patient declined call to .     Discharge instructions/Information to patient and family:   See after visit summary for information provided to patient and family.      Provisions for Follow-Up Care:  See after visit summary for information related to follow-up care and any pertinent home health orders.      Mobility at time of Discharge:   Basic Mobility Inpatient Raw Score: 24  JH-HLM Goal: 8: Walk 250 feet or more  JH-HLM Achieved: 6: Walk 10 steps or more  HLM Goal NOT achieved. Continue to encourage mobility in post discharge setting.     Disposition:   Home    Planned Readmission: no     Discharge Statement:  I spent 20 minutes discharging the patient. This time was spent on the day of discharge. I had direct contact with the patient on the day of discharge. Greater than 50% of the total time was spent examining patient, answering all patient questions, arranging and discussing plan of care with patient as well as directly providing post-discharge instructions.  Additional time then spent on discharge activities.    Discharge Medications:  See after visit summary for reconciled discharge medications provided to patient and/or family.      **Please Note: This note may have been constructed using a voice recognition system**

## 2024-08-21 NOTE — CASE MANAGEMENT
Case Management Discharge Planning Note    Patient name Sammy Mays  Location /-01 MRN [id number]  : 1960 Date 2024       Current Admission Date: 2024  Current Admission Diagnosis:Melena   Patient Active Problem List    Diagnosis Date Noted Date Diagnosed    GERD (gastroesophageal reflux disease) 2024     Hyponatremia 2024     Elevated LFTs 2024     Marijuana smoker, continuous 2024     Severe protein-calorie malnutrition (HCC) 2024     Hyperkalemia 10/18/2023     Alcohol withdrawal syndrome with complication (HCC) 10/17/2023     Depression 10/17/2023     Diastasis recti 10/17/2023     Hypocalcemia 2023     Pancytopenia (HCC) 2023     Obese 2023     Lightheaded 2023     Hypernatremia 07/10/2023     Melena 2023     Acute blood loss anemia 2023     Alcohol abuse 2023     SIRS (systemic inflammatory response syndrome) 2023     Transition of care performed with sharing of clinical summary 2018     Acute on chronic pancreatitis due to alcohol 2018     Leukopenia 2018     Platelets decreased (HCC) 2018     Generalized anxiety disorder 2018     Hypertension 2018     Hyperlipidemia 2018     Impaired fasting glucose 2018     Hypothyroidism 2018     Habitual alcohol use 2018     Gastroesophageal reflux disease without esophagitis 2018       LOS (days): 3  Geometric Mean LOS (GMLOS) (days): 3  Days to GMLOS:0.3     OBJECTIVE:  Risk of Unplanned Readmission Score: 26.9         Current admission status: Inpatient   Preferred Pharmacy:   [facility] - [place] - [address].  [address] [place] [address]  Phone: [phone] Fax: [phone]    [facility] - [place] - [address]  [address]  [place] [address]  Phone: [phone] Fax: [phone]    Primary Care  Provider: Ankur Bustillo DO    Primary Insurance: MEDICARE  Secondary Insurance: AARP    DISCHARGE DETAILS:    Discharge planning discussed with:: Pt  Freedom of Choice: Yes  Comments - Freedom of Choice: Discussed FOC  CM contacted family/caregiver?: No- see comments (Pt alert and oriented)  Were Treatment Team discharge recommendations reviewed with patient/caregiver?: Yes  Did patient/caregiver verbalize understanding of patient care needs?: Yes  Were patient/caregiver advised of the risks associated with not following Treatment Team discharge recommendations?: Yes    Contacts  Patient Contacts: stacy Christianson  Relationship to Patient:: Other (Comment) (Self)  Contact Method: In Person    Requested Home Health Care         Is the patient interested in HHC at discharge?: No         Other Referral/Resources/Interventions Provided:  Financial Resources Provided: Indigent Transportation  Referral Comments: Pt is medically stable for d/c. He requested to be transported back to his home address. Transport waiver signed. Transportation by [employer] scheduled for 1500.         Treatment Team Recommendation: Home  Discharge Destination Plan:: Home  Transport at Discharge : Ride Share        Transported by (Company and Unit #): [employer]  ETA of Transport (Date): 08/21/24  ETA of Transport (Time): 1500

## 2024-08-21 NOTE — PROGRESS NOTES
[facility] - Inpatient Consultation    PATIENT INFORMATION      Sammy Mays 64 y.o. male MRN: [id number]  Unit/Bed#: -01 Encounter: [id number]  PCP: Ankur Bustillo DO  Date of Admission:  8/18/2024  Date of Consultation: 08/21/24  Requesting Physician: Sotne Perkins MD       ASSESSMENT & PLAN     Sammy Mays is a 64 y.o. old male with PMH of chronic pancreatitis, patient admitted for necrotizing pancreatitis recently, and history of alcohol use, who presented with melena after discharge from the hospital.    Melena 2/2 possible submucosal stomach nodule or auto-fistulization in duodenum area due to necrotizing pancreatitis  History of alcohol use  Patient underwent an EGD yesterday which was concerning for a submucosal nodule in the stomach as well as concern for an auto fistulization of the duodenum area secondary to the necrotizing pancreatitis.  Patient is tolerating a diet postprocedure.    -Patient extensively counseled about alcohol and smoking cessation  -Please adhere to a low-fat diet  -GI will sign off, thank you for your consult, GI will be available to answer any questions.       REASON FOR CONSULTATION      Melena      HISTORY OF PRESENT ILLNESS      Patient is doing well this morning.    REVIEW OF SYSTEMS     CONSTITUTIONAL: Denies any fever, chills, rigors, and weight loss  HEENT: No earache or tinnitus, denies hearing loss or visual disturbances  CARDIOVASCULAR: No chest pain or palpitations   RESPIRATORY: Denies any cough, hemoptysis, shortness of breath or dyspnea on exertion  GASTROINTESTINAL: As noted in the History of Present Illness   GENITOURINARY: No problems with urination, denies any hematuria or dysuria  NEUROLOGIC: No dizziness or vertigo, denies headaches   MUSCULOSKELETAL: Denies any muscle or joint pain   SKIN: Denies skin rashes or itching   ENDOCRINE: Denies excessive thirst, denies intolerance to heat or cold  PSYCHOSOCIAL: Denies  depression or anxiety, denies any recent memory loss     PAST MEDICAL & SURGICAL HISTORY      Past Medical History:   Diagnosis Date    Medical history unknown     Pancreatitis        Past Surgical History:   Procedure Laterality Date    CHOLECYSTECTOMY LAPAROSCOPIC      REPAIR / REINSERT BICEPS TENDON AT ELBOW      Last Assessed: 1/12/2016        MEDICATIONS & ALLERGIES       Medications:     Medications Prior to Admission:     albuterol (PROVENTIL HFA,VENTOLIN HFA) 90 mcg/act inhaler    allopurinol (ZYLOPRIM) 300 mg tablet    cetirizine (ZyrTEC) 10 mg tablet    FLUoxetine (PROzac) 40 MG capsule    folic acid (FOLVITE) 1 mg tablet    levothyroxine 75 mcg tablet    magnesium Oxide (MagOx 400) 400 mg TABS    Multiple Vitamin (DAILY VALUE MULTIVITAMIN) TABS    pantoprazole (PROTONIX) 40 mg tablet    thiamine 100 MG tablet  Current Facility-Administered Medications   Medication Dose Route Frequency    acetaminophen (TYLENOL) tablet 650 mg  650 mg Oral Q6H PRN    albuterol (PROVENTIL HFA,VENTOLIN HFA) inhaler 1 puff  1 puff Inhalation Q6H PRN    folic acid 1 mg, thiamine (VITAMIN B1) 100 mg in sodium chloride 0.9 % 100 mL IV piggyback   Intravenous Daily    LORazepam (ATIVAN) tablet 0.5 mg  0.5 mg Oral HS PRN    ondansetron (ZOFRAN) injection 4 mg  4 mg Intravenous Q6H PRN    pantoprazole (PROTONIX) EC tablet 40 mg  40 mg Oral Early Morning       Allergies:   Allergies   Allergen Reactions    Amoxicillin Swelling and Fever     Other reaction(s): vertigo    Escitalopram      Other reaction(s): sweaty palms/hands, felt faint, passed out for a short time, had anxiety/panic attack    Gemfibrozil      Other reaction(s): Nausea and/or vomiting  Other reaction(s): Nausea and/or vomiting    Other      Other reaction(s): Other (See Comments)  rhinitis, itchy eyes       SOCIAL HISTORY      Social History     Marital Status: Single    Substance Use History:   Social History     Substance and Sexual Activity   Alcohol Use Yes     "Comment: social     Social History     Tobacco Use   Smoking Status Never   Smokeless Tobacco Never     Social History     Substance and Sexual Activity   Drug Use Yes    Types: Marijuana       FAMILY HISTORY      Family History   Problem Relation Age of Onset    Diabetes Mother        PHYSICAL EXAM     Objective   Blood pressure 133/81, pulse 73, temperature 98.6 °F (37 °C), resp. rate 18, height 5' 9\" (1.753 m), weight 86.3 kg (190 lb 3.2 oz), SpO2 97%. Body mass index is 28.09 kg/m².    Intake/Output Summary (Last 24 hours) at 8/21/2024 0917  Last data filed at 8/20/2024 1300  Gross per 24 hour   Intake 300 ml   Output --   Net 300 ml       General Appearance:   Alert, cooperative, no distress   HEENT:   Normocephalic, atraumatic, anicteric     Neck:   Supple, symmetrical, trachea midline   Lungs:   Equal chest rise, respirations unlabored    Heart:   Regular rate and rhythm   Abdomen:   Mild tenderness palpation   Rectal:   Deferred    Extremities:   No cyanosis, clubbing or edema    Neuro:   Moves all 4 extremities    Skin:   No jaundice, rashes, or lesions      ADDITIONAL DATA     Lab Results:     Results from last 7 days   Lab Units 08/21/24  0448   WBC Thousand/uL 5.65   HEMOGLOBIN g/dL 9.6*   HEMATOCRIT % 30.1*   PLATELETS Thousands/uL 218   SEGS PCT % 68   LYMPHO PCT % 21   MONO PCT % 8   EOS PCT % 1     Results from last 7 days   Lab Units 08/21/24  0448 08/19/24  0434 08/18/24  1721   POTASSIUM mmol/L 3.4*   < > 3.5   CHLORIDE mmol/L 104   < > 105   CO2 mmol/L 29   < > 26   BUN mg/dL 7   < > 24   CREATININE mg/dL 0.57*   < > 0.53*   CALCIUM mg/dL 8.2*   < > 7.9*   ALK PHOS U/L  --   --  111*   ALT U/L  --   --  9   AST U/L  --   --  12*    < > = values in this interval not displayed.     Results from last 7 days   Lab Units 08/18/24  1721   INR  1.19       Imaging:    MRI abdomen w wo contrast and mrcp    Result Date: 8/15/2024  Narrative: MRI OF THE ABDOMEN WITH AND WITHOUT CONTRAST WITH MRCP " INDICATION: 64 years / Male. Pancreatic head mass. Gastroenterology note from 8/14/2024 reviewed for history. Patient has history of chronic pancreatitis secondary to alcohol use presenting with abdominal pain. Patient has acute on chronic pancreatitis, and new 1.2 cm hypodense lesion in the pancreatic head. COMPARISON: Abdomen and pelvic CT from 8/11/2024. Right upper quadrant abdominal ultrasound from 6/8/2024. Abdomen and pelvic CT from 6/6/2024. Abdomen MR from 8/16/2019. TECHNIQUE: Multiplanar/multisequence MRI of the abdomen with 3D MRCP was performed before and after administration of contrast. IV Contrast: 8 mL of Gadobutrol injection (SINGLE-DOSE) FINDINGS: LOWER CHEST: Trace bilateral pleural effusions. LIVER: Normal in size and configuration. No suspicious mass. Tiny posterior right hepatic lobe simple cyst (series 8 image 16.) Patent hepatic and portal veins. There are splenic varices, and recanalized umbilical vein. BILE DUCTS: There is severe pancreatic and biliary ductal dilatation, with abrupt narrowing in the pancreatic head (series 9 image 8.) This is a chronic finding, unchanged dating back to 8/16/2019. GALLBLADDER: Post cholecystectomy. PANCREAS: The hypodense region in the pancreatic head detected on recent CT is a 1.0 x 0.9 x 0.9 cm region of nonenhancing pancreatic tissue consistent with pancreas necrosis (series 12 image 85.) There is evidence of acute pancreatitis, which will be described based on the revised Jing Classification of Acute Pancreatitis: - TIME OF ONSET: Around 8/11/2024 - NECROSIS: There is evidence of pancreatic parenchyma necrosis as above, therefore this is necrotizing pancreatitis. This involves only a small area in the pancreatic head, less than 30% of the pancreatic parenchyma. - LOCAL COMPLICATIONS: None. - INFECTION: There is no abnormal gas in or around the pancreas to suggest infection. ADRENAL GLANDS: Unremarkable. SPLEEN: Unchanged splenomegaly.  KIDNEYS/PROXIMAL URETERS: No hydroureteronephrosis. No suspicious renal mass. BOWEL: No dilated loops of bowel. PERITONEUM/RETROPERITONEUM: No ascites. LYMPH NODES: No abdominal lymphadenopathy. VESSELS: No aneurysm. ABDOMINAL WALL: Unremarkable BONES: No suspicious osseous lesion.     Impression: The hypodense region in the pancreatic head detected on recent CT is a 1.0 x 0.9 x 0.9 cm region of nonenhancing pancreatic tissue consistent with pancreas necrosis (series 12 image 85.) Therefore patient's pancreatitis is now characterized as necrotizing pancreatitis. There is severe pancreatic and biliary ductal dilatation, with abrupt narrowing in the pancreatic head (series 9 image 8.) This is a chronic finding, unchanged dating back to 8/16/2019. Again seen are mesenteric collaterals and splenomegaly. Workstation performed: [id number]     CT abdomen pelvis wo contrast    Result Date: 8/12/2024  Narrative: CT ABDOMEN AND PELVIS WITHOUT IV CONTRAST INDICATION: Abdominal pain. COMPARISON: 6/6/2024 TECHNIQUE: CT examination of the abdomen and pelvis was performed without intravenous contrast. Lack of IV contrast limits the sensitivity for pathology. Multiplanar 2D reformatted images were created from the source data. This examination, like all CT scans performed in the [facility], was performed utilizing techniques to minimize radiation dose exposure, including the use of iterative reconstruction and automated exposure control. Radiation dose length product (DLP) for this visit: 748 mGy-cm Enteric Contrast: Not administered. Lack of oral contrast limits the sensitivity for pathology within the bowel. FINDINGS: ABDOMEN LOWER CHEST: Redemonstration of left-sided gynecomastia. There are coronary artery calcifications. LIVER/BILIARY TREE: Unremarkable. GALLBLADDER: Post cholecystectomy. SPLEEN: Enlarged spleen measuring 14.7 cm. PANCREAS: There is chronic dilatation of the pancreatic duct with abrupt cut off  at the pancreatic head. There is a 1.2 cm hypodense lesion in the pancreatic head (2/67). The known low-density lesion at the pancreatic tail is not well delineated on this study without IV contrast. There is evidence of acute pancreatitis with fat stranding around the pancreatic head, which will be described based on the revised [place]mer Classification of Acute Pancreatitis: - TIME OF ONSET: less than or equal to 4 weeks - NECROSIS: There is peripancreatic edema but no evidence of pancreatic or peripancreatic necrosis, therefore this is interstitial edematous pancreatitis (IEP.) - LOCAL COMPLICATIONS: None - INFECTION: There is no abnormal gas in or around the pancreas to suggest infection. ADRENAL GLANDS: Unremarkable. KIDNEYS/URETERS: Simple renal cyst(s). Otherwise unremarkable kidneys. No hydronephrosis. STOMACH AND BOWEL: Colonic diverticulosis without findings of acute diverticulitis. There is fat stranding adjacent to the duodenum. APPENDIX: No findings to suggest appendicitis. ABDOMINOPELVIC CAVITY: No ascites. No pneumoperitoneum. No lymphadenopathy. VESSELS: Atherosclerosis without abdominal aortic aneurysm. Redemonstration of intra-abdominal varices. PELVIS REPRODUCTIVE ORGANS: Unremarkable for patient's age. URINARY BLADDER: Mild diffuse wall thickening. ABDOMINAL WALL/INGUINAL REGIONS: Unremarkable. BONES: No acute fracture or suspicious osseous lesion.     Impression: Findings consistent with acute pancreatitis versus duodenitis. 1.2 cm hypodense lesion in the pancreatic head. Would recommend an MRI/MRCP for further evaluation purposes. The study was marked in EPIC for immediate notification. Workstation performed: [id number]       EKG, Pathology, and Other Studies Reviewed on Admission:   EKG: Reviewed      Counseling / Coordination of Care Time: 30 total mins spent in consult. Greater than 50% of total time spent on patient counseling and coordination of care.    Melinda Stephenson,  MD  Gastroenterology Fellow  [facility]  Division of Gastroenterology and Hepatology    ...............................................................................................................................................  ** Please Note: This note is constructed using a voice recognition dictation system. **

## 2024-08-21 NOTE — ASSESSMENT & PLAN NOTE
Last alcohol intake was prior to last admission, before August 11th  Last admission patient was treated with Librium, which was weaned off  Continue IV thiamine/folic acid while admitted, transition to p.o. on discharge  Encourage cessation

## 2024-08-23 ENCOUNTER — TELEPHONE (OUTPATIENT)
Dept: GASTROENTEROLOGY | Facility: CLINIC | Age: 64
End: 2024-08-23

## 2024-08-23 PROCEDURE — 88305 TISSUE EXAM BY PATHOLOGIST: CPT | Performed by: PATHOLOGY

## 2024-08-23 NOTE — TELEPHONE ENCOUNTER
Pt discharged 08/21/2024.  Called to schedule f/u LM and send [employer] message.    Billing Type: Third-Party Bill

## 2024-08-23 NOTE — TELEPHONE ENCOUNTER
----- Message from Greta POOLE sent at 8/15/2024  5:27 PM EDT -----  Regarding: FW: f/u appt    ----- Message -----  From: Victor Hugo Vaughn MD  Sent: 8/15/2024  11:16 AM EDT  To: #  Subject: f/u appt                                         hi can we schedule a f/u appt for this shelby? Chronic pancreatitis, necrotizing pancreatic lesion, questionable cirrhosis.

## 2024-11-18 ENCOUNTER — APPOINTMENT (EMERGENCY)
Dept: RADIOLOGY | Facility: HOSPITAL | Age: 64
DRG: 440 | End: 2024-11-18
Payer: MEDICARE

## 2024-11-18 ENCOUNTER — HOSPITAL ENCOUNTER (INPATIENT)
Facility: HOSPITAL | Age: 64
LOS: 2 days | Discharge: HOME | DRG: 440 | End: 2024-11-20
Attending: EMERGENCY MEDICINE | Admitting: FAMILY MEDICINE
Payer: MEDICARE

## 2024-11-18 DIAGNOSIS — F10.10 ALCOHOL ABUSE: ICD-10-CM

## 2024-11-18 DIAGNOSIS — F10.939 ALCOHOL WITHDRAWAL SYNDROME WITH COMPLICATION (HCC): ICD-10-CM

## 2024-11-18 DIAGNOSIS — K85.90 PANCREATITIS: Primary | ICD-10-CM

## 2024-11-18 PROBLEM — E87.6 HYPOKALEMIA: Status: ACTIVE | Noted: 2024-11-18

## 2024-11-18 LAB
ALBUMIN SERPL BCG-MCNC: 4.1 G/DL (ref 3.5–5)
ALP SERPL-CCNC: 140 U/L (ref 34–104)
ALT SERPL W P-5'-P-CCNC: 23 U/L (ref 7–52)
ANION GAP SERPL CALCULATED.3IONS-SCNC: 13 MMOL/L (ref 4–13)
AST SERPL W P-5'-P-CCNC: 26 U/L (ref 13–39)
ATRIAL RATE: 110 BPM
ATRIAL RATE: 111 BPM
BACTERIA UR QL AUTO: ABNORMAL /HPF
BASOPHILS # BLD AUTO: 0.07 THOUSANDS/ÂΜL (ref 0–0.1)
BASOPHILS NFR BLD AUTO: 1 % (ref 0–1)
BILIRUB SERPL-MCNC: 1.27 MG/DL (ref 0.2–1)
BILIRUB UR QL STRIP: ABNORMAL
BUN SERPL-MCNC: 8 MG/DL (ref 5–25)
CALCIUM SERPL-MCNC: 9.4 MG/DL (ref 8.4–10.2)
CHLORIDE SERPL-SCNC: 95 MMOL/L (ref 96–108)
CLARITY UR: ABNORMAL
CO2 SERPL-SCNC: 27 MMOL/L (ref 21–32)
COLOR UR: ABNORMAL
CREAT SERPL-MCNC: 1.08 MG/DL (ref 0.6–1.3)
EOSINOPHIL # BLD AUTO: 0.01 THOUSAND/ÂΜL (ref 0–0.61)
EOSINOPHIL NFR BLD AUTO: 0 % (ref 0–6)
ERYTHROCYTE [DISTWIDTH] IN BLOOD BY AUTOMATED COUNT: 16 % (ref 11.6–15.1)
GFR SERPL CREATININE-BSD FRML MDRD: 71 ML/MIN/1.73SQ M
GLUCOSE SERPL-MCNC: 174 MG/DL (ref 65–140)
GLUCOSE UR STRIP-MCNC: ABNORMAL MG/DL
HCT VFR BLD AUTO: 51.8 % (ref 36.5–49.3)
HGB BLD-MCNC: 16.8 G/DL (ref 12–17)
HGB UR QL STRIP.AUTO: ABNORMAL
IMM GRANULOCYTES # BLD AUTO: 0.04 THOUSAND/UL (ref 0–0.2)
IMM GRANULOCYTES NFR BLD AUTO: 1 % (ref 0–2)
KETONES UR STRIP-MCNC: ABNORMAL MG/DL
LEUKOCYTE ESTERASE UR QL STRIP: ABNORMAL
LIPASE SERPL-CCNC: 367 U/L (ref 11–82)
LYMPHOCYTES # BLD AUTO: 1.1 THOUSANDS/ÂΜL (ref 0.6–4.47)
LYMPHOCYTES NFR BLD AUTO: 14 % (ref 14–44)
MCH RBC QN AUTO: 31.2 PG (ref 26.8–34.3)
MCHC RBC AUTO-ENTMCNC: 32.4 G/DL (ref 31.4–37.4)
MCV RBC AUTO: 96 FL (ref 82–98)
MONOCYTES # BLD AUTO: 0.55 THOUSAND/ÂΜL (ref 0.17–1.22)
MONOCYTES NFR BLD AUTO: 7 % (ref 4–12)
NEUTROPHILS # BLD AUTO: 5.91 THOUSANDS/ÂΜL (ref 1.85–7.62)
NEUTS SEG NFR BLD AUTO: 77 % (ref 43–75)
NITRITE UR QL STRIP: NEGATIVE
NON-SQ EPI CELLS URNS QL MICRO: ABNORMAL /HPF
NRBC BLD AUTO-RTO: 0 /100 WBCS
P AXIS: 40 DEGREES
P AXIS: 43 DEGREES
PH UR STRIP.AUTO: 5.5 [PH]
PLATELET # BLD AUTO: 257 THOUSANDS/UL (ref 149–390)
PMV BLD AUTO: 9.6 FL (ref 8.9–12.7)
POTASSIUM SERPL-SCNC: 3.3 MMOL/L (ref 3.5–5.3)
PR INTERVAL: 164 MS
PR INTERVAL: 168 MS
PROT SERPL-MCNC: 7.4 G/DL (ref 6.4–8.4)
PROT UR STRIP-MCNC: ABNORMAL MG/DL
QRS AXIS: 107 DEGREES
QRS AXIS: 111 DEGREES
QRSD INTERVAL: 74 MS
QRSD INTERVAL: 78 MS
QT INTERVAL: 298 MS
QT INTERVAL: 320 MS
QTC INTERVAL: 405 MS
QTC INTERVAL: 433 MS
RBC # BLD AUTO: 5.39 MILLION/UL (ref 3.88–5.62)
RBC #/AREA URNS AUTO: ABNORMAL /HPF
SODIUM SERPL-SCNC: 135 MMOL/L (ref 135–147)
SP GR UR STRIP.AUTO: >=1.03 (ref 1–1.03)
T WAVE AXIS: 22 DEGREES
T WAVE AXIS: 28 DEGREES
UROBILINOGEN UR STRIP-ACNC: 4 MG/DL
VENTRICULAR RATE: 110 BPM
VENTRICULAR RATE: 111 BPM
WBC # BLD AUTO: 7.68 THOUSAND/UL (ref 4.31–10.16)
WBC #/AREA URNS AUTO: ABNORMAL /HPF

## 2024-11-18 PROCEDURE — 85025 COMPLETE CBC W/AUTO DIFF WBC: CPT

## 2024-11-18 PROCEDURE — 99284 EMERGENCY DEPT VISIT MOD MDM: CPT

## 2024-11-18 PROCEDURE — 99285 EMERGENCY DEPT VISIT HI MDM: CPT | Performed by: EMERGENCY MEDICINE

## 2024-11-18 PROCEDURE — 80053 COMPREHEN METABOLIC PANEL: CPT

## 2024-11-18 PROCEDURE — 93005 ELECTROCARDIOGRAM TRACING: CPT

## 2024-11-18 PROCEDURE — 74177 CT ABD & PELVIS W/CONTRAST: CPT

## 2024-11-18 PROCEDURE — 83690 ASSAY OF LIPASE: CPT

## 2024-11-18 PROCEDURE — 99223 1ST HOSP IP/OBS HIGH 75: CPT | Performed by: FAMILY MEDICINE

## 2024-11-18 PROCEDURE — 36415 COLL VENOUS BLD VENIPUNCTURE: CPT

## 2024-11-18 PROCEDURE — 96374 THER/PROPH/DIAG INJ IV PUSH: CPT

## 2024-11-18 PROCEDURE — 93010 ELECTROCARDIOGRAM REPORT: CPT | Performed by: INTERNAL MEDICINE

## 2024-11-18 PROCEDURE — 81001 URINALYSIS AUTO W/SCOPE: CPT

## 2024-11-18 RX ORDER — ENOXAPARIN SODIUM 100 MG/ML
40 INJECTION SUBCUTANEOUS DAILY
Status: DISCONTINUED | OUTPATIENT
Start: 2024-11-19 | End: 2024-11-20 | Stop reason: HOSPADM

## 2024-11-18 RX ORDER — KETOROLAC TROMETHAMINE 30 MG/ML
15 INJECTION, SOLUTION INTRAMUSCULAR; INTRAVENOUS ONCE
Status: COMPLETED | OUTPATIENT
Start: 2024-11-18 | End: 2024-11-18

## 2024-11-18 RX ORDER — LORAZEPAM 0.5 MG/1
0.5 TABLET ORAL ONCE AS NEEDED
Status: COMPLETED | OUTPATIENT
Start: 2024-11-18 | End: 2024-11-18

## 2024-11-18 RX ORDER — SODIUM CHLORIDE, SODIUM LACTATE, POTASSIUM CHLORIDE, CALCIUM CHLORIDE 600; 310; 30; 20 MG/100ML; MG/100ML; MG/100ML; MG/100ML
125 INJECTION, SOLUTION INTRAVENOUS CONTINUOUS
Status: DISCONTINUED | OUTPATIENT
Start: 2024-11-18 | End: 2024-11-20 | Stop reason: HOSPADM

## 2024-11-18 RX ORDER — ALBUTEROL SULFATE 90 UG/1
2 INHALANT RESPIRATORY (INHALATION) EVERY 4 HOURS PRN
Status: DISCONTINUED | OUTPATIENT
Start: 2024-11-18 | End: 2024-11-20 | Stop reason: HOSPADM

## 2024-11-18 RX ORDER — POTASSIUM CHLORIDE 1500 MG/1
40 TABLET, EXTENDED RELEASE ORAL ONCE
Status: COMPLETED | OUTPATIENT
Start: 2024-11-18 | End: 2024-11-18

## 2024-11-18 RX ORDER — PANTOPRAZOLE SODIUM 40 MG/1
40 TABLET, DELAYED RELEASE ORAL
Status: DISCONTINUED | OUTPATIENT
Start: 2024-11-19 | End: 2024-11-20 | Stop reason: HOSPADM

## 2024-11-18 RX ORDER — ONDANSETRON 2 MG/ML
4 INJECTION INTRAMUSCULAR; INTRAVENOUS EVERY 6 HOURS PRN
Status: DISCONTINUED | OUTPATIENT
Start: 2024-11-18 | End: 2024-11-20 | Stop reason: HOSPADM

## 2024-11-18 RX ORDER — LEVOTHYROXINE SODIUM 75 UG/1
75 TABLET ORAL
Status: DISCONTINUED | OUTPATIENT
Start: 2024-11-19 | End: 2024-11-20 | Stop reason: HOSPADM

## 2024-11-18 RX ORDER — ALLOPURINOL 300 MG/1
300 TABLET ORAL DAILY
Status: DISCONTINUED | OUTPATIENT
Start: 2024-11-19 | End: 2024-11-20 | Stop reason: HOSPADM

## 2024-11-18 RX ORDER — POLYETHYLENE GLYCOL 3350 17 G/17G
17 POWDER, FOR SOLUTION ORAL DAILY
Status: DISCONTINUED | OUTPATIENT
Start: 2024-11-19 | End: 2024-11-20 | Stop reason: HOSPADM

## 2024-11-18 RX ORDER — LANOLIN ALCOHOL/MO/W.PET/CERES
100 CREAM (GRAM) TOPICAL DAILY
Status: DISCONTINUED | OUTPATIENT
Start: 2024-11-19 | End: 2024-11-20 | Stop reason: HOSPADM

## 2024-11-18 RX ORDER — KETOROLAC TROMETHAMINE 30 MG/ML
30 INJECTION, SOLUTION INTRAMUSCULAR; INTRAVENOUS EVERY 6 HOURS PRN
Status: DISCONTINUED | OUTPATIENT
Start: 2024-11-18 | End: 2024-11-20 | Stop reason: HOSPADM

## 2024-11-18 RX ORDER — ACETAMINOPHEN 325 MG/1
650 TABLET ORAL EVERY 6 HOURS PRN
Status: DISCONTINUED | OUTPATIENT
Start: 2024-11-18 | End: 2024-11-20 | Stop reason: HOSPADM

## 2024-11-18 RX ORDER — LORATADINE 10 MG/1
10 TABLET ORAL DAILY
Status: DISCONTINUED | OUTPATIENT
Start: 2024-11-19 | End: 2024-11-20 | Stop reason: HOSPADM

## 2024-11-18 RX ORDER — ACETAMINOPHEN 325 MG/1
650 TABLET ORAL ONCE
Status: COMPLETED | OUTPATIENT
Start: 2024-11-18 | End: 2024-11-18

## 2024-11-18 RX ORDER — FOLIC ACID 1 MG/1
1 TABLET ORAL DAILY
Status: DISCONTINUED | OUTPATIENT
Start: 2024-11-19 | End: 2024-11-20 | Stop reason: HOSPADM

## 2024-11-18 RX ORDER — TRAMADOL HYDROCHLORIDE 50 MG/1
50 TABLET ORAL EVERY 6 HOURS PRN
Status: DISCONTINUED | OUTPATIENT
Start: 2024-11-18 | End: 2024-11-20 | Stop reason: HOSPADM

## 2024-11-18 RX ADMIN — SODIUM CHLORIDE, SODIUM LACTATE, POTASSIUM CHLORIDE, AND CALCIUM CHLORIDE 125 ML/HR: .6; .31; .03; .02 INJECTION, SOLUTION INTRAVENOUS at 17:56

## 2024-11-18 RX ADMIN — ACETAMINOPHEN 650 MG: 325 TABLET, FILM COATED ORAL at 11:44

## 2024-11-18 RX ADMIN — POTASSIUM CHLORIDE 40 MEQ: 1500 TABLET, EXTENDED RELEASE ORAL at 19:32

## 2024-11-18 RX ADMIN — KETOROLAC TROMETHAMINE 15 MG: 30 INJECTION, SOLUTION INTRAMUSCULAR; INTRAVENOUS at 11:44

## 2024-11-18 RX ADMIN — IOHEXOL 100 ML: 350 INJECTION, SOLUTION INTRAVENOUS at 15:35

## 2024-11-18 RX ADMIN — LORAZEPAM 0.5 MG: 0.5 TABLET ORAL at 22:06

## 2024-11-18 NOTE — ED NOTES
Pt resting in bed  Pt denies any pain at this time  Pt provided some diet starry to drink  Pt still awaiting bed placement     Kay Dutton RN  11/18/24 [id number]

## 2024-11-18 NOTE — ASSESSMENT & PLAN NOTE
Patient with known history of alcohol abuse.  Reported that he had 2 drinks on Friday.  Since then he has not had any alcoholic beverage.  No signs of withdrawal.  Continue with thiamine and folic acid.  [facility] protocol  Patient reported that she does not consume alcohol on a regular basis anymore due to his pancreatitis

## 2024-11-18 NOTE — ED ATTENDING ATTESTATION
11/18/2024  I, Agustín Rose MD, saw and evaluated the patient. I have discussed the patient with the resident/non-physician practitioner and agree with the resident's/non-physician practitioner's findings, Plan of Care, and MDM as documented in the resident's/non-physician practitioner's note, except where noted. All available labs and Radiology studies were reviewed.  I was present for key portions of any procedure(s) performed by the resident/non-physician practitioner and I was immediately available to provide assistance.       At this point I agree with the current assessment done in the Emergency Department.  I have conducted an independent evaluation of this patient a history and physical is as follows:    ED Course     64-year-old male, history of previous pancreatitis, presenting to the emergency department for evaluation of infraumbilical abdominal discomfort.  Patient has been sick for the past 3 to 4 days with nausea, nonbloody vomitus, nonbloody diarrhea, or generalized abdominal discomfort which he describes as crampy.  No known ill contacts.  No fever.    Past Medical History:   Diagnosis Date    Medical history unknown     Pancreatitis      The patient is resting comfortably on a stretcher in no acute respiratory distress. The patient appears nontoxic. HEENT reveals moist mucous membranes. Head is normocephalic and atraumatic. Conjunctiva and sclera are normal. Neck is nontender and supple with full range of motion to flexion, extension, lateral rotation. No meningismus appreciated. No masses are appreciated. Lungs are clear to auscultation bilaterally without any wheezes, rales or rhonchi. Heart is regular rate and rhythm without any murmurs, rubs or gallops.  Mild midline infraumbilical abdominal tenderness without any rebound or guarding. Extremities appear grossly normal without any significant arthropathy. Patient is awake, alert, and oriented x3. The patient has normal interaction.   Cranial nerves grossly intact.  Motor is 5 out of 5 bilateral upper and lower extremities.    MEDICAL DECISION MAKING    Number and Complexity of Problems  Differential diagnosis: Enteritis, hypokalemia, ischemic colitis, infectious colitis.    Medical Decision Making Data  External documents reviewed: Reviewed last admission for alcoholic pancreatitis on 8/11/2024.  My EKG interpretation: Sinus tachycardia at 110 bpm.  First-degree AV block.  My CT interpretation: Pancreatitis.      CT abdomen pelvis with contrast   ED Interpretation   Pancreatitis versus duodenitis.  Similar to CT scan from August.                Labs Reviewed   CBC AND DIFFERENTIAL - Abnormal       Result Value Ref Range Status    WBC 7.68  4.31 - 10.16 Thousand/uL Final    RBC 5.39  3.88 - 5.62 Million/uL Final    Hemoglobin 16.8  12.0 - 17.0 g/dL Final    Hematocrit 51.8 (*) 36.5 - 49.3 % Final    MCV 96  82 - 98 fL Final    MCH 31.2  26.8 - 34.3 pg Final    MCHC 32.4  31.4 - 37.4 g/dL Final    RDW 16.0 (*) 11.6 - 15.1 % Final    MPV 9.6  8.9 - 12.7 fL Final    Platelets 257  149 - 390 Thousands/uL Final    nRBC 0  /100 WBCs Final    Segmented % 77 (*) 43 - 75 % Final    Immature Grans % 1  0 - 2 % Final    Lymphocytes % 14  14 - 44 % Final    Monocytes % 7  4 - 12 % Final    Eosinophils Relative 0  0 - 6 % Final    Basophils Relative 1  0 - 1 % Final    Absolute Neutrophils 5.91  1.85 - 7.62 Thousands/µL Final    Absolute Immature Grans 0.04  0.00 - 0.20 Thousand/uL Final    Absolute Lymphocytes 1.10  0.60 - 4.47 Thousands/µL Final    Absolute Monocytes 0.55  0.17 - 1.22 Thousand/µL Final    Eosinophils Absolute 0.01  0.00 - 0.61 Thousand/µL Final    Basophils Absolute 0.07  0.00 - 0.10 Thousands/µL Final   COMPREHENSIVE METABOLIC PANEL - Abnormal    Sodium 135  135 - 147 mmol/L Final    Potassium 3.3 (*) 3.5 - 5.3 mmol/L Final    Chloride 95 (*) 96 - 108 mmol/L Final    CO2 27  21 - 32 mmol/L Final    ANION GAP 13  4 - 13 mmol/L Final    BUN  8  5 - 25 mg/dL Final    Creatinine 1.08  0.60 - 1.30 mg/dL Final    Comment: Standardized to IDMS reference method    Glucose 174 (*) 65 - 140 mg/dL Final    Comment: If the patient is fasting, the ADA then defines impaired fasting glucose as > 100 mg/dL and diabetes as > or equal to 123 mg/dL.    Calcium 9.4  8.4 - 10.2 mg/dL Final    AST 26  13 - 39 U/L Final    ALT 23  7 - 52 U/L Final    Comment: Specimen collection should occur prior to Sulfasalazine administration due to the potential for falsely depressed results.     Alkaline Phosphatase 140 (*) 34 - 104 U/L Final    Total Protein 7.4  6.4 - 8.4 g/dL Final    Albumin 4.1  3.5 - 5.0 g/dL Final    Total Bilirubin 1.27 (*) 0.20 - 1.00 mg/dL Final    Comment: Use of this assay is not recommended for patients undergoing treatment with eltrombopag due to the potential for falsely elevated results.  N-acetyl-p-benzoquinone imine (metabolite of Acetaminophen) will generate erroneously low results in samples for patients that have taken an overdose of Acetaminophen.    eGFR 71  ml/min/1.73sq m Final    Narrative:     National Kidney Disease Foundation guidelines for Chronic Kidney Disease (CKD):     Stage 1 with normal or high GFR (GFR > 90 mL/min/1.73 square meters)    Stage 2 Mild CKD (GFR = 60-89 mL/min/1.73 square meters)    Stage 3A Moderate CKD (GFR = 45-59 mL/min/1.73 square meters)    Stage 3B Moderate CKD (GFR = 30-44 mL/min/1.73 square meters)    Stage 4 Severe CKD (GFR = 15-29 mL/min/1.73 square meters)    Stage 5 End Stage CKD (GFR <15 mL/min/1.73 square meters)  Note: GFR calculation is accurate only with a steady state creatinine   LIPASE - Abnormal    Lipase 367 (*) 11 - 82 u/L Final   UA W REFLEX TO MICROSCOPIC WITH REFLEX TO CULTURE - Abnormal    Color, UA Dark Yellow   Final    Clarity, UA Hazy   Final    Specific Gravity, UA >=1.030  1.005 - 1.030 Final    pH, UA 5.5  4.5, 5.0, 5.5, 6.0, 6.5, 7.0, 7.5, 8.0 Final    Leukocytes, UA Moderate (*)  Negative Final    Nitrite, UA Negative  Negative Final    Protein,  (2+) (*) Negative mg/dl Final    Glucose, UA 30 (3/100%) (*) Negative mg/dl Final    Ketones, UA Trace (*) Negative mg/dl Final    Urobilinogen, UA 4.0 (*) <2.0 mg/dl mg/dl Final    Bilirubin, UA Small (*) Negative Final    Occult Blood, UA Trace (*) Negative Final   URINE MICROSCOPIC - Abnormal    RBC, UA 2-4 (*) None Seen, 1-2 /hpf Final    WBC, UA 2-4 (*) None Seen, 1-2 /hpf Final    Epithelial Cells Occasional  None Seen, Occasional /hpf Final    Bacteria, UA None Seen  None Seen, Occasional /hpf Final       Labs reviewed by me are significant for:  Slightly elevated lipase of 367.    Discussed case with: Medicine.  Considered admission for: Alcohol pancreatitis.    Treatment and Disposition  ED course: Patient remained hemodynamically stable in the emergency department.  Patient underwent evaluation with lab work and CT scan which were reviewed by me.  Findings consistent with alcoholic pancreatitis.  Patient will be admitted to medicine for n.p.o. status and management of symptoms.  Shared decision making: Patient agreeable with plan.  Code status: Full code.            Critical Care Time  Procedures

## 2024-11-18 NOTE — ASSESSMENT & PLAN NOTE
Monitor blood pressure.  Do not appear to be on any antihypertensive prior to coming to the hospital

## 2024-11-18 NOTE — H&P
H&P - Hospitalist   Name: Sammy Mays 64 y.o. male I MRN: [id number]  Unit/Bed#: -01 I Date of Admission: 11/18/2024   Date of Service: 11/18/2024 I Hospital Day: 0     Assessment & Plan  Acute on chronic pancreatitis due to alcohol  Patient came to the hospital with epigastric pain since Friday  CT scan is concerning for pancreatitis.-No signs of lower collection.  Ductal dilatation is stable.  Area of pancreatic necrosis is stable  Elevated lipase noted.  Patient had multiple admissions in the past for pancreatitis.  Endorses alcohol consumption on Friday-but since then with decreased p.o. intake due to pain  Lactated Ringer at 125 cc  Clear liquid diet.  Advance diet as tolerated  Outpatient follow-up with GI  Hypertension  Monitor blood pressure.  Do not appear to be on any antihypertensive prior to coming to the hospital  Hyperlipidemia    Hypothyroidism  Continue Synthroid  Alcohol abuse  Patient with known history of alcohol abuse.  Reported that he had 2 drinks on Friday.  Since then he has not had any alcoholic beverage.  No signs of withdrawal.  Continue with thiamine and folic acid.  CIWA protocol  Patient reported that she does not consume alcohol on a regular basis anymore due to his pancreatitis  Hypokalemia  Patient noted to have hypokalemia.  Continue with potassium supplementation.      VTE Pharmacologic Prophylaxis: VTE Score: 3 Moderate Risk (Score 3-4) - Pharmacological DVT Prophylaxis Ordered: enoxaparin (Lovenox).  Code Status: Level 1 - Full Code   Discussion with family: Patient declined call to .     Anticipated Length of Stay: Patient will be admitted on an inpatient basis with an anticipated length of stay of greater than 2 midnights secondary to pancreatitis.    History of Present Illness   Chief Complaint: Abdominal pain    Sammy Mays is a 64 y.o. male with a PMH of alcohol abuse and chronic pancreatitis who presents with epigastric pain.  Patient reported that  the pain started on Friday.  Patient endorses consumption of 2 alcoholic beverages on Friday.  Patient reported that the pain is mainly in the epigastric region without any radiation to the back which he usually gets with acute pancreatitis.  Patient reported that he does not consume alcohol on a regular basis because of his pancreatitis.  Patient with poor p.o. intake.  In general patient believes that his pain and appetite is improving at this time..  Patient also endorses history of nausea vomiting and diarrhea and he believes his symptoms may have been due to a GI bug    Review of Systems   Constitutional:  Positive for appetite change. Negative for fatigue, fever and unexpected weight change.   HENT: Negative.     Eyes: Negative.    Respiratory: Negative.     Gastrointestinal:  Positive for abdominal pain, diarrhea, nausea and vomiting.   Endocrine: Negative.    Genitourinary: Negative.    Musculoskeletal: Negative.    Skin: Negative.    Allergic/Immunologic: Negative.    Neurological: Negative.    Hematological: Negative.    Psychiatric/Behavioral: Negative.         Historical Information   Past Medical History:   Diagnosis Date    Medical history unknown     Pancreatitis      Past Surgical History:   Procedure Laterality Date    CHOLECYSTECTOMY LAPAROSCOPIC      REPAIR / REINSERT BICEPS TENDON AT ELBOW      Last Assessed: 1/12/2016      Social History     Tobacco Use    Smoking status: Never    Smokeless tobacco: Never   Substance and Sexual Activity    Alcohol use: Yes     Comment: social    Drug use: Yes     Types: Marijuana    Sexual activity: Not on file     E-Cigarette/Vaping     E-Cigarette/Vaping Substances     Family History   Problem Relation Age of Onset    Diabetes Mother      Social History:  Marital Status: Single   Occupation:   Patient Pre-hospital Living Situation: Home  Patient Pre-hospital Level of Mobility: walks  Patient Pre-hospital Diet Restrictions:     Meds/Allergies   I have reviewed  home medications with patient personally.  Prior to Admission medications    Medication Sig Start Date End Date Taking? Authorizing Provider   albuterol (PROVENTIL HFA,VENTOLIN HFA) 90 mcg/act inhaler Inhale 1-2 puffs    Historical Provider, MD   allopurinol (ZYLOPRIM) 300 mg tablet TAKE 1 TABLET EVERY DAY 8/15/19   Sarah Fiore MD   cetirizine (ZyrTEC) 10 mg tablet Take one tablet by mouth daily as needed for allergies/congestion  5/2/14   Historical Provider, MD   FLUoxetine (PROzac) 40 MG capsule TAKE ONE CAPSULE BY MOUTH EVERY DAY 7/22/19   Sarah Fiore MD   folic acid (FOLVITE) 1 mg tablet Take 1 tablet (1 mg total) by mouth daily 8/21/24 9/20/24  Lina Lora PA-C   levothyroxine 75 mcg tablet TAKE 1 TABLET IN THE MORNING ON AN EMPTY STOMACH FOR THYROID 6/9/19   Sarah Fiore MD   Multiple Vitamin (DAILY VALUE MULTIVITAMIN) TABS Take 1 tablet by mouth daily    Historical Provider, MD   pantoprazole (PROTONIX) 40 mg tablet Take 1 tablet (40 mg total) by mouth daily in the early morning 4/23/24   ANI Barlow   thiamine 100 MG tablet Take 1 tablet (100 mg total) by mouth daily 8/21/24 9/20/24  Lina Lora PA-C     Allergies   Allergen Reactions    Amoxicillin Swelling and Fever     Other reaction(s): vertigo    Escitalopram      Other reaction(s): sweaty palms/hands, felt faint, passed out for a short time, had anxiety/panic attack    Gemfibrozil      Other reaction(s): Nausea and/or vomiting  Other reaction(s): Nausea and/or vomiting    Other      Other reaction(s): Other (See Comments)  rhinitis, itchy eyes       Objective :  Temp:  [98.6 °F (37 °C)] 98.6 °F (37 °C)  HR:  [] 87  BP: (121-137)/(71-84) 137/83  Resp:  [15-20] 20  SpO2:  [63 %-98 %] 96 %  O2 Device: None (Room air)    Physical Exam  Constitutional:       General: He is not in acute distress.     Appearance: He is not ill-appearing.   HENT:      Head: Normocephalic and atraumatic.      Nose: Nose normal. No congestion.    Eyes:      General: No scleral icterus.  Cardiovascular:      Rate and Rhythm: Normal rate and regular rhythm.      Heart sounds: No murmur heard.  Pulmonary:      Effort: Pulmonary effort is normal. No respiratory distress.      Breath sounds: No wheezing.   Abdominal:      General: Bowel sounds are normal. There is no distension.      Tenderness: There is abdominal tenderness (Epigastric and left upper quadrant tenderness).   Musculoskeletal:         General: No swelling. Normal range of motion.   Skin:     General: Skin is warm.      Coloration: Skin is not jaundiced.   Neurological:      Mental Status: He is alert. Mental status is at baseline.          Lines/Drains:            Lab Results: I have reviewed the following results:  Results from last 7 days   Lab Units 11/18/24  1144   WBC Thousand/uL 7.68   HEMOGLOBIN g/dL 16.8   HEMATOCRIT % 51.8*   PLATELETS Thousands/uL 257   SEGS PCT % 77*   LYMPHO PCT % 14   MONO PCT % 7   EOS PCT % 0     Results from last 7 days   Lab Units 11/18/24  1144   SODIUM mmol/L 135   POTASSIUM mmol/L 3.3*   CHLORIDE mmol/L 95*   CO2 mmol/L 27   BUN mg/dL 8   CREATININE mg/dL 1.08   ANION GAP mmol/L 13   CALCIUM mg/dL 9.4   ALBUMIN g/dL 4.1   TOTAL BILIRUBIN mg/dL 1.27*   ALK PHOS U/L 140*   ALT U/L 23   AST U/L 26   GLUCOSE RANDOM mg/dL 174*             Lab Results   Component Value Date    HGBA1C 5.8 (H) 09/18/2023    HGBA1C 5.0 07/10/2023    HGBA1C 5.6 09/07/2021           Imaging Results Review: I reviewed radiology reports from this admission including: CT abdomen/pelvis.  Other Study Results Review: EKG was reviewed.     Administrative Statements   I have spent a total time of 65 minutes in caring for this patient on the day of the visit/encounter including Obtaining or reviewing history  .    ** Please Note: This note has been constructed using a voice recognition system. **

## 2024-11-18 NOTE — ASSESSMENT & PLAN NOTE
Patient came to the hospital with epigastric pain since Friday  CT scan is concerning for pancreatitis.-No signs of lower collection.  Ductal dilatation is stable.  Area of pancreatic necrosis is stable  Elevated lipase noted.  Patient had multiple admissions in the past for pancreatitis.  Endorses alcohol consumption on Friday-but since then with decreased p.o. intake due to pain  Lactated Ringer at 125 cc  Clear liquid diet.  Advance diet as tolerated  Outpatient follow-up with GI

## 2024-11-18 NOTE — ED NOTES
Pt resting comfortable  Pt will be admitted and waiting for in patient bed       Kay Dutton RN  11/18/24 6538

## 2024-11-19 LAB
ALBUMIN SERPL BCG-MCNC: 3.6 G/DL (ref 3.5–5)
ALP SERPL-CCNC: 102 U/L (ref 34–104)
ALT SERPL W P-5'-P-CCNC: 17 U/L (ref 7–52)
ANION GAP SERPL CALCULATED.3IONS-SCNC: 11 MMOL/L (ref 4–13)
AST SERPL W P-5'-P-CCNC: 19 U/L (ref 13–39)
BILIRUB SERPL-MCNC: 0.7 MG/DL (ref 0.2–1)
BUN SERPL-MCNC: 9 MG/DL (ref 5–25)
CALCIUM SERPL-MCNC: 8.4 MG/DL (ref 8.4–10.2)
CHLORIDE SERPL-SCNC: 96 MMOL/L (ref 96–108)
CO2 SERPL-SCNC: 28 MMOL/L (ref 21–32)
CREAT SERPL-MCNC: 0.77 MG/DL (ref 0.6–1.3)
ERYTHROCYTE [DISTWIDTH] IN BLOOD BY AUTOMATED COUNT: 15.9 % (ref 11.6–15.1)
GFR SERPL CREATININE-BSD FRML MDRD: 94 ML/MIN/1.73SQ M
GLUCOSE SERPL-MCNC: 105 MG/DL (ref 65–140)
HCT VFR BLD AUTO: 45.1 % (ref 36.5–49.3)
HGB BLD-MCNC: 14.7 G/DL (ref 12–17)
LIPASE SERPL-CCNC: 261 U/L (ref 11–82)
MAGNESIUM SERPL-MCNC: 1.2 MG/DL (ref 1.9–2.7)
MCH RBC QN AUTO: 31.4 PG (ref 26.8–34.3)
MCHC RBC AUTO-ENTMCNC: 32.6 G/DL (ref 31.4–37.4)
MCV RBC AUTO: 96 FL (ref 82–98)
PLATELET # BLD AUTO: 147 THOUSANDS/UL (ref 149–390)
PMV BLD AUTO: 10.5 FL (ref 8.9–12.7)
POTASSIUM SERPL-SCNC: 3.3 MMOL/L (ref 3.5–5.3)
PROT SERPL-MCNC: 6 G/DL (ref 6.4–8.4)
RBC # BLD AUTO: 4.68 MILLION/UL (ref 3.88–5.62)
SODIUM SERPL-SCNC: 135 MMOL/L (ref 135–147)
WBC # BLD AUTO: 6.68 THOUSAND/UL (ref 4.31–10.16)

## 2024-11-19 PROCEDURE — 90662 IIV NO PRSV INCREASED AG IM: CPT | Performed by: FAMILY MEDICINE

## 2024-11-19 PROCEDURE — 85027 COMPLETE CBC AUTOMATED: CPT | Performed by: FAMILY MEDICINE

## 2024-11-19 PROCEDURE — G0008 ADMIN INFLUENZA VIRUS VAC: HCPCS | Performed by: FAMILY MEDICINE

## 2024-11-19 PROCEDURE — 83690 ASSAY OF LIPASE: CPT | Performed by: FAMILY MEDICINE

## 2024-11-19 PROCEDURE — 83735 ASSAY OF MAGNESIUM: CPT | Performed by: FAMILY MEDICINE

## 2024-11-19 PROCEDURE — 99232 SBSQ HOSP IP/OBS MODERATE 35: CPT | Performed by: INTERNAL MEDICINE

## 2024-11-19 PROCEDURE — 80053 COMPREHEN METABOLIC PANEL: CPT | Performed by: FAMILY MEDICINE

## 2024-11-19 RX ORDER — POTASSIUM CHLORIDE 1500 MG/1
40 TABLET, EXTENDED RELEASE ORAL ONCE
Status: COMPLETED | OUTPATIENT
Start: 2024-11-19 | End: 2024-11-19

## 2024-11-19 RX ADMIN — POTASSIUM CHLORIDE 40 MEQ: 1500 TABLET, EXTENDED RELEASE ORAL at 10:38

## 2024-11-19 RX ADMIN — LORATADINE 10 MG: 10 TABLET ORAL at 10:38

## 2024-11-19 RX ADMIN — PANTOPRAZOLE SODIUM 40 MG: 40 TABLET, DELAYED RELEASE ORAL at 05:44

## 2024-11-19 RX ADMIN — FOLIC ACID 1 MG: 1 TABLET ORAL at 10:40

## 2024-11-19 RX ADMIN — ENOXAPARIN SODIUM 40 MG: 40 INJECTION SUBCUTANEOUS at 09:40

## 2024-11-19 RX ADMIN — ALLOPURINOL 300 MG: 300 TABLET ORAL at 10:40

## 2024-11-19 RX ADMIN — THIAMINE HCL TAB 100 MG 100 MG: 100 TAB at 10:39

## 2024-11-19 RX ADMIN — SODIUM CHLORIDE, SODIUM LACTATE, POTASSIUM CHLORIDE, AND CALCIUM CHLORIDE 125 ML/HR: .6; .31; .03; .02 INJECTION, SOLUTION INTRAVENOUS at 02:32

## 2024-11-19 RX ADMIN — TRAMADOL HYDROCHLORIDE 50 MG: 50 TABLET, COATED ORAL at 22:34

## 2024-11-19 RX ADMIN — LEVOTHYROXINE SODIUM 75 MCG: 75 TABLET ORAL at 05:44

## 2024-11-19 RX ADMIN — FLUOXETINE HYDROCHLORIDE 40 MG: 20 CAPSULE ORAL at 10:38

## 2024-11-19 RX ADMIN — B-COMPLEX W/ C & FOLIC ACID TAB 1 TABLET: TAB at 10:39

## 2024-11-19 RX ADMIN — INFLUENZA A VIRUS A/VICTORIA/4897/2022 IVR-238 (H1N1) ANTIGEN (FORMALDEHYDE INACTIVATED), INFLUENZA A VIRUS A/CALIFORNIA/122/2022 SAN-022 (H3N2) ANTIGEN (FORMALDEHYDE INACTIVATED), AND INFLUENZA B VIRUS B/MICHIGAN/01/2021 ANTIGEN (FORMALDEHYDE INACTIVATED) 0.5 ML: 60; 60; 60 INJECTION, SUSPENSION INTRAMUSCULAR at 09:40

## 2024-11-19 NOTE — PROGRESS NOTES
"Progress Note - Hospitalist   Name: Sammy Mays 64 y.o. male I MRN: [id number]  Unit/Bed#: -01 I Date of Admission: 11/18/2024   Date of Service: 11/19/2024 I Hospital Day: 1    Assessment & Plan  Acute on chronic pancreatitis due to alcohol  Patient came to the hospital with epigastric pain for few days  CT AP w contrast showed \"acute interstitial edematous pancreatitis with stranding of the fat surrounding the pancreatic head and uncinate process. No free fluid or fluid collection.. A small 1.3 cm cystic lesion/focal necrosis in the head of the pancreas is unchanged. Pancreatic ductal dilatation has also been present since at least 2019. Intra and extrahepatic biliary ductal dilatation, likely due to a stricture in the head of the pancreas also unchanged. \"  Elevated lipase noted, trending down   Continue IVF, has been on CLD, will ADAT to low fat and monitor for pain  Pain control   ETOH cessation education provided   Hypothyroidism  Continue Synthroid  Alcohol abuse  Patient with known history of alcohol abuse.  Reports having 2 drinks on Friday 11/15, none since  No signs of withdrawal.  Continue with thiamine and folic acid.  CIWA protocol  Cessation education provided given recurrent pancreatitis   Hypokalemia  Patient noted to have hypokalemia.  Continue with potassium supplementation.  Depression  Continue prozac     VTE Pharmacologic Prophylaxis: VTE Score: 3 Moderate Risk (Score 3-4) - Pharmacological DVT Prophylaxis Ordered: enoxaparin (Lovenox).    Mobility:   Basic Mobility Inpatient Raw Score: 24  JH-HLM Goal: 8: Walk 250 feet or more  JH-HLM Achieved: 8: Walk 250 feet ot more  JH-HLM Goal achieved. Continue to encourage appropriate mobility.    Patient Centered Rounds: I performed bedside rounds with nursing staff today.   Discussions with Specialists or Other Care Team Provider: gene VIZCARRA    Education and Discussions with Family / Patient: Patient declined call to .     Current " Length of Stay: 1 day(s)  Current Patient Status: Inpatient   Certification Statement: The patient will continue to require additional inpatient hospital stay due to pain control, diet advancing   Discharge Plan: Anticipate discharge later today or tomorrow to home.    Code Status: Level 1 - Full Code    Subjective   Doing okay today, pain is much improved. Denies nausea vomiting or diarrhea.  Wants to try advancing diet today. Reports he is tired.     Objective :  Temp:  [97.8 °F (36.6 °C)-98.7 °F (37.1 °C)] 97.8 °F (36.6 °C)  HR:  [] 80  BP: (101-137)/(60-84) 106/60  Resp:  [15-20] 16  SpO2:  [63 %-98 %] 96 %  O2 Device: None (Room air)    Body mass index is 28.47 kg/m².     Input and Output Summary (last 24 hours):     Intake/Output Summary (Last 24 hours) at 11/19/2024 0926  Last data filed at 11/19/2024 0231  Gross per 24 hour   Intake 1240 ml   Output --   Net 1240 ml       Physical Exam  Vitals and nursing note reviewed.   Constitutional:       General: He is not in acute distress.  Cardiovascular:      Rate and Rhythm: Normal rate.   Pulmonary:      Effort: No respiratory distress.      Breath sounds: Normal breath sounds.   Abdominal:      General: There is no distension.      Tenderness: There is no abdominal tenderness.   Neurological:      Mental Status: He is alert and oriented to person, place, and time.   Psychiatric:         Mood and Affect: Mood normal.           Lines/Drains:              Lab Results: I have reviewed the following results:   Results from last 7 days   Lab Units 11/19/24  0551 11/18/24  1144   WBC Thousand/uL 6.68 7.68   HEMOGLOBIN g/dL 14.7 16.8   HEMATOCRIT % 45.1 51.8*   PLATELETS Thousands/uL 147* 257   SEGS PCT %  --  77*   LYMPHO PCT %  --  14   MONO PCT %  --  7   EOS PCT %  --  0     Results from last 7 days   Lab Units 11/19/24  0551   SODIUM mmol/L 135   POTASSIUM mmol/L 3.3*   CHLORIDE mmol/L 96   CO2 mmol/L 28   BUN mg/dL 9   CREATININE mg/dL 0.77   ANION GAP  mmol/L 11   CALCIUM mg/dL 8.4   ALBUMIN g/dL 3.6   TOTAL BILIRUBIN mg/dL 0.70   ALK PHOS U/L 102   ALT U/L 17   AST U/L 19   GLUCOSE RANDOM mg/dL 105                       Recent Cultures (last 7 days):         Imaging Results Review: I reviewed radiology reports from this admission including: CT abdomen/pelvis.  Other Study Results Review: No additional pertinent studies reviewed.    Last 24 Hours Medication List:     Current Facility-Administered Medications:     acetaminophen (TYLENOL) tablet 650 mg, Q6H PRN    albuterol (PROVENTIL HFA,VENTOLIN HFA) inhaler 2 puff, Q4H PRN    allopurinol (ZYLOPRIM) tablet 300 mg, Daily    enoxaparin (LOVENOX) subcutaneous injection 40 mg, Daily    FLUoxetine (PROzac) capsule 40 mg, Daily    folic acid (FOLVITE) tablet 1 mg, Daily    influenza vaccine, high-dose (Fluzone High-Dose) IM injection 0.5 mL, Once    ketorolac (TORADOL) injection 30 mg, Q6H PRN    lactated ringers infusion, Continuous, Last Rate: 125 mL/hr (11/19/24 0232)    levothyroxine tablet 75 mcg, Early Morning    loratadine (CLARITIN) tablet 10 mg, Daily    multivitamin stress formula tablet 1 tablet, Daily    ondansetron (ZOFRAN) injection 4 mg, Q6H PRN    pantoprazole (PROTONIX) EC tablet 40 mg, Early Morning    polyethylene glycol (MIRALAX) packet 17 g, Daily    potassium chloride (Klor-Con M20) CR tablet 40 mEq, Once    thiamine tablet 100 mg, Daily    traMADol (ULTRAM) tablet 50 mg, Q6H PRN    Administrative Statements   Today, Patient Was Seen By: Lala Guerrero PA-C      **Please Note: This note may have been constructed using a voice recognition system.**

## 2024-11-19 NOTE — PLAN OF CARE
Problem: PAIN - ADULT  Goal: Verbalizes/displays adequate comfort level or baseline comfort level  Description: Interventions:  - Encourage patient to monitor pain and request assistance  - Assess pain using appropriate pain scale  - Administer analgesics based on type and severity of pain and evaluate response  - Implement non-pharmacological measures as appropriate and evaluate response  - Consider cultural and social influences on pain and pain management  - Notify physician/advanced practitioner if interventions unsuccessful or patient reports new pain  Outcome: Progressing     Problem: INFECTION - ADULT  Goal: Absence or prevention of progression during hospitalization  Description: INTERVENTIONS:  - Assess and monitor for signs and symptoms of infection  - Monitor lab/diagnostic results  - Monitor all insertion sites, i.e. indwelling lines, tubes, and drains  - Monitor endotracheal if appropriate and nasal secretions for changes in amount and color  - Mead appropriate cooling/warming therapies per order  - Administer medications as ordered  - Instruct and encourage patient and family to use good hand hygiene technique  - Identify and instruct in appropriate isolation precautions for identified infection/condition  Outcome: Progressing  Goal: Absence of fever/infection during neutropenic period  Description: INTERVENTIONS:  - Monitor WBC    Outcome: Progressing     Problem: SAFETY ADULT  Goal: Patient will remain free of falls  Description: INTERVENTIONS:  - Educate patient/family on patient safety including physical limitations  - Instruct patient to call for assistance with activity   - Consult OT/PT to assist with strengthening/mobility   - Keep Call bell within reach  - Keep bed low and locked with side rails adjusted as appropriate  - Keep care items and personal belongings within reach  - Initiate and maintain comfort rounds  - Make Fall Risk Sign visible to staff  - Offer Toileting every 2 Hours,  in advance of need  - Initiate/Maintain n/a alarm  - Obtain necessary fall risk management equipment:     - Apply yellow socks and bracelet for high fall risk patients  - Consider moving patient to room near nurses station  Outcome: Progressing  Goal: Maintain or return to baseline ADL function  Description: INTERVENTIONS:  -  Assess patient's ability to carry out ADLs; assess patient's baseline for ADL function and identify physical deficits which impact ability to perform ADLs (bathing, care of mouth/teeth, toileting, grooming, dressing, etc.)  - Assess/evaluate cause of self-care deficits   - Assess range of motion  - Assess patient's mobility; develop plan if impaired  - Assess patient's need for assistive devices and provide as appropriate  - Encourage maximum independence but intervene and supervise when necessary  - Involve family in performance of ADLs  - Assess for home care needs following discharge   - Consider OT consult to assist with ADL evaluation and planning for discharge  - Provide patient education as appropriate  Outcome: Progressing  Goal: Maintains/Returns to pre admission functional level  Description: INTERVENTIONS:  - Perform AM-PAC 6 Click Basic Mobility/ Daily Activity assessment daily.  - Set and communicate daily mobility goal to care team and patient/family/caregiver.   - Collaborate with rehabilitation services on mobility goals if consulted  - Perform Range of Motion 3 times a day.  - Reposition patient every 2 hours.  - Dangle patient 3 times a day  - Stand patient 3 times a day  - Ambulate patient 3 times a day  - Out of bed to chair 3 times a day   - Out of bed for meals 3 times a day  - Out of bed for toileting  - Record patient progress and toleration of activity level   Outcome: Progressing     Problem: DISCHARGE PLANNING  Goal: Discharge to home or other facility with appropriate resources  Description: INTERVENTIONS:  - Identify barriers to discharge w/patient and caregiver  -  Arrange for needed discharge resources and transportation as appropriate  - Identify discharge learning needs (meds, wound care, etc.)  - Arrange for interpretive services to assist at discharge as needed  - Refer to Case Management Department for coordinating discharge planning if the patient needs post-hospital services based on physician/advanced practitioner order or complex needs related to functional status, cognitive ability, or social support system  Outcome: Progressing     Problem: Knowledge Deficit  Goal: Patient/family/caregiver demonstrates understanding of disease process, treatment plan, medications, and discharge instructions  Description: Complete learning assessment and assess knowledge base.  Interventions:  - Provide teaching at level of understanding  - Provide teaching via preferred learning methods  Outcome: Progressing

## 2024-11-19 NOTE — ASSESSMENT & PLAN NOTE
Patient with known history of alcohol abuse.  Reports having 2 drinks on Friday 11/15, none since  No signs of withdrawal.  Continue with thiamine and folic acid.  [facility] protocol  Cessation education provided given recurrent pancreatitis

## 2024-11-19 NOTE — ASSESSMENT & PLAN NOTE
"Patient came to the hospital with epigastric pain for few days  CT AP w contrast showed \"acute interstitial edematous pancreatitis with stranding of the fat surrounding the pancreatic head and uncinate process. No free fluid or fluid collection.. A small 1.3 cm cystic lesion/focal necrosis in the head of the pancreas is unchanged. Pancreatic ductal dilatation has also been present since at least 2019. Intra and extrahepatic biliary ductal dilatation, likely due to a stricture in the head of the pancreas also unchanged. \"  Elevated lipase noted, trending down   Continue IVF, has been on CLD, will ADAT to low fat and monitor for pain  Pain control   ETOH cessation education provided   "

## 2024-11-20 VITALS
DIASTOLIC BLOOD PRESSURE: 82 MMHG | WEIGHT: 190 LBS | RESPIRATION RATE: 17 BRPM | BODY MASS INDEX: 28.14 KG/M2 | TEMPERATURE: 97.3 F | OXYGEN SATURATION: 99 % | SYSTOLIC BLOOD PRESSURE: 121 MMHG | HEART RATE: 83 BPM | HEIGHT: 69 IN

## 2024-11-20 LAB
ALBUMIN SERPL BCG-MCNC: 3.2 G/DL (ref 3.5–5)
ALP SERPL-CCNC: 85 U/L (ref 34–104)
ALT SERPL W P-5'-P-CCNC: 13 U/L (ref 7–52)
ANION GAP SERPL CALCULATED.3IONS-SCNC: 9 MMOL/L (ref 4–13)
AST SERPL W P-5'-P-CCNC: 18 U/L (ref 13–39)
BASOPHILS # BLD AUTO: 0.03 THOUSANDS/ÂΜL (ref 0–0.1)
BASOPHILS NFR BLD AUTO: 1 % (ref 0–1)
BILIRUB SERPL-MCNC: 0.44 MG/DL (ref 0.2–1)
BUN SERPL-MCNC: 7 MG/DL (ref 5–25)
CALCIUM ALBUM COR SERPL-MCNC: 8.6 MG/DL (ref 8.3–10.1)
CALCIUM SERPL-MCNC: 8 MG/DL (ref 8.4–10.2)
CHLORIDE SERPL-SCNC: 103 MMOL/L (ref 96–108)
CO2 SERPL-SCNC: 25 MMOL/L (ref 21–32)
CREAT SERPL-MCNC: 0.57 MG/DL (ref 0.6–1.3)
EOSINOPHIL # BLD AUTO: 0.07 THOUSAND/ÂΜL (ref 0–0.61)
EOSINOPHIL NFR BLD AUTO: 2 % (ref 0–6)
ERYTHROCYTE [DISTWIDTH] IN BLOOD BY AUTOMATED COUNT: 15.6 % (ref 11.6–15.1)
GFR SERPL CREATININE-BSD FRML MDRD: 107 ML/MIN/1.73SQ M
GLUCOSE SERPL-MCNC: 117 MG/DL (ref 65–140)
HCT VFR BLD AUTO: 42 % (ref 36.5–49.3)
HGB BLD-MCNC: 13.6 G/DL (ref 12–17)
IMM GRANULOCYTES # BLD AUTO: 0.01 THOUSAND/UL (ref 0–0.2)
IMM GRANULOCYTES NFR BLD AUTO: 0 % (ref 0–2)
LIPASE SERPL-CCNC: 266 U/L (ref 11–82)
LYMPHOCYTES # BLD AUTO: 0.99 THOUSANDS/ÂΜL (ref 0.6–4.47)
LYMPHOCYTES NFR BLD AUTO: 31 % (ref 14–44)
MCH RBC QN AUTO: 31.6 PG (ref 26.8–34.3)
MCHC RBC AUTO-ENTMCNC: 32.4 G/DL (ref 31.4–37.4)
MCV RBC AUTO: 97 FL (ref 82–98)
MONOCYTES # BLD AUTO: 0.31 THOUSAND/ÂΜL (ref 0.17–1.22)
MONOCYTES NFR BLD AUTO: 10 % (ref 4–12)
NEUTROPHILS # BLD AUTO: 1.79 THOUSANDS/ÂΜL (ref 1.85–7.62)
NEUTS SEG NFR BLD AUTO: 56 % (ref 43–75)
NRBC BLD AUTO-RTO: 0 /100 WBCS
PLATELET # BLD AUTO: 121 THOUSANDS/UL (ref 149–390)
PMV BLD AUTO: 9.6 FL (ref 8.9–12.7)
POTASSIUM SERPL-SCNC: 3.4 MMOL/L (ref 3.5–5.3)
PROT SERPL-MCNC: 5.6 G/DL (ref 6.4–8.4)
RBC # BLD AUTO: 4.31 MILLION/UL (ref 3.88–5.62)
SODIUM SERPL-SCNC: 137 MMOL/L (ref 135–147)
WBC # BLD AUTO: 3.2 THOUSAND/UL (ref 4.31–10.16)

## 2024-11-20 PROCEDURE — 80053 COMPREHEN METABOLIC PANEL: CPT | Performed by: INTERNAL MEDICINE

## 2024-11-20 PROCEDURE — 99238 HOSP IP/OBS DSCHRG MGMT 30/<: CPT | Performed by: PHYSICIAN ASSISTANT

## 2024-11-20 PROCEDURE — 83690 ASSAY OF LIPASE: CPT | Performed by: INTERNAL MEDICINE

## 2024-11-20 PROCEDURE — 85025 COMPLETE CBC W/AUTO DIFF WBC: CPT | Performed by: INTERNAL MEDICINE

## 2024-11-20 RX ORDER — POTASSIUM CHLORIDE 1500 MG/1
40 TABLET, EXTENDED RELEASE ORAL ONCE
Status: COMPLETED | OUTPATIENT
Start: 2024-11-20 | End: 2024-11-20

## 2024-11-20 RX ADMIN — B-COMPLEX W/ C & FOLIC ACID TAB 1 TABLET: TAB at 09:41

## 2024-11-20 RX ADMIN — ALLOPURINOL 300 MG: 300 TABLET ORAL at 09:42

## 2024-11-20 RX ADMIN — SODIUM CHLORIDE, SODIUM LACTATE, POTASSIUM CHLORIDE, AND CALCIUM CHLORIDE 125 ML/HR: .6; .31; .03; .02 INJECTION, SOLUTION INTRAVENOUS at 03:39

## 2024-11-20 RX ADMIN — LORATADINE 10 MG: 10 TABLET ORAL at 09:42

## 2024-11-20 RX ADMIN — POTASSIUM CHLORIDE 40 MEQ: 1500 TABLET, EXTENDED RELEASE ORAL at 09:42

## 2024-11-20 RX ADMIN — PANTOPRAZOLE SODIUM 40 MG: 40 TABLET, DELAYED RELEASE ORAL at 05:37

## 2024-11-20 RX ADMIN — THIAMINE HCL TAB 100 MG 100 MG: 100 TAB at 09:41

## 2024-11-20 RX ADMIN — FOLIC ACID 1 MG: 1 TABLET ORAL at 09:42

## 2024-11-20 RX ADMIN — FLUOXETINE HYDROCHLORIDE 40 MG: 20 CAPSULE ORAL at 09:41

## 2024-11-20 RX ADMIN — LEVOTHYROXINE SODIUM 75 MCG: 75 TABLET ORAL at 05:37

## 2024-11-20 NOTE — CASE MANAGEMENT
Case Management Assessment & Discharge Planning Note    Patient name Sammy Mays  Location /-01 MRN [id number]  : 1960 Date 2024       Current Admission Date: 2024  Current Admission Diagnosis:Acute on chronic pancreatitis due to alcohol   Patient Active Problem List    Diagnosis Date Noted Date Diagnosed    Hypokalemia 2024     GERD (gastroesophageal reflux disease) 2024     Hyponatremia 2024     Elevated LFTs 2024     Marijuana smoker, continuous 2024     Severe protein-calorie malnutrition (HCC) 2024     Hyperkalemia 10/18/2023     Alcohol withdrawal syndrome with complication (HCC) 10/17/2023     Depression 10/17/2023     Diastasis recti 10/17/2023     Hypocalcemia 2023     Pancytopenia (HCC) 2023     Obese 2023     Lightheaded 2023     Hypernatremia 07/10/2023     Melena 2023     Acute blood loss anemia 2023     Alcohol abuse 2023     SIRS (systemic inflammatory response syndrome) 2023     Transition of care performed with sharing of clinical summary 2018     Acute on chronic pancreatitis due to alcohol 2018     Leukopenia 2018     Platelets decreased (HCC) 2018     Generalized anxiety disorder 2018     Impaired fasting glucose 2018     Hypothyroidism 2018     Habitual alcohol use 2018     Gastroesophageal reflux disease without esophagitis 2018       LOS (days): 2  Geometric Mean LOS (GMLOS) (days): 2.3  Days to GMLOS:0.5     OBJECTIVE:    Risk of Unplanned Readmission Score: 21.5         Current admission status: Inpatient       Preferred Pharmacy:   [id number] - [place] - [address]  [address] [place] [address]  Phone: [phone] Fax: [phone]    [facility] - [place] - [address]  [address]  [place] [address]  Phone: [phone] Fax:  [phone]    Primary Care Provider: Ankur Bustillo DO    Primary Insurance: MEDICARE  Secondary Insurance:     ASSESSMENT:  Active Health Care Proxies       Seth Pedraza [facility] Representative - Friend   Primary Phone: [phone] (Mobile)                         Readmission Root Cause  30 Day Readmission: No    Patient Information  Admitted from:: Home  Mental Status: Alert  During Assessment patient was accompanied by: Not accompanied during assessment  Assessment information provided by:: Patient  Primary Caregiver: Self  Support Systems: Self  Type of Current Residence: [facility]  Living Arrangements: Lives Alone  Is patient a ?: No    Activities of Daily Living Prior to Admission  Functional Status: Independent  Ambulates independently?: Yes  Does patient use assisted devices?: No  Does patient currently own DME?: Yes  What DME does the patient currently own?: Walker  Does patient have a history of Outpatient Therapy (PT/OT)?: No  Does the patient have a history of Short-Term Rehab?: No  Does patient have a history of HHC?: No  Does patient currently have HHC?: No         Patient Information Continued  Does patient have a history of substance abuse?: Yes  Historical substance use preference: Alcohol/ETOH  History of Withdrawal Symptoms: Denies past symptoms  Is patient currently in treatment for substance abuse?: No. Patient declined treatment information.           DISCHARGE DETAILS:    Discharge planning discussed with:: Patient  Freedom of Choice: Yes  Comments - Freedom of Choice: Discussed FOC  CM contacted family/caregiver?: No- see comments (Declined)  Were Treatment Team discharge recommendations reviewed with patient/caregiver?: Yes  Did patient/caregiver verbalize understanding of patient care needs?: Yes  Were patient/caregiver advised of the risks associated with not following Treatment Team discharge recommendations?: Yes         Requested Home Health Care         Is the patient  interested in HHC at discharge?: No    DME Referral Provided  Referral made for DME?: No           Transport at Discharge : Ride Share     Number/Name of Dispatcher: DIXIE [phone]  Transported by (Company and Unit #): [employer]  ETA of Transport (Date): 11/20/24  ETA of Transport (Time): 1230        CM reviewed d/c planning process including the following: identifying help at home, patient preference for d/c planning needs, availability of treatment team to discuss questions or concerns patient and/or family may have regarding understanding medications and recognizing signs and symptoms once discharged.  CM also encouraged patient to follow up with all recommended appointments after discharge. Patient advised of importance for patient and family to participate in managing patient’s medical well being.        CM met w/pt at bedside to introduce self & CM role. Pt lives alone in [facility]. Confirmed demographics & PCP.  Pt has RW & SPC but does not use.  Pt thinks his MPOA is his friend Seth Pedraza's sister [place].  Pt requesting his friend Seth be removed from Emergency contacts as he is now in a nursing home.  CM removed from [facility].  Pt stated he would need an Uber home upon discharge. CM reviewed rideshare waiver form w/pt - pt verbalized understanding and signed form. Form placed in MR bin.      CM notified bedside nurse of pickup time.

## 2024-11-20 NOTE — CERTIFIED RECOVERY SPECIALIST
"   Certified  Note    Patient name: Sammy Mays  Location: /-01  [place]: [facility]  Attending:  Bennie Montenegro MD MRN [id number]  : 1960  Age: 64 y.o.    Sex: male Date 2024         Substance Use History:     Social History     Substance and Sexual Activity   Alcohol Use Yes    Alcohol/week: 14.0 standard drinks of alcohol    Types: 14 Standard drinks or equivalent per week    Comment: 2 drinks per night.        Social History     Substance and Sexual Activity   Drug Use Yes    Types: Marijuana       Time spent: 10 min  Consult rcvd: Y   patient seen in: IP  Stage of change:  pre-contemplation     Substance used: alcohol  Frequency/quantity : daily patient reports\"few beers\"      History of withdrawal seizures:  Currently on MOUD:no   Interested in MOUD: patient is going to speak to PCP about Naltrexone     CRS provided introductions and explanation of service. CRS and patient engaged in conversation of hospitalization. Patient discussed needs he feels would be appropriate. CRS shared understanding    Patient reports he drinks daily \"because he is bored and likes the feeling he gets\" Patient open to recovery support and willing to try OP treatment if needed     Patient was waiting for LYFT and AVS already printed - CRS provided Paper resources and contact info      -        Kelsea Gonzalez       "

## 2024-11-20 NOTE — PLAN OF CARE
Problem: PAIN - ADULT  Goal: Verbalizes/displays adequate comfort level or baseline comfort level  Description: Interventions:  - Encourage patient to monitor pain and request assistance  - Assess pain using appropriate pain scale  - Administer analgesics based on type and severity of pain and evaluate response  - Implement non-pharmacological measures as appropriate and evaluate response  - Consider cultural and social influences on pain and pain management  - Notify physician/advanced practitioner if interventions unsuccessful or patient reports new pain  11/20/2024 1303 by Starr Link RN  Outcome: Adequate for Discharge  11/20/2024 1133 by Starr Link RN  Outcome: Adequate for Discharge     Problem: INFECTION - ADULT  Goal: Absence or prevention of progression during hospitalization  Description: INTERVENTIONS:  - Assess and monitor for signs and symptoms of infection  - Monitor lab/diagnostic results  - Monitor all insertion sites, i.e. indwelling lines, tubes, and drains  - Monitor endotracheal if appropriate and nasal secretions for changes in amount and color  - [place] appropriate cooling/warming therapies per order  - Administer medications as ordered  - Instruct and encourage patient and family to use good hand hygiene technique  - Identify and instruct in appropriate isolation precautions for identified infection/condition  11/20/2024 1303 by Starr Link RN  Outcome: Adequate for Discharge  11/20/2024 1133 by Starr Link RN  Outcome: Adequate for Discharge  Goal: Absence of fever/infection during neutropenic period  Description: INTERVENTIONS:  - Monitor WBC    11/20/2024 1303 by Starr Link RN  Outcome: Adequate for Discharge  11/20/2024 1133 by Starr Link RN  Outcome: Adequate for Discharge     Problem: SAFETY ADULT  Goal: Patient will remain free of falls  Description: INTERVENTIONS:  - Educate patient/family on patient safety including physical limitations  - Instruct patient to call for  assistance with activity   - Consult OT/PT to assist with strengthening/mobility   - Keep Call bell within reach  - Keep bed low and locked with side rails adjusted as appropriate  - Keep care items and personal belongings within reach  - Initiate and maintain comfort rounds  - Make Fall Risk Sign visible to staff  - Offer Toileting every 2 Hours, in advance of need  - Initiate/Maintain alarm  - Obtain necessary fall risk management equipment: 3  - Apply yellow socks and bracelet for high fall risk patients  - Consider moving patient to room near nurses station  11/20/2024 1303 by Starr Link RN  Outcome: Adequate for Discharge  11/20/2024 1133 by Starr Link RN  Outcome: Adequate for Discharge  Goal: Maintain or return to baseline ADL function  Description: INTERVENTIONS:  -  Assess patient's ability to carry out ADLs; assess patient's baseline for ADL function and identify physical deficits which impact ability to perform ADLs (bathing, care of mouth/teeth, toileting, grooming, dressing, etc.)  - Assess/evaluate cause of self-care deficits   - Assess range of motion  - Assess patient's mobility; develop plan if impaired  - Assess patient's need for assistive devices and provide as appropriate  - Encourage maximum independence but intervene and supervise when necessary  - Involve family in performance of ADLs  - Assess for home care needs following discharge   - Consider OT consult to assist with ADL evaluation and planning for discharge  - Provide patient education as appropriate  11/20/2024 1303 by Starr Link RN  Outcome: Adequate for Discharge  11/20/2024 1133 by Starr Link RN  Outcome: Adequate for Discharge  Goal: Maintains/Returns to pre admission functional level  Description: INTERVENTIONS:  - Perform AM-PAC 6 Click Basic Mobility/ Daily Activity assessment daily.  - Set and communicate daily mobility goal to care team and patient/family/caregiver.   - Collaborate with rehabilitation services on mobility  goals if consulted  - Perform Range of Motion 3 times a day.  - Reposition patient every 2 hours.  - Dangle patient 3 times a day  - Stand patient 3 times a day  - Ambulate patient 3 times a day  - Out of bed to chair 3 times a day   - Out of bed for meals 3 times a day  - Out of bed for toileting  - Record patient progress and toleration of activity level   11/20/2024 1303 by Starr Link RN  Outcome: Adequate for Discharge  11/20/2024 1133 by Starr Link RN  Outcome: Adequate for Discharge     Problem: DISCHARGE PLANNING  Goal: Discharge to home or other facility with appropriate resources  Description: INTERVENTIONS:  - Identify barriers to discharge w/patient and caregiver  - Arrange for needed discharge resources and transportation as appropriate  - Identify discharge learning needs (meds, wound care, etc.)  - Arrange for interpretive services to assist at discharge as needed  - Refer to Case Management Department for coordinating discharge planning if the patient needs post-hospital services based on physician/advanced practitioner order or complex needs related to functional status, cognitive ability, or social support system  11/20/2024 1303 by Starr Link RN  Outcome: Adequate for Discharge  11/20/2024 1133 by Starr Link RN  Outcome: Adequate for Discharge     Problem: Knowledge Deficit  Goal: Patient/family/caregiver demonstrates understanding of disease process, treatment plan, medications, and discharge instructions  Description: Complete learning assessment and assess knowledge base.  Interventions:  - Provide teaching at level of understanding  - Provide teaching via preferred learning methods  11/20/2024 1303 by Starr Link RN  Outcome: Adequate for Discharge  11/20/2024 1133 by Starr Link RN  Outcome: Adequate for Discharge

## 2024-11-20 NOTE — PLAN OF CARE
Problem: PAIN - ADULT  Goal: Verbalizes/displays adequate comfort level or baseline comfort level  Description: Interventions:  - Encourage patient to monitor pain and request assistance  - Assess pain using appropriate pain scale  - Administer analgesics based on type and severity of pain and evaluate response  - Implement non-pharmacological measures as appropriate and evaluate response  - Consider cultural and social influences on pain and pain management  - Notify physician/advanced practitioner if interventions unsuccessful or patient reports new pain  Outcome: Progressing     Problem: INFECTION - ADULT  Goal: Absence or prevention of progression during hospitalization  Description: INTERVENTIONS:  - Assess and monitor for signs and symptoms of infection  - Monitor lab/diagnostic results  - Monitor all insertion sites, i.e. indwelling lines, tubes, and drains  - Monitor endotracheal if appropriate and nasal secretions for changes in amount and color  - [place] appropriate cooling/warming therapies per order  - Administer medications as ordered  - Instruct and encourage patient and family to use good hand hygiene technique  - Identify and instruct in appropriate isolation precautions for identified infection/condition  Outcome: Progressing  Goal: Absence of fever/infection during neutropenic period  Description: INTERVENTIONS:  - Monitor WBC    Outcome: Progressing     Problem: SAFETY ADULT  Goal: Patient will remain free of falls  Description: INTERVENTIONS:  - Educate patient/family on patient safety including physical limitations  - Instruct patient to call for assistance with activity   - Consult OT/PT to assist with strengthening/mobility   - Keep Call bell within reach  - Keep bed low and locked with side rails adjusted as appropriate  - Keep care items and personal belongings within reach  - Initiate and maintain comfort rounds  - Make Fall Risk Sign visible to staff  - Offer Toileting every 2 Hours,  in advance of need  - Initiate/Maintain alarm  - Obtain necessary fall risk management equipment:   - Apply yellow socks and bracelet for high fall risk patients  - Consider moving patient to room near nurses station  Outcome: Progressing  Goal: Maintain or return to baseline ADL function  Description: INTERVENTIONS:  -  Assess patient's ability to carry out ADLs; assess patient's baseline for ADL function and identify physical deficits which impact ability to perform ADLs (bathing, care of mouth/teeth, toileting, grooming, dressing, etc.)  - Assess/evaluate cause of self-care deficits   - Assess range of motion  - Assess patient's mobility; develop plan if impaired  - Assess patient's need for assistive devices and provide as appropriate  - Encourage maximum independence but intervene and supervise when necessary  - Involve family in performance of ADLs  - Assess for home care needs following discharge   - Consider OT consult to assist with ADL evaluation and planning for discharge  - Provide patient education as appropriate  Outcome: Progressing  Goal: Maintains/Returns to pre admission functional level  Description: INTERVENTIONS:  - Perform AM-PAC 6 Click Basic Mobility/ Daily Activity assessment daily.  - Set and communicate daily mobility goal to care team and patient/family/caregiver.   - Collaborate with rehabilitation services on mobility goals if consulted  - Perform Range of Motion 3 times a day.  - Reposition patient every 2 hours.  - Dangle patient 3 times a day  - Stand patient 3 times a day  - Ambulate patient 3 times a day  - Out of bed to chair 3 times a day   - Out of bed for meals 3 times a day  - Out of bed for toileting  - Record patient progress and toleration of activity level   Outcome: Progressing     Problem: DISCHARGE PLANNING  Goal: Discharge to home or other facility with appropriate resources  Description: INTERVENTIONS:  - Identify barriers to discharge w/patient and caregiver  -  Arrange for needed discharge resources and transportation as appropriate  - Identify discharge learning needs (meds, wound care, etc.)  - Arrange for interpretive services to assist at discharge as needed  - Refer to Case Management Department for coordinating discharge planning if the patient needs post-hospital services based on physician/advanced practitioner order or complex needs related to functional status, cognitive ability, or social support system  Outcome: Progressing     Problem: Knowledge Deficit  Goal: Patient/family/caregiver demonstrates understanding of disease process, treatment plan, medications, and discharge instructions  Description: Complete learning assessment and assess knowledge base.  Interventions:  - Provide teaching at level of understanding  - Provide teaching via preferred learning methods  Outcome: Progressing

## 2024-11-20 NOTE — DISCHARGE INSTR - AVS FIRST PAGE
You may continue to advance your diet as tolerated.  Recommend complete alcohol cessation.  Follow up with primary care on discharge

## 2024-11-20 NOTE — DISCHARGE SUMMARY
"Discharge Summary - Hospitalist   Name: Sammy Mays 64 y.o. male I MRN: [id number]  Unit/Bed#: -01 I Date of Admission: 11/18/2024   Date of Service: 11/20/2024 I Hospital Day: 2     Assessment & Plan  Acute on chronic pancreatitis due to alcohol  Patient came to the hospital with epigastric pain for few days  CT AP w contrast showed \"acute interstitial edematous pancreatitis with stranding of the fat surrounding the pancreatic head and uncinate process. No free fluid or fluid collection.. A small 1.3 cm cystic lesion/focal necrosis in the head of the pancreas is unchanged. Pancreatic ductal dilatation has also been present since at least 2019. Intra and extrahepatic biliary ductal dilatation, likely due to a stricture in the head of the pancreas also unchanged. \"  Elevated lipase noted, trending down   Tolerating diet.  Denies pain, n/v today.  Cleared for discharge   ETOH cessation   Hypothyroidism  Continue Synthroid  Alcohol abuse  Patient with known history of alcohol abuse.  Reports having 2 drinks on Friday 11/15, none since  No signs of withdrawal.  CIWA protocol  Cessation education provided given recurrent pancreatitis   Hypokalemia  Patient noted to have hypokalemia.  Continue with potassium supplementation.  Depression  Continue prozac      Medical Problems       Resolved Problems  Date Reviewed: 11/19/2024   None       Discharging Physician / Practitioner: Rosana Zuluaga PA-C  PCP: Ankur Bustillo,   Admission Date:   Admission Orders (From admission, onward)       Ordered        11/18/24 1647  INPATIENT ADMISSION  Once                          Discharge Date: 11/20/24    Consultations During Hospital Stay:  None    Procedures Performed:   None    Significant Findings / Test Results:   CT a/p with pancreatitis   Elevated lipase    Incidental Findings:   None     Test Results Pending at Discharge (will require follow up):   None     Outpatient Tests Requested:  None    Complications:  " "none    Reason for Admission: pancreatitis     Hospital Course:   Sammy Mays is a 64 y.o. male patient who originally presented to the hospital on 11/18/2024 due to epigastric pain.  He has hx of chronic pancreatitis and ETOH use.  CT imaging c/w acute pancreatitis and he also had elevated lipase.  He was tx for acute pancreatitis with IV fluids, bowel rest, PRN pain medications.  He is feeling better.  Pain resolved.  He has tolerating diet. He is cleared for discharge.     Please see above list of diagnoses and related plan for additional information.     Condition at Discharge: good    Discharge Day Visit / Exam:   Subjective:  No complaints, says he feels great.  Denies any pain, n/v.  Tolerating diet.  Eager to go home   Vitals: Blood Pressure: 121/82 (11/20/24 1121)  Pulse: 83 (11/20/24 1121)  Temperature: (!) 97.3 °F (36.3 °C) (11/20/24 1121)  Temp Source: Oral (11/20/24 0231)  Respirations: 17 (11/20/24 1121)  Height: 5' 8.5\" (174 cm) (11/18/24 1935)  Weight - Scale: 86.2 kg (190 lb) (11/18/24 1935)  SpO2: 99 % (11/20/24 1121)  Physical Exam  Vitals reviewed.   Constitutional:       General: He is not in acute distress.     Appearance: He is not toxic-appearing.   HENT:      Head: Normocephalic and atraumatic.   Eyes:      Extraocular Movements: Extraocular movements intact.   Cardiovascular:      Rate and Rhythm: Normal rate and regular rhythm.   Pulmonary:      Effort: Pulmonary effort is normal. No respiratory distress.   Abdominal:      General: Bowel sounds are normal. There is no distension.      Palpations: Abdomen is soft.   Musculoskeletal:         General: Normal range of motion.   Neurological:      General: No focal deficit present.      Mental Status: He is alert and oriented to person, place, and time.   Psychiatric:         Mood and Affect: Mood normal.         Behavior: Behavior normal.         Thought Content: Thought content normal.        Discussion with Family: Patient declined call " to .     Discharge instructions/Information to patient and family:   See after visit summary for information provided to patient and family.      Provisions for Follow-Up Care:  See after visit summary for information related to follow-up care and any pertinent home health orders.      Mobility at time of Discharge:   Basic Mobility Inpatient Raw Score: 24  JH-HLM Goal: 8: Walk 250 feet or more  JH-HLM Achieved: 7: Walk 25 feet or more  HLM Goal NOT achieved. Continue to encourage mobility in post discharge setting.     Disposition:   Home    Planned Readmission: no    Discharge Medications:  See after visit summary for reconciled discharge medications provided to patient and/or family.      Administrative Statements   Discharge Statement:  I have spent a total time of 25 minutes in caring for this patient on the day of the visit/encounter.     **Please Note: This note may have been constructed using a voice recognition system**

## 2024-11-20 NOTE — APP STUDENT NOTE
MIKE STUDENT  Inpatient Progress Note for TRAINING ONLY  Not Part of Legal Medical Record     Progress Note - Sammy Mays 64 y.o. male MRN: [id number]  Unit/Bed#: -[facility] Encounter: [id number]    Assessment:    Principal Problem:    Acute on chronic pancreatitis due to alcohol  Active Problems:    Hypothyroidism    Alcohol abuse    Depression    Hypokalemia      Plan:    Acute on chronic pancreatitis due to alcohol  The patient underwent a CT abdomen and pelvis on 11/18 which showed acute interstitial edematous pancreatitis with a small 1.3 cm cystic lesion/focal necrosis in the head of the pancreas and intra/extrahepatic biliary ductal dilatation likely due to stricture in head of the pancreas.  11/18 showed lipase of 367. Patients lipase has since been 261 on 11/19 and 266 today.   Patient has been able to tolerate foods and liquids  Continue IV fluids and tylenol 650 mg PRN pain  Patient was educated on alcohol cessation  Hypothyroidism  Patients 8/14 TSH was  5.271   Continue taking levothyroxine 75 mcg tablets daily  Alcohol abuse  Patient endorsed recent alcohol consumption on Friday with 2 alcoholic beverages. No signs of withdrawal.  Continue thiamine 100 mg tablet and folic acid 1 mg daily  Patient was educated on alcohol cessation. Offer outpatient rehabilitation options, consult with case management.   Depression  Patient was diagnosed with depression in 2023 and is followed by PCP  Continue taking Prozac 40 mg daily  Hypokalemia   Patients potassium was 3.4 on 8/21, 3.3 on 11/18 , 3.3 on 11/19 and 3.4 today  Patient was started on potassium chloride 40 mEq  Monitor with CMP      VTE Pharmacologic Prophylaxis:   Pharmacologic: Enoxaparin (Lovenox)  Mechanical VTE Prophylaxis in Place: No    Patient Centered Rounds: I have performed bedside rounds with nursing staff today.      Time Spent for Care: 20 minutes.  More than 50% of total time spent on counseling and coordination of care as described  above.    Current Length of Stay: 2 day(s)    Current Patient Status: Inpatient   Certification Statement:  patient may not require extra hospital stay as the patient is denies any pain or complaints    Discharge Plan: possible discharge today or tomorrow     Code Status: Level 1 - Full Code    Subjective:   Sammy Mays is a 64 year old male with a past medical history of hypothyroidism with  TSH of 5.271 taking levothyroxine 75 mcg tablets daily, alcohol abuse, depression taking prozac 40 mg daily and hypokalemia of 3.4 taking potassium chloride 40 mEq PRN hypokalemia who presented the the ED 2 days ago with epigastric abdominal pain, loose stools and decreased appetite. The patient underwent a CT abdomen and pelvis on  which showed acute interstitial edematous pancreatitis with a small 1.3 cm cystic lesion/focal necrosis in the head of the pancreas and intra/extrahepatic biliary ductal dilatation likely due to stricture in head of the pancreas. Patients labs on  showed potassium of 3.3 and lipase of 367. Patients lipase has since been 261 on  and 266 today. Today the patient denies any difficulties overnight or issues with eating, drinking, using the bathroom or ambulating. Patient denies chest pain, palpitations, shortness of breath, headache, dizziness, abdominal pain, nausea, vomiting, diarrhea, fevers, chills, or night sweats. The patient is hoping he can go home today.     Objective:     Vitals:   Temp (24hrs), Av.3 °F (36.8 °C), Min:98 °F (36.7 °C), Max:98.9 °F (37.2 °C)    Temp:  [98 °F (36.7 °C)-98.9 °F (37.2 °C)] 98 °F (36.7 °C)  HR:  [63-79] 74  Resp:  [16-18] 16  BP: (105-125)/(61-72) 105/62  SpO2:  [96 %-98 %] 97 %  Body mass index is 28.47 kg/m².     Input and Output Summary (last 24 hours):       Intake/Output Summary (Last 24 hours) at 2024 1059  Last data filed at 2024 0809  Gross per 24 hour   Intake 1240 ml   Output --   Net 1240 ml       Physical Exam:      Physical Exam  Constitutional:       General: He is awake.      Appearance: Normal appearance.   Cardiovascular:      Rate and Rhythm: Normal rate and regular rhythm.      Heart sounds: Normal heart sounds. No murmur heard.  Pulmonary:      Effort: Pulmonary effort is normal. No accessory muscle usage.      Breath sounds: Normal breath sounds. No stridor.   Abdominal:      Palpations: Abdomen is soft.      Tenderness: There is no abdominal tenderness.   Musculoskeletal:      Right lower leg: No edema.      Left lower leg: No edema.   Skin:     General: Skin is warm and dry.      Capillary Refill: Capillary refill takes less than 2 seconds.   Neurological:      General: No focal deficit present.      Mental Status: He is alert.      Sensory: Sensation is intact.      Motor: Motor function is intact.   Psychiatric:         Behavior: Behavior is cooperative.       Historical Information   Past Medical History:   Diagnosis Date    Medical history unknown     Pancreatitis      Past Surgical History:   Procedure Laterality Date    CHOLECYSTECTOMY LAPAROSCOPIC      REPAIR / REINSERT BICEPS TENDON AT ELBOW      Last Assessed: 1/12/2016      Social History   Social History     Substance and Sexual Activity   Alcohol Use Yes    Alcohol/week: 14.0 standard drinks of alcohol    Types: 14 Standard drinks or equivalent per week    Comment: 2 drinks per night.     Social History     Substance and Sexual Activity   Drug Use Yes    Types: Marijuana     Social History     Tobacco Use   Smoking Status Never   Smokeless Tobacco Never     Family History: Family history non-contributory    Meds/Allergies   all medications and allergies reviewed  Allergies   Allergen Reactions    Amoxicillin Swelling and Fever     Other reaction(s): vertigo    Escitalopram      Other reaction(s): sweaty palms/hands, felt faint, passed out for a short time, had anxiety/panic attack    Gemfibrozil      Other reaction(s): Nausea and/or vomiting  Other  reaction(s): Nausea and/or vomiting    Other Allergic Rhinitis     Other reaction(s): Other (See Comments)  rhinitis, itchy eyes       Additional Data:     Labs:    Results from last 7 days   Lab Units 11/20/24  0549   WBC Thousand/uL 3.20*   HEMOGLOBIN g/dL 13.6   HEMATOCRIT % 42.0   PLATELETS Thousands/uL 121*   SEGS PCT % 56   LYMPHO PCT % 31   MONO PCT % 10   EOS PCT % 2     Results from last 7 days   Lab Units 11/20/24  0549   SODIUM mmol/L 137   POTASSIUM mmol/L 3.4*   CHLORIDE mmol/L 103   CO2 mmol/L 25   BUN mg/dL 7   CREATININE mg/dL 0.57*   ANION GAP mmol/L 9   CALCIUM mg/dL 8.0*   ALBUMIN g/dL 3.2*   TOTAL BILIRUBIN mg/dL 0.44   ALK PHOS U/L 85   ALT U/L 13   AST U/L 18   GLUCOSE RANDOM mg/dL 117                         * I Have Reviewed All Lab Data Listed Above.  * Additional Pertinent Lab Tests Reviewed: All Labs For Current Hospital Admission Reviewed    Imaging:    Imaging Reports Reviewed Today Include: CT abdomen pelvis with contrast  Imaging Personally Reviewed by Myself Includes:  CT abdomen pelvis with contrast    Recent Cultures (last 7 days):           Last 24 Hours Medication List:   Current Facility-Administered Medications   Medication Dose Route Frequency Provider Last Rate    acetaminophen  650 mg Oral Q6H PRN Courtney Person MD      albuterol  2 puff Inhalation Q4H PRN Courtney Person MD      allopurinol  300 mg Oral Daily Courtney Person MD      doxylamine  25 mg Oral HS PRN Leslie Falk PA-C      enoxaparin  40 mg Subcutaneous Daily Courtney Person MD      FLUoxetine  40 mg Oral Daily Courtney Person MD      folic acid  1 mg Oral Daily Courtney Person MD      ketorolac  30 mg Intravenous Q6H PRN Courtney Person MD      lactated ringers  125 mL/hr Intravenous Continuous Courtnye Person  mL/hr (11/20/24 9212)    levothyroxine  75 mcg Oral Early Morning Courtney Person MD      loratadine  10 mg Oral Daily Courtney Person MD      multivitamin stress formula   1 tablet Oral Daily Courtney Person MD      ondansetron  4 mg Intravenous Q6H PRN Courtney Person MD      pantoprazole  40 mg Oral Early Morning Courtney Person MD      polyethylene glycol  17 g Oral Daily Courtney Person MD      thiamine  100 mg Oral Daily Courtney Person MD      traMADol  50 mg Oral Q6H PRN Courtney Person MD          Today, Patient Was Seen By: Connie Laird    ** Please Note: Dictation voice to text software may have been used in the creation of this document. **

## 2024-11-20 NOTE — ASSESSMENT & PLAN NOTE
Patient with known history of alcohol abuse.  Reports having 2 drinks on Friday 11/15, none since  No signs of withdrawal.  [facility] protocol  Cessation education provided given recurrent pancreatitis

## 2024-11-20 NOTE — ASSESSMENT & PLAN NOTE
Continue prozac    Chest Pain    Chest pain can be caused by many different conditions which may or may not be dangerous. Causes include heartburn, lung infections, heart attack, blood clot in lungs, skin infections, strain or damage to muscle, cartilage, or bones, etc. In addition to a history and physical examination, an electrocardiogram (ECG) or other lab tests may have been performed to determine the cause of your chest pain. Follow up with your primary care provider or with a cardiologist as instructed.     SEEK IMMEDIATE MEDICAL CARE IF YOU HAVE ANY OF THE FOLLOWING SYMPTOMS: worsening chest pain, coughing up blood, unexplained back/neck/jaw pain, severe abdominal pain, dizziness or lightheadedness, fainting, shortness of breath, sweaty or clammy skin, vomiting, or racing heart beat. These symptoms may represent a serious problem that is an emergency. Do not wait to see if the symptoms will go away. Get medical help right away. Call 911 and do not drive yourself to the hospital.    Shortness of breath    Shortness of breath (dyspnea) means you have trouble breathing and could indicate a medical problem. Causes include lung disease, heart disease, low amount of red blood cells (anemia), poor physical fitness, being overweight, smoking, etc. Your health care provider today may not be able to find a cause for your shortness of breath after your exam. In this case, it is important to have a follow-up exam with your primary care physician as instructed. If medicines were prescribed, take them as directed for the full length of time directed. Refrain from tobacco products.    SEEK IMMEDIATE MEDICAL CARE IF YOU HAVE ANY OF THE FOLLOWING SYMPTOMS: worsening shortness of breath, chest pain, back pain, abdominal pain, fever, coughing up blood, lightheadedness/dizziness.

## 2024-11-20 NOTE — ASSESSMENT & PLAN NOTE
"Patient came to the hospital with epigastric pain for few days  CT AP w contrast showed \"acute interstitial edematous pancreatitis with stranding of the fat surrounding the pancreatic head and uncinate process. No free fluid or fluid collection.. A small 1.3 cm cystic lesion/focal necrosis in the head of the pancreas is unchanged. Pancreatic ductal dilatation has also been present since at least 2019. Intra and extrahepatic biliary ductal dilatation, likely due to a stricture in the head of the pancreas also unchanged. \"  Elevated lipase noted, trending down   Tolerating diet.  Denies pain, n/v today.  Cleared for discharge   ETOH cessation   "

## 2025-01-06 ENCOUNTER — HOSPITAL ENCOUNTER (EMERGENCY)
Facility: HOSPITAL | Age: 65
DRG: 885 | End: 2025-01-06
Attending: EMERGENCY MEDICINE
Payer: MEDICARE

## 2025-01-06 ENCOUNTER — HOSPITAL ENCOUNTER (INPATIENT)
Facility: HOSPITAL | Age: 65
LOS: 22 days | Discharge: HOME | DRG: 885 | End: 2025-01-28
Attending: STUDENT IN AN ORGANIZED HEALTH CARE EDUCATION/TRAINING PROGRAM | Admitting: PSYCHIATRY & NEUROLOGY
Payer: MEDICARE

## 2025-01-06 VITALS
DIASTOLIC BLOOD PRESSURE: 72 MMHG | SYSTOLIC BLOOD PRESSURE: 118 MMHG | RESPIRATION RATE: 15 BRPM | OXYGEN SATURATION: 97 % | TEMPERATURE: 97.1 F | HEART RATE: 88 BPM

## 2025-01-06 DIAGNOSIS — E87.1 HYPONATREMIA: ICD-10-CM

## 2025-01-06 DIAGNOSIS — F51.05 MOOD INSOMNIA (HCC): ICD-10-CM

## 2025-01-06 DIAGNOSIS — R45.851 SUICIDAL IDEATION: ICD-10-CM

## 2025-01-06 DIAGNOSIS — R79.89 ELEVATED LFTS: ICD-10-CM

## 2025-01-06 DIAGNOSIS — Z00.8 MEDICAL CLEARANCE FOR PSYCHIATRIC ADMISSION: Primary | ICD-10-CM

## 2025-01-06 DIAGNOSIS — F33.2 SEVERE EPISODE OF RECURRENT MAJOR DEPRESSIVE DISORDER, WITHOUT PSYCHOTIC FEATURES (HCC): Primary | ICD-10-CM

## 2025-01-06 DIAGNOSIS — Z59.00 HOMELESSNESS: ICD-10-CM

## 2025-01-06 DIAGNOSIS — E03.9 ACQUIRED HYPOTHYROIDISM: ICD-10-CM

## 2025-01-06 DIAGNOSIS — F32.A DEPRESSION: ICD-10-CM

## 2025-01-06 DIAGNOSIS — D61.818 PANCYTOPENIA (HCC): ICD-10-CM

## 2025-01-06 DIAGNOSIS — E55.9 VITAMIN D DEFICIENCY: ICD-10-CM

## 2025-01-06 DIAGNOSIS — F33.41 RECURRENT MAJOR DEPRESSIVE DISORDER, IN PARTIAL REMISSION (HCC): ICD-10-CM

## 2025-01-06 DIAGNOSIS — F10.20 ALCOHOL USE DISORDER, SEVERE, DEPENDENCE (HCC): ICD-10-CM

## 2025-01-06 DIAGNOSIS — F10.10 ALCOHOL ABUSE: ICD-10-CM

## 2025-01-06 DIAGNOSIS — Z87.19 HISTORY OF PANCREATITIS: ICD-10-CM

## 2025-01-06 DIAGNOSIS — Z00.8 MEDICAL CLEARANCE FOR PSYCHIATRIC ADMISSION: ICD-10-CM

## 2025-01-06 DIAGNOSIS — F10.11 HISTORY OF ALCOHOL ABUSE: ICD-10-CM

## 2025-01-06 DIAGNOSIS — E03.9 HYPOTHYROIDISM, UNSPECIFIED TYPE: ICD-10-CM

## 2025-01-06 DIAGNOSIS — F41.1 GENERALIZED ANXIETY DISORDER: ICD-10-CM

## 2025-01-06 DIAGNOSIS — F39 MOOD INSOMNIA (HCC): ICD-10-CM

## 2025-01-06 LAB
25(OH)D3 SERPL-MCNC: 32.9 NG/ML (ref 30–100)
ALBUMIN SERPL BCG-MCNC: 3.6 G/DL (ref 3.5–5)
ALP SERPL-CCNC: 86 U/L (ref 34–104)
ALT SERPL W P-5'-P-CCNC: 72 U/L (ref 7–52)
AMPHETAMINES SERPL QL SCN: NEGATIVE
ANION GAP SERPL CALCULATED.3IONS-SCNC: 11 MMOL/L (ref 4–13)
AST SERPL W P-5'-P-CCNC: 45 U/L (ref 13–39)
ATRIAL RATE: 94 BPM
BACTERIA UR QL AUTO: ABNORMAL /HPF
BARBITURATES UR QL: NEGATIVE
BASOPHILS # BLD AUTO: 0.09 THOUSANDS/ΜL (ref 0–0.1)
BASOPHILS NFR BLD AUTO: 2 % (ref 0–1)
BENZODIAZ UR QL: NEGATIVE
BILIRUB SERPL-MCNC: 2.32 MG/DL (ref 0.2–1)
BILIRUB UR QL STRIP: NEGATIVE
BUN SERPL-MCNC: 15 MG/DL (ref 5–25)
CALCIUM SERPL-MCNC: 8.8 MG/DL (ref 8.4–10.2)
CHLORIDE SERPL-SCNC: 98 MMOL/L (ref 96–108)
CLARITY UR: CLEAR
CO2 SERPL-SCNC: 27 MMOL/L (ref 21–32)
COCAINE UR QL: NEGATIVE
COLOR UR: ABNORMAL
CREAT SERPL-MCNC: 0.61 MG/DL (ref 0.6–1.3)
EOSINOPHIL # BLD AUTO: 0.05 THOUSAND/ΜL (ref 0–0.61)
EOSINOPHIL NFR BLD AUTO: 1 % (ref 0–6)
ERYTHROCYTE [DISTWIDTH] IN BLOOD BY AUTOMATED COUNT: 16.3 % (ref 11.6–15.1)
EST. AVERAGE GLUCOSE BLD GHB EST-MCNC: 97 MG/DL
ETHANOL EXG-MCNC: 0 MG/DL
ETHANOL SERPL-MCNC: <10 MG/DL
FENTANYL UR QL SCN: NEGATIVE
FOLATE SERPL-MCNC: 15.8 NG/ML
GFR SERPL CREATININE-BSD FRML MDRD: 104 ML/MIN/1.73SQ M
GLUCOSE SERPL-MCNC: 172 MG/DL (ref 65–140)
GLUCOSE SERPL-MCNC: 175 MG/DL (ref 65–140)
GLUCOSE UR STRIP-MCNC: NEGATIVE MG/DL
HBA1C MFR BLD: 5 %
HCT VFR BLD AUTO: 37.4 % (ref 36.5–49.3)
HGB BLD-MCNC: 12.7 G/DL (ref 12–17)
HGB UR QL STRIP.AUTO: NEGATIVE
HYALINE CASTS #/AREA URNS LPF: ABNORMAL /LPF
HYDROCODONE UR QL SCN: NEGATIVE
IMM GRANULOCYTES # BLD AUTO: 0.03 THOUSAND/UL (ref 0–0.2)
IMM GRANULOCYTES NFR BLD AUTO: 1 % (ref 0–2)
KETONES UR STRIP-MCNC: NEGATIVE MG/DL
LEUKOCYTE ESTERASE UR QL STRIP: NEGATIVE
LIPASE SERPL-CCNC: 120 U/L (ref 11–82)
LYMPHOCYTES # BLD AUTO: 1.29 THOUSANDS/ΜL (ref 0.6–4.47)
LYMPHOCYTES NFR BLD AUTO: 23 % (ref 14–44)
MCH RBC QN AUTO: 33.8 PG (ref 26.8–34.3)
MCHC RBC AUTO-ENTMCNC: 34 G/DL (ref 31.4–37.4)
MCV RBC AUTO: 100 FL (ref 82–98)
METHADONE UR QL: NEGATIVE
MONOCYTES # BLD AUTO: 0.36 THOUSAND/ΜL (ref 0.17–1.22)
MONOCYTES NFR BLD AUTO: 6 % (ref 4–12)
NEUTROPHILS # BLD AUTO: 3.81 THOUSANDS/ΜL (ref 1.85–7.62)
NEUTS SEG NFR BLD AUTO: 67 % (ref 43–75)
NITRITE UR QL STRIP: NEGATIVE
NON-SQ EPI CELLS URNS QL MICRO: ABNORMAL /HPF
NRBC BLD AUTO-RTO: 0 /100 WBCS
OPIATES UR QL SCN: NEGATIVE
OXYCODONE+OXYMORPHONE UR QL SCN: NEGATIVE
P AXIS: 33 DEGREES
PCP UR QL: NEGATIVE
PH UR STRIP.AUTO: 8.5 [PH]
PLATELET # BLD AUTO: 156 THOUSANDS/UL (ref 149–390)
PMV BLD AUTO: 9.5 FL (ref 8.9–12.7)
POTASSIUM SERPL-SCNC: 3.4 MMOL/L (ref 3.5–5.3)
PR INTERVAL: 170 MS
PROT SERPL-MCNC: 6.1 G/DL (ref 6.4–8.4)
PROT UR STRIP-MCNC: ABNORMAL MG/DL
QRS AXIS: 77 DEGREES
QRSD INTERVAL: 80 MS
QT INTERVAL: 362 MS
QTC INTERVAL: 452 MS
RBC # BLD AUTO: 3.76 MILLION/UL (ref 3.88–5.62)
RBC #/AREA URNS AUTO: ABNORMAL /HPF
SODIUM SERPL-SCNC: 136 MMOL/L (ref 135–147)
SP GR UR STRIP.AUTO: 1.02 (ref 1–1.03)
T WAVE AXIS: 53 DEGREES
T4 FREE SERPL-MCNC: 0.81 NG/DL (ref 0.61–1.12)
THC UR QL: NEGATIVE
TSH SERPL DL<=0.05 MIU/L-ACNC: 8.83 UIU/ML (ref 0.45–4.5)
UROBILINOGEN UR STRIP-ACNC: >=12 MG/DL
VENTRICULAR RATE: 94 BPM
VIT B12 SERPL-MCNC: 614 PG/ML (ref 180–914)
WBC # BLD AUTO: 5.63 THOUSAND/UL (ref 4.31–10.16)
WBC #/AREA URNS AUTO: ABNORMAL /HPF

## 2025-01-06 PROCEDURE — 36415 COLL VENOUS BLD VENIPUNCTURE: CPT

## 2025-01-06 PROCEDURE — 82075 ASSAY OF BREATH ETHANOL: CPT

## 2025-01-06 PROCEDURE — 82077 ASSAY SPEC XCP UR&BREATH IA: CPT

## 2025-01-06 PROCEDURE — 84443 ASSAY THYROID STIM HORMONE: CPT

## 2025-01-06 PROCEDURE — 82607 VITAMIN B-12: CPT

## 2025-01-06 PROCEDURE — 83036 HEMOGLOBIN GLYCOSYLATED A1C: CPT

## 2025-01-06 PROCEDURE — 96366 THER/PROPH/DIAG IV INF ADDON: CPT

## 2025-01-06 PROCEDURE — 84439 ASSAY OF FREE THYROXINE: CPT

## 2025-01-06 PROCEDURE — 99285 EMERGENCY DEPT VISIT HI MDM: CPT | Performed by: EMERGENCY MEDICINE

## 2025-01-06 PROCEDURE — 80307 DRUG TEST PRSMV CHEM ANLYZR: CPT

## 2025-01-06 PROCEDURE — 83690 ASSAY OF LIPASE: CPT

## 2025-01-06 PROCEDURE — 99285 EMERGENCY DEPT VISIT HI MDM: CPT

## 2025-01-06 PROCEDURE — 93005 ELECTROCARDIOGRAM TRACING: CPT

## 2025-01-06 PROCEDURE — 93010 ELECTROCARDIOGRAM REPORT: CPT | Performed by: INTERNAL MEDICINE

## 2025-01-06 PROCEDURE — 81001 URINALYSIS AUTO W/SCOPE: CPT

## 2025-01-06 PROCEDURE — 85025 COMPLETE CBC W/AUTO DIFF WBC: CPT

## 2025-01-06 PROCEDURE — 82306 VITAMIN D 25 HYDROXY: CPT

## 2025-01-06 PROCEDURE — 82746 ASSAY OF FOLIC ACID SERUM: CPT

## 2025-01-06 PROCEDURE — 80053 COMPREHEN METABOLIC PANEL: CPT

## 2025-01-06 PROCEDURE — 82948 REAGENT STRIP/BLOOD GLUCOSE: CPT

## 2025-01-06 PROCEDURE — 96361 HYDRATE IV INFUSION ADD-ON: CPT

## 2025-01-06 PROCEDURE — 96365 THER/PROPH/DIAG IV INF INIT: CPT

## 2025-01-06 RX ORDER — PROPRANOLOL HYDROCHLORIDE 10 MG/1
5 TABLET ORAL EVERY 8 HOURS PRN
Status: CANCELLED | OUTPATIENT
Start: 2025-01-06

## 2025-01-06 RX ORDER — LORAZEPAM 2 MG/ML
1 INJECTION INTRAMUSCULAR
Status: DISCONTINUED | OUTPATIENT
Start: 2025-01-06 | End: 2025-01-28 | Stop reason: HOSPADM

## 2025-01-06 RX ORDER — HYDROXYZINE HYDROCHLORIDE 25 MG/1
25 TABLET, FILM COATED ORAL
Status: CANCELLED | OUTPATIENT
Start: 2025-01-06

## 2025-01-06 RX ORDER — OLANZAPINE 10 MG/2ML
5 INJECTION, POWDER, FOR SOLUTION INTRAMUSCULAR
Status: CANCELLED | OUTPATIENT
Start: 2025-01-06

## 2025-01-06 RX ORDER — POTASSIUM CHLORIDE 1500 MG/1
40 TABLET, EXTENDED RELEASE ORAL ONCE
Status: COMPLETED | OUTPATIENT
Start: 2025-01-06 | End: 2025-01-06

## 2025-01-06 RX ORDER — TRAZODONE HYDROCHLORIDE 50 MG/1
50 TABLET, FILM COATED ORAL
Status: CANCELLED | OUTPATIENT
Start: 2025-01-06

## 2025-01-06 RX ORDER — MAGNESIUM SULFATE HEPTAHYDRATE 40 MG/ML
2 INJECTION, SOLUTION INTRAVENOUS ONCE
Status: COMPLETED | OUTPATIENT
Start: 2025-01-06 | End: 2025-01-06

## 2025-01-06 RX ORDER — TRAZODONE HYDROCHLORIDE 50 MG/1
50 TABLET, FILM COATED ORAL
Status: DISCONTINUED | OUTPATIENT
Start: 2025-01-06 | End: 2025-01-16

## 2025-01-06 RX ORDER — ACETAMINOPHEN 325 MG/1
975 TABLET ORAL EVERY 6 HOURS PRN
Status: DISCONTINUED | OUTPATIENT
Start: 2025-01-06 | End: 2025-01-28 | Stop reason: HOSPADM

## 2025-01-06 RX ORDER — FOLIC ACID 1 MG/1
1 TABLET ORAL DAILY
Status: DISCONTINUED | OUTPATIENT
Start: 2025-01-07 | End: 2025-01-28 | Stop reason: HOSPADM

## 2025-01-06 RX ORDER — HYDROXYZINE HYDROCHLORIDE 25 MG/1
50 TABLET, FILM COATED ORAL
Status: CANCELLED | OUTPATIENT
Start: 2025-01-06

## 2025-01-06 RX ORDER — LEVOTHYROXINE SODIUM 75 UG/1
75 TABLET ORAL
Status: DISCONTINUED | OUTPATIENT
Start: 2025-01-07 | End: 2025-01-28 | Stop reason: HOSPADM

## 2025-01-06 RX ORDER — OLANZAPINE 10 MG/1
5 TABLET ORAL
Status: CANCELLED | OUTPATIENT
Start: 2025-01-06

## 2025-01-06 RX ORDER — BISACODYL 10 MG
10 SUPPOSITORY, RECTAL RECTAL DAILY PRN
Status: DISCONTINUED | OUTPATIENT
Start: 2025-01-06 | End: 2025-01-06

## 2025-01-06 RX ORDER — AMOXICILLIN 250 MG
1 CAPSULE ORAL DAILY PRN
Status: CANCELLED | OUTPATIENT
Start: 2025-01-06

## 2025-01-06 RX ORDER — HYDROXYZINE HYDROCHLORIDE 50 MG/1
50 TABLET, FILM COATED ORAL
Status: DISCONTINUED | OUTPATIENT
Start: 2025-01-06 | End: 2025-01-28 | Stop reason: HOSPADM

## 2025-01-06 RX ORDER — HYDROXYZINE HYDROCHLORIDE 25 MG/1
25 TABLET, FILM COATED ORAL
Status: DISCONTINUED | OUTPATIENT
Start: 2025-01-06 | End: 2025-01-28 | Stop reason: HOSPADM

## 2025-01-06 RX ORDER — AMOXICILLIN 250 MG
1 CAPSULE ORAL DAILY PRN
Status: DISCONTINUED | OUTPATIENT
Start: 2025-01-06 | End: 2025-01-28 | Stop reason: HOSPADM

## 2025-01-06 RX ORDER — ACETAMINOPHEN 325 MG/1
650 TABLET ORAL EVERY 4 HOURS PRN
Status: DISCONTINUED | OUTPATIENT
Start: 2025-01-06 | End: 2025-01-28 | Stop reason: HOSPADM

## 2025-01-06 RX ORDER — PROPRANOLOL HYDROCHLORIDE 10 MG/1
5 TABLET ORAL EVERY 8 HOURS PRN
Status: DISCONTINUED | OUTPATIENT
Start: 2025-01-06 | End: 2025-01-28 | Stop reason: HOSPADM

## 2025-01-06 RX ORDER — BENZTROPINE MESYLATE 0.5 MG/1
0.5 TABLET ORAL
Status: DISCONTINUED | OUTPATIENT
Start: 2025-01-06 | End: 2025-01-28 | Stop reason: HOSPADM

## 2025-01-06 RX ORDER — OLANZAPINE 2.5 MG/1
2.5 TABLET, FILM COATED ORAL
Status: CANCELLED | OUTPATIENT
Start: 2025-01-06

## 2025-01-06 RX ORDER — POLYETHYLENE GLYCOL 3350 17 G/17G
17 POWDER, FOR SOLUTION ORAL DAILY PRN
Status: DISCONTINUED | OUTPATIENT
Start: 2025-01-06 | End: 2025-01-28 | Stop reason: HOSPADM

## 2025-01-06 RX ORDER — ACETAMINOPHEN 325 MG/1
650 TABLET ORAL EVERY 4 HOURS PRN
Status: CANCELLED | OUTPATIENT
Start: 2025-01-06

## 2025-01-06 RX ORDER — LORAZEPAM 1 MG/1
1 TABLET ORAL
Status: CANCELLED | OUTPATIENT
Start: 2025-01-06

## 2025-01-06 RX ORDER — OLANZAPINE 2.5 MG/1
2.5 TABLET, FILM COATED ORAL
Status: DISCONTINUED | OUTPATIENT
Start: 2025-01-06 | End: 2025-01-28 | Stop reason: HOSPADM

## 2025-01-06 RX ORDER — LANOLIN ALCOHOL/MO/W.PET/CERES
100 CREAM (GRAM) TOPICAL DAILY
Status: DISCONTINUED | OUTPATIENT
Start: 2025-01-07 | End: 2025-01-28 | Stop reason: HOSPADM

## 2025-01-06 RX ORDER — BENZTROPINE MESYLATE 1 MG/ML
1 INJECTION, SOLUTION INTRAMUSCULAR; INTRAVENOUS
Status: CANCELLED | OUTPATIENT
Start: 2025-01-06

## 2025-01-06 RX ORDER — LORAZEPAM 1 MG/1
1 TABLET ORAL
Status: DISCONTINUED | OUTPATIENT
Start: 2025-01-06 | End: 2025-01-28 | Stop reason: HOSPADM

## 2025-01-06 RX ORDER — BISACODYL 10 MG
10 SUPPOSITORY, RECTAL RECTAL DAILY PRN
Status: DISCONTINUED | OUTPATIENT
Start: 2025-01-06 | End: 2025-01-28 | Stop reason: HOSPADM

## 2025-01-06 RX ORDER — BENZTROPINE MESYLATE 1 MG/ML
1 INJECTION, SOLUTION INTRAMUSCULAR; INTRAVENOUS
Status: DISCONTINUED | OUTPATIENT
Start: 2025-01-06 | End: 2025-01-28 | Stop reason: HOSPADM

## 2025-01-06 RX ORDER — PANTOPRAZOLE SODIUM 40 MG/1
40 TABLET, DELAYED RELEASE ORAL
Status: DISCONTINUED | OUTPATIENT
Start: 2025-01-07 | End: 2025-01-28 | Stop reason: HOSPADM

## 2025-01-06 RX ORDER — OLANZAPINE 10 MG/2ML
5 INJECTION, POWDER, FOR SOLUTION INTRAMUSCULAR
Status: DISCONTINUED | OUTPATIENT
Start: 2025-01-06 | End: 2025-01-28 | Stop reason: HOSPADM

## 2025-01-06 RX ORDER — OLANZAPINE 5 MG/1
5 TABLET ORAL
Status: DISCONTINUED | OUTPATIENT
Start: 2025-01-06 | End: 2025-01-28 | Stop reason: HOSPADM

## 2025-01-06 RX ORDER — POLYETHYLENE GLYCOL 3350 17 G/17G
17 POWDER, FOR SOLUTION ORAL DAILY PRN
Status: CANCELLED | OUTPATIENT
Start: 2025-01-06

## 2025-01-06 RX ORDER — BISACODYL 10 MG
10 SUPPOSITORY, RECTAL RECTAL DAILY PRN
Status: CANCELLED | OUTPATIENT
Start: 2025-01-06

## 2025-01-06 RX ORDER — ACETAMINOPHEN 325 MG/1
975 TABLET ORAL EVERY 6 HOURS PRN
Status: CANCELLED | OUTPATIENT
Start: 2025-01-06

## 2025-01-06 RX ORDER — MAGNESIUM HYDROXIDE/ALUMINUM HYDROXICE/SIMETHICONE 120; 1200; 1200 MG/30ML; MG/30ML; MG/30ML
30 SUSPENSION ORAL EVERY 4 HOURS PRN
Status: DISCONTINUED | OUTPATIENT
Start: 2025-01-06 | End: 2025-01-28 | Stop reason: HOSPADM

## 2025-01-06 RX ORDER — BENZTROPINE MESYLATE 1 MG/1
0.5 TABLET ORAL
Status: CANCELLED | OUTPATIENT
Start: 2025-01-06

## 2025-01-06 RX ORDER — MAGNESIUM HYDROXIDE/ALUMINUM HYDROXICE/SIMETHICONE 120; 1200; 1200 MG/30ML; MG/30ML; MG/30ML
30 SUSPENSION ORAL EVERY 4 HOURS PRN
Status: CANCELLED | OUTPATIENT
Start: 2025-01-06

## 2025-01-06 RX ORDER — LORAZEPAM 2 MG/ML
1 INJECTION INTRAMUSCULAR
Status: CANCELLED | OUTPATIENT
Start: 2025-01-06

## 2025-01-06 RX ADMIN — POTASSIUM CHLORIDE 40 MEQ: 1500 TABLET, EXTENDED RELEASE ORAL at 13:31

## 2025-01-06 RX ADMIN — MAGNESIUM SULFATE HEPTAHYDRATE 2 G: 40 INJECTION, SOLUTION INTRAVENOUS at 13:28

## 2025-01-06 RX ADMIN — SODIUM CHLORIDE 1000 ML: 0.9 INJECTION, SOLUTION INTRAVENOUS at 11:57

## 2025-01-06 SDOH — ECONOMIC STABILITY - HOUSING INSECURITY: HOMELESSNESS UNSPECIFIED: Z59.00

## 2025-01-06 NOTE — ED ATTENDING ATTESTATION
1/6/2025  IJuan MD, saw and evaluated the patient. I have discussed the patient with the resident/non-physician practitioner and agree with the resident's/non-physician practitioner's findings, Plan of Care, and MDM as documented in the resident's/non-physician practitioner's note, except where noted. All available labs and Radiology studies were reviewed.  I was present for key portions of any procedure(s) performed by the resident/non-physician practitioner and I was immediately available to provide assistance.       At this point I agree with the current assessment done in the Emergency Department.  I have conducted an independent evaluation of this patient a history and physical is as follows:    ED Course  ED Course as of 01/06/25 1410   Mon Jan 06, 2025   1133 Per resident h&p 63 YO M presents depression after being evicted no thoughts of self harm; diarrhea last several days; less alcohol since admission at the end of last year;  M in no distress P 108; I/P crisis care consult; basic labs; LFT's r/o hyperbilirubinemia     Emergency Department Note- Sammy Mays 64 y.o. male MRN: [id number]    Unit/Bed#: ED 01 Encounter: [id number]    Sammy Mays is a 64 y.o. male who presents with   Chief Complaint   Patient presents with    Medical Problem     Pt reports having vomiting, chills, black stools. Reports has been sleeping more the last couple days as well as not eating. Pt reports he was evicted today from his home and now having some depression and trouble getting out of bed the last couple of days. Patient reports he feels sometimes it would be best to go to sleep and not wake up and if he had a gun he might want to harm himself.         History of Present Illness   HPI:  Sammy Mays is a 64 y.o. male who presents for evaluation of:  Worsening depression since being evicted from his home earlier today.  Patient denies any ingestions to harm himself.  He was drinking alcohol earlier today.   He has a history of chronic alcohol use.    Review of Systems   Constitutional:  Negative for fatigue and fever.   HENT:  Negative for congestion and sore throat.    Respiratory:  Negative for cough and shortness of breath.    Cardiovascular:  Negative for chest pain and palpitations.   Gastrointestinal:  Positive for nausea and vomiting. Negative for abdominal pain.   Genitourinary:  Negative for flank pain and frequency.   Neurological:  Negative for light-headedness and headaches.   Psychiatric/Behavioral:  Negative for dysphoric mood and hallucinations.    All other systems reviewed and are negative.      Historical Information   Past Medical History:   Diagnosis Date    Medical history unknown     Pancreatitis      Past Surgical History:   Procedure Laterality Date    CHOLECYSTECTOMY LAPAROSCOPIC      REPAIR / REINSERT BICEPS TENDON AT ELBOW      Last Assessed: 1/12/2016      Social History   Social History     Substance and Sexual Activity   Alcohol Use Yes    Alcohol/week: 14.0 standard drinks of alcohol    Types: 14 Standard drinks or equivalent per week    Comment: 2 drinks per night.     Social History     Substance and Sexual Activity   Drug Use Yes    Types: Marijuana     Social History     Tobacco Use   Smoking Status Never   Smokeless Tobacco Never     Family History:   Family History   Problem Relation Age of Onset    Diabetes Mother        Meds/Allergies   PTA meds:   Prior to Admission Medications   Prescriptions Last Dose Informant Patient Reported? Taking?   FLUoxetine (PROzac) 40 MG capsule   No No   Sig: TAKE ONE CAPSULE BY MOUTH EVERY DAY   Multiple Vitamin (DAILY VALUE MULTIVITAMIN) TABS   Yes No   Sig: Take 1 tablet by mouth daily   albuterol (PROVENTIL HFA,VENTOLIN HFA) 90 mcg/act inhaler   Yes No   Sig: Inhale 1-2 puffs   allopurinol (ZYLOPRIM) 300 mg tablet   No No   Sig: TAKE 1 TABLET EVERY DAY   cetirizine (ZyrTEC) 10 mg tablet   Yes No   Sig: Take one tablet by mouth daily as needed for  allergies/congestion    folic acid (FOLVITE) 1 mg tablet   No No   Sig: Take 1 tablet (1 mg total) by mouth daily   levothyroxine 75 mcg tablet   No No   Sig: TAKE 1 TABLET IN THE MORNING ON AN EMPTY STOMACH FOR THYROID   pantoprazole (PROTONIX) 40 mg tablet   No No   Sig: Take 1 tablet (40 mg total) by mouth daily in the early morning   thiamine 100 MG tablet   No No   Sig: Take 1 tablet (100 mg total) by mouth daily      Facility-Administered Medications: None     Allergies   Allergen Reactions    Amoxicillin Swelling and Fever     Other reaction(s): vertigo    Escitalopram      Other reaction(s): sweaty palms/hands, felt faint, passed out for a short time, had anxiety/panic attack    Gemfibrozil      Other reaction(s): Nausea and/or vomiting  Other reaction(s): Nausea and/or vomiting    Other Allergic Rhinitis     Other reaction(s): Other (See Comments)  rhinitis, itchy eyes       Objective   First Vitals:   Blood Pressure: 120/66 (01/06/25 1046)  Pulse: (!) 108 (01/06/25 1046)  Temperature: (!) 97.1 °F (36.2 °C) (01/06/25 1046)  Temp Source: Temporal (01/06/25 1046)  Respirations: 20 (01/06/25 1046)  SpO2: 97 % (01/06/25 1046)    Current Vitals:   Blood Pressure: 120/66 (01/06/25 1046)  Pulse: 93 (01/06/25 1208)  Temperature: (!) 97.1 °F (36.2 °C) (01/06/25 1046)  Temp Source: Temporal (01/06/25 1046)  Respirations: 16 (01/06/25 1208)  SpO2: 97 % (01/06/25 1046)      Intake/Output Summary (Last 24 hours) at 1/6/2025 1410  Last data filed at 1/6/2025 1327  Gross per 24 hour   Intake 1000 ml   Output --   Net 1000 ml       Invasive Devices       Peripheral Intravenous Line  Duration             Peripheral IV 01/06/25 Left Wrist <1 day                    Physical Exam  Vitals and nursing note reviewed.   Constitutional:       General: He is not in acute distress.     Appearance: Normal appearance. He is well-developed.   HENT:      Head: Normocephalic and atraumatic.      Right Ear: External ear normal.      Left  Ear: External ear normal.      Nose: Nose normal.      Mouth/Throat:      Pharynx: No oropharyngeal exudate.   Eyes:      Conjunctiva/sclera: Conjunctivae normal.      Pupils: Pupils are equal, round, and reactive to light.   Cardiovascular:      Rate and Rhythm: Normal rate and regular rhythm.   Pulmonary:      Effort: Pulmonary effort is normal. No respiratory distress.   Abdominal:      General: Abdomen is flat. There is no distension.      Palpations: Abdomen is soft.   Musculoskeletal:         General: No deformity. Normal range of motion.      Cervical back: Normal range of motion and neck supple.   Skin:     General: Skin is warm and dry.      Capillary Refill: Capillary refill takes less than 2 seconds.   Neurological:      General: No focal deficit present.      Mental Status: He is alert and oriented to person, place, and time. Mental status is at baseline.      Coordination: Coordination normal.   Psychiatric:         Mood and Affect: Mood normal.         Behavior: Behavior normal.         Thought Content: Thought content normal.         Judgment: Judgment normal.           Medical Decision Makin.  Depression after eviction: Plan breath alcohol test rule out alcohol intoxication; urine drug screen rule out illicit drugs of abuse; ECG rule out arrhythmia; complete metabolic profile rule out electrolyte disturbance, uremia, and disorders of glucose homeostasis; CBC rule out leukocytosis and anemia.    Recent Results (from the past 36 hours)   POCT alcohol breath test    Collection Time: 25 11:50 AM   Result Value Ref Range    EXTBreath Alcohol 0.000    Fingerstick Glucose (POCT)    Collection Time: 25 11:51 AM   Result Value Ref Range    POC Glucose 175 (H) 65 - 140 mg/dl   CBC and differential    Collection Time: 25 11:55 AM   Result Value Ref Range    WBC 5.63 4.31 - 10.16 Thousand/uL    RBC 3.76 (L) 3.88 - 5.62 Million/uL    Hemoglobin 12.7 12.0 - 17.0 g/dL    Hematocrit 37.4 36.5  - 49.3 %     (H) 82 - 98 fL    MCH 33.8 26.8 - 34.3 pg    MCHC 34.0 31.4 - 37.4 g/dL    RDW 16.3 (H) 11.6 - 15.1 %    MPV 9.5 8.9 - 12.7 fL    Platelets 156 149 - 390 Thousands/uL    nRBC 0 /100 WBCs    Segmented % 67 43 - 75 %    Immature Grans % 1 0 - 2 %    Lymphocytes % 23 14 - 44 %    Monocytes % 6 4 - 12 %    Eosinophils Relative 1 0 - 6 %    Basophils Relative 2 (H) 0 - 1 %    Absolute Neutrophils 3.81 1.85 - 7.62 Thousands/µL    Absolute Immature Grans 0.03 0.00 - 0.20 Thousand/uL    Absolute Lymphocytes 1.29 0.60 - 4.47 Thousands/µL    Absolute Monocytes 0.36 0.17 - 1.22 Thousand/µL    Eosinophils Absolute 0.05 0.00 - 0.61 Thousand/µL    Basophils Absolute 0.09 0.00 - 0.10 Thousands/µL   Comprehensive metabolic panel    Collection Time: 01/06/25 11:55 AM   Result Value Ref Range    Sodium 136 135 - 147 mmol/L    Potassium 3.4 (L) 3.5 - 5.3 mmol/L    Chloride 98 96 - 108 mmol/L    CO2 27 21 - 32 mmol/L    ANION GAP 11 4 - 13 mmol/L    BUN 15 5 - 25 mg/dL    Creatinine 0.61 0.60 - 1.30 mg/dL    Glucose 172 (H) 65 - 140 mg/dL    Calcium 8.8 8.4 - 10.2 mg/dL    AST 45 (H) 13 - 39 U/L    ALT 72 (H) 7 - 52 U/L    Alkaline Phosphatase 86 34 - 104 U/L    Total Protein 6.1 (L) 6.4 - 8.4 g/dL    Albumin 3.6 3.5 - 5.0 g/dL    Total Bilirubin 2.32 (H) 0.20 - 1.00 mg/dL    eGFR 104 ml/min/1.73sq m   Lipase    Collection Time: 01/06/25 11:55 AM   Result Value Ref Range    Lipase 120 (H) 11 - 82 u/L   Ethanol    Collection Time: 01/06/25 11:55 AM   Result Value Ref Range    Ethanol Lvl <10 <10 mg/dL   ECG 12 lead    Collection Time: 01/06/25 12:06 PM   Result Value Ref Range    Ventricular Rate 94 BPM    Atrial Rate 94 BPM    MD Interval 170 ms    QRSD Interval 80 ms    QT Interval 362 ms    QTC Interval 452 ms    P [place] 33 degrees    QRS Axis 77 degrees    T Wave Axis 53 degrees     No orders to display         Portions of the record may have been created with voice recognition software. Occasional wrong word or  "\"sound a like\" substitutions may have occurred due to the inherent limitations of voice recognition software.  Read the chart carefully and recognize, using context, where substitutions have occurred.        Critical Care Time  Procedures      "

## 2025-01-06 NOTE — PLAN OF CARE
Problem: PAIN - ADULT  Goal: Verbalizes/displays adequate comfort level or baseline comfort level  Description: Interventions:  - Encourage patient to monitor pain and request assistance  - Assess pain using appropriate pain scale  - Administer analgesics based on type and severity of pain and evaluate response  - Implement non-pharmacological measures as appropriate and evaluate response  - Consider cultural and social influences on pain and pain management  - Notify physician/advanced practitioner if interventions unsuccessful or patient reports new pain  Outcome: Not Met     Problem: INFECTION - ADULT  Goal: Absence or prevention of progression during hospitalization  Description: INTERVENTIONS:  - Assess and monitor for signs and symptoms of infection  - Monitor lab/diagnostic results  - Monitor all insertion sites, i.e. indwelling lines, tubes, and drains  - Monitor endotracheal if appropriate and nasal secretions for changes in amount and color  - Howard appropriate cooling/warming therapies per order  - Administer medications as ordered  - Instruct and encourage patient and family to use good hand hygiene technique  - Identify and instruct in appropriate isolation precautions for identified infection/condition  Outcome: Not Met  Goal: Absence of fever/infection during neutropenic period  Description: INTERVENTIONS:  - Monitor WBC    Outcome: Not Met     Problem: SAFETY ADULT  Goal: Patient will remain free of falls  Description: INTERVENTIONS:  - Educate patient/family on patient safety including physical limitations  - Instruct patient to call for assistance with activity   - Consult OT/PT to assist with strengthening/mobility   - Keep Call bell within reach  - Keep bed low and locked with side rails adjusted as appropriate  - Keep care items and personal belongings within reach  - Initiate and maintain comfort rounds  - Make Fall Risk Sign visible to staff  -- Apply yellow socks and bracelet for high  fall risk patients  - Consider moving patient to room near nurses station  Outcome: Not Met  Goal: Maintain or return to baseline ADL function  Description: INTERVENTIONS:  -  Assess patient's ability to carry out ADLs; assess patient's baseline for ADL function and identify physical deficits which impact ability to perform ADLs (bathing, care of mouth/teeth, toileting, grooming, dressing, etc.)  - Assess/evaluate cause of self-care deficits   - Assess range of motion  - Assess patient's mobility; develop plan if impaired  - Assess patient's need for assistive devices and provide as appropriate  - Encourage maximum independence but intervene and supervise when necessary  - Involve family in performance of ADLs  - Assess for home care needs following discharge   - Consider OT consult to assist with ADL evaluation and planning for discharge  - Provide patient education as appropriate  Outcome: Not Met     Problem: DISCHARGE PLANNING  Goal: Discharge to home or other facility with appropriate resources  Description: INTERVENTIONS:  - Identify barriers to discharge w/patient and caregiver  - Arrange for needed discharge resources and transportation as appropriate  - Identify discharge learning needs (meds, wound care, etc.)  - Arrange for interpretive services to assist at discharge as needed  - Refer to Case Management Department for coordinating discharge planning if the patient needs post-hospital services based on physician/advanced practitioner order or complex needs related to functional status, cognitive ability, or social support system  Outcome: Not Met     Problem: Knowledge Deficit  Goal: Patient/family/caregiver demonstrates understanding of disease process, treatment plan, medications, and discharge instructions  Description: Complete learning assessment and assess knowledge base.  Interventions:  - Provide teaching at level of understanding  - Provide teaching via preferred learning methods  Outcome: Not  Met     Problem: Alteration in Thoughts and Perception  Goal: Treatment Goal: Gain control of psychotic behaviors/thinking, reduce/eliminate presenting symptoms and demonstrate improved reality functioning upon discharge  Outcome: Not Met  Goal: Verbalize thoughts and feelings  Description: Interventions:  - Promote a nonjudgmental and trusting relationship with the patient through active listening and therapeutic communication  - Assess patient's level of functioning, behavior and potential for risk  - Engage patient in 1 on 1 interactions  - Encourage patient to express fears, feelings, frustrations, and discuss symptoms    - Banquete patient to reality, help patient recognize reality-based thinking   - Administer medications as ordered and assess for potential side effects  - Provide the patient education related to the signs and symptoms of the illness and desired effects of prescribed medications  Outcome: Not Met  Goal: Refrain from acting on delusional thinking/internal stimuli  Description: Interventions:  - Monitor patient closely, per order   - Utilize least restrictive measures   - Set reasonable limits, give positive feedback for acceptable   - Administer medications as ordered and monitor of potential side effects  Outcome: Not Met  Goal: Agree to be compliant with medication regime, as prescribed and report medication side effects  Description: Interventions:  - Offer appropriate PRN medication and supervise ingestion; conduct AIMS, as needed   Outcome: Not Met  Goal: Attend and participate in unit activities, including therapeutic, recreational, and educational groups  Description: Interventions:  -Encourage Visitation and family involvement in care  Outcome: Not Met  Goal: Recognize dysfunctional thoughts, communicate reality-based thoughts at the time of discharge  Description: Interventions:  - Provide medication and psycho-education to assist patient in compliance and developing insight into his/her  illness   Outcome: Not Met  Goal: Complete daily ADLs, including personal hygiene independently, as able  Description: Interventions:  - Observe, teach, and assist patient with ADLS  - Monitor and promote a balance of rest/activity, with adequate nutrition and elimination   Outcome: Not Met     Problem: Ineffective Coping  Goal: Cooperates with admission process  Description: Interventions:   - Complete admission process  Outcome: Not Met  Goal: Identifies ineffective coping skills  Outcome: Not Met  Goal: Identifies healthy coping skills  Outcome: Not Met  Goal: Demonstrates healthy coping skills  Outcome: Not Met  Goal: Participates in unit activities  Description: Interventions:  - Provide therapeutic environment   - Provide required programming   - Redirect inappropriate behaviors   Outcome: Not Met  Goal: Patient/Family participate in treatment and DC plans  Description: Interventions:  - Provide therapeutic environment  Outcome: Not Met  Goal: Patient/Family verbalizes awareness of resources  Outcome: Not Met  Goal: Understands least restrictive measures  Description: Interventions:  - Utilize least restrictive behavior  Outcome: Not Met  Goal: Free from restraint events  Description: - Utilize least restrictive measures   - Provide behavioral interventions   - Redirect inappropriate behaviors   Outcome: Not Met     Problem: Risk for Self Injury/Neglect  Goal: Treatment Goal: Remain safe during length of stay, learn and adopt new coping skills, and be free of self-injurious ideation, impulses and acts at the time of discharge  Outcome: Not Met  Goal: Verbalize thoughts and feelings  Description: Interventions:  - Assess and re-assess patient's lethality and potential for self-injury  - Engage patient in 1:1 interactions, daily, for a minimum of 15 minutes  - Encourage patient to express feelings, fears, frustrations, hopes  - Establish rapport/trust with patient   Outcome: Not Met  Goal: Refrain from harming  self  Description: Interventions:  - Monitor patient closely, per order  - Develop a trusting relationship  - Supervise medication ingestion, monitor effects and side effects   Outcome: Not Met  Goal: Attend and participate in unit activities, including therapeutic, recreational, and educational groups  Description: Interventions:  - Provide therapeutic and educational activities daily, encourage attendance and participation, and document same in the medical record  - Obtain collateral information, encourage visitation and family involvement in care   Outcome: Not Met  Goal: Recognize maladaptive responses and adopt new coping mechanisms  Outcome: Not Met  Goal: Complete daily ADLs, including personal hygiene independently, as able  Description: Interventions:  - Observe, teach, and assist patient with ADLS  - Monitor and promote a balance of rest/activity, with adequate nutrition and elimination  Outcome: Not Met     Problem: Depression  Goal: Treatment Goal: Demonstrate behavioral control of depressive symptoms, verbalize feelings of improved mood/affect, and adopt new coping skills prior to discharge  Outcome: Not Met  Goal: Verbalize thoughts and feelings  Description: Interventions:  - Assess and re-assess patient's level of risk   - Engage patient in 1:1 interactions, daily, for a minimum of 15 minutes   - Encourage patient to express feelings, fears, frustrations, hopes   Outcome: Not Met  Goal: Refrain from harming self  Description: Interventions:  - Monitor patient closely, per order   - Supervise medication ingestion, monitor effects and side effects   Outcome: Not Met  Goal: Refrain from isolation  Description: Interventions:  - Develop a trusting relationship   - Encourage socialization   Outcome: Not Met  Goal: Refrain from self-neglect  Outcome: Not Met  Goal: Attend and participate in unit activities, including therapeutic, recreational, and educational groups  Description: Interventions:  - Provide  therapeutic and educational activities daily, encourage attendance and participation, and document same in the medical record   Outcome: Not Met  Goal: Complete daily ADLs, including personal hygiene independently, as able  Description: Interventions:  - Observe, teach, and assist patient with ADLS  -  Monitor and promote a balance of rest/activity, with adequate nutrition and elimination   Outcome: Not Met     Problem: Risk for Violence/Aggression Toward Others  Goal: Treatment Goal: Refrain from acts of violence/aggression during length of stay, and demonstrate improved impulse control at the time of discharge  Outcome: Not Met  Goal: Verbalize thoughts and feelings  Description: Interventions:  - Assess and re-assess patient's level of risk, every waking shift  - Engage patient in 1:1 interactions, daily, for a minimum of 15 minutes   - Allow patient to express feelings and frustrations in a safe and non-threatening manner   - Establish rapport/trust with patient   Outcome: Not Met  Goal: Refrain from harming others  Outcome: Not Met  Goal: Refrain from destructive acts on the environment or property  Outcome: Not Met  Goal: Control angry outbursts  Description: Interventions:  - Monitor patient closely, per order  - Ensure early verbal de-escalation  - Monitor prn medication needs  - Set reasonable/therapeutic limits, outline behavioral expectations, and consequences   - Provide a non-threatening milieu, utilizing the least restrictive interventions   Outcome: Not Met  Goal: Attend and participate in unit activities, including therapeutic, recreational, and educational groups  Description: Interventions:  - Provide therapeutic and educational activities daily, encourage attendance and participation, and document same in the medical record   Outcome: Not Met  Goal: Identify appropriate positive anger management techniques  Description: Interventions:  - Offer anger management and coping skills groups   - Staff  will provide positive feedback for appropriate anger control  Outcome: Not Met     Problem: Alteration in Orientation  Goal: Treatment Goal: Demonstrate a reduction of confusion and improved orientation to person, place, time and/or situation upon discharge, according to optimum baseline/ability  Outcome: Not Met  Goal: Interact with staff daily  Description: Interventions:  - Assess and re-assess patient's level of orientation  - Engage patient in 1 on 1 interactions, daily, for a minimum of 15 minutes   - Establish rapport/trust with patient   Outcome: Not Met  Goal: Express concerns related to confused thinking related to:  Description: Interventions:  - Encourage patient to express feelings, fears, frustrations, hopes  - Assign consistent caregivers   - [place]/re-orient patient as needed  - Allow comfort items, as appropriate  - Provide visual cues, signs, etc.   Outcome: Not Met  Goal: Allow medical examinations, as recommended  Description: Interventions:  - Provide physical/neurological exams and/or referrals, per provider   Outcome: Not Met  Goal: Cooperate with recommended testing/procedures  Description: Interventions:  - Determine need for ancillary testing  - Observe for mental status changes  - Implement falls/precaution protocol   Outcome: Not Met  Goal: Attend and participate in unit activities, including therapeutic, recreational, and educational groups  Description: Interventions:  - Provide therapeutic and educational activities daily, encourage attendance and participation, and document same in the medical record   - Provide appropriate opportunities for reminiscence   - Provide a consistent daily routine   - Encourage family contact/visitation   Outcome: Not Met  Goal: Complete daily ADLs, including personal hygiene independently, as able  Description: Interventions:  - Observe, teach, and assist patient with ADLS  Outcome: Not Met     Problem: Individualized Interventions  Goal: Patient will  verbalize appropriate use of telephone within 5 days  Description: Interventions:  - Treatment team to determine use of supervised phone privileges   Outcome: Not Met  Goal: Patient will verbalize need for hospitalization and will no longer attempt elopement within 5 days  Description: Interventions:  - Ongoing education to help patient understand need for hospitalization  Outcome: Not Met  Goal: Patient will recognize inappropriate behaviors and develop alternative behaviors within 5 days  Description: Interventions:  - Patient in collaboration with Treatment Team will develop a behavior management plan to help identify effective coping skills to deal with stressors  Outcome: Not Met

## 2025-01-06 NOTE — ED PROVIDER NOTES
Time reflects when diagnosis was documented in both MDM as applicable and the Disposition within this note       Time User Action Codes Description Comment    1/6/2025  3:44 PM Trish Taylor Add [Z00.8] Medical clearance for psychiatric admission     1/6/2025  3:44 PM Trish Taylor Add [E87.6] Hypokalemia     1/6/2025  3:44 PM Hangey, Trish Add [E87.1] Hyponatremia     1/6/2025  3:44 PM HangeySusannaTrish Remove [E87.6] Hypokalemia     1/6/2025  3:45 PM HangeySusannaTrish Add [R79.89] Elevated LFTs     1/6/2025  3:45 PM HangeySusannaTrish Add [D61.818] Pancytopenia (HCC)     1/6/2025  3:45 PM Susanna Taylorily Add [E03.9] Acquired hypothyroidism     1/6/2025  4:25 PM Gael Nicole Add [R45.851] Suicidal ideation     1/6/2025  4:25 PM Gael Nicole Add [F32.A] Depression     1/6/2025  4:25 PM Gael Nicole Add [Z87.19] History of pancreatitis     1/6/2025  4:26 PM Gael Nicole Add [Z59.00] Homelessness     1/6/2025  4:26 PM Gael Nicole Add [F10.11] History of alcohol abuse           ED Disposition       ED Disposition   Transfer to Behavioral Health Condition   --    Date/Time   Mon Jan 6, 2025  4:22 PM    Comment   Sammy Mays should be transferred out to [place] and has been medically cleared.               Assessment & Plan       Medical Decision Making  Patient is a 64 y.o. male with PMH of alcohol use, depression, pancreatitis, who presents to the ED with complaints of depression, SI    Vital signs on arrival pulse is elevated 108, otherwise vital signs within normal limits  On exam patient is alert, oriented, no evidence respiratory stress    History and physical exam most consistent with depression, however, differential diagnosis included but not limited to anxiety, alcohol use, doubt alcohol withdrawal, doubt acute pancreatitis, plan CBC/CMP/lipase/ethanol, point-of-care glucose, alcohol breath test, ECG, UDS, 1 L bolus of saline, 2 g of magnesium and 40 mEq of potassium    View ED course above for further discussion  on patient workup.     Patient is medically cleared for inpatient psych    All labs reviewed and utilized in the medical decision making process  All radiology studies independently viewed by me and interpreted by the radiologist.  I reviewed all testing with the patient.     Upon re-evaluation Patient continues remain hemodynamically stable with no new symptom/complaint, patient is spoken with the ED crisis worker, at this point in time he would like to pursue a voluntary 201 for his depression.     201 signed, at this point in time the patient does not meet criteria for 302, would be allowed to leave if he decided he wanted to go, his workup/laboratory evaluation demonstrates no evidence of acute disease process, CMP demonstrates evidence of mild hypokalemia, AST and ALT changes consistent with history of alcohol abuse and recent diarrheal illness, his TSH is elevated but his free T4 is within normal limits, his urinalysis does not demonstrate evidence of infection, his urine drug screen is negative, his ethanol is less than 10 and he is not demonstrating evidence of withdrawal, his ECG demonstrates no features concerning for ACS or malignant arrhythmia    Patient care signed out to ED night team, at time of signout patient is medically cleared for psychiatric admission with diagnosis of depression, suicidal ideation, additionally he is experiencing homelessness, has a history of pancreatitis with a mild transaminitis in the setting of recent diarrheal illness which may have been secondary to alcohol withdrawal, patient is pending placement for psychiatric inpatient medical help and has been given 2 g of magnesium, potassium repletion, and a 1 L bolus of saline in the emergency department for resuscitation and electrolyte repletion    Amount and/or Complexity of Data Reviewed  Labs: ordered. Decision-making details documented in ED Course.    Risk  Prescription drug management.  Decision regarding  "hospitalization.        ED Course as of 01/09/25 1944 Mon Jan 06, 2025   1119 Pt is presenting complaining of being sick for last few days black diarrhea + vomiting, today did not have, no appetite and cannot keep down his prescribed home pills, depressed bc knew he was getting evicted, no SI or HI, reports symptoms began 2 days ago, reports hx of these symptoms chronically, not having any pain this time, has seen GI and had colonoscopy, at this time pt is upset w/ current social status, has had, last alcohol 2 days ago, drank beer, thinks may have been w/drawing last few days, last month or so, been drinking off and on, been depressed, glass full of ice + whisky, diluting it, recent depression for eviction, been fearing it for the last few months, knew it was coming, police came to evict today, police called ambulance, pt reporting he was \"sick,\" feels sober / no HA, depressed / thinking about where life is going to go, no previous suicide attempts, no pain the chest, no sob, no recent dysuria symptoms,    1311 LIPASE(!): 120  This value is lower than patient's priors in which she was having an acute pancreatic flare, he has no pain complaint at this time, is not nauseated or vomiting, do not believe this is a episode of acute pancreatitis   1311 ETHANOL: <10   1311 Potassium(!): 3.4   1437 Patient evaluated by ED crisis worker, patient willing to sign 201   1542 UDS negative for evidence of substance use, urine without evidence of infection       Medications   sodium chloride 0.9 % bolus 1,000 mL (0 mL Intravenous Stopped 1/6/25 1327)   magnesium sulfate 2 g/50 mL IVPB (premix) 2 g (0 g Intravenous Stopped 1/6/25 1510)   potassium chloride (Klor-Con M20) CR tablet 40 mEq (40 mEq Oral Given 1/6/25 1331)       ED Risk Strat Scores                                              History of Present Illness       Chief Complaint   Patient presents with    Medical Problem     Pt reports having vomiting, chills, black " "stools. Reports has been sleeping more the last couple days as well as not eating. Pt reports he was evicted today from his home and now having some depression and trouble getting out of bed the last couple of days. Patient reports he feels sometimes it would be best to go to sleep and not wake up and if he had a gun he might want to harm himself.       Past Medical History:   Diagnosis Date    Medical history unknown     Pancreatitis       Past Surgical History:   Procedure Laterality Date    CHOLECYSTECTOMY LAPAROSCOPIC      REPAIR / REINSERT BICEPS TENDON AT ELBOW      Last Assessed: 1/12/2016       Family History   Problem Relation Age of Onset    Diabetes Mother       Social History     Tobacco Use    Smoking status: Never    Smokeless tobacco: Never   Substance Use Topics    Alcohol use: Yes     Alcohol/week: 14.0 standard drinks of alcohol     Types: 14 Standard drinks or equivalent per week     Comment: 2 drinks per night.    Drug use: Yes     Types: Marijuana      E-Cigarette/Vaping      E-Cigarette/Vaping Substances      I have reviewed and agree with the history as documented.     Pt is presenting complaining of being sick for last few days black diarrhea + vomiting, today did not have, no appetite and cannot keep down his prescribed home pills, depressed bc knew he was getting evicted, no SI or HI, reports symptoms began 2 days ago, reports hx of these symptoms chronically, not having any pain this time, has seen GI and had colonoscopy, at this time pt is upset w/ current social status, has had, last alcohol 2 days ago, drank beer, thinks may have been w/drawing last few days, last month or so, been drinking off and on, been depressed, glass full of ice + whisky, diluting it, recent depression for eviction, been fearing it for the last few months, knew it was coming, police came to evict today, police called ambulance, pt reporting he was \"sick,\" feels sober / no HA, depressed / thinking about where life " is going to go, no previous suicide attempts, no pain the chest, no sob, no recent dysuria symptoms        Review of Systems        Objective       ED Triage Vitals [01/06/25 1046]   Temperature Pulse Blood Pressure Respirations SpO2 Patient Position - Orthostatic VS   (!) 97.1 °F (36.2 °C) (!) 108 120/66 20 97 % Sitting      Temp Source Heart Rate Source BP Location FiO2 (%) Pain Score    Temporal Monitor Left arm -- No Pain      Vitals      Date and Time Temp Pulse SpO2 Resp BP Pain Score FACES Pain Rating User   01/06/25 1425 -- -- -- -- -- No Pain -- [place]   01/06/25 1424 -- 88 -- 15 118/72 -- -- [place]   01/06/25 1208 -- 93 -- 16 -- -- -- [place]   01/06/25 1046 97.1 °F (36.2 °C) 108 97 % 20 120/66 No Pain -- [facility]            Physical Exam  Vitals and nursing note reviewed.   Constitutional:       General: He is not in acute distress.     Appearance: He is well-developed. He is not ill-appearing or toxic-appearing.   HENT:      Head: Normocephalic and atraumatic.   Eyes:      Conjunctiva/sclera: Conjunctivae normal.   Cardiovascular:      Rate and Rhythm: Normal rate and regular rhythm.      Heart sounds: No murmur heard.  Pulmonary:      Effort: Pulmonary effort is normal. No respiratory distress.      Breath sounds: Normal breath sounds. No wheezing, rhonchi or rales.   Abdominal:      Palpations: Abdomen is soft.      Tenderness: There is no abdominal tenderness. There is no guarding or rebound.   Musculoskeletal:         General: No swelling.      Cervical back: Neck supple.   Skin:     General: Skin is warm and dry.      Capillary Refill: Capillary refill takes less than 2 seconds.      Coloration: Skin is not jaundiced or pale.      Findings: No bruising, erythema or rash.   Neurological:      Mental Status: He is alert.   Psychiatric:         Mood and Affect: Mood normal.         Results Reviewed       Procedure Component Value Units Date/Time    T4, free [[id number]]  (Normal) Collected: 01/06/25 1155    Lab Status:  "Final result Specimen: Blood from Arm, Left Updated: 01/06/25 1729     Free T4 0.81 ng/dL     Narrative:        \"Therapeutic range for patients medicated with thyroid disorders: 0.61-1.24 ng/dL.\"    TSH, 3rd generation with Free T4 reflex [[id number]]  (Abnormal) Collected: 01/06/25 1155    Lab Status: Final result Specimen: Blood from Arm, Left Updated: 01/06/25 1642     TSH 3RD GENERATON 8.827 uIU/mL     Rapid drug screen, urine [[id number]]  (Normal) Collected: 01/06/25 1458    Lab Status: Final result Specimen: Urine, Clean Catch Updated: 01/06/25 1540     Amph/Meth UR Negative     Barbiturate Ur Negative     Benzodiazepine Urine Negative     Cocaine Urine Negative     Methadone Urine Negative     Opiate Urine Negative     PCP Ur Negative     THC Urine Negative     Oxycodone Urine Negative     Fentanyl Urine Negative     HYDROCODONE URINE Negative    Narrative:      FOR MEDICAL PURPOSES ONLY.   IF CONFIRMATION NEEDED PLEASE CONTACT THE LAB WITHIN 5 DAYS.    Drug Screen Cutoff Levels:  AMPHETAMINE/METHAMPHETAMINES  1000 ng/mL  BARBITURATES     200 ng/mL  BENZODIAZEPINES     200 ng/mL  COCAINE      300 ng/mL  METHADONE      300 ng/mL  OPIATES      300 ng/mL  PHENCYCLIDINE     25 ng/mL  THC       50 ng/mL  OXYCODONE      100 ng/mL  FENTANYL      5 ng/mL  HYDROCODONE     300 ng/mL    Urine Microscopic [[id number]]  (Abnormal) Collected: 01/06/25 1458    Lab Status: Final result Specimen: Urine, Clean Catch Updated: 01/06/25 1516     RBC, UA None Seen /hpf      WBC, UA 1-2 /hpf      Epithelial Cells None Seen /hpf      Bacteria, UA None Seen /hpf      Hyaline Casts, UA 0-3 /lpf     UA w Reflex to Microscopic w Reflex to Culture [[id number]]  (Abnormal) Collected: 01/06/25 1458    Lab Status: Final result Specimen: Urine, Clean Catch Updated: 01/06/25 1512     Color, UA Light Orange     Clarity, UA Clear     Specific Gravity, UA 1.019     pH, UA 8.5     Leukocytes, UA Negative     Nitrite, UA Negative     Protein, UA 30 " (1+) mg/dl      Glucose, UA Negative mg/dl      Ketones, UA Negative mg/dl      Urobilinogen, UA >=12.0 mg/dl      Bilirubin, UA Negative     Occult Blood, UA Negative    Comprehensive metabolic panel [[id number]]  (Abnormal) Collected: 01/06/25 1155    Lab Status: Final result Specimen: Blood from Arm, Left Updated: 01/06/25 1226     Sodium 136 mmol/L      Potassium 3.4 mmol/L      Chloride 98 mmol/L      CO2 27 mmol/L      ANION GAP 11 mmol/L      BUN 15 mg/dL      Creatinine 0.61 mg/dL      Glucose 172 mg/dL      Calcium 8.8 mg/dL      AST 45 U/L      ALT 72 U/L      Alkaline Phosphatase 86 U/L      Total Protein 6.1 g/dL      Albumin 3.6 g/dL      Total Bilirubin 2.32 mg/dL      eGFR 104 ml/min/1.73sq m     Narrative:      [employer] guidelines for Chronic Kidney Disease (CKD):     Stage 1 with normal or high GFR (GFR > 90 mL/min/1.73 square meters)    Stage 2 Mild CKD (GFR = 60-89 mL/min/1.73 square meters)    Stage 3A Moderate CKD (GFR = 45-59 mL/min/1.73 square meters)    Stage 3B Moderate CKD (GFR = 30-44 mL/min/1.73 square meters)    Stage 4 Severe CKD (GFR = 15-29 mL/min/1.73 square meters)    Stage 5 End Stage CKD (GFR <15 mL/min/1.73 square meters)  Note: GFR calculation is accurate only with a steady state creatinine    Lipase [[id number]]  (Abnormal) Collected: 01/06/25 1155    Lab Status: Final result Specimen: Blood from Arm, Left Updated: 01/06/25 1226     Lipase 120 u/L     Ethanol [[id number]]  (Normal) Collected: 01/06/25 1155    Lab Status: Final result Specimen: Blood from Arm, Left Updated: 01/06/25 1225     Ethanol Lvl <10 mg/dL     CBC and differential [[id number]]  (Abnormal) Collected: 01/06/25 1155    Lab Status: Final result Specimen: Blood from Arm, Left Updated: 01/06/25 1205     WBC 5.63 Thousand/uL      RBC 3.76 Million/uL      Hemoglobin 12.7 g/dL      Hematocrit 37.4 %       fL      MCH 33.8 pg      MCHC 34.0 g/dL      RDW 16.3 %      MPV 9.5 fL       Platelets 156 Thousands/uL      nRBC 0 /100 WBCs      Segmented % 67 %      Immature Grans % 1 %      Lymphocytes % 23 %      Monocytes % 6 %      Eosinophils Relative 1 %      Basophils Relative 2 %      Absolute Neutrophils 3.81 Thousands/µL      Absolute Immature Grans 0.03 Thousand/uL      Absolute Lymphocytes 1.29 Thousands/µL      Absolute Monocytes 0.36 Thousand/µL      Eosinophils Absolute 0.05 Thousand/µL      Basophils Absolute 0.09 Thousands/µL     Fingerstick Glucose (POCT) [[id number]]  (Abnormal) Collected: 01/06/25 1151    Lab Status: Final result Specimen: Blood Updated: 01/06/25 1152     POC Glucose 175 mg/dl     POCT alcohol breath test [[id number]]  (Normal) Resulted: 01/06/25 1150    Lab Status: Final result Updated: 01/06/25 1150     EXTBreath Alcohol 0.000            No orders to display       Procedures    ED Medication and Procedure Management   Prior to Admission Medications   Prescriptions Last Dose Informant Patient Reported? Taking?   FLUoxetine (PROzac) 40 MG capsule   No No   Sig: TAKE ONE CAPSULE BY MOUTH EVERY DAY   Multiple Vitamin (DAILY VALUE MULTIVITAMIN) TABS   Yes No   Sig: Take 1 tablet by mouth daily   albuterol (PROVENTIL HFA,VENTOLIN HFA) 90 mcg/act inhaler   Yes No   Sig: Inhale 1-2 puffs   allopurinol (ZYLOPRIM) 300 mg tablet   No No   Sig: TAKE 1 TABLET EVERY DAY   cetirizine (ZyrTEC) 10 mg tablet   Yes No   Sig: Take one tablet by mouth daily as needed for allergies/congestion    folic acid (FOLVITE) 1 mg tablet   No No   Sig: Take 1 tablet (1 mg total) by mouth daily   levothyroxine 75 mcg tablet   No No   Sig: TAKE 1 TABLET IN THE MORNING ON AN EMPTY STOMACH FOR THYROID   pantoprazole (PROTONIX) 40 mg tablet   No No   Sig: Take 1 tablet (40 mg total) by mouth daily in the early morning   thiamine 100 MG tablet   No No   Sig: Take 1 tablet (100 mg total) by mouth daily      Facility-Administered Medications: None     Discharge Medication List as of 1/6/2025  6:01 PM         CONTINUE these medications which have NOT CHANGED    Details   albuterol (PROVENTIL HFA,VENTOLIN HFA) 90 mcg/act inhaler Inhale 1-2 puffs, Historical Med      allopurinol (ZYLOPRIM) 300 mg tablet TAKE 1 TABLET EVERY DAY, Normal      cetirizine (ZyrTEC) 10 mg tablet Take one tablet by mouth daily as needed for allergies/congestion , Historical Med      FLUoxetine (PROzac) 40 MG capsule TAKE ONE CAPSULE BY MOUTH EVERY DAY, Normal      folic acid (FOLVITE) 1 mg tablet Take 1 tablet (1 mg total) by mouth daily, Starting Wed 8/21/2024, Until Fri 9/20/2024, Normal      levothyroxine 75 mcg tablet TAKE 1 TABLET IN THE MORNING ON AN EMPTY STOMACH FOR THYROID, Normal      Multiple Vitamin (DAILY VALUE MULTIVITAMIN) TABS Take 1 tablet by mouth daily, Historical Med      pantoprazole (PROTONIX) 40 mg tablet Take 1 tablet (40 mg total) by mouth daily in the early morning, Starting Tue 4/23/2024, Normal      thiamine 100 MG tablet Take 1 tablet (100 mg total) by mouth daily, Starting Wed 8/21/2024, Until Fri 9/20/2024, Normal           No discharge procedures on file.  ED SEPSIS DOCUMENTATION   Time reflects when diagnosis was documented in both MDM as applicable and the Disposition within this note       Time User Action Codes Description Comment    1/6/2025  3:44 PM Trish Taylor [Z00.8] Medical clearance for psychiatric admission     1/6/2025  3:44 PM Trish Taylor [E87.6] Hypokalemia     1/6/2025  3:44 PM Trish Taylor [E87.1] Hyponatremia     1/6/2025  3:44 PM Trish Taylor Remove [E87.6] Hypokalemia     1/6/2025  3:45 PM Trish Taylor [R79.89] Elevated LFTs     1/6/2025  3:45 PM Trish Taylor [D61.818] Pancytopenia (HCC)     1/6/2025  3:45 PM Trish Taylor [E03.9] Acquired hypothyroidism     1/6/2025  4:25 PM Gael Nicole [R45.851] Suicidal ideation     1/6/2025  4:25 PM Gael Nicole [F32.A] Depression     1/6/2025  4:25 PM Gael Nicole [Z87.19] History of pancreatitis     1/6/2025   4:26 PM Gael Nicole [Z59.00] Homelessness     1/6/2025  4:26 PM Gael Nicole [F10.11] History of alcohol abuse                  Gael Nicole DO  01/09/25 1944

## 2025-01-06 NOTE — EMTALA/ACUTE CARE TRANSFER
[facility] EMERGENCY DEPARTMENT  [address] [facility] [address]  Dept: [phone]      EMTALA TRANSFER CONSENT    NAME Sammy Mays                                         1960                              MRN [id number]    I have been informed of my rights regarding examination, treatment, and transfer   by Dr. Janet champion. providers found    Benefits: Specialized equipment and/or services available at the receiving facility (Include comment)________________________    Risks: Potential for delay in receiving treatment, Potential deterioration of medical condition, Loss of IV, Increased discomfort during transfer, Possible worsening of condition or death during transfer      Transfer Request   I acknowledge that my medical condition has been evaluated and explained to me by the emergency department physician or other qualified medical person and/or my attending physician who has recommended and offered to me further medical examination and treatment. I understand the Hospital's obligation with respect to the treatment and stabilization of my emergency medical condition. I nevertheless request to be transferred. I release the Hospital, the doctor, and any other persons caring for me from all responsibility or liability for any injury or ill effects that may result from my transfer and agree to accept all responsibility for the consequences of my choice to transfer, rather than receive stabilizing treatment at the Hospital. I understand that because the transfer is my request, my insurance may not provide reimbursement for the services.  The Hospital will assist and direct me and my family in how to make arrangements for transfer, but the hospital is not liable for any fees charged by the transport service.  In spite of this understanding, I refuse to consent to further medical examination and treatment which has been offered to me, and request transfer to Accepting Facility Name, [facility]  & [place] : [place]. I authorize the performance of emergency medical procedures and treatments upon me in both transit and upon arrival at the receiving facility.  Additionally, I authorize the release of any and all medical records to the receiving facility and request they be transported with me, if possible.    I authorize the performance of emergency medical procedures and treatments upon me in both transit and upon arrival at the receiving facility.  Additionally, I authorize the release of any and all medical records to the receiving facility and request they be transported with me, if possible.  I understand that the safest mode of transportation during a medical emergency is an ambulance and that the Hospital advocates the use of this mode of transport. Risks of traveling to the receiving facility by car, including absence of medical control, life sustaining equipment, such as oxygen, and medical personnel has been explained to me and I fully understand them.    (DARYL CORRECT BOX BELOW)  [  ]  I consent to the stated transfer and to be transported by ambulance/helicopter.  [  ]  I consent to the stated transfer, but refuse transportation by ambulance and accept full responsibility for my transportation by car.  I understand the risks of non-ambulance transfers and I exonerate the Hospital and its staff from any deterioration in my condition that results from this refusal.    X___________________________________________    DATE  25  TIME________  Signature of patient or legally responsible individual signing on patient behalf           RELATIONSHIP TO PATIENT_________________________          Provider Certification    NAME Sammy Mays                                         1960                              MRN [id number]    A medical screening exam was performed on the above named patient.  Based on the examination:    Condition Necessitating Transfer The primary encounter diagnosis was Medical  clearance for psychiatric admission. Diagnoses of Hyponatremia, Elevated LFTs, Pancytopenia (HCC), and Acquired hypothyroidism were also pertinent to this visit.    Patient Condition: The patient has been stabilized such that within reasonable medical probability, no material deterioration of the patient condition or the condition of the unborn child(raymond) is likely to result from the transfer    Reason for Transfer: Level of Care needed not available at this facility    Transfer Requirements: Facility [facility]   Space available and qualified personnel available for treatment as acknowledged by    Agreed to accept transfer and to provide appropriate medical treatment as acknowledged by       Jaylan  Appropriate medical records of the examination and treatment of the patient are provided at the time of transfer   STAFF INITIAL WHEN COMPLETED _______  Transfer will be performed by qualified personnel from [facility]  and appropriate transfer equipment as required, including the use of necessary and appropriate life support measures.    Provider Certification: I have examined the patient and explained the following risks and benefits of being transferred/refusing transfer to the patient/family:  General risk, such as traffic hazards, adverse weather conditions, rough terrain or turbulence, possible failure of equipment (including vehicle or aircraft), or consequences of actions of persons outside the control of the transport personnel, Unanticipated needs of medical equipment and personnel during transport, Risk of worsening condition, The possibility of a transport vehicle being unavailable, Consent was not obtained as patient is committed to psychiatric facility and transfer is mandated, The patient is stable for psychiatric transfer because they are medically stable, and is protected from harming him/herself or others during transport      Based on these reasonable risks and benefits to the patient and/or the unborn child(raymond),  and based upon the information available at the time of the patient’s examination, I certify that the medical benefits reasonably to be expected from the provision of appropriate medical treatments at another medical facility outweigh the increasing risks, if any, to the individual’s medical condition, and in the case of labor to the unborn child, from effecting the transfer.    X____________________________________________ DATE 01/06/25        TIME_______      ORIGINAL - SEND TO MEDICAL RECORDS   COPY - SEND WITH PATIENT DURING TRANSFER

## 2025-01-06 NOTE — ED NOTES
Patient is accepted at [facility]  Patient is accepted by Dr. Jaylan Reeves    Transportation is arranged with [employer].     Waiting for transport time      Nurse report is to be called to [phone] prior to patient transfer.

## 2025-01-06 NOTE — ED NOTES
Pt reports being evicted today. Police arrived at his door to escort him out of his friend's home. The friend is in a nursing home. Pt stated he knew he would be evicted but lost all ambition to look for another place. Pt stated he would lay in bed for days at a time. He canceled many doctor appointments and stopped taking his meds. Pt reports depression became worse and he currently endorses suicidal ideation with a plan to buy a gun. Pt denies homicidal ideation as well as hallucinations. Pt does not have a psychiatrist or therapist. He is retired and has a pension and social security. Pt stated he hasn't been paying his bills. Pt said he hasn't wanted to deal with anything lately. His sister and cousin  recently. Pt reports fair sleep and appetite. He denies drug use but admits to alcohol use. He wasn't able to state how much alcohol he uses or frequency but denies use over the past couple days. He denies any withdrawal symptoms today. Pt denies any other legal issues besides the eviction. He has nowhere to go and feels hopeless and helpless. Pt requested to sign a 201 for inpatient psych tx. He is currently calm and cooperative. No past suicide attempts noted.

## 2025-01-06 NOTE — ED NOTES
Transport here, pt to [place] with belongings handed to transport team     Carolyn Candelario, HEATHER  01/06/25 [id number]

## 2025-01-06 NOTE — ED NOTES
Insurance Authorization for admission:     Pt has Medicare A&B. No precert required.  Secondary is Plan G. No precert rquired.

## 2025-01-07 PROBLEM — R17 ELEVATED BILIRUBIN: Status: ACTIVE | Noted: 2025-01-07

## 2025-01-07 PROBLEM — E83.42 HYPOMAGNESEMIA: Status: ACTIVE | Noted: 2025-01-07

## 2025-01-07 PROBLEM — Z59.00 HOMELESSNESS: Status: ACTIVE | Noted: 2025-01-07

## 2025-01-07 PROBLEM — Z00.8 MEDICAL CLEARANCE FOR PSYCHIATRIC ADMISSION: Status: ACTIVE | Noted: 2018-03-28

## 2025-01-07 PROBLEM — F33.2 SEVERE EPISODE OF RECURRENT MAJOR DEPRESSIVE DISORDER, WITHOUT PSYCHOTIC FEATURES (HCC): Status: ACTIVE | Noted: 2025-01-07

## 2025-01-07 PROBLEM — Z91.199 NONCOMPLIANCE: Status: ACTIVE | Noted: 2025-01-07

## 2025-01-07 LAB
ALBUMIN SERPL BCG-MCNC: 3.3 G/DL (ref 3.5–5)
ALP SERPL-CCNC: 71 U/L (ref 34–104)
ALT SERPL W P-5'-P-CCNC: 51 U/L (ref 7–52)
ANION GAP SERPL CALCULATED.3IONS-SCNC: 9 MMOL/L (ref 4–13)
AST SERPL W P-5'-P-CCNC: 32 U/L (ref 13–39)
ATRIAL RATE: 81 BPM
BASOPHILS # BLD AUTO: 0.05 THOUSANDS/ΜL (ref 0–0.1)
BASOPHILS NFR BLD AUTO: 1 % (ref 0–1)
BILIRUB SERPL-MCNC: 1.4 MG/DL (ref 0.2–1)
BUN SERPL-MCNC: 12 MG/DL (ref 5–25)
CALCIUM ALBUM COR SERPL-MCNC: 8.9 MG/DL (ref 8.3–10.1)
CALCIUM SERPL-MCNC: 8.3 MG/DL (ref 8.4–10.2)
CHLORIDE SERPL-SCNC: 99 MMOL/L (ref 96–108)
CHOLEST SERPL-MCNC: 127 MG/DL (ref ?–200)
CO2 SERPL-SCNC: 28 MMOL/L (ref 21–32)
CREAT SERPL-MCNC: 0.61 MG/DL (ref 0.6–1.3)
EOSINOPHIL # BLD AUTO: 0.08 THOUSAND/ΜL (ref 0–0.61)
EOSINOPHIL NFR BLD AUTO: 2 % (ref 0–6)
ERYTHROCYTE [DISTWIDTH] IN BLOOD BY AUTOMATED COUNT: 16 % (ref 11.6–15.1)
GFR SERPL CREATININE-BSD FRML MDRD: 104 ML/MIN/1.73SQ M
GLUCOSE SERPL-MCNC: 131 MG/DL (ref 65–140)
HCT VFR BLD AUTO: 34.4 % (ref 36.5–49.3)
HDLC SERPL-MCNC: 40 MG/DL
HGB BLD-MCNC: 11.3 G/DL (ref 12–17)
IMM GRANULOCYTES # BLD AUTO: 0.02 THOUSAND/UL (ref 0–0.2)
IMM GRANULOCYTES NFR BLD AUTO: 1 % (ref 0–2)
LDLC SERPL CALC-MCNC: 65 MG/DL (ref 0–100)
LYMPHOCYTES # BLD AUTO: 1.32 THOUSANDS/ΜL (ref 0.6–4.47)
LYMPHOCYTES NFR BLD AUTO: 33 % (ref 14–44)
MAGNESIUM SERPL-MCNC: 1.5 MG/DL (ref 1.9–2.7)
MCH RBC QN AUTO: 33.7 PG (ref 26.8–34.3)
MCHC RBC AUTO-ENTMCNC: 32.8 G/DL (ref 31.4–37.4)
MCV RBC AUTO: 103 FL (ref 82–98)
MONOCYTES # BLD AUTO: 0.3 THOUSAND/ΜL (ref 0.17–1.22)
MONOCYTES NFR BLD AUTO: 8 % (ref 4–12)
NEUTROPHILS # BLD AUTO: 2.2 THOUSANDS/ΜL (ref 1.85–7.62)
NEUTS SEG NFR BLD AUTO: 55 % (ref 43–75)
NONHDLC SERPL-MCNC: 87 MG/DL
NRBC BLD AUTO-RTO: 0 /100 WBCS
P AXIS: 37 DEGREES
PHOSPHATE SERPL-MCNC: 4.3 MG/DL (ref 2.3–4.1)
PLATELET # BLD AUTO: 118 THOUSANDS/UL (ref 149–390)
PMV BLD AUTO: 10.4 FL (ref 8.9–12.7)
POTASSIUM SERPL-SCNC: 3.6 MMOL/L (ref 3.5–5.3)
PR INTERVAL: 182 MS
PROT SERPL-MCNC: 5.5 G/DL (ref 6.4–8.4)
QRS AXIS: 86 DEGREES
QRSD INTERVAL: 82 MS
QT INTERVAL: 398 MS
QTC INTERVAL: 462 MS
RBC # BLD AUTO: 3.35 MILLION/UL (ref 3.88–5.62)
SODIUM SERPL-SCNC: 136 MMOL/L (ref 135–147)
T WAVE AXIS: 35 DEGREES
T4 FREE SERPL-MCNC: 0.83 NG/DL (ref 0.61–1.12)
TRIGL SERPL-MCNC: 112 MG/DL (ref ?–150)
TSH SERPL DL<=0.05 MIU/L-ACNC: 8.94 UIU/ML (ref 0.45–4.5)
VENTRICULAR RATE: 81 BPM
WBC # BLD AUTO: 3.97 THOUSAND/UL (ref 4.31–10.16)

## 2025-01-07 PROCEDURE — 85025 COMPLETE CBC W/AUTO DIFF WBC: CPT

## 2025-01-07 PROCEDURE — 83735 ASSAY OF MAGNESIUM: CPT

## 2025-01-07 PROCEDURE — 99223 1ST HOSP IP/OBS HIGH 75: CPT | Performed by: PSYCHIATRY & NEUROLOGY

## 2025-01-07 PROCEDURE — 93005 ELECTROCARDIOGRAM TRACING: CPT

## 2025-01-07 PROCEDURE — 80061 LIPID PANEL: CPT

## 2025-01-07 PROCEDURE — 80053 COMPREHEN METABOLIC PANEL: CPT

## 2025-01-07 PROCEDURE — 84443 ASSAY THYROID STIM HORMONE: CPT

## 2025-01-07 PROCEDURE — 84100 ASSAY OF PHOSPHORUS: CPT

## 2025-01-07 PROCEDURE — 99222 1ST HOSP IP/OBS MODERATE 55: CPT | Performed by: NURSE PRACTITIONER

## 2025-01-07 PROCEDURE — 84439 ASSAY OF FREE THYROXINE: CPT

## 2025-01-07 PROCEDURE — 93010 ELECTROCARDIOGRAM REPORT: CPT | Performed by: INTERNAL MEDICINE

## 2025-01-07 RX ORDER — LANOLIN ALCOHOL/MO/W.PET/CERES
800 CREAM (GRAM) TOPICAL 2 TIMES DAILY
Status: COMPLETED | OUTPATIENT
Start: 2025-01-07 | End: 2025-01-09

## 2025-01-07 RX ORDER — POTASSIUM CHLORIDE 1500 MG/1
40 TABLET, EXTENDED RELEASE ORAL ONCE
Status: COMPLETED | OUTPATIENT
Start: 2025-01-07 | End: 2025-01-07

## 2025-01-07 RX ORDER — BUSPIRONE HYDROCHLORIDE 10 MG/1
10 TABLET ORAL DAILY
Status: DISCONTINUED | OUTPATIENT
Start: 2025-01-08 | End: 2025-01-09

## 2025-01-07 RX ORDER — LANOLIN ALCOHOL/MO/W.PET/CERES
400 CREAM (GRAM) TOPICAL 2 TIMES DAILY
Status: DISCONTINUED | OUTPATIENT
Start: 2025-01-10 | End: 2025-01-13

## 2025-01-07 RX ADMIN — MULTIPLE VITAMINS W/ MINERALS TAB 1 TABLET: TAB ORAL at 10:13

## 2025-01-07 RX ADMIN — PANTOPRAZOLE SODIUM 40 MG: 40 TABLET, DELAYED RELEASE ORAL at 06:19

## 2025-01-07 RX ADMIN — FOLIC ACID 1 MG: 1 TABLET ORAL at 10:14

## 2025-01-07 RX ADMIN — Medication 800 MG: at 17:36

## 2025-01-07 RX ADMIN — LEVOTHYROXINE SODIUM 75 MCG: 75 TABLET ORAL at 06:19

## 2025-01-07 RX ADMIN — TRAZODONE HYDROCHLORIDE 50 MG: 50 TABLET ORAL at 21:25

## 2025-01-07 RX ADMIN — Medication 800 MG: at 10:14

## 2025-01-07 RX ADMIN — HYDROXYZINE HYDROCHLORIDE 50 MG: 50 TABLET, FILM COATED ORAL at 06:30

## 2025-01-07 RX ADMIN — POTASSIUM CHLORIDE 40 MEQ: 1500 TABLET, EXTENDED RELEASE ORAL at 10:14

## 2025-01-07 RX ADMIN — Medication 1000 UNITS: at 10:14

## 2025-01-07 RX ADMIN — THIAMINE HCL TAB 100 MG 100 MG: 100 TAB at 10:13

## 2025-01-07 NOTE — PLAN OF CARE
Problem: Risk for Self Injury/Neglect  Goal: Treatment Goal: Remain safe during length of stay, learn and adopt new coping skills, and be free of self-injurious ideation, impulses and acts at the time of discharge  Outcome: Progressing  Goal: Verbalize thoughts and feelings  Description: Interventions:  - Assess and re-assess patient's lethality and potential for self-injury  - Engage patient in 1:1 interactions, daily, for a minimum of 15 minutes  - Encourage patient to express feelings, fears, frustrations, hopes  - Establish rapport/trust with patient   Outcome: Progressing  Goal: Refrain from harming self  Description: Interventions:  - Monitor patient closely, per order  - Develop a trusting relationship  - Supervise medication ingestion, monitor effects and side effects   Outcome: Progressing     Problem: Depression  Goal: Treatment Goal: Demonstrate behavioral control of depressive symptoms, verbalize feelings of improved mood/affect, and adopt new coping skills prior to discharge  Outcome: Not Progressing  Goal: Refrain from isolation  Description: Interventions:  - Develop a trusting relationship   - Encourage socialization   Outcome: Not Progressing     Problem: Anxiety  Goal: Anxiety is at manageable level  Description: Interventions:  - Assess and monitor patient's anxiety level.   - Monitor for signs and symptoms (heart palpitations, chest pain, shortness of breath, headaches, nausea, feeling jumpy, restlessness, irritable, apprehensive).   - Collaborate with interdisciplinary team and initiate plan and interventions as ordered.  - [place] patient to unit/surroundings  - Explain treatment plan  - Encourage participation in care  - Encourage verbalization of concerns/fears  - Identify coping mechanisms  - Assist in developing anxiety-reducing skills  - Administer/offer alternative therapies  - Limit or eliminate stimulants  Outcome: Not Progressing

## 2025-01-07 NOTE — TREATMENT PLAN
TREATMENT PLAN REVIEW - Behavioral Health Sammy Mays 64 y.o. 1960 male MRN: [id number]    [facility] Encounter: [id number]          Admit Date/Time:  2025  6:20 PM    Treatment Team:   Jerica Gerena, DO Angelica Gonzalez Terry L Tr[place], COTA Bernice Tobash Michael Apgar Shernett Rose Yainelice Pabon    Diagnosis: Active Problems:    Hypothyroidism    Habitual alcohol use    Medical clearance for psychiatric admission    Gastroesophageal reflux disease without esophagitis    Hypomagnesemia    Elevated bilirubin    Noncompliance    Homelessness      Patient Strengths/Assets: average or above intelligence, capable of independent living, cooperative, communication skills, compliant with medication, good past treatment response, motivated, supportive family/friends    Patient Barriers/Limitations: homeless, lack of social/family support, poor self-care, poor support system, self-care deficit    Short Term Goals: decrease in depressive symptoms, decrease in anxiety symptoms, decrease in suicidal thoughts, improvement in self care, sleep improvement, improvement in appetite, increase in socialization with peers on the unit    Long Term Goals: improvement in anxiety, resolution of depressive symptoms, free of suicidal thoughts, adequate self care, adequate sleep, adequate appetite, appropriate interaction with family, stable living arrangements upon discharge, establishment of family support upon discharge    Progress Towards Goals: starting psychiatric medications as prescribed    Recommended Treatment: medication management, patient medication education, group therapy, milieu therapy, continued Behavioral Health psychiatric evaluation/assessment process    Treatment Frequency: daily medication monitoring, group and milieu therapy daily, monitoring through interdisciplinary rounds, monitoring through weekly patient care  conferences    Expected Discharge Date:  TBD    Discharge Plan: referrals as indicated    Treatment Plan Created/Updated By: Heather Fang DO

## 2025-01-07 NOTE — TREATMENT TEAM
01/07/25 0600   Henderson Anxiety Scale   Anxious Mood 2   Tension 2   Fears 2   Insomnia 2   Intellectual 2   Depressed Mood 2   Somatic Complaints: Muscular 2   Somatic Complaints: Sensory 2   Cardiovascular Symptoms 0   Respiratory Symptoms 0   Gastrointestinal Symptoms 0   Genitourinary Symptoms 0   Autonomic Symptoms 0   Behavior at Interview 2   Henderson Anxiety Score 18     Pt received PRN Atarax 50mg @6.30am for moderate anxiety.

## 2025-01-07 NOTE — CONSULTS
Consultation - Hospitalist   Name: Sammy Mays 64 y.o. male I MRN: [id number]  Unit/Bed#: [facility] 605-02 I Date of Admission: 1/6/2025   Date of Service: 1/7/2025 I Hospital Day: 1   Inpatient consult for Medical Clearance for  patient  Consult performed by: ANI Whitlock  Consult ordered by: ANI Ritchie        Physician Requesting Evaluation: Heather Fang DO   Reason for Evaluation / Principal Problem: Clearance for psychiatric admission    Assessment & Plan  Medical clearance for psychiatric admission  Vital signs stable afebrile patient seen and examined by myself labs from today were reviewed by myself sodium 136 potassium 3.6 creatinine 0.61  Patient medically stable for admit  Please reach out to  IM with any medical questions or concerns, we will be repeating his labs on 1/10/2025 to follow-up on his bilirubin as well as magnesium  Hypothyroidism  Patient's TSH level is 8.938 today  T4 pending  Patient will continue on his levothyroxine 75 mcg p.o. daily  Patient has had noncompliance with his medication which is what I think is attributing to his high TSH  Habitual alcohol use  Patient is a long-term active alcohol abuser on a daily basis greater than 1 year  Patient has recently been admitted to the acute care facility for acute pancreatitis secondary to his alcohol abuse  MVI/thiamine/folate  Alcohol cessation education  Gastroesophageal reflux disease without esophagitis  Patient will continue on his PPI 40 mg p.o. daily  Hypomagnesemia  Patient's magnesium level today is 1.5  Patient will start Mag-Ox 800 mg p.o. twice daily x 6 doses  He will have a repeat mag level on 1/10/2025 at that time he will start on Mag-Ox 400 milligrams p.o. twice daily  Elevated bilirubin  Bilirubin today is 1.41, will a few days ago was 2.32  Patient will have a repeat CMP on 1/10/2025 to make sure that his bilirubin is trending appropriately  Noncompliance  Patient has been noncompliant with his  medications as well as medical and psychological follow-up please educate patient on the importance of medication adherence as well as medical and psychological follow-up  Homelessness  Patient is currently homeless  The care manager on the unit will be the primary point person        Collaboration of Care: Were Recommendations Directly Discussed with Primary Treatment Team? - No     History of Present Illness   Sammy Mays is a 64 y.o. male who is originally admitted to the psychiatry service due to increased depression, SI to buy a gun. We are consulted for medical clearance for admission to Behavioral Health Unit and treatment of underlying psychiatric illness.      Patient presents to [facility] ED on 1/6/2025 after being evicted from a friend's home because the friend is medically impaired and he is house was being evicted upon.  Patient endorses depression as well as suicidal ideation with a plan to buy a gun.  He reports that he has hopelessness as well as helplessness.  He has been noncompliant with his medications as well as any follow-up.  He was recently admitted to the acute care hospital a few weeks ago for acute on chronic pancreatitis.  Patient has hypothyroidism as well as significant alcohol abuse greater than 1 year.  He has hypothyroidism.  He reports that his sister and his cousin recently passed away.  A year ago he had an A1c of A1c of 5.8 today he has an A1c of 5.0  He is now admitted to the U for stabilization of his mental health issues.    Review of Systems   Constitutional:  Positive for activity change and appetite change. Negative for chills and fever.   HENT:  Negative for ear pain and sore throat.    Eyes:  Negative for pain and visual disturbance.   Respiratory:  Negative for cough and shortness of breath.    Cardiovascular:  Negative for chest pain and palpitations.   Gastrointestinal:  Negative for abdominal pain and vomiting.   Genitourinary:  Negative for dysuria  and hematuria.   Musculoskeletal:  Negative for arthralgias and back pain.   Skin:  Negative for color change and rash.   Neurological:  Negative for seizures and syncope.   Psychiatric/Behavioral:  Positive for decreased concentration, dysphoric mood and sleep disturbance. The patient is nervous/anxious.    All other systems reviewed and are negative.      Historical Information   Past Medical History:   Diagnosis Date    Medical history unknown     Pancreatitis      Past Surgical History:   Procedure Laterality Date    CHOLECYSTECTOMY LAPAROSCOPIC      REPAIR / REINSERT BICEPS TENDON AT ELBOW      Last Assessed: 1/12/2016      Social History     Tobacco Use    Smoking status: Never    Smokeless tobacco: Never   Substance and Sexual Activity    Alcohol use: Yes     Alcohol/week: 14.0 standard drinks of alcohol     Types: 14 Standard drinks or equivalent per week     Comment: 2 drinks per night.    Drug use: Yes     Types: Marijuana    Sexual activity: Not on file     E-Cigarette/Vaping     E-Cigarette/Vaping Substances       Marital Status: Single    Meds/Allergies   I have reviewed home medications using recent Epic encounter.  Prior to Admission medications    Medication Sig Start Date End Date Taking? Authorizing Provider   albuterol (PROVENTIL HFA,VENTOLIN HFA) 90 mcg/act inhaler Inhale 1-2 puffs   Yes Historical Provider, MD   allopurinol (ZYLOPRIM) 300 mg tablet TAKE 1 TABLET EVERY DAY 8/15/19  Yes Sarah Fiore MD   FLUoxetine (PROzac) 40 MG capsule TAKE ONE CAPSULE BY MOUTH EVERY DAY 7/22/19  Yes Sarah Fiore MD   levothyroxine 75 mcg tablet TAKE 1 TABLET IN THE MORNING ON AN EMPTY STOMACH FOR THYROID 6/9/19  Yes Sarah Fiore MD   Multiple Vitamin (DAILY VALUE MULTIVITAMIN) TABS Take 1 tablet by mouth daily   Yes Historical Provider, MD   pantoprazole (PROTONIX) 40 mg tablet Take 1 tablet (40 mg total) by mouth daily in the early morning 4/23/24  Yes ANI Barlow   cetirizine (ZyrTEC) 10 mg  "tablet Take one tablet by mouth daily as needed for allergies/congestion  5/2/14   Historical Provider, MD   folic acid (FOLVITE) 1 mg tablet Take 1 tablet (1 mg total) by mouth daily 8/21/24 9/20/24  Lina Lora PA-C   thiamine 100 MG tablet Take 1 tablet (100 mg total) by mouth daily 8/21/24 9/20/24  Lina Lora PA-C     Allergies   Allergen Reactions    Amoxicillin Swelling and Fever     Other reaction(s): vertigo    Escitalopram      Other reaction(s): sweaty palms/hands, felt faint, passed out for a short time, had anxiety/panic attack    Gemfibrozil      Other reaction(s): Nausea and/or vomiting  Other reaction(s): Nausea and/or vomiting    Other Allergic Rhinitis     Other reaction(s): Other (See Comments)  rhinitis, itchy eyes       Objective :  Temp:  [97 °F (36.1 °C)-97.9 °F (36.6 °C)] 97.9 °F (36.6 °C)  HR:  [] 102  BP: (104-120)/(59-72) 112/66  Resp:  [15-20] 18  SpO2:  [97 %-99 %] 97 %  O2 Device: None (Room air)    Height: 5' 8\" (172.7 cm) (01/06/25 1817)  Weight - Scale: 87 kg (191 lb 12.8 oz) (01/06/25 1817)  Physical Exam  Vitals and nursing note reviewed.   Constitutional:       Appearance: Normal appearance. He is normal weight.   HENT:      Head: Normocephalic and atraumatic.      Right Ear: Tympanic membrane normal.      Left Ear: Tympanic membrane normal.      Nose: Nose normal.      Mouth/Throat:      Mouth: Mucous membranes are dry.   Eyes:      Extraocular Movements: Extraocular movements intact.      Pupils: Pupils are equal, round, and reactive to light.   Cardiovascular:      Rate and Rhythm: Normal rate and regular rhythm.      Pulses: Normal pulses.      Heart sounds: Normal heart sounds.   Pulmonary:      Effort: Pulmonary effort is normal.      Breath sounds: Normal breath sounds.   Abdominal:      General: Abdomen is flat. Bowel sounds are normal.      Palpations: Abdomen is soft.   Musculoskeletal:         General: Normal range of motion.      Cervical back: Normal range " of motion and neck supple.   Skin:     General: Skin is warm and dry.      Capillary Refill: Capillary refill takes 2 to 3 seconds.   Neurological:      Mental Status: He is alert and oriented to person, place, and time.   Psychiatric:         Attention and Perception: Perception normal.         Mood and Affect: Mood is depressed. Affect is tearful.         Speech: Speech normal.         Behavior: Behavior normal.         Thought Content: Thought content normal.         Cognition and Memory: Cognition normal.         Judgment: Judgment normal.           Lab Results: I have reviewed the following results:  Results from last 7 days   Lab Units 01/07/25  0532   WBC Thousand/uL 3.97*   HEMOGLOBIN g/dL 11.3*   HEMATOCRIT % 34.4*   PLATELETS Thousands/uL 118*   SEGS PCT % 55   LYMPHO PCT % 33   MONO PCT % 8   EOS PCT % 2     Results from last 7 days   Lab Units 01/07/25  0532   SODIUM mmol/L 136   POTASSIUM mmol/L 3.6   CHLORIDE mmol/L 99   CO2 mmol/L 28   BUN mg/dL 12   CREATININE mg/dL 0.61   ANION GAP mmol/L 9   CALCIUM mg/dL 8.3*   ALBUMIN g/dL 3.3*   TOTAL BILIRUBIN mg/dL 1.40*   ALK PHOS U/L 71   ALT U/L 51   AST U/L 32   GLUCOSE RANDOM mg/dL 131             Lab Results   Component Value Date/Time    HGBA1C 5.0 01/06/2025 11:55 AM    HGBA1C 5.8 (H) 09/18/2023 10:15 AM    HGBA1C 5.0 07/10/2023 04:47 AM    HGBA1C 5.6 09/07/2021 11:22 AM    HGBA1C 5.1 05/29/2021 04:42 PM     Results from last 7 days   Lab Units 01/06/25  1151   POC GLUCOSE mg/dl 175*       Imaging Results Review: No pertinent imaging studies reviewed.  Other Study Results Review: EKG was reviewed.   1/6/2025 twelve-lead EKG reviewed by myself as well as interpreted by myself ventricular rate 94 QT interval 362 QTc 452 normal sinus rhythm when compared with an EKG from 11/18/2024 his QRS axis is shifted.  There are no new acute EKG findings in this EKG.  Administrative Statements   I have spent a total time of 45 minutes in caring for this patient on  the day of the visit/encounter including Diagnostic results, Prognosis, Risks and benefits of tx options, Instructions for management, Patient and family education, Importance of tx compliance, Risk factor reductions, Impressions, Counseling / Coordination of care, Documenting in the medical record, Reviewing / ordering tests, medicine, procedures  , Obtaining or reviewing history  , and Communicating with other healthcare professionals .  ** Please Note: This note has been constructed using a voice recognition system. **

## 2025-01-07 NOTE — PROGRESS NOTES
01/07/25 0800   Team Meeting   Meeting Type Daily Rounds   Team Members Present   Team Members Present Physician;Nurse;   Physician Team Member Jaylan/Claudia/Annalisa   Nursing Team Member Nikole   Care Management Team Member Guru   Patient/Family Present   Patient Present No   Patient's Family Present No     Patient here as a 201 after being evicted by police due to living with a friend in a nursing home. He admits to drinking daily. Patient endorses depression. Shower this morning and a BM on the 6th. He had broken sleep. Atarax 50mg given this morning and it as effective. He is eating well. Patient discharge is pending stabilization of mood and medications.

## 2025-01-07 NOTE — ASSESSMENT & PLAN NOTE
Patient has been noncompliant with his medications as well as medical and psychological follow-up please educate patient on the importance of medication adherence as well as medical and psychological follow-up

## 2025-01-07 NOTE — NURSING NOTE
"Pt admitted on a 201 from  ED after being evicted from his friends home in a nursing home. Pt endorsed increased depression and death wish in ED. While in ED, pt reported recent N/V and chills as well as not eating x2 days from being sick.     Upon admission to , pt reports increased depression and anxiety. States today he was evicted from his home and reports passive death wish. Pt denies SI and states he wouldn't act on death wish. Stressors identified include eviction and sisters death last month. Pt denies hx of SA. States he has felt increasingly depressed \"pretty much the last couple months\". Endorses 15-20lb wt loss. Pt reports alcohol use 2-3 beers/day. States he has not had alcohol in a few days. Pt states last marijuana use 1 month ago, states \"I felt depressed and did not feel like driving to my [place]\". Denies SI/HI/AVH. Denies s/s of w/d. Pt ate 100% of snack.     PMH significant for hypothyroidism, GERD, pancreatitis d/t alcohol.     Pt pleasant and cooperative with admission process. Will initiate q15min checks.  "

## 2025-01-07 NOTE — CASE MANAGEMENT
Psychosocial Assessment 1:1:        Admission / Details: Patient was evicted from his current living situation by police as he was staying with a friend in a nursing home. Patient reports having a lack of motivation, canceling doctor appointments, sleeping for days at a time, and no longer taking his medications. Patient endorses some SI and thoughts to buy a gun. He lost his sister and cousin recently. He admits to drinking daily, and has a history of chronic alcohol use according to chart review. He reports feeling hopeless and helpless and has no where to go.      County: [place]   Commitment Status: [facility]  Insurance: Medicare A and B   Rx coverage: Yes   Marital Status: Single   Children: None   Family: Recently lost her sister and cousin.   Residence: Previously [address] [facility], [facility], [facility] (Patient was recently evicted).   Can return home: No.   Lives with: Self.   Level of Ed: Some College   Work History: Retired   Income/Source: Pension/Retirment  [employer]: [employer]  Transportation: Drives Self   Legal Issues: Denied.   Pharmacy: [facility] in [facility] Treatment Hx: Patient has not had mental health outpatient or inpatient history.   Trauma Hx: Unknown   Family Hx: Patient denies.   D&A Hx: Patient was drinking alcohol prior to ER visit yesterday. He has a history of chronic alcohol use. Reports 14 standard drinks weekly. Patient also endorses THC use.   Medical: PMH significant for hypothyroidism, GERD, pancreatitis d/t alcohol.   DME: None   Tobacco: Denied    Hx: Denied   Access to firearms: Patient does not own but was thinking of buying one at the time of SI.   UDS Results: +THC  PCP:Ankur Bustillo [phone]   Psych: None.   Therapist: None.   ICM/ACT:  None   Community Supports: Lacking. Patient is in regular contact with friend Seth.   Stressors: Recent eviction, SI, finances, recent loss of sister.   Strengths: Communication, here willingly.   Coping  Skills: Lacking.   ROIs Signed: Erik Mays (Brother)   Treatment Plan Signed: Yes   IMM Signed: Yes

## 2025-01-07 NOTE — NURSING NOTE
"Pt alert and visible on the unit. Reported feeling \"depressed and anxious\". Stated, \"I really don't know what's going to happen to me. I have nowhere to go. I am worried\".  Pt reports history of alcohol use and prefers \"scotch and whiskey\" but denies recent use. Denies si. Limited peer interactions. Refused groups stating, \"I am just so tired today\". Pt agreed to attend tomorrow. Affect blunted. Gait steady. Appetite 100% for breakfast and 50% lunch. Support provided.   "

## 2025-01-07 NOTE — TREATMENT TEAM
01/06/25 1952   Provider Notification   Reason for Communication Admission   Provider Name Navjot   Provider Role Hospitalist   Method of Communication   (secure chat)   Response Waiting for response   HEENT   Vision - Corrective Lenses (at bedside) Glasses     PTA med rec completed. Provider made aware.

## 2025-01-07 NOTE — ASSESSMENT & PLAN NOTE
Patient is a long-term active alcohol abuser on a daily basis greater than 1 year  Patient has recently been admitted to the acute care facility for acute pancreatitis secondary to his alcohol abuse  MVI/thiamine/folate  Alcohol cessation education

## 2025-01-07 NOTE — H&P
"H&P - Behavioral Health   Name: Sammy Mays 64 y.o. male I MRN: [id number]  Unit/Bed#: [facility] 605-02 I Date of Admission: 1/6/2025   Date of Service: 1/7/2025 I Hospital Day: 1     Assessment & Plan  Severe episode of recurrent major depressive disorder, without psychotic features (HCC)  Continue prozac 40 mg for depression and anxiety  Start buspirone 10 mg daily - will start at low dose due to patients hesitance regarding new medication and fear of side effects  Generalized anxiety disorder  See plan above  Alcohol abuse  Counseling cessation, patient minimizes - says only drinking 1 scotch per day. Per chart review he drinks 2-3 mixed drinks daily and has required alcohol detox in the past  Homelessness  Recently evicted from his house        Risks / Benefits of Treatment:  Risks, benefits, and possible side effects of medications explained to patient and patient verbalizes understanding.      History of Present Illness    Reason for Admission: depressive symptoms, worsening depression, anxiety symptoms, worsening anxiety, insomnia, difficulty with ADL's, suicidal ideation with plan to buy a gun to shoot himself, and signs and symptoms of alcohol abuse  Patient is a 64 y.o. male with a history of Major Depressive Disorder and Generalized Anxiety Disorder who  was admitted to the psychiatric service on 1/6/2025 due to worsening depression and active suicidal thoughts in the context of being evicted from his current housing, also current alcohol misuse with history of needing detox.    Per ED physician, Juan Swann MD, on 1/6/25: \"Sammy Mays is a 64 y.o. male who presents for evaluation of: Worsening depression since being evicted from his home earlier today.  Patient denies any ingestions to harm himself.  He was drinking alcohol earlier today.  He has a history of chronic alcohol use.\"    Per crisis worker, Irving Gonzalez, on 1/6/25: \"Pt reports being evicted today. Police arrived at his door to escort " "him out of his friend's home. The friend is in a nursing home. Pt stated he knew he would be evicted but lost all ambition to look for another place. Pt stated he would lay in bed for days at a time. He canceled many doctor appointments and stopped taking his meds. Pt reports depression became worse and he currently endorses suicidal ideation with a plan to buy a gun. Pt denies homicidal ideation as well as hallucinations. Pt does not have a psychiatrist or therapist. He is retired and has a pension and social security. Pt stated he hasn't been paying his bills. Pt said he hasn't wanted to deal with anything lately. His sister and cousin  recently. Pt reports fair sleep and appetite. He denies drug use but admits to alcohol use. He wasn't able to state how much alcohol he uses or frequency but denies use over the past couple days. He denies any withdrawal symptoms today. Pt denies any other legal issues besides the eviction. He has nowhere to go and feels hopeless and helpless. Pt requested to sign a 201 for inpatient psych tx. He is currently calm and cooperative. No past suicide attempts noted. \"    Psychiatric symptoms prior to consultation included alcohol abuse, anxiety, depression worse, difficulty sleeping, feeling suicidal, and financial problems. Onset of symptoms was gradual starting several weeks ago with rapidly worsening course since that time. Psychosocial stressors included alcohol use problems, death of sister, financial problems, housing issues, being homeless, limited support, ongoing anxiety, and chronic mental illness.    Patient reports that over the last few weeks he has been dealing with lack of motivation, lack of caring for his ADLs, over thinking, loss of appetite and weight loss, low energy and anhedonia.  Recently he was evicted from his friend's home due to missed real estate taxes.  Once he realized he was homeless and had no place to go began contemplating buying a gun so that he " could shoot himself.  He recently lost his sister and missed the  due to having no clean clothes.      Psychiatric Review Of Systems:  sleep: poor sleep due to over worrying  appetite changes: Less appetite  weight changes: Lost weight  energy/anergy: Low energy  interest/pleasure/anhedonia: poor motivation, reports anhedonia, stopped participating in hobbies, stopped doing house chores  somatic symptoms: no  anxiety/panic: yes, anxiety related to being evicted  aby: denies  guilty/hopeless: hopeless and helpless  self injurious behavior/risky behavior: None  SI/HI: Kept thinking about buying a gun, said if he had a gun he would've tried to commit suicide / None  Paranoia: None  AH/VH: Denies    Historical Information   Past Psychiatric History:   Inpatient Treatment: None  Outpatient Treatment: None  Past Suicide Attempts: None  Past Violent Behavior: None  Past Psychiatric Medication Trials: Prozac    Substance Abuse History:  E-Cigarette/Vaping      E-Cigarette/Vaping Substances       Social History       Tobacco History       Smoking Status  Never      Smokeless Tobacco Use  Never              Alcohol History       Alcohol Use Status  Yes Drinks/Week  14 Standard drinks or equivalent per week Amount  14.0 standard drinks of alcohol/wk Comment  2 drinks per night.              Drug Use       Drug Use Status  Yes Types  Marijuana              Sexual Activity       Sexually Active  Not Asked              Other Factors    Not Asked                 Additional Substance Use Detail       Questions Responses    Problems Due to Past Use of Alcohol? Yes    Problems Due to Past Use of Substances? No    Substance Use Assessment Substance use within the past 12 months    Alcohol Use Frequency Daily    Cannabis frequency Daily    Comment:  Daily on 10/18/2023     Alcohol Drink of Choice Whiskey    1st Use of Alcohol High School    Cannabis 1st Use High School    Last Use of Alcohol & Amount 10/16- 2 shots in mixed  drink    Cannabis last use 10/16/23    Comment:  10/16/2023 on 10/18/2023     Longest Abstinence from Alcohol 1 month    Cannabis Longest Abstinence 3 weeks    Not reviewed.        THC use, not in 1 month  Alcohol - scotch and soda 2-3 daily, has had pancreatitis due to alcohol in the past.    Last drink was Friday  I have assessed this patient for substance use within the past 12 months    Family Psychiatric History:   None    Social History:  Education: some college  Learning Disabilities: none  Marital history: single  Children: none  Living arrangement, social support: Was living with a friend but got evicted due to overdue taxes  Occupational History: retired, SSI and retirements - $3000  Functioning Relationships: Good relationship with his brothers.  Other Pertinent History:  Legal History: None   History: None      Traumatic History:   Abuse: None reported  Other Traumatic Events: None reported      Seizure: Denies  Head injury: Denies  I have reviewed the patient's PMH, PSH, Social History, Family History, Meds, and Allergies  Historical Information   Past Medical History:   Diagnosis Date    Medical history unknown     Pancreatitis      Past Surgical History:   Procedure Laterality Date    CHOLECYSTECTOMY LAPAROSCOPIC      REPAIR / REINSERT BICEPS TENDON AT ELBOW      Last Assessed: 1/12/2016      Social History     Tobacco Use    Smoking status: Never    Smokeless tobacco: Never   Substance and Sexual Activity    Alcohol use: Yes     Alcohol/week: 14.0 standard drinks of alcohol     Types: 14 Standard drinks or equivalent per week     Comment: 2 drinks per night.    Drug use: Yes     Types: Marijuana    Sexual activity: Not on file     E-Cigarette/Vaping     E-Cigarette/Vaping Substances     Family History   Problem Relation Age of Onset    Diabetes Mother        Objective   Temp:  [97 °F (36.1 °C)-97.9 °F (36.6 °C)] 97.9 °F (36.6 °C)  HR:  [] 102  BP: (104-120)/(59-72) 112/66  Resp:   [15-20] 18  SpO2:  [97 %-99 %] 97 %  O2 Device: None (Room air)    Intake/Output Summary (Last 24 hours) at 1/7/2025 0926  Last data filed at 1/7/2025 0916  Gross per 24 hour   Intake 420 ml   Output --   Net 420 ml       Mental Status Evaluation:  Appearance:  age appropriate, bearded, and marginal hygeine   Behavior:  normal   Speech:  normal volume   Mood:  depressed   Affect:  constricted   Language: naming objects and repeating phrases   Thought Process:  normal   Associations intact associations   Thought Content:  normal and negative thinking   Perceptual Disturbances: None   Risk Potential: Suicidal Ideations with plan to shoot himself if he has access to a gun  Homicidal Ideations none  Potential for Aggression No   Sensorium:  person, place, and time/date   Cognition:  recent and remote memory grossly intact   Consciousness:  alert and awake    Attention: attention span and concentration were age appropriate   Intellect: within normal limits   Fund of Knowledge: awareness of current events: intact   Insight:  limited   Judgment:  limited   Muscle Strength:  Muscle Tone: normal face  normal   Gait/Station: normal gait/station   Motor Activity: no abnormal movements     Nutrition Assessment (completed by Staff): Nutrition  Feeding: Able to feed self  Appetite: Fair  Diet Supplements: None and Pain Screening: Pain Assessment  Pain Assessment Tool: 0-10  Pain Score: 0  Patient's Stated Pain Goal: No pain    Patient Strengths/Assets: Average or above intelligence, capable of independent living, cooperative, communication skills, compliant with medication, good past treatment response, motivated, supportive family  Patient Barriers/Limitations: Homeless, lack of social family support, poor self-care, poor support system, self-care deficit      Lab Results: I have reviewed the following results:Most Recent Labs:   Lab Results   Component Value Date    WBC 3.97 (L) 01/07/2025    RBC 3.35 (L) 01/07/2025    HGB 11.3  (L) 01/07/2025    HCT 34.4 (L) 01/07/2025     (L) 01/07/2025    RDW 16.0 (H) 01/07/2025    NEUTROABS 2.20 01/07/2025    SODIUM 136 01/07/2025    K 3.6 01/07/2025    CL 99 01/07/2025    CO2 28 01/07/2025    BUN 12 01/07/2025    CREATININE 0.61 01/07/2025    GLUC 131 01/07/2025    GLUF 111 (H) 08/12/2024    CALCIUM 8.3 (L) 01/07/2025    AST 32 01/07/2025    ALT 51 01/07/2025    ALKPHOS 71 01/07/2025    TP 5.5 (L) 01/07/2025    ALB 3.3 (L) 01/07/2025    TBILI 1.40 (H) 01/07/2025    CHOLESTEROL 127 01/07/2025    HDL 40 01/07/2025    TRIG 112 01/07/2025    LDLCALC 65 01/07/2025    NONHDLC 87 01/07/2025    AMMONIA 57 08/18/2024    [id number] 8.938 (H) 01/07/2025    FREET4 0.83 01/07/2025    HGBA1C 5.0 01/06/2025    EAG 97 01/06/2025        Administrative Statements   I have spent a total time of 60 minutes in caring for this patient on the day of the visit/encounter including Diagnostic results, Risks and benefits of tx options, Instructions for management, Patient and family education, Importance of tx compliance, Counseling / Coordination of care, Documenting in the medical record, Reviewing / ordering tests, medicine, procedures  , and Obtaining or reviewing history  .

## 2025-01-07 NOTE — ASSESSMENT & PLAN NOTE
Patient's TSH level is 8.938 today  T4 pending  Patient will continue on his levothyroxine 75 mcg p.o. daily  Patient has had noncompliance with his medication which is what I think is attributing to his high TSH

## 2025-01-07 NOTE — ASSESSMENT & PLAN NOTE
Bilirubin today is 1.41, will a few days ago was 2.32  Patient will have a repeat CMP on 1/10/2025 to make sure that his bilirubin is trending appropriately

## 2025-01-07 NOTE — PROGRESS NOTES
01/07/25 1054   Team Meeting   Meeting Type Tx Team Meeting   Initial Conference Date 01/07/25   Team Members Present   Team Members Present Physician;Nurse;   Physician Team Member Annalisa   Nursing Team Member Nikole   Care Management Team Member Guru   Patient/Family Present   Patient Present Yes   Patient's Family Present No     Tx plan was reviewed and discussed with Pt. Pt was encouraged to attend groups. Medication was discussed with Pt. Pt signed tx plan.

## 2025-01-07 NOTE — ASSESSMENT & PLAN NOTE
Patient's magnesium level today is 1.5  Patient will start Mag-Ox 800 mg p.o. twice daily x 6 doses  He will have a repeat mag level on 1/10/2025 at that time he will start on Mag-Ox 400 milligrams p.o. twice daily

## 2025-01-07 NOTE — ASSESSMENT & PLAN NOTE
Vital signs stable afebrile patient seen and examined by myself labs from today were reviewed by myself sodium 136 potassium 3.6 creatinine 0.61  Patient medically stable for admit  Please reach out to CHRISTINA GREENWOOD with any medical questions or concerns, we will be repeating his labs on 1/10/2025 to follow-up on his bilirubin as well as magnesium

## 2025-01-08 PROCEDURE — 99232 SBSQ HOSP IP/OBS MODERATE 35: CPT | Performed by: PSYCHIATRY & NEUROLOGY

## 2025-01-08 RX ADMIN — FLUOXETINE HYDROCHLORIDE 40 MG: 20 CAPSULE ORAL at 09:01

## 2025-01-08 RX ADMIN — TRAZODONE HYDROCHLORIDE 50 MG: 50 TABLET ORAL at 21:04

## 2025-01-08 RX ADMIN — BUSPIRONE HYDROCHLORIDE 10 MG: 10 TABLET ORAL at 09:02

## 2025-01-08 RX ADMIN — Medication 800 MG: at 17:30

## 2025-01-08 RX ADMIN — PANTOPRAZOLE SODIUM 40 MG: 40 TABLET, DELAYED RELEASE ORAL at 05:53

## 2025-01-08 RX ADMIN — LEVOTHYROXINE SODIUM 75 MCG: 75 TABLET ORAL at 05:53

## 2025-01-08 RX ADMIN — Medication 1000 UNITS: at 09:02

## 2025-01-08 RX ADMIN — MULTIPLE VITAMINS W/ MINERALS TAB 1 TABLET: TAB ORAL at 09:02

## 2025-01-08 RX ADMIN — FOLIC ACID 1 MG: 1 TABLET ORAL at 09:02

## 2025-01-08 RX ADMIN — Medication 800 MG: at 09:01

## 2025-01-08 RX ADMIN — THIAMINE HCL TAB 100 MG 100 MG: 100 TAB at 09:01

## 2025-01-08 NOTE — ASSESSMENT & PLAN NOTE
Continue prozac 40 mg for depression and anxiety  Continue buspirone 10 mg daily - will start at low dose due to patients hesitance regarding new medication and fear of side effects

## 2025-01-08 NOTE — TREATMENT TEAM
01/08/25 1100   Activity/Group Checklist   Group Other (Comment)  (Short term problem solving)   Attendance Attended   Attendance Duration (min) 31-45   Interactions Did not interact   Affect/Mood Other (Comment)  (staring out window)   Goals Achieved Able to listen to others;Identified feelings;Identified triggers;Identified relapse prevention strategies

## 2025-01-08 NOTE — PROGRESS NOTES
01/08/25 0800   Team Meeting   Meeting Type Daily Rounds   Team Members Present   Team Members Present Physician;Nurse;   Physician Team Member Jaylan/Claudia/Annalisa   Nursing Team Member Nikole   Care Management Team Member Guru   Patient/Family Present   Patient Present No   Patient's Family Present No     Patient endorses depression and anxiety. He is compliant with care and medications. Shower on the 7th, BM on the 6th. Trazodone 50mg given 21:25 and it was effective. Labs on the 10th. Patient discharge is pending stabilization of mood and medications.

## 2025-01-08 NOTE — ASSESSMENT & PLAN NOTE
Continue prozac 40 mg for depression and anxiety  Start buspirone 10 mg daily - will start at low dose due to patients hesitance regarding new medication and fear of side effects

## 2025-01-08 NOTE — PLAN OF CARE
Pt. Present in several groups daily yet not fully invested in spontaneous interaction in the group.  Problem: Ineffective Coping  Goal: Participates in unit activities  Description: Interventions:  - Provide therapeutic environment   - Provide required programming   - Redirect inappropriate behaviors   Outcome: Progressing

## 2025-01-08 NOTE — NURSING NOTE
Pt visible in day room this evening, pleasant on approach. Endorses depression and anxiety, denies further s/s or unmet needs. Provided with prn trazodone at 2125. Will maintain q15min checks.

## 2025-01-08 NOTE — PROGRESS NOTES
01/08/25 1000   Activity/Group Checklist   Group Community meeting  (task on writing instructions for self care.)   Attendance Attended   Attendance Duration (min) 31-45   Interactions Interacted appropriately  (Pt. focused to topic yet initially needed prompting to look at the group rather than out the window.)   Affect/Mood Calm   Goals Achieved Discussed coping strategies;Able to engage in interactions

## 2025-01-08 NOTE — PLAN OF CARE
Problem: Risk for Self Injury/Neglect  Goal: Treatment Goal: Remain safe during length of stay, learn and adopt new coping skills, and be free of self-injurious ideation, impulses and acts at the time of discharge  Outcome: Progressing  Goal: Verbalize thoughts and feelings  Description: Interventions:  - Assess and re-assess patient's lethality and potential for self-injury  - Engage patient in 1:1 interactions, daily, for a minimum of 15 minutes  - Encourage patient to express feelings, fears, frustrations, hopes  - Establish rapport/trust with patient   Outcome: Progressing  Goal: Refrain from harming self  Description: Interventions:  - Monitor patient closely, per order  - Develop a trusting relationship  - Supervise medication ingestion, monitor effects and side effects   Outcome: Progressing     Problem: Depression  Goal: Treatment Goal: Demonstrate behavioral control of depressive symptoms, verbalize feelings of improved mood/affect, and adopt new coping skills prior to discharge  Outcome: Progressing  Goal: Refrain from isolation  Description: Interventions:  - Develop a trusting relationship   - Encourage socialization   Outcome: Progressing     Problem: Anxiety  Goal: Anxiety is at manageable level  Description: Interventions:  - Assess and monitor patient's anxiety level.   - Monitor for signs and symptoms (heart palpitations, chest pain, shortness of breath, headaches, nausea, feeling jumpy, restlessness, irritable, apprehensive).   - Collaborate with interdisciplinary team and initiate plan and interventions as ordered.  - Lequire patient to unit/surroundings  - Explain treatment plan  - Encourage participation in care  - Encourage verbalization of concerns/fears  - Identify coping mechanisms  - Assist in developing anxiety-reducing skills  - Administer/offer alternative therapies  - Limit or eliminate stimulants  Outcome: Progressing

## 2025-01-08 NOTE — NURSING NOTE
"Pt alert and visible on the unit. Interacts with select peers. Observed sitting looking out the window throughout the day. Stated, \"I got sleep so I feel better today\". Attended groups today. Reported that depression and anxiety are \"a little better\". Denies si. Gait steady. Support provided.   "

## 2025-01-08 NOTE — PROGRESS NOTES
Progress Note - Behavioral Health   Name: Sammy Mays 64 y.o. male I MRN: [id number]  Unit/Bed#: [facility] 605[facility]02 I Date of Admission: 1/6/2025   Date of Service: 1/8/2025 I Hospital Day: 2     Assessment & Plan  Severe episode of recurrent major depressive disorder, without psychotic features (HCC)  Continue prozac 40 mg for depression and anxiety  Continue buspirone 10 mg daily - will start at low dose due to patients hesitance regarding new medication and fear of side effects  Generalized anxiety disorder  See plan above  Alcohol abuse  Counseling cessation, patient minimizes - says only drinking 1 scotch per day. Per chart review he drinks 2-3 mixed drinks daily and has required alcohol detox in the past  Homelessness  Recently evicted from his house    Current medications:  Current Facility-Administered Medications   Medication Dose Route Frequency Provider Last Rate    acetaminophen  650 mg Oral Q4H PRN ANI Ritchie      acetaminophen  650 mg Oral Q4H PRN ANI Ritchie      acetaminophen  975 mg Oral Q6H PRN ANI Ritchie      aluminum-magnesium hydroxide-simethicone  30 mL Oral Q4H PRN ANI Ritchie      benztropine  1 mg Intramuscular Q4H PRN Max 6/day ANI Ritchie      benztropine  0.5 mg Oral Q4H PRN Max 6/day ANI Ritchie      bisacodyl  10 mg Rectal Daily PRN ANI Ritchie      busPIRone  10 mg Oral Daily Heather Fang,       Cholecalciferol  1,000 Units Oral Daily ANI Whitlock      FLUoxetine  40 mg Oral Daily Heather Fang, DO      folic acid  1 mg Oral Daily ANI Whitlock      hydrOXYzine HCL  25 mg Oral Q6H PRN Max 4/day ANI Ritchie      hydrOXYzine HCL  50 mg Oral Q6H PRN Max 4/day ANI Ritchie      levothyroxine  75 mcg Oral Early Morning ANI Whitlock      LORazepam  1 mg Intramuscular Q6H PRN Max 3/day ANI Ritchie      LORazepam  1 mg Oral Q6H PRN Max 3/day ANI Ritchie      [START ON 1/10/2025]  magnesium Oxide  400 mg Oral BID Margaux GersaskiaNeris, CRHUNG      magnesium Oxide  800 mg Oral BID Margaux Hubbard, CRNP      multivitamin-minerals  1 tablet Oral Daily Margaux Hubbard, CRNP      OLANZapine  5 mg Intramuscular Q3H PRN Max 3/day Trish Taylor, ANGELESNP      OLANZapine  2.5 mg Oral Q4H PRN Max 6/day Trish Taylor, ANGELESNP      OLANZapine  5 mg Oral Q4H PRN Max 3/day Trish Taylor, ANGELESNP      OLANZapine  5 mg Oral Q3H PRN Max 3/day Trish Taylor, ANGELESNP      pantoprazole  40 mg Oral Early Morning Margaux Hubbard, CRNP      polyethylene glycol  17 g Oral Daily PRN Trish Taylor, ANGELESNP      propranolol  5 mg Oral Q8H PRN Trish Taylor, ANI      senna-docusate sodium  1 tablet Oral Daily PRN Trish Taylor, ANI      thiamine  100 mg Oral Daily Margaux SavageNicki, ANGELESNP      traZODone  50 mg Oral HS PRN Trish Taylor, ANI         Risks / Benefits of Treatment:    Risks, benefits, and possible side effects of medications explained to patient and patient verbalizes understanding and agreement for treatment.    Subjective:    Behavior over the last 24 hours: slowly improving.     The patient was evaluated this morning for continuity of care and no acute distress noted throughout the evaluation.  Over the past 24 hours staff noted the patient was compliant with medications and meals, was able to sleep with trazodone, he did not attend any groups, and appropriate with staff and peers.      Patient was visited on unit for continuing care; chart reviewed and discussed with multidisciplinary treatment team.  On approach, the patient was calm and cooperative. Sammy reports that he feels slightly more calm today but still feels like ending his life would be easier. He spoke extensively about all of the different suicidal plans he considered prior to admission. He spoke about how his depression made it impossible to attend his sisters , prevented him from paying any bills, prevented him from getting his car  inspected for over 10 months, stopped him from visiting his partner in the nursing home, and made it impossible to leave the house every day.  Denied any changes in appetite, and energy level. No problem initiating and maintaining sleep.  Denied A/VH currently.  The patient consented for safety and agreed to verbalize any frustration or concerns to the nursing staff, immediately.    Patient continues to be selectively interactive with staff and peers. No reports of aggression or self-injurious behavior on unit. No PRN medications used in the past 24 hours.    Patient accepted all offered medications and no adverse effects of medications noted or reported.    Sleep: slept better  Appetite: normal  Medication side effects: No   ROS: no complaints    Mental Status Examination:  Appearance:  age appropriate, casually dressed, looks stated age, bearded   Behavior:  pleasant, cooperative   Speech:  normal rate and volume, soft   Mood:  depressed   Affect:  constricted   Thought Process:  logical, coherent, linear, negative thinking   Associations: intact associations   Thought Content:  no overt delusions, negative thoughts, ruminating thoughts   Perceptual Disturbances: no auditory hallucinations, no visual hallucinations, does not appear responding to internal stimuli   Risk Potential: Suicidal ideation - Yes, with plan to jump off a bridge, overdose on medications, or shoot self with a gun  Homicidal ideation - None at present  Potential for aggression - No   Sensorium:  oriented to person, place, and time/date   Memory:  recent and remote memory grossly intact   Consciousness:  alert and awake   Attention/Concentration: attention span and concentration are age appropriate   Insight:  limited   Judgment: limited   Gait/Station: normal gait/station   Motor Activity: no abnormal movements        Vital signs in last 24 hours:    Temp:  [96.7 °F (35.9 °C)-98 °F (36.7 °C)] 98 °F (36.7 °C)  HR:  [71-95] 71  BP:  (106-136)/(69-78) 112/69  Resp:  [16-18] 16  SpO2:  [96 %-99 %] 98 %  O2 Device: None (Room air)         Laboratory results: I have personally reviewed all pertinent laboratory/tests results    Most Recent Labs:   Lab Results   Component Value Date    WBC 3.97 (L) 01/07/2025    RBC 3.35 (L) 01/07/2025    HGB 11.3 (L) 01/07/2025    HCT 34.4 (L) 01/07/2025     (L) 01/07/2025    RDW 16.0 (H) 01/07/2025    NEUTROABS 2.20 01/07/2025    SODIUM 136 01/07/2025    K 3.6 01/07/2025    CL 99 01/07/2025    CO2 28 01/07/2025    BUN 12 01/07/2025    CREATININE 0.61 01/07/2025    GLUC 131 01/07/2025    CALCIUM 8.3 (L) 01/07/2025    AST 32 01/07/2025    ALT 51 01/07/2025    ALKPHOS 71 01/07/2025    TP 5.5 (L) 01/07/2025    ALB 3.3 (L) 01/07/2025    TBILI 1.40 (H) 01/07/2025    CHOLESTEROL 127 01/07/2025    HDL 40 01/07/2025    TRIG 112 01/07/2025    LDLCALC 65 01/07/2025    NONHDLC 87 01/07/2025    AMMONIA 57 08/18/2024    CNJ2WUNDRVIQ 8.938 (H) 01/07/2025    FREET4 0.83 01/07/2025    HGBA1C 5.0 01/06/2025    EAG 97 01/06/2025       Progress Toward Goals: Improving but still reporting SI with multiple plans    Counseling / Coordination of Care:    Patient's progress discussed with staff in treatment team meeting.  Medication changes reviewed with staff in treatment team meeting.  Patient's progress reviewed with nursing staff.  Medications, treatment progress and treatment plan reviewed with patient.  Medication changes discussed with patient.  Medication education provided to patient.  Patient's progress reviewed with case management staff.  Reassurance and supportive therapy provided.}    Heather Fang DO 01/08/25    This note was completed in part utilizing Dragon dictation Software. Grammatical, translation, syntax errors, random word insertions, spelling mistakes, and incomplete sentences may be an occasional consequence of this system secondary to software limitations with voice recognition, ambient noise, and  hardware issues. If you have any questions or concerns about the content, text, or information contained within the body of this dictation, please contact the provider for clarification.

## 2025-01-08 NOTE — ASSESSMENT & PLAN NOTE
Counseling cessation, patient minimizes - says only drinking 1 scotch per day. Per chart review he drinks 2-3 mixed drinks daily and has required alcohol detox in the past

## 2025-01-08 NOTE — CASE MANAGEMENT
Cm was granted verbal permission from patient to speak with brother Edwin in order to give him an update and to give  number for brother Angel. Brother Flakito was very supportive and stated that both brothers have been attempting to help patient any way that they can, but when he gets depressed, he does not return phone calls and does not take care of himself. Brother Flakito is in [place] and has had a recent triple bypass and his family is attempting to move him in with them at this time.     Flakito Delgadomnsri (Brother)   [phone]      called and spoke to brothjosiah Ortiz about patient's brother. Brother lives in [facility]. Brother has offered to come up and get him and bring him home to [place]. He has an upstairs apartment for him with his own bathroom and kitchenette. He said that he could set him up with physicians in the area and is retired, so he would be able to keep an eye on him, but that his brother wants to come.     Angel Mays   [phone]

## 2025-01-09 PROCEDURE — 99232 SBSQ HOSP IP/OBS MODERATE 35: CPT | Performed by: PSYCHIATRY & NEUROLOGY

## 2025-01-09 RX ORDER — BUSPIRONE HYDROCHLORIDE 10 MG/1
10 TABLET ORAL 2 TIMES DAILY
Status: DISCONTINUED | OUTPATIENT
Start: 2025-01-10 | End: 2025-01-13

## 2025-01-09 RX ADMIN — FOLIC ACID 1 MG: 1 TABLET ORAL at 09:22

## 2025-01-09 RX ADMIN — BUSPIRONE HYDROCHLORIDE 10 MG: 10 TABLET ORAL at 09:22

## 2025-01-09 RX ADMIN — MULTIPLE VITAMINS W/ MINERALS TAB 1 TABLET: TAB ORAL at 09:22

## 2025-01-09 RX ADMIN — LEVOTHYROXINE SODIUM 75 MCG: 75 TABLET ORAL at 05:44

## 2025-01-09 RX ADMIN — Medication 800 MG: at 17:14

## 2025-01-09 RX ADMIN — Medication 800 MG: at 09:22

## 2025-01-09 RX ADMIN — TRAZODONE HYDROCHLORIDE 50 MG: 50 TABLET ORAL at 21:30

## 2025-01-09 RX ADMIN — HYDROXYZINE HYDROCHLORIDE 50 MG: 50 TABLET, FILM COATED ORAL at 02:42

## 2025-01-09 RX ADMIN — FLUOXETINE HYDROCHLORIDE 40 MG: 20 CAPSULE ORAL at 09:22

## 2025-01-09 RX ADMIN — Medication 1000 UNITS: at 09:23

## 2025-01-09 RX ADMIN — PANTOPRAZOLE SODIUM 40 MG: 40 TABLET, DELAYED RELEASE ORAL at 05:45

## 2025-01-09 RX ADMIN — THIAMINE HCL TAB 100 MG 100 MG: 100 TAB at 09:22

## 2025-01-09 NOTE — PLAN OF CARE
Problem: Risk for Self Injury/Neglect  Goal: Treatment Goal: Remain safe during length of stay, learn and adopt new coping skills, and be free of self-injurious ideation, impulses and acts at the time of discharge  Outcome: Progressing  Goal: Verbalize thoughts and feelings  Description: Interventions:  - Assess and re-assess patient's lethality and potential for self-injury  - Engage patient in 1:1 interactions, daily, for a minimum of 15 minutes  - Encourage patient to express feelings, fears, frustrations, hopes  - Establish rapport/trust with patient   Outcome: Progressing  Goal: Refrain from harming self  Description: Interventions:  - Monitor patient closely, per order  - Develop a trusting relationship  - Supervise medication ingestion, monitor effects and side effects   Outcome: Progressing     Problem: Depression  Goal: Treatment Goal: Demonstrate behavioral control of depressive symptoms, verbalize feelings of improved mood/affect, and adopt new coping skills prior to discharge  Outcome: Progressing  Goal: Refrain from isolation  Description: Interventions:  - Develop a trusting relationship   - Encourage socialization   Outcome: Progressing     Problem: Anxiety  Goal: Anxiety is at manageable level  Description: Interventions:  - Assess and monitor patient's anxiety level.   - Monitor for signs and symptoms (heart palpitations, chest pain, shortness of breath, headaches, nausea, feeling jumpy, restlessness, irritable, apprehensive).   - Collaborate with interdisciplinary team and initiate plan and interventions as ordered.  - [place] patient to unit/surroundings  - Explain treatment plan  - Encourage participation in care  - Encourage verbalization of concerns/fears  - Identify coping mechanisms  - Assist in developing anxiety-reducing skills  - Administer/offer alternative therapies  - Limit or eliminate stimulants  Outcome: Progressing

## 2025-01-09 NOTE — ASSESSMENT & PLAN NOTE
Continue prozac 40 mg for depression and anxiety  Increase buspirone 10 mg twice daily-started at low dose due to patients hesitance regarding new medication and fear of side effects

## 2025-01-09 NOTE — TREATMENT TEAM
01/09/25 0242   Henderson Anxiety Scale   Anxious Mood 2   Tension 2   Fears 2   Insomnia 3   Intellectual 2   Depressed Mood 2   Somatic Complaints: Muscular 2   Somatic Complaints: Sensory 2   Cardiovascular Symptoms 0   Respiratory Symptoms 0   Gastrointestinal Symptoms 0   Genitourinary Symptoms 0   Autonomic Symptoms 0   Behavior at Interview 2   Henderson Anxiety Score 19     Pt awake at this time. prn atarax 50mg provided for anxiety r/t inability to fall back asleep. Pt reading his book.

## 2025-01-09 NOTE — PROGRESS NOTES
01/09/25 0900   Team Meeting   Meeting Type Daily Rounds   Team Members Present   Team Members Present Physician;Nurse;   Physician Team Member Jaylan/Claudia/Annalisa   Nursing Team Member Nikole   Care Management Team Member Guru   Patient/Family Present   Patient Present No   Patient's Family Present No     Patient endorses anxiety and depression. He states that he was very uplifted after speaking with the doctor. Eating and sleeping well. Shower on the 6th and a BM on the 7th. Patient given Trazodone 50mg at 2100. But at 2:42 he was given Atarax 50mg due to an issue on the unit. He attended all of his groups yesterday. Patient discharge is pending stabilization of mood and medications.

## 2025-01-09 NOTE — NURSING NOTE
"Pt visible in hallway, reading a book. Pt reports feeling \"better\" after an adequate night sleep last night. Pt endorses anxiety and depression. States he feels more optimistic after speaking with psychiatrist today. Will maintain q15min checks.  "

## 2025-01-09 NOTE — NURSING NOTE
"Pt alert and visible on the unit. Reports depression and anxiety. Stated, \"I am feeling better though and I am excited because I think things are going to be ok\". Pt reported that he spoke to his brother today. Less somatic. Interacts with select peers. Denies si. Appetite 100%.  Gait steady. Support provided.   "

## 2025-01-09 NOTE — PROGRESS NOTES
Progress Note - Behavioral Health   Name: Sammy Mays 64 y.o. male I MRN: [id number]  Unit/Bed#: [facility]02 I Date of Admission: 1/6/2025   Date of Service: 1/9/2025 I Hospital Day: 3     Assessment & Plan  Severe episode of recurrent major depressive disorder, without psychotic features (HCC)  Continue prozac 40 mg for depression and anxiety  Increase buspirone 10 mg twice daily-started at low dose due to patients hesitance regarding new medication and fear of side effects  Generalized anxiety disorder  See plan above  Alcohol abuse  Counseling cessation, patient minimizes - says only drinking 1 scotch per day. Per chart review he drinks 2-3 mixed drinks daily and has required alcohol detox in the past  Homelessness  Recently evicted from his house    Current medications:  Current Facility-Administered Medications   Medication Dose Route Frequency Provider Last Rate    acetaminophen  650 mg Oral Q4H PRN ANI Ritchie      acetaminophen  650 mg Oral Q4H PRN ANI Ritchie      acetaminophen  975 mg Oral Q6H PRN ANI Ritchie      aluminum-magnesium hydroxide-simethicone  30 mL Oral Q4H PRN ANI Ritchie      benztropine  1 mg Intramuscular Q4H PRN Max 6/day ANI Ritchie      benztropine  0.5 mg Oral Q4H PRN Max 6/day ANI Ritchie      bisacodyl  10 mg Rectal Daily PRN ANI Ritchie      busPIRone  10 mg Oral Daily Heather aFng,       Cholecalciferol  1,000 Units Oral Daily ANI Whitlock      FLUoxetine  40 mg Oral Daily Heather Fang,       folic acid  1 mg Oral Daily ANI Whitlock      hydrOXYzine HCL  25 mg Oral Q6H PRN Max 4/day ANI Ritchie      hydrOXYzine HCL  50 mg Oral Q6H PRN Max 4/day ANI Ritchie      levothyroxine  75 mcg Oral Early Morning ANI Whitlock      LORazepam  1 mg Intramuscular Q6H PRN Max 3/day ANI Ritchie      LORazepam  1 mg Oral Q6H PRN Max 3/day ANI Ritchie      [START ON 1/10/2025]  magnesium Oxide  400 mg Oral BID Margaux GersaskiaNeris, CRNP      magnesium Oxide  800 mg Oral BID Margaux Hubbard, CRNP      multivitamin-minerals  1 tablet Oral Daily Margaux Hubbard, CRNP      OLANZapine  5 mg Intramuscular Q3H PRN Max 3/day Trish Taylor, ANGELESNP      OLANZapine  2.5 mg Oral Q4H PRN Max 6/day Trish Taylor, ANGELESNP      OLANZapine  5 mg Oral Q4H PRN Max 3/day Trish Taylor, ANGELESNP      OLANZapine  5 mg Oral Q3H PRN Max 3/day Trish Taylor, ANGELESNP      pantoprazole  40 mg Oral Early Morning Margaux Hubbard, CRNP      polyethylene glycol  17 g Oral Daily PRN Trish Taylor, ANGELESNP      propranolol  5 mg Oral Q8H PRN Trish Taylor, ANGELESNP      senna-docusate sodium  1 tablet Oral Daily PRN Trish Taylor, ANI      thiamine  100 mg Oral Daily Margaux RenettaNeris, ANGELESNP      traZODone  50 mg Oral HS PRN Trish Taylor, ANI         Risks / Benefits of Treatment:    Risks, benefits, and possible side effects of medications explained to patient and patient verbalizes understanding and agreement for treatment.    Subjective:    Behavior over the last 24 hours: slowly improving.     The patient was evaluated this morning for continuity of care and no acute distress noted throughout the evaluation.  Over the past 24 hours staff noted the patient was reporting anxiety and depression, acknowledges that he is starting to feel better. He was awake for some of the night due to noise level on the unit, he was given Atarax twice overnight.       Patient was visited on unit for continuing care; chart reviewed and discussed with multidisciplinary treatment team.  On approach, the patient was calm, pleasant and cooperative.  Sammy reports slowly improving but still experiencing suicidal thoughts, anxiety and poor sleep.  He has been trying to reach out to family and except more support since being in the hospital.  Endorsed better mood and less anxiety sxs, but continues to have negative thinking and ruminating thoughts.  Reported interrupted sleep. Reported initial insomnia. Denied A/VH currently.  The patient consented for safety and agreed to verbalize any frustration or concerns to the nursing staff, immediately.    Patient continues to be selectively interactive with staff and peers. No reports of aggression or self-injurious behavior on unit. No PRN medications used in the past 24 hours.    Patient accepted all offered medications and no adverse effects of medications noted or reported.    Sleep: insomnia  Appetite: normal  Medication side effects: No   ROS: no complaints    Mental Status Examination:  Appearance:  age appropriate, casually dressed, looks stated age, bearded   Behavior:  pleasant, cooperative, psychomotor retardation, slow responses   Speech:  normal rate and volume, soft   Mood:  depressed   Affect:  constricted   Thought Process:  logical, coherent, linear, negative thinking   Associations: intact associations   Thought Content:  no overt delusions, negative thoughts, ruminating thoughts   Perceptual Disturbances: no auditory hallucinations, no visual hallucinations, does not appear responding to internal stimuli   Risk Potential: Suicidal ideation - Yes with multiple plans, contracts for safety while on the unit  Homicidal ideation - None at present  Potential for aggression - No   Sensorium:  oriented to person, place, and time/date   Memory:  recent and remote memory grossly intact   Consciousness:  alert and awake   Attention/Concentration: attention span and concentration are age appropriate   Insight:  limited   Judgment: limited   Gait/Station: normal gait/station   Motor Activity: no abnormal movements        Vital signs in last 24 hours:    Temp:  [97 °F (36.1 °C)-98.6 °F (37 °C)] 97 °F (36.1 °C)  HR:  [80-90] 80  BP: (122-141)/(63-81) 141/81  Resp:  [17-18] 18  SpO2:  [97 %-100 %] 97 %  O2 Device: None (Room air)         Laboratory results: I have personally reviewed all pertinent laboratory/tests  results    Most Recent Labs:   Lab Results   Component Value Date    WBC 3.97 (L) 01/07/2025    RBC 3.35 (L) 01/07/2025    HGB 11.3 (L) 01/07/2025    HCT 34.4 (L) 01/07/2025     (L) 01/07/2025    RDW 16.0 (H) 01/07/2025    NEUTROABS 2.20 01/07/2025    SODIUM 136 01/07/2025    K 3.6 01/07/2025    CL 99 01/07/2025    CO2 28 01/07/2025    BUN 12 01/07/2025    CREATININE 0.61 01/07/2025    GLUC 131 01/07/2025    CALCIUM 8.3 (L) 01/07/2025    AST 32 01/07/2025    ALT 51 01/07/2025    ALKPHOS 71 01/07/2025    TP 5.5 (L) 01/07/2025    ALB 3.3 (L) 01/07/2025    TBILI 1.40 (H) 01/07/2025    CHOLESTEROL 127 01/07/2025    HDL 40 01/07/2025    TRIG 112 01/07/2025    LDLCALC 65 01/07/2025    NONHDLC 87 01/07/2025    AMMONIA 57 08/18/2024    [id number] 8.938 (H) 01/07/2025    FREET4 0.83 01/07/2025    HGBA1C 5.0 01/06/2025    EAG 97 01/06/2025       Progress Toward Goals: progressing slowly, agreeable to increasing dosing of BuSpar from once a day to twice a day.  He reported good sleep on the first day of admission but yesterday reports interrupted sleep and only 4 hours total.     Counseling / Coordination of Care:    Patient's progress discussed with staff in treatment team meeting.  Medication changes reviewed with staff in treatment team meeting.  Patient's progress reviewed with nursing staff.  Medications, treatment progress and treatment plan reviewed with patient.  Medication changes discussed with patient.  Medication education provided to patient.  Patient's progress reviewed with case management staff.  Reassurance and supportive therapy provided.}    Heather Fang DO 01/09/25    This note was completed in part utilizing Dragon dictation Software. Grammatical, translation, syntax errors, random word insertions, spelling mistakes, and incomplete sentences may be an occasional consequence of this system secondary to software limitations with voice recognition, ambient noise, and hardware issues. If you have  any questions or concerns about the content, text, or information contained within the body of this dictation, please contact the provider for clarification.

## 2025-01-10 LAB
ALBUMIN SERPL BCG-MCNC: 3.5 G/DL (ref 3.5–5)
ALP SERPL-CCNC: 76 U/L (ref 34–104)
ALT SERPL W P-5'-P-CCNC: 37 U/L (ref 7–52)
ANION GAP SERPL CALCULATED.3IONS-SCNC: 8 MMOL/L (ref 4–13)
AST SERPL W P-5'-P-CCNC: 25 U/L (ref 13–39)
BILIRUB SERPL-MCNC: 0.49 MG/DL (ref 0.2–1)
BUN SERPL-MCNC: 9 MG/DL (ref 5–25)
CALCIUM SERPL-MCNC: 8.6 MG/DL (ref 8.4–10.2)
CHLORIDE SERPL-SCNC: 105 MMOL/L (ref 96–108)
CO2 SERPL-SCNC: 27 MMOL/L (ref 21–32)
CREAT SERPL-MCNC: 0.58 MG/DL (ref 0.6–1.3)
GFR SERPL CREATININE-BSD FRML MDRD: 106 ML/MIN/1.73SQ M
GLUCOSE P FAST SERPL-MCNC: 133 MG/DL (ref 65–99)
GLUCOSE SERPL-MCNC: 133 MG/DL (ref 65–140)
MAGNESIUM SERPL-MCNC: 2 MG/DL (ref 1.9–2.7)
PHOSPHATE SERPL-MCNC: 4.2 MG/DL (ref 2.3–4.1)
POTASSIUM SERPL-SCNC: 4.2 MMOL/L (ref 3.5–5.3)
PROT SERPL-MCNC: 5.8 G/DL (ref 6.4–8.4)
SODIUM SERPL-SCNC: 140 MMOL/L (ref 135–147)

## 2025-01-10 PROCEDURE — 80053 COMPREHEN METABOLIC PANEL: CPT | Performed by: NURSE PRACTITIONER

## 2025-01-10 PROCEDURE — 84100 ASSAY OF PHOSPHORUS: CPT | Performed by: NURSE PRACTITIONER

## 2025-01-10 PROCEDURE — 83735 ASSAY OF MAGNESIUM: CPT | Performed by: NURSE PRACTITIONER

## 2025-01-10 PROCEDURE — 99232 SBSQ HOSP IP/OBS MODERATE 35: CPT | Performed by: PSYCHIATRY & NEUROLOGY

## 2025-01-10 RX ADMIN — Medication 400 MG: at 09:00

## 2025-01-10 RX ADMIN — PANTOPRAZOLE SODIUM 40 MG: 40 TABLET, DELAYED RELEASE ORAL at 06:17

## 2025-01-10 RX ADMIN — FLUOXETINE HYDROCHLORIDE 40 MG: 20 CAPSULE ORAL at 09:00

## 2025-01-10 RX ADMIN — THIAMINE HCL TAB 100 MG 100 MG: 100 TAB at 09:00

## 2025-01-10 RX ADMIN — TRAZODONE HYDROCHLORIDE 50 MG: 50 TABLET ORAL at 21:30

## 2025-01-10 RX ADMIN — BUSPIRONE HYDROCHLORIDE 10 MG: 10 TABLET ORAL at 09:00

## 2025-01-10 RX ADMIN — Medication 400 MG: at 17:07

## 2025-01-10 RX ADMIN — Medication 1000 UNITS: at 09:01

## 2025-01-10 RX ADMIN — LEVOTHYROXINE SODIUM 75 MCG: 75 TABLET ORAL at 06:17

## 2025-01-10 RX ADMIN — FOLIC ACID 1 MG: 1 TABLET ORAL at 09:00

## 2025-01-10 RX ADMIN — BUSPIRONE HYDROCHLORIDE 10 MG: 10 TABLET ORAL at 17:07

## 2025-01-10 RX ADMIN — MULTIPLE VITAMINS W/ MINERALS TAB 1 TABLET: TAB ORAL at 09:00

## 2025-01-10 NOTE — NURSING NOTE
"Pt alert and visible on the unit. Interacting with select peers. Reported that his depression and anxiety \"are better\". Stated, \"my anxiety gets bad at night when I think about things too much\". Pleasant on approach. Appetite 100% for meals. Gait steady. Tolerating medication adjustments. No s/e reported. Support provided.   "

## 2025-01-10 NOTE — PLAN OF CARE
Problem: Risk for Self Injury/Neglect  Goal: Treatment Goal: Remain safe during length of stay, learn and adopt new coping skills, and be free of self-injurious ideation, impulses and acts at the time of discharge  Outcome: Progressing  Goal: Verbalize thoughts and feelings  Description: Interventions:  - Assess and re-assess patient's lethality and potential for self-injury  - Engage patient in 1:1 interactions, daily, for a minimum of 15 minutes  - Encourage patient to express feelings, fears, frustrations, hopes  - Establish rapport/trust with patient   Outcome: Progressing  Goal: Refrain from harming self  Description: Interventions:  - Monitor patient closely, per order  - Develop a trusting relationship  - Supervise medication ingestion, monitor effects and side effects   Outcome: Progressing     Problem: Depression  Goal: Treatment Goal: Demonstrate behavioral control of depressive symptoms, verbalize feelings of improved mood/affect, and adopt new coping skills prior to discharge  Outcome: Progressing  Goal: Refrain from isolation  Description: Interventions:  - Develop a trusting relationship   - Encourage socialization   Outcome: Progressing     Problem: Anxiety  Goal: Anxiety is at manageable level  Description: Interventions:  - Assess and monitor patient's anxiety level.   - Monitor for signs and symptoms (heart palpitations, chest pain, shortness of breath, headaches, nausea, feeling jumpy, restlessness, irritable, apprehensive).   - Collaborate with interdisciplinary team and initiate plan and interventions as ordered.  - [place] patient to unit/surroundings  - Explain treatment plan  - Encourage participation in care  - Encourage verbalization of concerns/fears  - Identify coping mechanisms  - Assist in developing anxiety-reducing skills  - Administer/offer alternative therapies  - Limit or eliminate stimulants  Outcome: Progressing     Problem: DISCHARGE PLANNING  Goal: Discharge to home or other  facility with appropriate resources  Description: INTERVENTIONS:  - Identify barriers to discharge w/patient and caregiver  - Arrange for needed discharge resources and transportation as appropriate  - Identify discharge learning needs (meds, wound care, etc.)  - Arrange for interpretive services to assist at discharge as needed  - Refer to Case Management Department for coordinating discharge planning if the patient needs post-hospital services based on physician/advanced practitioner order or complex needs related to functional status, cognitive ability, or social support system  Outcome: Progressing     Problem: Ineffective Coping  Goal: Participates in unit activities  Description: Interventions:  - Provide therapeutic environment   - Provide required programming   - Redirect inappropriate behaviors   Outcome: Progressing

## 2025-01-10 NOTE — NURSING NOTE
"Patient visible in milieu and social with peers. Pleasant, cooperative, polite, and bright on approach. Denies all psych s/s and pains. Reports concern for sleep tonight because he states, \"I had a rough time sleeping last night.\" Medication compliant.   "

## 2025-01-10 NOTE — ASSESSMENT & PLAN NOTE
Continue prozac 40 mg for depression and anxiety  Continue buspirone 10 mg twice daily-started at low dose due to patients hesitance regarding new medication and fear of side effects

## 2025-01-10 NOTE — PROGRESS NOTES
01/10/25 0750 01/10/25 1000   Activity/Group Checklist   Group Current events Community meeting  (adversity topic)   Attendance Attended Attended   Attendance Duration (min) 31-45 31-45   Interactions Interacted appropriately Interacted appropriately  (Pt. discussed lonliness)   Affect/Mood Appropriate;Bright Appropriate;Calm   Goals Achieved Able to engage in interactions Identified feelings;Identified triggers;Discussed coping strategies;Able to engage in interactions

## 2025-01-10 NOTE — PROGRESS NOTES
01/10/25 0700   Team Meeting   Meeting Type Daily Rounds   Team Members Present   Team Members Present Physician;Nurse;   Physician Team Member Jaylan/Claudia/Annalisa   Nursing Team Member Nikole   Care Management Team Member Guru   Patient/Family Present   Patient Present No   Patient's Family Present No     Patient denies signs and symptoms. Buspar was increased. He went to one group. Trazodone was given at 1:30am and he slept. He is eating well and attended one group. Patient discharge is pending stabilization of mood and medications.

## 2025-01-10 NOTE — PROGRESS NOTES
Progress Note - Behavioral Health   Name: Sammy Mays 64 y.o. male I MRN: [id number]  Unit/Bed#: [facility] 605-02 I Date of Admission: 1/6/2025   Date of Service: 1/10/2025 I Hospital Day: 4     Assessment & Plan  Severe episode of recurrent major depressive disorder, without psychotic features (HCC)  Continue prozac 40 mg for depression and anxiety  Continue buspirone 10 mg twice daily-started at low dose due to patients hesitance regarding new medication and fear of side effects  Generalized anxiety disorder  See plan above  Alcohol abuse  Counseling cessation, patient minimizes - says only drinking 1 scotch per day. Per chart review he drinks 2-3 mixed drinks daily and has required alcohol detox in the past  Homelessness  Recently evicted from his house    Current medications:  Current Facility-Administered Medications   Medication Dose Route Frequency Provider Last Rate    acetaminophen  650 mg Oral Q4H PRN ANI Ritchie      acetaminophen  650 mg Oral Q4H PRN ANI Ritchie      acetaminophen  975 mg Oral Q6H PRN ANI Ritchie      aluminum-magnesium hydroxide-simethicone  30 mL Oral Q4H PRN ANI Ritchie      benztropine  1 mg Intramuscular Q4H PRN Max 6/day ANI Ritchie      benztropine  0.5 mg Oral Q4H PRN Max 6/day ANI Ritchie      bisacodyl  10 mg Rectal Daily PRN ANI Ritchie      busPIRone  10 mg Oral BID Heather Fang,       Cholecalciferol  1,000 Units Oral Daily ANI Whitlock      FLUoxetine  40 mg Oral Daily Heather Fang, DO      folic acid  1 mg Oral Daily ANI Whitlock      hydrOXYzine HCL  25 mg Oral Q6H PRN Max 4/day ANI Ritchie      hydrOXYzine HCL  50 mg Oral Q6H PRN Max 4/day ANI Ritchie      levothyroxine  75 mcg Oral Early Morning ANI Whitlock      LORazepam  1 mg Intramuscular Q6H PRN Max 3/day ANI Ritchie      LORazepam  1 mg Oral Q6H PRN Max 3/day ANI Ritchie      magnesium Oxide  400  mg Oral BID Margaux Hubbard, CRHUNG      multivitamin-minerals  1 tablet Oral Daily Margaux Hubbard, CRNP      OLANZapine  5 mg Intramuscular Q3H PRN Max 3/day Trish Taylor, ANI      OLANZapine  2.5 mg Oral Q4H PRN Max 6/day Trish Taylor, ANGELESNP      OLANZapine  5 mg Oral Q4H PRN Max 3/day Trish Taylor, ANGELESNP      OLANZapine  5 mg Oral Q3H PRN Max 3/day Trish Taylor, ANI      pantoprazole  40 mg Oral Early Morning Margaux Hubbard, CRNP      polyethylene glycol  17 g Oral Daily PRN Trish Taylor, ANGELESNP      propranolol  5 mg Oral Q8H PRN Trish Taylor, ANI      senna-docusate sodium  1 tablet Oral Daily PRN Trish Taylor, ANI      thiamine  100 mg Oral Daily Margaux Hubbard, ANGELESNP      traZODone  50 mg Oral HS PRN Trish Taylor, ANI         Risks / Benefits of Treatment:    Risks, benefits, and possible side effects of medications explained to patient and patient verbalizes understanding and agreement for treatment.    Subjective:    Behavior over the last 24 hours: slowly improving.     The patient was evaluated this morning for continuity of care and no acute distress noted throughout the evaluation.  Over the past 24 hours staff noted the patient attended one group, required trazodone for sleep, eating well and compliant with treatment.      Patient was visited on unit for continuing care; chart reviewed and discussed with multidisciplinary treatment team.  On approach, the patient was calm and cooperative. Denied any changes in mood, appetite, and energy level. Reported interrupted sleep. Reported initial insomnia. Denied A/VH currently.  The patient consented for safety and agreed to verbalize any frustration or concerns to the nursing staff, immediately.  Sammy states that for the first time he was able to participate in group, he still states that he is dealing with lots of worries regarding his belongings, being evicted from the house he was living in for 25 years and he has fears of being  homeless.  He states that he is trying to be more positive and think about his 2 brothers who are supportive.    Patient continues to be selectively interactive with staff and peers. No reports of aggression or self-injurious behavior on unit. No PRN medications used in the past 24 hours.    Patient accepted all offered medications and no adverse effects of medications noted or reported.    Sleep: insomnia, frequent awakenings  Appetite: normal  Medication side effects: No   ROS: no complaints    Mental Status Examination:  Appearance:  age appropriate, casually dressed, looks stated age   Behavior:  pleasant, cooperative   Speech:  normal rate, soft   Mood:  depressed   Affect:  constricted   Thought Process:  logical, coherent, linear, negative thinking   Associations: intact associations   Thought Content:  no overt delusions, negative thoughts, ruminating thoughts   Perceptual Disturbances: no auditory hallucinations, no visual hallucinations, does not appear responding to internal stimuli   Risk Potential: Suicidal ideation - Yes, fleeting suicidal thoughts  Homicidal ideation - None at present  Potential for aggression - No   Sensorium:  oriented to person, place, and time/date   Memory:  recent and remote memory grossly intact   Consciousness:  alert and awake   Attention/Concentration: attention span and concentration are age appropriate   Insight:  limited   Judgment: limited   Gait/Station: normal gait/station   Motor Activity: no abnormal movements        Vital signs in last 24 hours:    Temp:  [97.3 °F (36.3 °C)-97.4 °F (36.3 °C)] 97.4 °F (36.3 °C)  HR:  [72-77] 72  BP: (127-139)/(75-76) 128/75  Resp:  [18] 18  SpO2:  [98 %-100 %] 99 %  O2 Device: None (Room air)         Laboratory results: I have personally reviewed all pertinent laboratory/tests results    Most Recent Labs:   Lab Results   Component Value Date    WBC 3.97 (L) 01/07/2025    RBC 3.35 (L) 01/07/2025    HGB 11.3 (L) 01/07/2025    HCT 34.4  (L) 01/07/2025     (L) 01/07/2025    RDW 16.0 (H) 01/07/2025    NEUTROABS 2.20 01/07/2025    SODIUM 140 01/10/2025    K 4.2 01/10/2025     01/10/2025    CO2 27 01/10/2025    BUN 9 01/10/2025    CREATININE 0.58 (L) 01/10/2025    GLUC 133 01/10/2025    CALCIUM 8.6 01/10/2025    AST 25 01/10/2025    ALT 37 01/10/2025    ALKPHOS 76 01/10/2025    TP 5.8 (L) 01/10/2025    ALB 3.5 01/10/2025    TBILI 0.49 01/10/2025    CHOLESTEROL 127 01/07/2025    HDL 40 01/07/2025    TRIG 112 01/07/2025    LDLCALC 65 01/07/2025    NONHDLC 87 01/07/2025    AMMONIA 57 08/18/2024    [id number] 8.938 (H) 01/07/2025    FREET4 0.83 01/07/2025    HGBA1C 5.0 01/06/2025    EAG 97 01/06/2025       Progress Toward Goals: Progressing slowly, no side effects from increase in buspirone dosing.  We will assess over the weekend, consider optimizing buspirone.    Counseling / Coordination of Care:    Patient's progress discussed with staff in treatment team meeting.  Medication changes reviewed with staff in treatment team meeting.  Patient's progress reviewed with nursing staff.  Medications, treatment progress and treatment plan reviewed with patient.  Medication changes discussed with patient.  Medication education provided to patient.  Patient's progress reviewed with case management staff.  Reassurance and supportive therapy provided.}    Heather Fang,  01/10/25    This note was completed in part utilizing Dragon dictation Software. Grammatical, translation, syntax errors, random word insertions, spelling mistakes, and incomplete sentences may be an occasional consequence of this system secondary to software limitations with voice recognition, ambient noise, and hardware issues. If you have any questions or concerns about the content, text, or information contained within the body of this dictation, please contact the provider for clarification.

## 2025-01-11 PROCEDURE — 99232 SBSQ HOSP IP/OBS MODERATE 35: CPT | Performed by: PSYCHIATRY & NEUROLOGY

## 2025-01-11 RX ADMIN — THIAMINE HCL TAB 100 MG 100 MG: 100 TAB at 08:44

## 2025-01-11 RX ADMIN — FOLIC ACID 1 MG: 1 TABLET ORAL at 08:44

## 2025-01-11 RX ADMIN — Medication 400 MG: at 17:07

## 2025-01-11 RX ADMIN — HYDROXYZINE HYDROCHLORIDE 50 MG: 50 TABLET, FILM COATED ORAL at 19:23

## 2025-01-11 RX ADMIN — PANTOPRAZOLE SODIUM 40 MG: 40 TABLET, DELAYED RELEASE ORAL at 06:14

## 2025-01-11 RX ADMIN — Medication 400 MG: at 08:44

## 2025-01-11 RX ADMIN — FLUOXETINE HYDROCHLORIDE 40 MG: 20 CAPSULE ORAL at 08:44

## 2025-01-11 RX ADMIN — Medication 1000 UNITS: at 08:44

## 2025-01-11 RX ADMIN — MULTIPLE VITAMINS W/ MINERALS TAB 1 TABLET: TAB ORAL at 08:44

## 2025-01-11 RX ADMIN — BUSPIRONE HYDROCHLORIDE 10 MG: 10 TABLET ORAL at 08:44

## 2025-01-11 RX ADMIN — TRAZODONE HYDROCHLORIDE 50 MG: 50 TABLET ORAL at 21:41

## 2025-01-11 RX ADMIN — BUSPIRONE HYDROCHLORIDE 10 MG: 10 TABLET ORAL at 17:07

## 2025-01-11 RX ADMIN — LEVOTHYROXINE SODIUM 75 MCG: 75 TABLET ORAL at 06:14

## 2025-01-11 NOTE — NURSING NOTE
Pt reported anxiety in a.m. and also poor sleep. Visible on unit, good intake at meals. Pt brightens during interactions. Pt reported mood improved as day progressed, denied having any symptoms in evening including SI. Denies unmet needs currently.

## 2025-01-11 NOTE — PROGRESS NOTES
Progress Note - Behavioral Health   Name: Sammy Mays 64 y.o. male I MRN: [id number]  Unit/Bed#: [facility] 605[facility]02 I Date of Admission: 1/6/2025   Date of Service: 1/11/2025 I Hospital Day: 5     Assessment & Plan  Severe episode of recurrent major depressive disorder, without psychotic features (HCC)  Continue prozac 40 mg for depression and anxiety  Continue buspirone 10 mg twice daily-started at low dose due to patients hesitance regarding new medication and fear of side effects  Generalized anxiety disorder  See plan above  Alcohol abuse  Counseling cessation, patient minimizes - says only drinking 1 scotch per day. Per chart review he drinks 2-3 mixed drinks daily and has required alcohol detox in the past  Homelessness  Recently evicted from his house        Subjective:    Documentation, nursing notes, medication reconciliation, labs, and vitals reviewed. Patient was seen for continuing care and reviewed with treatment team. No acute events over the past 24 hours.  Per nursing report, pleasant and cooperative, social, continues to report improvements in depression.    No medication changes over the past 24 hours. Continues to tolerate current medications with no adverse effects.     On evaluation today, He is seen in the hallway sitting area.  Speech is somewhat rambling,.  He acknowledges significant improvement in his depression and feeling more capable and more hopeful.  Still has significant anxiety regarding getting his belongings from his apartment and worrying about losing his car etc. tends to ruminate and catastrophize regarding the future.  Grateful that his brother is willing to take him  No suicidal ideation, plan, or intent. Denies perceptual disturbances and does not exhibit any symptoms of aby on evaluation.    Sleep: slept off and on  Appetite: fair  Medication side effects: No   ROS: no complaints, all other systems are negative, except as noted above    Mental Status Evaluation:    Appearance:   age appropriate   Behavior:  pleasant, cooperative   Speech:  hypertalkative   Mood:  anxious   Affect:  normal range and intensity   Thought Process:  circumstantial   Associations: perseverative   Thought Content:  obsessive thoughts, ruminating thoughts   Perceptual Disturbances: none   Risk Potential: Suicidal ideation - None  Homicidal ideation - None  Potential for aggression - No   Sensorium:  oriented to person, place, and time/date   Memory:  recent and remote memory grossly intact   Consciousness:  alert and awake   Attention: decreased concentration and decreased attention span   Insight:  limited   Judgment: limited   Gait/Station: normal gait/station, normal balance   Motor Activity: no abnormal movements     Vital signs in last 24 hours:    Temp:  [97.5 °F (36.4 °C)-98.5 °F (36.9 °C)] 98.5 °F (36.9 °C)  HR:  [78-95] 78  BP: (107-151)/(57-80) 107/57  Resp:  [17-18] 17  SpO2:  [98 %-99 %] 98 %  O2 Device: None (Room air)    Laboratory results: I have personally reviewed all pertinent laboratory/tests results.      Progress Toward Goals: continues to improve        All current active medications have been reviewed  Encourage group therapy, milieu therapy and occupational therapy  Behavioral Health checks for safety monitoring    Requires continued inpatient treatment due to chronic illness and high risk of decompensation if discharged before long term stability is achieved.    Continue current medications:  Current Facility-Administered Medications   Medication Dose Route Frequency Provider Last Rate    acetaminophen  650 mg Oral Q4H PRN ANI Ritchie      acetaminophen  650 mg Oral Q4H PRN ANI Ritchie      acetaminophen  975 mg Oral Q6H PRN ANI Ritchie      aluminum-magnesium hydroxide-simethicone  30 mL Oral Q4H PRN ANI Ritchie      benztropine  1 mg Intramuscular Q4H PRN Max 6/day ANI Ritchie      benztropine  0.5 mg Oral Q4H PRN Max 6/day ANI Ritchie       bisacodyl  10 mg Rectal Daily PRN Trish Taylor, ANI      busPIRone  10 mg Oral BID Heather Fang, DO      Cholecalciferol  1,000 Units Oral Daily Margaux Hubbard, ANI      FLUoxetine  40 mg Oral Daily Heather Annalisa, DO      folic acid  1 mg Oral Daily Margaux Hubbard, CRNP      hydrOXYzine HCL  25 mg Oral Q6H PRN Max 4/day Trish Taylor, ANI      hydrOXYzine HCL  50 mg Oral Q6H PRN Max 4/day Trish Taylor, ANI      levothyroxine  75 mcg Oral Early Morning Margaux Hubbard, CRNP      LORazepam  1 mg Intramuscular Q6H PRN Max 3/day Trish Taylor, ANI      LORazepam  1 mg Oral Q6H PRN Max 3/day ANI Ritchie      magnesium Oxide  400 mg Oral BID Margaux Hubbard, CRHUNG      multivitamin-minerals  1 tablet Oral Daily Margaux RenettaNeris, ANI      OLANZapine  5 mg Intramuscular Q3H PRN Max 3/day ANI Ritchie      OLANZapine  2.5 mg Oral Q4H PRN Max 6/day Trish Taylor, ANGELESNP      OLANZapine  5 mg Oral Q4H PRN Max 3/day Trish Taylor, ANI      OLANZapine  5 mg Oral Q3H PRN Max 3/day ANI Ritchie      pantoprazole  40 mg Oral Early Morning Margaux SavageNicki, CRNP      polyethylene glycol  17 g Oral Daily PRN ANI Ritchie      propranolol  5 mg Oral Q8H PRN ANI Ritchie      senna-docusate sodium  1 tablet Oral Daily PRN ANI Ritchie      thiamine  100 mg Oral Daily Margaux RenettaNeris, CRNP      traZODone  50 mg Oral HS PRN Trish Taylor, ANI         Risks / Benefits of Treatment:    Risks, benefits, and possible side effects of medications explained to patient and patient verbalizes understanding and agreement for treatment.    Counseling / Coordination of Care:    Patient's progress discussed with staff in treatment team meeting.  Medications, treatment progress and treatment plan reviewed with patient.  Reassurance and supportive therapy provided.  Encouraged participation in milieu and group therapy on the unit.    This note has been constructed using a voice  recognition system. There may be translation, syntax, or grammatical errors. If you have any questions, please contact the dictating author.     ANI Jain 01/11/25

## 2025-01-11 NOTE — PLAN OF CARE
Problem: Risk for Self Injury/Neglect  Goal: Verbalize thoughts and feelings  Description: Interventions:  - Assess and re-assess patient's lethality and potential for self-injury  - Engage patient in 1:1 interactions, daily, for a minimum of 15 minutes  - Encourage patient to express feelings, fears, frustrations, hopes  - Establish rapport/trust with patient   Outcome: Progressing     Problem: Depression  Goal: Treatment Goal: Demonstrate behavioral control of depressive symptoms, verbalize feelings of improved mood/affect, and adopt new coping skills prior to discharge  Outcome: Progressing  Goal: Refrain from isolation  Description: Interventions:  - Develop a trusting relationship   - Encourage socialization   Outcome: Progressing     Problem: Anxiety  Goal: Anxiety is at manageable level  Description: Interventions:  - Assess and monitor patient's anxiety level.   - Monitor for signs and symptoms (heart palpitations, chest pain, shortness of breath, headaches, nausea, feeling jumpy, restlessness, irritable, apprehensive).   - Collaborate with interdisciplinary team and initiate plan and interventions as ordered.  - [place] patient to unit/surroundings  - Explain treatment plan  - Encourage participation in care  - Encourage verbalization of concerns/fears  - Identify coping mechanisms  - Assist in developing anxiety-reducing skills  - Administer/offer alternative therapies  - Limit or eliminate stimulants  Outcome: Progressing

## 2025-01-11 NOTE — NURSING NOTE
Patient visible in milieu. Social with peers. Pleasant and cooperative on approach. Currently denies anxiety and depression. Denies pain, SI/HI and AH/VH. Steady gait. Requesting Trazodone 50 mg prior to HS. Medication compliant.

## 2025-01-12 PROCEDURE — 99232 SBSQ HOSP IP/OBS MODERATE 35: CPT | Performed by: PSYCHIATRY & NEUROLOGY

## 2025-01-12 RX ADMIN — Medication 400 MG: at 17:07

## 2025-01-12 RX ADMIN — PANTOPRAZOLE SODIUM 40 MG: 40 TABLET, DELAYED RELEASE ORAL at 06:19

## 2025-01-12 RX ADMIN — LEVOTHYROXINE SODIUM 75 MCG: 75 TABLET ORAL at 06:19

## 2025-01-12 RX ADMIN — FOLIC ACID 1 MG: 1 TABLET ORAL at 08:15

## 2025-01-12 RX ADMIN — FLUOXETINE HYDROCHLORIDE 40 MG: 20 CAPSULE ORAL at 08:15

## 2025-01-12 RX ADMIN — BUSPIRONE HYDROCHLORIDE 10 MG: 10 TABLET ORAL at 17:07

## 2025-01-12 RX ADMIN — MULTIPLE VITAMINS W/ MINERALS TAB 1 TABLET: TAB ORAL at 08:15

## 2025-01-12 RX ADMIN — TRAZODONE HYDROCHLORIDE 50 MG: 50 TABLET ORAL at 21:24

## 2025-01-12 RX ADMIN — Medication 1000 UNITS: at 08:15

## 2025-01-12 RX ADMIN — HYDROXYZINE HYDROCHLORIDE 25 MG: 25 TABLET ORAL at 20:29

## 2025-01-12 RX ADMIN — THIAMINE HCL TAB 100 MG 100 MG: 100 TAB at 08:15

## 2025-01-12 RX ADMIN — BUSPIRONE HYDROCHLORIDE 10 MG: 10 TABLET ORAL at 08:15

## 2025-01-12 RX ADMIN — Medication 400 MG: at 08:15

## 2025-01-12 NOTE — PLAN OF CARE
Problem: Risk for Self Injury/Neglect  Goal: Treatment Goal: Remain safe during length of stay, learn and adopt new coping skills, and be free of self-injurious ideation, impulses and acts at the time of discharge  Outcome: Progressing  Goal: Verbalize thoughts and feelings  Description: Interventions:  - Assess and re-assess patient's lethality and potential for self-injury  - Engage patient in 1:1 interactions, daily, for a minimum of 15 minutes  - Encourage patient to express feelings, fears, frustrations, hopes  - Establish rapport/trust with patient   Outcome: Progressing  Goal: Refrain from harming self  Description: Interventions:  - Monitor patient closely, per order  - Develop a trusting relationship  - Supervise medication ingestion, monitor effects and side effects   Outcome: Progressing     Problem: Depression  Goal: Treatment Goal: Demonstrate behavioral control of depressive symptoms, verbalize feelings of improved mood/affect, and adopt new coping skills prior to discharge  Outcome: Progressing  Goal: Refrain from isolation  Description: Interventions:  - Develop a trusting relationship   - Encourage socialization   Outcome: Progressing     Problem: Anxiety  Goal: Anxiety is at manageable level  Description: Interventions:  - Assess and monitor patient's anxiety level.   - Monitor for signs and symptoms (heart palpitations, chest pain, shortness of breath, headaches, nausea, feeling jumpy, restlessness, irritable, apprehensive).   - Collaborate with interdisciplinary team and initiate plan and interventions as ordered.  - [place] patient to unit/surroundings  - Explain treatment plan  - Encourage participation in care  - Encourage verbalization of concerns/fears  - Identify coping mechanisms  - Assist in developing anxiety-reducing skills  - Administer/offer alternative therapies  - Limit or eliminate stimulants  Outcome: Progressing     Problem: DISCHARGE PLANNING  Goal: Discharge to home or other  facility with appropriate resources  Description: INTERVENTIONS:  - Identify barriers to discharge w/patient and caregiver  - Arrange for needed discharge resources and transportation as appropriate  - Identify discharge learning needs (meds, wound care, etc.)  - Arrange for interpretive services to assist at discharge as needed  - Refer to Case Management Department for coordinating discharge planning if the patient needs post-hospital services based on physician/advanced practitioner order or complex needs related to functional status, cognitive ability, or social support system  Outcome: Progressing     Problem: Ineffective Coping  Goal: Participates in unit activities  Description: Interventions:  - Provide therapeutic environment   - Provide required programming   - Redirect inappropriate behaviors   Outcome: Progressing

## 2025-01-12 NOTE — NURSING NOTE
Patient is awake and visible in the milieu. Pleasant, social with peers and able to make needs known. Reports anxiety, denies all other psych s/s. Medication compliant and cooperative with care. Safety precautions maintained.

## 2025-01-12 NOTE — PROGRESS NOTES
Progress Note - Behavioral Health   Name: Sammy Mays 64 y.o. male I MRN: [id number]  Unit/Bed#: [facility] 605-02 I Date of Admission: 1/6/2025   Date of Service: 1/12/2025 I Hospital Day: 6     Assessment & Plan  Severe episode of recurrent major depressive disorder, without psychotic features (HCC)  Continue prozac 40 mg for depression and anxiety  Continue buspirone 10 mg twice daily-started at low dose due to patients hesitance regarding new medication and fear of side effects  Generalized anxiety disorder  See plan above  Alcohol abuse  Counseling cessation, patient minimizes - says only drinking 1 scotch per day. Per chart review he drinks 2-3 mixed drinks daily and has required alcohol detox in the past  Homelessness  Recently evicted from his house        Subjective:    Documentation, nursing notes, medication reconciliation, labs, and vitals reviewed. Patient was seen for continuing care and reviewed with treatment team. No acute events over the past 24 hours.  Per nursing report, has been calm and cooperative and presents slightly brighter.  Reports improved sleep overnight.    No medication changes over the past 24 hours. Continues to tolerate current medications with no adverse effects.     On evaluation today, he is seen in the common dining room.  Will spend a lot of time looking out the window.  He continues to report gradual improvement in depressive symptoms and feeling that medications have been helping.  However he continues to perseverate regarding what will happen to his belongings from his eviction in his car.  Some catastrophizing.  Continues to struggle with anxiety.  However, he reports as needed Atarax has been very helpful`.    No suicidal ideation, plan, or intent. Denies perceptual disturbances and does not exhibit any symptoms of aby on evaluation.    Sleep: slept better  Appetite: fair  Medication side effects: No   ROS: no complaints, all other systems are negative, except as noted  above    Mental Status Evaluation:    Appearance:  age appropriate   Behavior:  pleasant, cooperative   Speech:  hypertalkative   Mood:  anxious   Affect:  brighter   Thought Process:  perseverative   Associations: intact associations   Thought Content:  intrusive thoughts, ruminating thoughts   Perceptual Disturbances: denies auditory hallucinations when asked, does not appear responding to internal stimuli   Risk Potential: Suicidal ideation - None  Homicidal ideation - None  Potential for aggression - No   Sensorium:  oriented to person, place, and time/date   Memory:  recent and remote memory grossly intact   Consciousness:  alert and awake   Attention: decreased concentration and decreased attention span   Insight:  improving   Judgment: improving   Gait/Station: normal gait/station, normal balance   Motor Activity: no abnormal movements     Vital signs in last 24 hours:    Temp:  [96.9 °F (36.1 °C)-97.9 °F (36.6 °C)] 96.9 °F (36.1 °C)  HR:  [79-88] 81  BP: (115-124)/(55-70) 115/56  Resp:  [16-17] 16  SpO2:  [98 %-99 %] 99 %  O2 Device: None (Room air)    Laboratory results: I have personally reviewed all pertinent laboratory/tests results.      Progress Toward Goals: continues to improve, still very anxious        All current active medications have been reviewed  Encourage group therapy, milieu therapy and occupational therapy  Behavioral Health checks for safety monitoring    Requires continued inpatient treatment due to chronic illness and high risk of decompensation if discharged before long term stability is achieved.    Continue current medications:  Current Facility-Administered Medications   Medication Dose Route Frequency Provider Last Rate    acetaminophen  650 mg Oral Q4H PRN ANI Ritchie      acetaminophen  650 mg Oral Q4H PRN ANI Ritchie      acetaminophen  975 mg Oral Q6H PRN ANI Ritchie      aluminum-magnesium hydroxide-simethicone  30 mL Oral Q4H PRN ANI Ritchie       benztropine  1 mg Intramuscular Q4H PRN Max 6/day Trish Taylor, ANGELESNP      benztropine  0.5 mg Oral Q4H PRN Max 6/day Trish Taylor, ANI      bisacodyl  10 mg Rectal Daily PRN Trish Taylor, ANGELESNP      busPIRone  10 mg Oral BID Heather Annalisa, DO      Cholecalciferol  1,000 Units Oral Daily Margaux Hubbard, CRNP      FLUoxetine  40 mg Oral Daily Heather Annalisa, DO      folic acid  1 mg Oral Daily Margaux Hubbard, CRNP      hydrOXYzine HCL  25 mg Oral Q6H PRN Max 4/day Trish Taylor, ANGELESNP      hydrOXYzine HCL  50 mg Oral Q6H PRN Max 4/day Trish Taylor, ANI      levothyroxine  75 mcg Oral Early Morning Margaux Hubbard, CRNP      LORazepam  1 mg Intramuscular Q6H PRN Max 3/day Trish Taylor, ANGELESNP      LORazepam  1 mg Oral Q6H PRN Max 3/day ANI Ritchie      magnesium Oxide  400 mg Oral BID Margaux Hubbard, CRNP      multivitamin-minerals  1 tablet Oral Daily Margaux Hubbard, CRNP      OLANZapine  5 mg Intramuscular Q3H PRN Max 3/day ANI Ritchie      OLANZapine  2.5 mg Oral Q4H PRN Max 6/day Trish Taylor, ANGELESNP      OLANZapine  5 mg Oral Q4H PRN Max 3/day Trish Taylor, ANGELESNP      OLANZapine  5 mg Oral Q3H PRN Max 3/day ANI Ritchie      pantoprazole  40 mg Oral Early Morning Margaux Hubbard, CRNP      polyethylene glycol  17 g Oral Daily PRN ANGELES RitchieNP      propranolol  5 mg Oral Q8H PRN ANGELES RitchieNP      senna-docusate sodium  1 tablet Oral Daily PRN ANI Ritchie      thiamine  100 mg Oral Daily Margaux Jackson-Stephen, ANGELESNP      traZODone  50 mg Oral HS PRN Trish Taylor, ANI         Risks / Benefits of Treatment:    Risks, benefits, and possible side effects of medications explained to patient and patient verbalizes understanding and agreement for treatment.    Counseling / Coordination of Care:    Patient's progress discussed with staff in treatment team meeting.  Medications, treatment progress and treatment plan reviewed with patient.  Medication education  provided to patient.  Reassurance and supportive therapy provided.  Encouraged participation in milieu and group therapy on the unit.    This note has been constructed using a voice recognition system. There may be translation, syntax, or grammatical errors. If you have any questions, please contact the dictating author.     ANI Jain 01/12/25

## 2025-01-12 NOTE — TREATMENT TEAM
01/11/25 1923   Henderson Anxiety Scale   Anxious Mood 4   Tension 3   Fears 2   Insomnia 0   Intellectual 4   Depressed Mood 3   Somatic Complaints: Muscular 0   Somatic Complaints: Sensory 0   Cardiovascular Symptoms 0   Respiratory Symptoms 0   Gastrointestinal Symptoms 0   Genitourinary Symptoms 0   Autonomic Symptoms 0   Behavior at Interview 4   Henderson Anxiety Score 20     Patient reports feeling anxious, PRN Atarax 50 mg given PO.

## 2025-01-12 NOTE — NURSING NOTE
Pt visible on unit, appears calm and cooperative. Pt denies all symptoms currently, reports positive mood. Bright during interactions. Pt reports improved sleep last pm. Denies unmet needs currently.

## 2025-01-13 PROCEDURE — 99233 SBSQ HOSP IP/OBS HIGH 50: CPT | Performed by: PSYCHIATRY & NEUROLOGY

## 2025-01-13 RX ORDER — BUSPIRONE HYDROCHLORIDE 15 MG/1
15 TABLET ORAL 2 TIMES DAILY
Status: DISCONTINUED | OUTPATIENT
Start: 2025-01-14 | End: 2025-01-16

## 2025-01-13 RX ORDER — LANOLIN ALCOHOL/MO/W.PET/CERES
400 CREAM (GRAM) TOPICAL DAILY
Status: DISCONTINUED | OUTPATIENT
Start: 2025-01-14 | End: 2025-01-28 | Stop reason: HOSPADM

## 2025-01-13 RX ADMIN — Medication 1000 UNITS: at 08:35

## 2025-01-13 RX ADMIN — LEVOTHYROXINE SODIUM 75 MCG: 75 TABLET ORAL at 06:26

## 2025-01-13 RX ADMIN — FOLIC ACID 1 MG: 1 TABLET ORAL at 08:35

## 2025-01-13 RX ADMIN — BUSPIRONE HYDROCHLORIDE 10 MG: 10 TABLET ORAL at 08:35

## 2025-01-13 RX ADMIN — PANTOPRAZOLE SODIUM 40 MG: 40 TABLET, DELAYED RELEASE ORAL at 06:26

## 2025-01-13 RX ADMIN — FLUOXETINE HYDROCHLORIDE 40 MG: 20 CAPSULE ORAL at 08:35

## 2025-01-13 RX ADMIN — BUSPIRONE HYDROCHLORIDE 10 MG: 10 TABLET ORAL at 17:08

## 2025-01-13 RX ADMIN — THIAMINE HCL TAB 100 MG 100 MG: 100 TAB at 08:35

## 2025-01-13 RX ADMIN — Medication 400 MG: at 08:35

## 2025-01-13 RX ADMIN — MULTIPLE VITAMINS W/ MINERALS TAB 1 TABLET: TAB ORAL at 08:35

## 2025-01-13 RX ADMIN — TRAZODONE HYDROCHLORIDE 50 MG: 50 TABLET ORAL at 21:22

## 2025-01-13 NOTE — PLAN OF CARE
Problem: Risk for Self Injury/Neglect  Goal: Treatment Goal: Remain safe during length of stay, learn and adopt new coping skills, and be free of self-injurious ideation, impulses and acts at the time of discharge  Outcome: Progressing  Goal: Verbalize thoughts and feelings  Description: Interventions:  - Assess and re-assess patient's lethality and potential for self-injury  - Engage patient in 1:1 interactions, daily, for a minimum of 15 minutes  - Encourage patient to express feelings, fears, frustrations, hopes  - Establish rapport/trust with patient   Outcome: Progressing  Goal: Refrain from harming self  Description: Interventions:  - Monitor patient closely, per order  - Develop a trusting relationship  - Supervise medication ingestion, monitor effects and side effects   Outcome: Progressing     Problem: Depression  Goal: Treatment Goal: Demonstrate behavioral control of depressive symptoms, verbalize feelings of improved mood/affect, and adopt new coping skills prior to discharge  Outcome: Progressing  Goal: Refrain from isolation  Description: Interventions:  - Develop a trusting relationship   - Encourage socialization   Outcome: Progressing     Problem: Anxiety  Goal: Anxiety is at manageable level  Description: Interventions:  - Assess and monitor patient's anxiety level.   - Monitor for signs and symptoms (heart palpitations, chest pain, shortness of breath, headaches, nausea, feeling jumpy, restlessness, irritable, apprehensive).   - Collaborate with interdisciplinary team and initiate plan and interventions as ordered.  - [place] patient to unit/surroundings  - Explain treatment plan  - Encourage participation in care  - Encourage verbalization of concerns/fears  - Identify coping mechanisms  - Assist in developing anxiety-reducing skills  - Administer/offer alternative therapies  - Limit or eliminate stimulants  Outcome: Progressing     Problem: DISCHARGE PLANNING  Goal: Discharge to home or other  facility with appropriate resources  Description: INTERVENTIONS:  - Identify barriers to discharge w/patient and caregiver  - Arrange for needed discharge resources and transportation as appropriate  - Identify discharge learning needs (meds, wound care, etc.)  - Arrange for interpretive services to assist at discharge as needed  - Refer to Case Management Department for coordinating discharge planning if the patient needs post-hospital services based on physician/advanced practitioner order or complex needs related to functional status, cognitive ability, or social support system  Outcome: Progressing     Problem: Ineffective Coping  Goal: Participates in unit activities  Description: Interventions:  - Provide therapeutic environment   - Provide required programming   - Redirect inappropriate behaviors   Outcome: Progressing

## 2025-01-13 NOTE — PROGRESS NOTES
01/13/25 1000 01/13/25 1056 01/13/25 1100   Activity/Group Checklist   Group Community meeting Admission/Discharge  (self assessment completed) Wellness   Attendance Attended Attended Attended   Attendance Duration (min) 31-45 16-30 46-60   Interactions Interacted appropriately Interacted appropriately Interacted appropriately   Affect/Mood Appropriate;Bright;Normal range Appropriate;Normal range Appropriate   Goals Achieved Discussed coping strategies Able to listen to others;Able to engage in interactions Discussed coping strategies;Identified feelings      01/13/25 1330   Activity/Group Checklist   Group Life Skills  (topic fear)   Attendance Attended   Attendance Duration (min) 46-60   Interactions Interacted appropriately   Affect/Mood Appropriate;Normal range   Goals Achieved Discussed coping strategies;Able to engage in interactions

## 2025-01-13 NOTE — NURSING NOTE
Pt visible on unit and social with peers. Brightens during interactions. Pt denies experiencing any symptoms at current time, including depression, anxiety or SI. Visible for meals with good intake. Pt denies having any unmet needs at current time.

## 2025-01-13 NOTE — TREATMENT TEAM
01/12/25 2026   Henderson Anxiety Scale   Anxious Mood 2   Tension 0   Fears 0   Insomnia 0   Intellectual 2   Depressed Mood 0   Somatic Complaints: Muscular 0   Somatic Complaints: Sensory 0   Cardiovascular Symptoms 0   Respiratory Symptoms 0   Gastrointestinal Symptoms 0   Genitourinary Symptoms 0   Autonomic Symptoms 0   Behavior at Interview 2   Henderson Anxiety Score 6     Patient reports feeling anxious. PRN Atarax 25 mg given PO.

## 2025-01-13 NOTE — NURSING NOTE
Patient is alert and visible in the milieu. Pleasant and social with peers. Denies all psych s/s. Medication compliant and cooperative with care. Safety precautions maintained.

## 2025-01-13 NOTE — PROGRESS NOTES
Progress Note - Behavioral Health   Name: Sammy Mays 64 y.o. male I MRN: [id number]  Unit/Bed#: [facility]02 I Date of Admission: 1/6/2025   Date of Service: 1/13/2025 I Hospital Day: 7     Assessment & Plan  Severe episode of recurrent major depressive disorder, without psychotic features (HCC)  Continue prozac 40 mg for depression and anxiety  Increase buspirone to 15 mg twice daily-started at low dose due to patients hesitance regarding new medication and fear of side effects  Generalized anxiety disorder  See plan above  Alcohol abuse  Counseling cessation, patient minimizes - says only drinking 1 scotch per day. Per chart review he drinks 2-3 mixed drinks daily and has required alcohol detox in the past  Homelessness  Recently evicted from his house    Current medications:  Current Facility-Administered Medications   Medication Dose Route Frequency Provider Last Rate    acetaminophen  650 mg Oral Q4H PRN ANI Ritchie      acetaminophen  650 mg Oral Q4H PRN ANI Ritchie      acetaminophen  975 mg Oral Q6H PRN ANI Ritchie      aluminum-magnesium hydroxide-simethicone  30 mL Oral Q4H PRN ANI Ritchie      benztropine  1 mg Intramuscular Q4H PRN Max 6/day ANI Ritchie      benztropine  0.5 mg Oral Q4H PRN Max 6/day ANI Ritchie      bisacodyl  10 mg Rectal Daily PRN ANI Ritchie      busPIRone  10 mg Oral BID Heather Fang, DO      Cholecalciferol  1,000 Units Oral Daily ANI Whitlock      FLUoxetine  40 mg Oral Daily Heather Fang, DO      folic acid  1 mg Oral Daily ANI Whitlock      hydrOXYzine HCL  25 mg Oral Q6H PRN Max 4/day ANI Ritchie      hydrOXYzine HCL  50 mg Oral Q6H PRN Max 4/day ANI Ritchie      levothyroxine  75 mcg Oral Early Morning ANI Whitlock      LORazepam  1 mg Intramuscular Q6H PRN Max 3/day ANI Ritchie      LORazepam  1 mg Oral Q6H PRN Max 3/day ANI Ritchie      magnesium Oxide   400 mg Oral BID Margaux GersaskiaNeris, ANI      multivitamin-minerals  1 tablet Oral Daily Margauxkian JacksonNeris, ANGELESNP      OLANZapine  5 mg Intramuscular Q3H PRN Max 3/day Trish Taylor, ANI      OLANZapine  2.5 mg Oral Q4H PRN Max 6/day Trish Taylor, ANGELESNP      OLANZapine  5 mg Oral Q4H PRN Max 3/day Trish Taylor, ANGELESNP      OLANZapine  5 mg Oral Q3H PRN Max 3/day Trish Taylor, ANI      pantoprazole  40 mg Oral Early Morning Margaux SavageNicki, CRNP      polyethylene glycol  17 g Oral Daily PRN Trish Taylor, ANGELESNP      propranolol  5 mg Oral Q8H PRN Trish Taylor, ANI      senna-docusate sodium  1 tablet Oral Daily PRN Trish Taylor, ANI      thiamine  100 mg Oral Daily Margauxkian JacksonNeris, ANI      traZODone  50 mg Oral HS PRN Trish Taylor, ANI         Risks / Benefits of Treatment:    Risks, benefits, and possible side effects of medications explained to patient and patient verbalizes understanding and agreement for treatment.    Subjective:    Behavior over the last 24 hours: slowly improving.     The patient was evaluated this morning for continuity of care and no acute distress noted throughout the evaluation.  Over the past 24 hours staff noted the patient was attending some groups, eating and sleeping adequately, requiring trazodone for sleep.      Patient was visited on unit for continuing care; chart reviewed and discussed with multidisciplinary treatment team.  On approach, the patient was calm and cooperative.  Sammy reports that he continues to have worried and intrusive thoughts, he states that he is afraid suicidal thoughts will keep haunting him.  He states that when he was at home by himself he knows he would have tried to shoot himself if he had a gun.  He is agreeable to increase in buspirone at this time.  No problem initiating and maintaining sleep.  Denied A/VH currently.  The patient consented for safety and agreed to verbalize any frustration or concerns to the nursing staff,  immediately.    Patient continues to be visible in the milieu and interacts with staff and peers. No reports of aggression or self-injurious behavior on unit. No PRN medications used in the past 24 hours.    Patient accepted all offered medications and no adverse effects of medications noted or reported.    Sleep: slept better  Appetite: normal  Medication side effects: No   ROS: no complaints    Mental Status Examination:  Appearance:  age appropriate, casually dressed, looks stated age   Behavior:  pleasant, cooperative   Speech:  normal rate, soft   Mood:  depressed   Affect:  constricted   Thought Process:  logical, coherent, linear, negative thinking   Associations: intact associations   Thought Content:  no overt delusions, negative thoughts   Perceptual Disturbances: no auditory hallucinations, no visual hallucinations, does not appear responding to internal stimuli   Risk Potential: Suicidal ideation - Yes, fleeting suicidal thoughts  Homicidal ideation - None at present  Potential for aggression - No   Sensorium:  oriented to person, place, and time/date   Memory:  recent and remote memory grossly intact   Consciousness:  alert and awake   Attention/Concentration: attention span and concentration are age appropriate   Insight:  limited   Judgment: limited   Gait/Station: normal gait/station   Motor Activity: no abnormal movements        Vital signs in last 24 hours:    Temp:  [97.5 °F (36.4 °C)-98.2 °F (36.8 °C)] 98.2 °F (36.8 °C)  HR:  [73-89] 73  BP: (110-133)/(66-70) 110/66  Resp:  [16-18] 18  SpO2:  [97 %-99 %] 97 %  O2 Device: None (Room air)         Laboratory results: I have personally reviewed all pertinent laboratory/tests results    Most Recent Labs:   Lab Results   Component Value Date    WBC 3.97 (L) 01/07/2025    RBC 3.35 (L) 01/07/2025    HGB 11.3 (L) 01/07/2025    HCT 34.4 (L) 01/07/2025     (L) 01/07/2025    RDW 16.0 (H) 01/07/2025    NEUTROABS 2.20 01/07/2025    SODIUM 140 01/10/2025     K 4.2 01/10/2025     01/10/2025    CO2 27 01/10/2025    BUN 9 01/10/2025    CREATININE 0.58 (L) 01/10/2025    GLUC 133 01/10/2025    CALCIUM 8.6 01/10/2025    AST 25 01/10/2025    ALT 37 01/10/2025    ALKPHOS 76 01/10/2025    TP 5.8 (L) 01/10/2025    ALB 3.5 01/10/2025    TBILI 0.49 01/10/2025    CHOLESTEROL 127 01/07/2025    HDL 40 01/07/2025    TRIG 112 01/07/2025    LDLCALC 65 01/07/2025    NONHDLC 87 01/07/2025    AMMONIA 57 08/18/2024    [id number] 8.938 (H) 01/07/2025    FREET4 0.83 01/07/2025    HGBA1C 5.0 01/06/2025    EAG 97 01/06/2025       Progress Toward Goals: improving, was reporting persistent intrusive and worried thoughts, continues to have fleeting suicidal thoughts, he is agreeable to increasing buspirone    Counseling / Coordination of Care:    Patient's progress discussed with staff in treatment team meeting.  Medication changes reviewed with staff in treatment team meeting.  Patient's progress reviewed with nursing staff.  Medications, treatment progress and treatment plan reviewed with patient.  Medication changes discussed with patient.  Medication education provided to patient.  Patient's progress reviewed with case management staff.  Reassurance and supportive therapy provided.}    Heather Fang DO 01/13/25    This note was completed in part utilizing Dragon dictation Software. Grammatical, translation, syntax errors, random word insertions, spelling mistakes, and incomplete sentences may be an occasional consequence of this system secondary to software limitations with voice recognition, ambient noise, and hardware issues. If you have any questions or concerns about the content, text, or information contained within the body of this dictation, please contact the provider for clarification.      Bexarotene Counseling:  I discussed with the patient the risks of bexarotene including but not limited to hair loss, dry lips/skin/eyes, liver abnormalities, hyperlipidemia, pancreatitis, depression/suicidal ideation, photosensitivity, drug rash/allergic reactions, hypothyroidism, anemia, leukopenia, infection, cataracts, and teratogenicity.  Patient understands that they will need regular blood tests to check lipid profile, liver function tests, white blood cell count, thyroid function tests and pregnancy test if applicable.

## 2025-01-13 NOTE — CASE MANAGEMENT
Cm completed call to the [facility] 's Department on behalf of patient to ask about current status of his things from the house he was evicted from. 's department explained that their involvement really stops at the time of the eviction. If the patient would like to collect his things, he would have to contact the plaintiff in the case (Seth Simms) and work that out with them. The paperwork that patient has is just to indicate that he has been evicted from the property.     [place] 's Department  [phone]

## 2025-01-13 NOTE — ASSESSMENT & PLAN NOTE
Continue prozac 40 mg for depression and anxiety  Increase buspirone to 15 mg twice daily-started at low dose due to patients hesitance regarding new medication and fear of side effects

## 2025-01-13 NOTE — PROGRESS NOTES
01/13/25 0900   Team Meeting   Meeting Type Daily Rounds   Team Members Present   Team Members Present Physician;Nurse;   Physician Team Member Jaylan/Claudia/Annalisa   Nursing Team Member Nikole   Care Management Team Member Guru   Patient/Family Present   Patient Present No   Patient's Family Present No     Patient attended two groups. Eating and sleeping well. Social and compliant with care. Trazodone 50mg given at night and it was effective. He was given atarax 50mg in the afternoon for anxiety which was effective. Patient discharge is pending stabilization of mood and medications.

## 2025-01-14 PROCEDURE — 99233 SBSQ HOSP IP/OBS HIGH 50: CPT | Performed by: PSYCHIATRY & NEUROLOGY

## 2025-01-14 RX ADMIN — BUSPIRONE HYDROCHLORIDE 15 MG: 15 TABLET ORAL at 08:23

## 2025-01-14 RX ADMIN — FOLIC ACID 1 MG: 1 TABLET ORAL at 08:22

## 2025-01-14 RX ADMIN — BUSPIRONE HYDROCHLORIDE 15 MG: 15 TABLET ORAL at 17:09

## 2025-01-14 RX ADMIN — Medication 1000 UNITS: at 08:23

## 2025-01-14 RX ADMIN — FLUOXETINE HYDROCHLORIDE 40 MG: 20 CAPSULE ORAL at 08:22

## 2025-01-14 RX ADMIN — MULTIPLE VITAMINS W/ MINERALS TAB 1 TABLET: TAB ORAL at 08:22

## 2025-01-14 RX ADMIN — TRAZODONE HYDROCHLORIDE 50 MG: 50 TABLET ORAL at 21:28

## 2025-01-14 RX ADMIN — THIAMINE HCL TAB 100 MG 100 MG: 100 TAB at 08:22

## 2025-01-14 RX ADMIN — Medication 400 MG: at 08:23

## 2025-01-14 RX ADMIN — PANTOPRAZOLE SODIUM 40 MG: 40 TABLET, DELAYED RELEASE ORAL at 06:07

## 2025-01-14 RX ADMIN — LEVOTHYROXINE SODIUM 75 MCG: 75 TABLET ORAL at 06:06

## 2025-01-14 NOTE — PROGRESS NOTES
01/14/25 0800   Team Meeting   Meeting Type Daily Rounds   Team Members Present   Team Members Present Physician;Nurse;   Physician Team Member Jaylan/Claudia/Annalisa   Nursing Team Member Nikole   Care Management Team Member Guru   Patient/Family Present   Patient Present No   Patient's Family Present No     Patient endorsing anxiety. He is eating and sleeping well as well as attending groups and has been connecting more. He had a BM on the 12th and shower on the 13th. Patient discharge is pending stabilization of mood and medications.

## 2025-01-14 NOTE — PROGRESS NOTES
01/14/25 1000 01/14/25 1300   Activity/Group Checklist   Group Community meeting Pet therapy   Attendance Attended Attended   Attendance Duration (min) 31-45 31-45   Interactions Interacted appropriately Interacted appropriately   Affect/Mood Appropriate;Bright;Normal range Appropriate;Bright;Normal range   Goals Achieved Discussed coping strategies;Identified feelings;Identified triggers;Able to engage in interactions Able to engage in interactions

## 2025-01-14 NOTE — PROGRESS NOTES
"Pt attended group.  Incongruent mood (depressive features and smiling).   Pt did indicate he is \"mindful\" and has bills, eviction and housing concerns.  Pt did express he would be able to make need known.   Pt able to identify stressors. High risk factors.     01/14/25 1100   Activity/Group Checklist   Group Other (Comment)  ([facility]: Affirmation, goal and self imrpovement)   Attendance Attended   Attendance Duration (min) 31-45   Interactions Other (Comment)  (smiling while speaking about challenges)   Affect/Mood Incongruent   Goals Achieved Identified triggers;Identified feelings;Identified relapse prevention strategies;Able to listen to others;Able to engage in interactions;Able to recieve feedback;Able to self-disclose           "

## 2025-01-14 NOTE — PLAN OF CARE
Pt.present for all groups as scheduled yet minimized his current life stressors.  Problem: Ineffective Coping  Goal: Participates in unit activities  Description: Interventions:  - Provide therapeutic environment   - Provide required programming   - Redirect inappropriate behaviors   Outcome: Progressing

## 2025-01-14 NOTE — NURSING NOTE
Patient is calm, visible in the milieu, social, and pleasant on approach. Patient denies all psych s/s at the moment. Patient is medication compliant.  No behaviors observed. Safety checks ongoing.

## 2025-01-14 NOTE — NURSING NOTE
Patient is awake and visible in the milieu. Pleasant and social with peers. Patient reports anxiety, denies all other psych s/s. Medication compliant and cooperative with care. Safety precautions maintained.

## 2025-01-14 NOTE — PROGRESS NOTES
Progress Note - Behavioral Health   Name: Sammy Mays 64 y.o. male I MRN: [id number]  Unit/Bed#: [facility] 605[facility]02 I Date of Admission: 1/6/2025   Date of Service: 1/14/2025 I Hospital Day: 8     Assessment & Plan  Severe episode of recurrent major depressive disorder, without psychotic features (HCC)  Continue prozac 40 mg for depression and anxiety  Continue buspirone to 15 mg twice daily-started at low dose due to patients hesitance regarding new medication and fear of side effects  Generalized anxiety disorder  See plan above  Alcohol abuse  Counseling cessation, patient minimizes - says only drinking 1 scotch per day. Per chart review he drinks 2-3 mixed drinks daily and has required alcohol detox in the past  Homelessness  Recently evicted from his house    Current medications:  Current Facility-Administered Medications   Medication Dose Route Frequency Provider Last Rate    acetaminophen  650 mg Oral Q4H PRN ANI Ritchie      acetaminophen  650 mg Oral Q4H PRN ANI Ritchie      acetaminophen  975 mg Oral Q6H PRN ANI Ritchie      aluminum-magnesium hydroxide-simethicone  30 mL Oral Q4H PRN ANI Ritchie      benztropine  1 mg Intramuscular Q4H PRN Max 6/day ANI Ritchie      benztropine  0.5 mg Oral Q4H PRN Max 6/day ANI Ritchie      bisacodyl  10 mg Rectal Daily PRN ANI Ritchie      busPIRone  15 mg Oral BID Heather Fang, DO      Cholecalciferol  1,000 Units Oral Daily ANI Whitlock      FLUoxetine  40 mg Oral Daily Heather Fang, DO      folic acid  1 mg Oral Daily ANI Whitlock      hydrOXYzine HCL  25 mg Oral Q6H PRN Max 4/day ANI Ritchie      hydrOXYzine HCL  50 mg Oral Q6H PRN Max 4/day ANI Ritchie      levothyroxine  75 mcg Oral Early Morning ANI Whitlock      LORazepam  1 mg Intramuscular Q6H PRN Max 3/day ANI Ritchie      LORazepam  1 mg Oral Q6H PRN Max 3/day ANI Ritchie      magnesium Oxide   400 mg Oral Daily Regine Cisneros [place], CRHUNG      multivitamin-minerals  1 tablet Oral Daily Margaux Hubbard, ANI      OLANZapine  5 mg Intramuscular Q3H PRN Max 3/day Trish Taylor, ANI      OLANZapine  2.5 mg Oral Q4H PRN Max 6/day Trish Taylor, ANGELESNP      OLANZapine  5 mg Oral Q4H PRN Max 3/day Trish Taylor, ANGELESNP      OLANZapine  5 mg Oral Q3H PRN Max 3/day Trish Taylor, ANI      pantoprazole  40 mg Oral Early Morning Margaux Hubbard, ANI      polyethylene glycol  17 g Oral Daily PRN Trish Taylor, ANGELESNP      propranolol  5 mg Oral Q8H PRN Trish Taylor, ANI      senna-docusate sodium  1 tablet Oral Daily PRN Trish Taylor, ANI      thiamine  100 mg Oral Daily Margaux Hubbard, ANI      traZODone  50 mg Oral HS PRN Trish Taylor, ANI         Risks / Benefits of Treatment:    Risks, benefits, and possible side effects of medications explained to patient and patient verbalizes understanding and agreement for treatment.    Subjective:    Behavior over the last 24 hours: slowly improving.     The patient was evaluated this morning for continuity of care and no acute distress noted throughout the evaluation.  Over the past 24 hours staff noted the patient was endorsing anxiety, eating and sleeping well, and has been connecting more with peers, he has been having normal BMs and showering regularly.      Patient was visited on unit for continuing care; chart reviewed and discussed with multidisciplinary treatment team.  On approach, the patient was calm and cooperative.  Sammy reports feeling distressed and anxious about the recent eviction, stating that police told him he only has 30 days to retrieve his belongings.  He states that he left the house in disarray with food still in the oven and still in the microwave.  Endorsed better mood but continues to have negative thinking and ruminating thoughts. No problem initiating and maintaining sleep.  Denied A/VH currently.  The patient consented for safety  and agreed to verbalize any frustration or concerns to the nursing staff, immediately.    Patient continues to be selectively interactive with staff and peers. No reports of aggression or self-injurious behavior on unit. No PRN medications used in the past 24 hours.    Patient accepted all offered medications and no adverse effects of medications noted or reported.    Sleep: slept better  Appetite: normal  Medication side effects: No   ROS: no complaints    Mental Status Examination:  Appearance:  age appropriate, casually dressed, looks stated age   Behavior:  pleasant, cooperative   Speech:  normal rate and volume   Mood:  depressed   Affect:  constricted   Thought Process:  logical, coherent, linear, negative thinking   Associations: intact associations   Thought Content:  no overt delusions, negative thoughts   Perceptual Disturbances: no auditory hallucinations, no visual hallucinations, does not appear responding to internal stimuli   Risk Potential: Suicidal ideation - Yes, fleeting suicidal thoughts  Homicidal ideation - None at present  Potential for aggression - No   Sensorium:  oriented to person, place, and time/date   Memory:  recent and remote memory grossly intact   Consciousness:  alert and awake   Attention/Concentration: attention span and concentration are age appropriate   Insight:  limited   Judgment: limited   Gait/Station: normal gait/station   Motor Activity: no abnormal movements        Vital signs in last 24 hours:    Temp:  [97.2 °F (36.2 °C)-97.9 °F (36.6 °C)] 97.9 °F (36.6 °C)  HR:  [70-82] 70  BP: (118-129)/(64-71) 118/64  Resp:  [18] 18  SpO2:  [96 %-100 %] 96 %  O2 Device: None (Room air)         Laboratory results: I have personally reviewed all pertinent laboratory/tests results    Most Recent Labs:   Lab Results   Component Value Date    WBC 3.97 (L) 01/07/2025    RBC 3.35 (L) 01/07/2025    HGB 11.3 (L) 01/07/2025    HCT 34.4 (L) 01/07/2025     (L) 01/07/2025    RDW 16.0 (H)  01/07/2025    NEUTROABS 2.20 01/07/2025    SODIUM 140 01/10/2025    K 4.2 01/10/2025     01/10/2025    CO2 27 01/10/2025    BUN 9 01/10/2025    CREATININE 0.58 (L) 01/10/2025    GLUC 133 01/10/2025    CALCIUM 8.6 01/10/2025    AST 25 01/10/2025    ALT 37 01/10/2025    ALKPHOS 76 01/10/2025    TP 5.8 (L) 01/10/2025    ALB 3.5 01/10/2025    TBILI 0.49 01/10/2025    CHOLESTEROL 127 01/07/2025    HDL 40 01/07/2025    TRIG 112 01/07/2025    LDLCALC 65 01/07/2025    NONHDLC 87 01/07/2025    AMMONIA 57 08/18/2024    DMN1PZEOPZAV 8.938 (H) 01/07/2025    FREET4 0.83 01/07/2025    HGBA1C 5.0 01/06/2025    EAG 97 01/06/2025       Progress Toward Goals: improving, still reporting negative and intrusive thoughts, continues to have fleeting suicidal thoughts.  Will monitor improvement of symptoms on increased dose of buspirone    Counseling / Coordination of Care:    Patient's progress discussed with staff in treatment team meeting.  Medication changes reviewed with staff in treatment team meeting.  Patient's progress reviewed with nursing staff.  Medications, treatment progress and treatment plan reviewed with patient.  Medication changes discussed with patient.  Medication education provided to patient.  Patient's progress reviewed with case management staff.  Reassurance and supportive therapy provided.}    Heather Fang,  01/14/25    This note was completed in part utilizing Dragon dictation Software. Grammatical, translation, syntax errors, random word insertions, spelling mistakes, and incomplete sentences may be an occasional consequence of this system secondary to software limitations with voice recognition, ambient noise, and hardware issues. If you have any questions or concerns about the content, text, or information contained within the body of this dictation, please contact the provider for clarification.

## 2025-01-14 NOTE — CMS CERTIFICATION NOTE
Certification: Based upon physical, mental and social evaluations, I certify that inpatient psychiatric services are medically necessary for this patient for a duration of 12 midnights for the treatment of Severe episode of recurrent major depressive disorder, without psychotic features (HCC)

## 2025-01-14 NOTE — ASSESSMENT & PLAN NOTE
Continue prozac 40 mg for depression and anxiety  Continue buspirone to 15 mg twice daily-started at low dose due to patients hesitance regarding new medication and fear of side effects

## 2025-01-15 PROCEDURE — 99232 SBSQ HOSP IP/OBS MODERATE 35: CPT | Performed by: PSYCHIATRY & NEUROLOGY

## 2025-01-15 RX ORDER — NALTREXONE HYDROCHLORIDE 50 MG/1
25 TABLET, FILM COATED ORAL
Status: DISCONTINUED | OUTPATIENT
Start: 2025-01-15 | End: 2025-01-17

## 2025-01-15 RX ADMIN — BUSPIRONE HYDROCHLORIDE 15 MG: 15 TABLET ORAL at 17:28

## 2025-01-15 RX ADMIN — Medication 400 MG: at 08:38

## 2025-01-15 RX ADMIN — BUSPIRONE HYDROCHLORIDE 15 MG: 15 TABLET ORAL at 08:38

## 2025-01-15 RX ADMIN — LEVOTHYROXINE SODIUM 75 MCG: 75 TABLET ORAL at 05:41

## 2025-01-15 RX ADMIN — FLUOXETINE HYDROCHLORIDE 40 MG: 20 CAPSULE ORAL at 08:39

## 2025-01-15 RX ADMIN — THIAMINE HCL TAB 100 MG 100 MG: 100 TAB at 08:38

## 2025-01-15 RX ADMIN — NALTREXONE HYDROCHLORIDE 25 MG: 50 TABLET, FILM COATED ORAL at 13:05

## 2025-01-15 RX ADMIN — MULTIPLE VITAMINS W/ MINERALS TAB 1 TABLET: TAB ORAL at 08:38

## 2025-01-15 RX ADMIN — TRAZODONE HYDROCHLORIDE 50 MG: 50 TABLET ORAL at 21:27

## 2025-01-15 RX ADMIN — FOLIC ACID 1 MG: 1 TABLET ORAL at 08:38

## 2025-01-15 RX ADMIN — Medication 1000 UNITS: at 08:38

## 2025-01-15 RX ADMIN — PANTOPRAZOLE SODIUM 40 MG: 40 TABLET, DELAYED RELEASE ORAL at 05:41

## 2025-01-15 NOTE — PLAN OF CARE
Problem: Risk for Self Injury/Neglect  Goal: Treatment Goal: Remain safe during length of stay, learn and adopt new coping skills, and be free of self-injurious ideation, impulses and acts at the time of discharge  Outcome: Progressing  Goal: Verbalize thoughts and feelings  Description: Interventions:  - Assess and re-assess patient's lethality and potential for self-injury  - Engage patient in 1:1 interactions, daily, for a minimum of 15 minutes  - Encourage patient to express feelings, fears, frustrations, hopes  - Establish rapport/trust with patient   Outcome: Progressing  Goal: Refrain from harming self  Description: Interventions:  - Monitor patient closely, per order  - Develop a trusting relationship  - Supervise medication ingestion, monitor effects and side effects   Outcome: Progressing     Problem: Ineffective Coping  Goal: Participates in unit activities  Description: Interventions:  - Provide therapeutic environment   - Provide required programming   - Redirect inappropriate behaviors   Outcome: Progressing

## 2025-01-15 NOTE — PROGRESS NOTES
01/15/25 0800   Team Meeting   Meeting Type Daily Rounds   Team Members Present   Team Members Present Physician;Nurse;   Physician Team Member Jaylan/Claudia/Annalisa   Nursing Team Member Nikole   Care Management Team Member Guru   Patient/Family Present   Patient Present No   Patient's Family Present No     Patient is complaint with medications. He is worried about his living situation. He had a BM on the 13th and a shower on the 14th. He is attending groups and sharing. Naltrexone possibly to be started for alcohol cravings. Patient discharge is pending stabilization of mood and medications.

## 2025-01-15 NOTE — PROGRESS NOTES
Pt attended groups.  Pt able to self express and cooperative  Pt making slow and steady progress towards mh recovery goals.  Pt indicated a sense of relief after speaking with Guru the CM about huis situation.  Less anxious/overwhelmed.     01/15/25 1100   Activity/Group Checklist   Group Other (Comment)  (Short term problem solving)   Attendance Attended   Attendance Duration (min) 31-45   Interactions Interacted appropriately   Affect/Mood Bright   Goals Achieved Identified triggers;Identified feelings;Identified relapse prevention strategies;Able to listen to others;Able to engage in interactions;Able to reflect/comment on own behavior;Able to manage/cope with feelings;Verbalized increased hopefulness;Able to self-disclose;Able to recieve feedback

## 2025-01-15 NOTE — NURSING NOTE
Patient visible in milieu and social with peers. Pleasant, polite, and cooperative on approach. Reports that he is currently not feeling anxiety but he is concerned about his living situation once he is discharged. Requesting PRN Trazadone 50 mg at HS. Denies SI/HI and AH/VH. Medication compliant.

## 2025-01-15 NOTE — ASSESSMENT & PLAN NOTE
Counseling cessation, patient minimizes - says only drinking 1 scotch per day. Per chart review he drinks 2-3 mixed drinks daily and has required alcohol detox in the past    Added naltrexone 25 mg daily for alcohol cravings

## 2025-01-15 NOTE — NURSING NOTE
"Received Pt at 1100. Pt calm and cooperative. Visible and social in the milieu. Attends groups. On assessment describes mild fluctuating anxiety and depression related to obtaining belongings and plans for DC. Pt describes utilizing coping skills, and \"feeling pretty good here\". Pt Takes medications as prescribed. Denies unmet needs at this time. Q15 safety checks maintained. Will cont to provide support as needed.   "

## 2025-01-15 NOTE — PROGRESS NOTES
"   01/15/25 1000   Activity/Group Checklist   Group Community meeting   Attendance Attended   Attendance Duration (min) 31-45   Interactions Other (Comment)  (Pt. stated plans to go forward in his life without thought of any stressors in his future.\"I'll just be happy and every thing will work out for me.\"Minimal insight into coping options.)   Affect/Mood Incongruent   Goals Achieved Able to engage in interactions;Able to listen to others       "

## 2025-01-15 NOTE — PROGRESS NOTES
Progress Note - Behavioral Health   Name: Sammy Mays 64 y.o. male I MRN: [id number]  Unit/Bed#: [facility] I Date of Admission: 1/6/2025   Date of Service: 1/15/2025 I Hospital Day: 9     Assessment & Plan  Severe episode of recurrent major depressive disorder, without psychotic features (HCC)  Continue prozac 40 mg for depression and anxiety  Continue buspirone to 15 mg twice daily-started at low dose due to patients hesitance regarding new medication and fear of side effects  Generalized anxiety disorder  See plan above  Alcohol abuse  Counseling cessation, patient minimizes - says only drinking 1 scotch per day. Per chart review he drinks 2-3 mixed drinks daily and has required alcohol detox in the past    Added naltrexone 25 mg daily for alcohol cravings  Homelessness  Recently evicted from his house    Current medications:  Current Facility-Administered Medications   Medication Dose Route Frequency Provider Last Rate    acetaminophen  650 mg Oral Q4H PRN ANI Ritchie      acetaminophen  650 mg Oral Q4H PRN ANI Ritchie      acetaminophen  975 mg Oral Q6H PRN ANI Ritchie      aluminum-magnesium hydroxide-simethicone  30 mL Oral Q4H PRN ANI Ritchie      benztropine  1 mg Intramuscular Q4H PRN Max 6/day ANI Ritchie      benztropine  0.5 mg Oral Q4H PRN Max 6/day ANI Ritchie      bisacodyl  10 mg Rectal Daily PRN ANI Ritchie      busPIRone  15 mg Oral BID Heather Fang,       Cholecalciferol  1,000 Units Oral Daily ANI Whitlock      FLUoxetine  40 mg Oral Daily Heather Fang,       folic acid  1 mg Oral Daily ANI Whitlock      hydrOXYzine HCL  25 mg Oral Q6H PRN Max 4/day ANI Ritchie      hydrOXYzine HCL  50 mg Oral Q6H PRN Max 4/day ANI Ritchie      levothyroxine  75 mcg Oral Early Morning ANI Whitlock      LORazepam  1 mg Intramuscular Q6H PRN Max 3/day ANI Ritchie      LORazepam  1 mg Oral Q6H PRN  Max 3/day Trish Taylor, ANI      magnesium Oxide  400 mg Oral Daily Regine Reid, CRNP      multivitamin-minerals  1 tablet Oral Daily Margaux Hubbard, ANI      OLANZapine  5 mg Intramuscular Q3H PRN Max 3/day Trish Taylor, ANGELESNP      OLANZapine  2.5 mg Oral Q4H PRN Max 6/day Trish Taylor, ANGELESNP      OLANZapine  5 mg Oral Q4H PRN Max 3/day Trish Taylor, ANGELESNP      OLANZapine  5 mg Oral Q3H PRN Max 3/day Trish Taylor, ANGELESNP      pantoprazole  40 mg Oral Early Morning Margaux Hubbard, ANGELESNP      polyethylene glycol  17 g Oral Daily PRN Trish Taylor, ANGELESNP      propranolol  5 mg Oral Q8H PRN Trish Taylor, ANI      senna-docusate sodium  1 tablet Oral Daily PRN Trish Taylor, ANI      thiamine  100 mg Oral Daily Margaux Hubbard, ANGELESNP      traZODone  50 mg Oral HS PRN Trish Taylor, ANI         Risks / Benefits of Treatment:    Risks, benefits, and possible side effects of medications explained to patient and patient verbalizes understanding and agreement for treatment.    Subjective:    Behavior over the last 24 hours: some improvement.     The patient was evaluated this morning for continuity of care and no acute distress noted throughout the evaluation.  Over the past 24 hours staff noted the patient was compliant with medications, still worried about his living situation. He has been attending groups and participating.      Patient was visited on unit for continuing care; chart reviewed and discussed with multidisciplinary treatment team.  On approach, the patient was calm and cooperative. Sammy is trying to remain positive despite continued complaints of intrusive , worried and negative thoughts. He spoke about his fear of returning to alcohol post discharge.  No problem initiating and maintaining sleep.  Denied A/VH currently.  The patient consented for safety and agreed to verbalize any frustration or concerns to the nursing staff, immediately.    Patient continues to be selectively  interactive with staff and peers. No reports of aggression or self-injurious behavior on unit. No PRN medications used in the past 24 hours.    Patient accepted all offered medications and no adverse effects of medications noted or reported.    Sleep: slept better  Appetite: normal  Medication side effects: No   ROS: no complaints    Mental Status Examination:  Appearance:  age appropriate, casually dressed, looks stated age   Behavior:  pleasant, cooperative, psychomotor retardation, slow responses   Speech:  normal rate and volume   Mood:  depressed   Affect:  constricted   Thought Process:  logical, coherent, linear, negative thinking   Associations: intact associations   Thought Content:  no overt delusions, negative thoughts   Perceptual Disturbances: no auditory hallucinations, no visual hallucinations, does not appear responding to internal stimuli   Risk Potential: Suicidal ideation - Yes, fleeting suicidal thoughts  Homicidal ideation - None at present  Potential for aggression - No   Sensorium:  oriented to person, place, and time/date   Memory:  recent and remote memory grossly intact   Consciousness:  alert and awake   Attention/Concentration: attention span and concentration are age appropriate   Insight:  limited   Judgment: limited   Gait/Station: normal gait/station   Motor Activity: no abnormal movements        Vital signs in last 24 hours:    Temp:  [96.8 °F (36 °C)-97.1 °F (36.2 °C)] 96.9 °F (36.1 °C)  HR:  [] 78  BP: (132-140)/(64-70) 140/65  Resp:  [18] 18  SpO2:  [95 %-99 %] 99 %  O2 Device: None (Room air)         Laboratory results: I have personally reviewed all pertinent laboratory/tests results    Most Recent Labs:   Lab Results   Component Value Date    WBC 3.97 (L) 01/07/2025    RBC 3.35 (L) 01/07/2025    HGB 11.3 (L) 01/07/2025    HCT 34.4 (L) 01/07/2025     (L) 01/07/2025    RDW 16.0 (H) 01/07/2025    NEUTROABS 2.20 01/07/2025    SODIUM 140 01/10/2025    K 4.2 01/10/2025      01/10/2025    CO2 27 01/10/2025    BUN 9 01/10/2025    CREATININE 0.58 (L) 01/10/2025    GLUC 133 01/10/2025    CALCIUM 8.6 01/10/2025    AST 25 01/10/2025    ALT 37 01/10/2025    ALKPHOS 76 01/10/2025    TP 5.8 (L) 01/10/2025    ALB 3.5 01/10/2025    TBILI 0.49 01/10/2025    CHOLESTEROL 127 01/07/2025    HDL 40 01/07/2025    TRIG 112 01/07/2025    LDLCALC 65 01/07/2025    NONHDLC 87 01/07/2025    AMMONIA 57 08/18/2024    [id number] 8.938 (H) 01/07/2025    FREET4 0.83 01/07/2025    HGBA1C 5.0 01/06/2025    EAG 97 01/06/2025       Progress Toward Goals: improving, still reporting negative and intrusive thoughts, has fleeting suicidal thoughts. Added Naltrexone for alcohol cravings. . Plan to optimize buspirone tomorrow.     Counseling / Coordination of Care:    Patient's progress discussed with staff in treatment team meeting.  Medication changes reviewed with staff in treatment team meeting.  Patient's progress reviewed with nursing staff.  Medications, treatment progress and treatment plan reviewed with patient.  Medication changes discussed with patient.  Medication education provided to patient.  Patient's progress reviewed with case management staff.  Reassurance and supportive therapy provided.}    Heather Fang,  01/15/25    This note was completed in part utilizing Dragon dictation Software. Grammatical, translation, syntax errors, random word insertions, spelling mistakes, and incomplete sentences may be an occasional consequence of this system secondary to software limitations with voice recognition, ambient noise, and hardware issues. If you have any questions or concerns about the content, text, or information contained within the body of this dictation, please contact the provider for clarification.

## 2025-01-16 PROBLEM — F51.05 MOOD INSOMNIA (HCC): Status: ACTIVE | Noted: 2025-01-16

## 2025-01-16 PROBLEM — F39 MOOD INSOMNIA (HCC): Status: ACTIVE | Noted: 2025-01-16

## 2025-01-16 PROCEDURE — 99233 SBSQ HOSP IP/OBS HIGH 50: CPT | Performed by: PSYCHIATRY & NEUROLOGY

## 2025-01-16 RX ORDER — BUSPIRONE HYDROCHLORIDE 10 MG/1
20 TABLET ORAL 2 TIMES DAILY
Status: DISCONTINUED | OUTPATIENT
Start: 2025-01-16 | End: 2025-01-24

## 2025-01-16 RX ORDER — TRAZODONE HYDROCHLORIDE 50 MG/1
50 TABLET, FILM COATED ORAL
Status: DISCONTINUED | OUTPATIENT
Start: 2025-01-16 | End: 2025-01-28 | Stop reason: HOSPADM

## 2025-01-16 RX ADMIN — FOLIC ACID 1 MG: 1 TABLET ORAL at 08:28

## 2025-01-16 RX ADMIN — FLUOXETINE HYDROCHLORIDE 40 MG: 20 CAPSULE ORAL at 08:28

## 2025-01-16 RX ADMIN — BUSPIRONE HYDROCHLORIDE 20 MG: 10 TABLET ORAL at 17:10

## 2025-01-16 RX ADMIN — THIAMINE HCL TAB 100 MG 100 MG: 100 TAB at 08:28

## 2025-01-16 RX ADMIN — TRAZODONE HYDROCHLORIDE 50 MG: 50 TABLET ORAL at 21:31

## 2025-01-16 RX ADMIN — PANTOPRAZOLE SODIUM 40 MG: 40 TABLET, DELAYED RELEASE ORAL at 05:55

## 2025-01-16 RX ADMIN — Medication 400 MG: at 08:28

## 2025-01-16 RX ADMIN — Medication 1000 UNITS: at 08:28

## 2025-01-16 RX ADMIN — NALTREXONE HYDROCHLORIDE 25 MG: 50 TABLET, FILM COATED ORAL at 14:58

## 2025-01-16 RX ADMIN — MULTIPLE VITAMINS W/ MINERALS TAB 1 TABLET: TAB ORAL at 08:28

## 2025-01-16 RX ADMIN — LEVOTHYROXINE SODIUM 75 MCG: 75 TABLET ORAL at 05:55

## 2025-01-16 RX ADMIN — BUSPIRONE HYDROCHLORIDE 15 MG: 15 TABLET ORAL at 08:28

## 2025-01-16 NOTE — PROGRESS NOTES
01/16/25 0900   Team Meeting   Meeting Type Daily Rounds   Team Members Present   Team Members Present Physician;Nurse;   Physician Team Member Jaylan/Claudia/Annalisa   Nursing Team Member Nikole   Care Management Team Member Guru   Patient/Family Present   Patient Present No   Patient's Family Present No     Patient is anxious and depressed. He showered and had a bm yesterday. He attended groups yesterday and shares openly. Patient discharge is pending stabilization of mood and medications.

## 2025-01-16 NOTE — ASSESSMENT & PLAN NOTE
Continue prozac 40 mg for depression and anxiety  Increase buspirone to 20 mg twice daily-started at low dose due to patients hesitance regarding new medication and fear of side effects

## 2025-01-16 NOTE — PROGRESS NOTES
Progress Note - Behavioral Health   Name: Sammy Mays 64 y.o. male I MRN: [id number]  Unit/Bed#: [facility] 605[facility]02 I Date of Admission: 1/6/2025   Date of Service: 1/16/2025 I Hospital Day: 10     Assessment & Plan  Severe episode of recurrent major depressive disorder, without psychotic features (HCC)  Continue prozac 40 mg for depression and anxiety  Increase buspirone to 20 mg twice daily-started at low dose due to patients hesitance regarding new medication and fear of side effects  Generalized anxiety disorder  See plan above  Alcohol abuse  Counseling cessation, patient minimizes - says only drinking 1 scotch per day. Per chart review he drinks 2-3 mixed drinks daily and has required alcohol detox in the past    Added naltrexone 25 mg daily for alcohol cravings  Homelessness  Recently evicted from his house  Mood insomnia (HCC)  Add trazodone 50 mg daily    Current medications:  Current Facility-Administered Medications   Medication Dose Route Frequency Provider Last Rate    acetaminophen  650 mg Oral Q4H PRN ANI Ritchie      acetaminophen  650 mg Oral Q4H PRN ANI Ritchie      acetaminophen  975 mg Oral Q6H PRN ANI Ritchie      aluminum-magnesium hydroxide-simethicone  30 mL Oral Q4H PRN ANI Ritchie      benztropine  1 mg Intramuscular Q4H PRN Max 6/day ANI Ritchie      benztropine  0.5 mg Oral Q4H PRN Max 6/day ANI Ritchie      bisacodyl  10 mg Rectal Daily PRN ANI Ritchie      busPIRone  15 mg Oral BID Heather Fang,       Cholecalciferol  1,000 Units Oral Daily ANI Whitlock      FLUoxetine  40 mg Oral Daily Heather Fang DO      folic acid  1 mg Oral Daily ANI Whitlock      hydrOXYzine HCL  25 mg Oral Q6H PRN Max 4/day ANI Ritchie      hydrOXYzine HCL  50 mg Oral Q6H PRN Max 4/day ANI Ritchie      levothyroxine  75 mcg Oral Early Morning ANI Whitlock      LORazepam  1 mg Intramuscular Q6H PRN Max 3/day  ANI Ritchie      LORazepam  1 mg Oral Q6H PRN Max 3/day ANI Ritchie      magnesium Oxide  400 mg Oral Daily Regine Cisneros ANI Reid      multivitamin-minerals  1 tablet Oral Daily ANI Whitlock      naltrexone  25 mg Oral After Lunch Heathermary kay Fnag,       OLANZapine  5 mg Intramuscular Q3H PRN Max 3/day Trish Taylor, ANI      OLANZapine  2.5 mg Oral Q4H PRN Max 6/day Trish Taylor, ANI      OLANZapine  5 mg Oral Q4H PRN Max 3/day Trish Taylor, ANI      OLANZapine  5 mg Oral Q3H PRN Max 3/day ANI Ritchie      pantoprazole  40 mg Oral Early Morning ANI Whitlock      polyethylene glycol  17 g Oral Daily PRN ANI Ritchie      propranolol  5 mg Oral Q8H PRN ANI Ritchie      senna-docusate sodium  1 tablet Oral Daily PRN ANI Ritchie      thiamine  100 mg Oral Daily ANI Whitlock      traZODone  50 mg Oral HS PRN ANI Ritchie         Risks / Benefits of Treatment:    Risks, benefits, and possible side effects of medications explained to patient and patient verbalizes understanding and agreement for treatment.    Subjective:    Behavior over the last 24 hours: slowly improving.     The patient was evaluated this morning for continuity of care and no acute distress noted throughout the evaluation.  Over the past 24 hours staff noted the patient was calm and cooperative, reports fluctuating anxiety and depression. He is compliant with care and is concerned about his discharge plan due to him being recently evicted.      Patient was visited on unit for continuing care; chart reviewed and discussed with multidisciplinary treatment team.  On approach, the patient was anxious appearing, worried about his mail in the house he was evicted from.  He states that he has many concerns.. Endorsed better mood but continues to have negative thinking and ruminating thoughts. No problem initiating and maintaining sleep.  Denied A/VH currently.  The  patient consented for safety and agreed to verbalize any frustration or concerns to the nursing staff, immediately.    Patient continues to be visible in the milieu and interacts with staff and peers. No reports of aggression or self-injurious behavior on unit.     Patient accepted all offered medications and no adverse effects of medications noted or reported.    Sleep: normal with trazodone  Appetite: normal  Medication side effects: No   ROS: no complaints    Mental Status Examination:  Appearance:  age appropriate, casually dressed, looks stated age   Behavior:  pleasant, cooperative   Speech:  normal rate and volume   Mood:  depressed   Affect:  constricted   Thought Process:  logical, coherent, linear, negative thinking   Associations: intact associations   Thought Content:  no overt delusions, negative thoughts, ruminating thoughts   Perceptual Disturbances: no auditory hallucinations, no visual hallucinations, does not appear responding to internal stimuli   Risk Potential: Suicidal ideation - Yes, fleeting suicidal thoughts, contracts for safety on the unit, would not feel safe if discharged  Homicidal ideation - None at present  Potential for aggression - No   Sensorium:  oriented to person, place, and time/date   Memory:  recent and remote memory grossly intact   Consciousness:  alert and awake   Attention/Concentration: attention span and concentration are age appropriate   Insight:  limited   Judgment: limited   Gait/Station: normal gait/station   Motor Activity: no abnormal movements        Vital signs in last 24 hours:    Temp:  [96.8 °F (36 °C)-97.6 °F (36.4 °C)] 96.8 °F (36 °C)  HR:  [76-84] 84  BP: (127-145)/(71-78) 127/75  Resp:  [17-18] 17  SpO2:  [94 %-98 %] 97 %  O2 Device: None (Room air)         Laboratory results: I have personally reviewed all pertinent laboratory/tests results    Most Recent Labs:   Lab Results   Component Value Date    WBC 3.97 (L) 01/07/2025    RBC 3.35 (L) 01/07/2025     HGB 11.3 (L) 01/07/2025    HCT 34.4 (L) 01/07/2025     (L) 01/07/2025    RDW 16.0 (H) 01/07/2025    NEUTROABS 2.20 01/07/2025    SODIUM 140 01/10/2025    K 4.2 01/10/2025     01/10/2025    CO2 27 01/10/2025    BUN 9 01/10/2025    CREATININE 0.58 (L) 01/10/2025    GLUC 133 01/10/2025    CALCIUM 8.6 01/10/2025    AST 25 01/10/2025    ALT 37 01/10/2025    ALKPHOS 76 01/10/2025    TP 5.8 (L) 01/10/2025    ALB 3.5 01/10/2025    TBILI 0.49 01/10/2025    CHOLESTEROL 127 01/07/2025    HDL 40 01/07/2025    TRIG 112 01/07/2025    LDLCALC 65 01/07/2025    NONHDLC 87 01/07/2025    AMMONIA 57 08/18/2024    [id number] 8.938 (H) 01/07/2025    FREET4 0.83 01/07/2025    HGBA1C 5.0 01/06/2025    EAG 97 01/06/2025       Progress Toward Goals: improving, currently dealing with anxious and intrusive thoughts, will increase buspirone at this time.  Patient so far tolerating naltrexone we will consider increasing to therapeutic dose tomorrow.    Counseling / Coordination of Care:    Patient's progress discussed with staff in treatment team meeting.  Medication changes reviewed with staff in treatment team meeting.  Patient's progress reviewed with nursing staff.  Medications, treatment progress and treatment plan reviewed with patient.  Medication changes discussed with patient.  Medication education provided to patient.  Patient's progress reviewed with case management staff.  Reassurance and supportive therapy provided.}    Heather Fang DO 01/16/25    This note was completed in part utilizing Dragon dictation Software. Grammatical, translation, syntax errors, random word insertions, spelling mistakes, and incomplete sentences may be an occasional consequence of this system secondary to software limitations with voice recognition, ambient noise, and hardware issues. If you have any questions or concerns about the content, text, or information contained within the body of this dictation, please contact the provider for  clarification.

## 2025-01-16 NOTE — NURSING NOTE
"Patient visible in milieu and social with peers. Patient endorses anxiety and depression. States, \"thank goodness I'm getting my meds.\" Reports that medications help with anxiety. Denies SI/HI and AH/VH. Denies pain. Medication compliant.   "

## 2025-01-16 NOTE — NURSING NOTE
Patient denies all psych s/s at the moment, calm, visible, and pleasant on approach. Patient is medication and meals compliant. Patient offers no c/o at the moment, safety checks ongoing.

## 2025-01-16 NOTE — TREATMENT TEAM
Pt attends groups.  Pt pleasant and cooperative  Pt  able to identify positive self talk.  Pt able to reminisce and descriptive.      01/16/25 1330   Activity/Group Checklist   Group Other (Comment)  (Positive reflection and reminiscing)   Attendance Attended   Attendance Duration (min) 46-60   Interactions Interacted appropriately   Affect/Mood Bright   Goals Achieved Identified triggers;Identified feelings;Discussed coping strategies;Identified relapse prevention strategies;Increased hopefulness;Discussed self-esteem issues;Able to engage in interactions;Able to listen to others;Able to reflect/comment on own behavior;Verbalized increased hopefulness;Able to manage/cope with feelings;Able to self-disclose;Able to recieve feedback

## 2025-01-16 NOTE — PROGRESS NOTES
01/16/25 1000 01/16/25 1145   Activity/Group Checklist   Group Community meeting Admission/Discharge  (relapse prevention plan completed)   Attendance Attended Attended   Attendance Duration (min) 31-45 16-30   Interactions Interacted appropriately Interacted appropriately   Affect/Mood Appropriate;Bright;Normal range Appropriate;Calm   Goals Achieved Discussed coping strategies;Able to engage in interactions Identified relapse prevention strategies;Discussed coping strategies

## 2025-01-16 NOTE — PLAN OF CARE
Problem: Risk for Self Injury/Neglect  Goal: Treatment Goal: Remain safe during length of stay, learn and adopt new coping skills, and be free of self-injurious ideation, impulses and acts at the time of discharge  Outcome: Progressing  Goal: Verbalize thoughts and feelings  Description: Interventions:  - Assess and re-assess patient's lethality and potential for self-injury  - Engage patient in 1:1 interactions, daily, for a minimum of 15 minutes  - Encourage patient to express feelings, fears, frustrations, hopes  - Establish rapport/trust with patient   Outcome: Progressing  Goal: Refrain from harming self  Description: Interventions:  - Monitor patient closely, per order  - Develop a trusting relationship  - Supervise medication ingestion, monitor effects and side effects   Outcome: Progressing     Problem: Depression  Goal: Treatment Goal: Demonstrate behavioral control of depressive symptoms, verbalize feelings of improved mood/affect, and adopt new coping skills prior to discharge  Outcome: Progressing  Goal: Refrain from isolation  Description: Interventions:  - Develop a trusting relationship   - Encourage socialization   Outcome: Progressing     Problem: Anxiety  Goal: Anxiety is at manageable level  Description: Interventions:  - Assess and monitor patient's anxiety level.   - Monitor for signs and symptoms (heart palpitations, chest pain, shortness of breath, headaches, nausea, feeling jumpy, restlessness, irritable, apprehensive).   - Collaborate with interdisciplinary team and initiate plan and interventions as ordered.  - [place] patient to unit/surroundings  - Explain treatment plan  - Encourage participation in care  - Encourage verbalization of concerns/fears  - Identify coping mechanisms  - Assist in developing anxiety-reducing skills  - Administer/offer alternative therapies  - Limit or eliminate stimulants  Outcome: Progressing

## 2025-01-17 PROCEDURE — 99233 SBSQ HOSP IP/OBS HIGH 50: CPT | Performed by: PSYCHIATRY & NEUROLOGY

## 2025-01-17 RX ORDER — NALTREXONE HYDROCHLORIDE 50 MG/1
50 TABLET, FILM COATED ORAL
Status: DISCONTINUED | OUTPATIENT
Start: 2025-01-18 | End: 2025-01-28 | Stop reason: HOSPADM

## 2025-01-17 RX ADMIN — MULTIPLE VITAMINS W/ MINERALS TAB 1 TABLET: TAB ORAL at 09:06

## 2025-01-17 RX ADMIN — LEVOTHYROXINE SODIUM 75 MCG: 75 TABLET ORAL at 05:52

## 2025-01-17 RX ADMIN — Medication 400 MG: at 09:06

## 2025-01-17 RX ADMIN — PANTOPRAZOLE SODIUM 40 MG: 40 TABLET, DELAYED RELEASE ORAL at 05:52

## 2025-01-17 RX ADMIN — NALTREXONE HYDROCHLORIDE 25 MG: 50 TABLET, FILM COATED ORAL at 14:22

## 2025-01-17 RX ADMIN — THIAMINE HCL TAB 100 MG 100 MG: 100 TAB at 09:06

## 2025-01-17 RX ADMIN — BUSPIRONE HYDROCHLORIDE 20 MG: 10 TABLET ORAL at 17:27

## 2025-01-17 RX ADMIN — TRAZODONE HYDROCHLORIDE 50 MG: 50 TABLET ORAL at 21:13

## 2025-01-17 RX ADMIN — FLUOXETINE HYDROCHLORIDE 40 MG: 20 CAPSULE ORAL at 09:06

## 2025-01-17 RX ADMIN — Medication 1000 UNITS: at 09:06

## 2025-01-17 RX ADMIN — FOLIC ACID 1 MG: 1 TABLET ORAL at 09:06

## 2025-01-17 RX ADMIN — BUSPIRONE HYDROCHLORIDE 20 MG: 10 TABLET ORAL at 09:06

## 2025-01-17 NOTE — NURSING NOTE
Patient reports depression d/t his living situation. Patient is calm, visible in the milieu, and social. Patient is medication compliant. Safety checks ongoing.

## 2025-01-17 NOTE — NURSING NOTE
"Patient visible in milieu. Social with peers. Pleasant and cooperative on approach. Denies all psych s/s. States, \"this medication has really helped me while I've been here.\" Able to make needs known. Steady gait. Medication compliant.   "

## 2025-01-17 NOTE — PROGRESS NOTES
01/17/25 0700   Team Meeting   Meeting Type Daily Rounds   Team Members Present   Team Members Present Physician;Nurse;   Physician Team Member Jaylan/Claudia/Annalisa   Nursing Team Member Nikole   Care Management Team Member Guru   Patient/Family Present   Patient Present No   Patient's Family Present No     Patient is doing well. Eating and sleeping well. Focused on discharge at this time. Cooperative with care. Patient discharge is pending stabilization of mood and medications.

## 2025-01-17 NOTE — TREATMENT TEAM
Pt attends groups.  Pt pleasant and cooperative  Pt  able to identify positive self talk.   01/17/25 1100   Activity/Group Checklist   Group Other (Comment)  ( Recovery: Guiding principles)   Attendance Attended   Attendance Duration (min) 31-45   Interactions Interacted appropriately   Affect/Mood Appropriate   Goals Achieved Identified feelings;Identified triggers;Able to listen to others;Able to engage in interactions;Verbalized increased hopefulness;Able to manage/cope with feelings;Able to reflect/comment on own behavior;Able to self-disclose

## 2025-01-17 NOTE — CASE MANAGEMENT
CM called and left a voicemail for brother Angel to discuss discharge planning.     Angel Davon   [phone]

## 2025-01-17 NOTE — PLAN OF CARE
Pt. Actively engaged in scheduled groups and appropriately social with peers.  Problem: Ineffective Coping  Goal: Participates in unit activities  Description: Interventions:  - Provide therapeutic environment   - Provide required programming   - Redirect inappropriate behaviors   Outcome: Progressing

## 2025-01-17 NOTE — PROGRESS NOTES
Progress Note - Behavioral Health   Name: Sammy Mays 64 y.o. male I MRN: [id number]   Unit/Bed#: [facility]02 I Date of Admission: 1/6/2025   Date of Service: 1/18/2025 I Hospital Day: 12     Assessment & Plan  Severe episode of recurrent major depressive disorder, without psychotic features (HCC)  Continue prozac 40 mg for depression and anxiety  Continue buspirone to 20 mg twice daily-started at low dose due to patients hesitance regarding new medication and fear of side effects  Generalized anxiety disorder  See plan above  Alcohol abuse  Counseling cessation, patient minimizes - says only drinking 1 scotch per day. Per chart review he drinks 2-3 mixed drinks daily and has required alcohol detox in the past    Increase naltrexone to 50 mg daily for alcohol cravings  Homelessness  Recently evicted from his house  Mood insomnia (HCC)  Add trazodone 50 mg daily     Psychiatric Review of Systems:  Behavior over the last 24 hours: Slowly improving  Sleep: sleeping okay throughout the night  Appetite: adequate  Medication side effects: none reported  ROS:no complaints    Patient was seen today for continuation of care, records reviewed and patient was discussed with the morning case review team.  No behavioral outbursts or acute events noted overnight and no significant changes in behaviors or clinical status over the last 24 hours.      On assessment today, Sammy was seen sitting in the dayroom.  He is doing well, feels he continues to slowly improve.  Still has anxiety about his future, but expresses feeling more hopeful.  States this hospitalization has been very helpful, as well as groups and working with a psychiatrist.  Sammy reports adequate daytime energy and denies any difficulties with initiating or staying asleep.  Oral appetite and hydration is adequate.   We reviewed once more the specific as-needed medications they can use going forward if they experience any insomnia or destabilization of their  mood, they understood and were agreeable. Milieu visibility and group attendance encouraged to promote an active participation in treatment.    Sammy denies acute suicidal/self-harm ideation/intent/plan upon direct inquiry at this time. Sammy is able to contract for safety while on the unit and would feel comfortable seeking staff support should suicidal symptoms or urges appear or worsen. Sammy remains behaviorally appropriate, no agitation or aggression noted on exam or in report. Sammy also denies HI/AH/VH, and does not appear overtly manic.  Patient does not verbalize any experiences that can be categorized as paranoid, persecutory, bizarre, or somatic delusions. Sammy remains adherent to his current psychotropic medication regimen and denies any side effects from medications, as well as none noted on exam.    Vitals:  Vitals:    01/18/25 0736   BP: 134/69   Pulse: 77   Resp: 19   Temp: 97.8 °F (36.6 °C)   SpO2: 97%     Laboratory Results:  I have personally reviewed all pertinent laboratory/tests results.  Most Recent Labs:   Lab Results   Component Value Date    WBC 3.97 (L) 01/07/2025    RBC 3.35 (L) 01/07/2025    HGB 11.3 (L) 01/07/2025    HCT 34.4 (L) 01/07/2025     (L) 01/07/2025    RDW 16.0 (H) 01/07/2025    NEUTROABS 2.20 01/07/2025    SODIUM 140 01/10/2025    K 4.2 01/10/2025     01/10/2025    CO2 27 01/10/2025    BUN 9 01/10/2025    CREATININE 0.58 (L) 01/10/2025    GLUC 133 01/10/2025    GLUF 133 (H) 01/10/2025    CALCIUM 8.6 01/10/2025    AST 25 01/10/2025    ALT 37 01/10/2025    ALKPHOS 76 01/10/2025    TP 5.8 (L) 01/10/2025    ALB 3.5 01/10/2025    TBILI 0.49 01/10/2025    CHOLESTEROL 127 01/07/2025    HDL 40 01/07/2025    TRIG 112 01/07/2025    LDLCALC 65 01/07/2025    NONHDLC 87 01/07/2025    AMMONIA 57 08/18/2024    [id number] 8.938 (H) 01/07/2025    FREET4 0.83 01/07/2025    HGBA1C 5.0 01/06/2025    EAG 97 01/06/2025     Behavioral Health Medications: all current active  meds have been reviewed and continue current psychiatric medications.  Current Facility-Administered Medications   Medication Dose Route Frequency Provider Last Rate    acetaminophen  650 mg Oral Q4H PRN ANI Ritchie      acetaminophen  650 mg Oral Q4H PRN ANI Ritchie      acetaminophen  975 mg Oral Q6H PRN ANI Ritchie      aluminum-magnesium hydroxide-simethicone  30 mL Oral Q4H PRN ANI Ritchie      benztropine  1 mg Intramuscular Q4H PRN Max 6/day ANI Ritchie      benztropine  0.5 mg Oral Q4H PRN Max 6/day ANI Ritchie      bisacodyl  10 mg Rectal Daily PRN ANI Ritchie      busPIRone  20 mg Oral BID Heather Fang, DO      Cholecalciferol  1,000 Units Oral Daily Margaux Hubbard, ANI      FLUoxetine  40 mg Oral Daily Heather Fang, DO      folic acid  1 mg Oral Daily ANI Whitlock      hydrOXYzine HCL  25 mg Oral Q6H PRN Max 4/day ANI Ritchie      hydrOXYzine HCL  50 mg Oral Q6H PRN Max 4/day ANI Ritchie      levothyroxine  75 mcg Oral Early Morning ANI Whitlock      LORazepam  1 mg Intramuscular Q6H PRN Max 3/day ANI Ritchie      LORazepam  1 mg Oral Q6H PRN Max 3/day ANI Ritchie      magnesium Oxide  400 mg Oral Daily Regine Reid, ANI      multivitamin-minerals  1 tablet Oral Daily ANI Whitlock      naltrexone  50 mg Oral After Lunch Heather Fang, DO      OLANZapine  5 mg Intramuscular Q3H PRN Max 3/day ANI Ritchie      OLANZapine  2.5 mg Oral Q4H PRN Max 6/day ANI Ritchie      OLANZapine  5 mg Oral Q4H PRN Max 3/day ANI Ritchie      OLANZapine  5 mg Oral Q3H PRN Max 3/day ANI Ritchie      pantoprazole  40 mg Oral Early Morning Margaux Hubbard, ANI      polyethylene glycol  17 g Oral Daily PRN ANI Ritchie      propranolol  5 mg Oral Q8H PRN ANI Ritchie      senna-docusate sodium  1 tablet Oral Daily PRN ANI Ritchie      thiamine   100 mg Oral Daily ANI Whitlock      traZODone  50 mg Oral HS Hetaher Fang DO       Mental Status Evaluation:  Appearance:  age appropriate, casually dressed, dressed appropriately   Behavior:  pleasant, cooperative, calm   Speech:  normal volume   Mood:  anxious   Affect:  slightly brighter   Thought Process:  organized, logical, coherent, goal directed   Associations: intact associations   Thought Content:  no overt delusions   Perceptual Disturbances: no auditory hallucinations, no visual hallucinations, denies when asked, does not appear responding to internal stimuli   Risk Potential: Suicidal ideation - None at present, contracts for safety on the unit, would talk to staff if not feeling safe on the unit  Homicidal ideation - None at present  Potential for aggression - Not at present   Sensorium:  oriented to person, place, and time/date   Memory:  recent memory intact   Consciousness:  alert and awake   Attention/Concentration: attention span and concentration appear shorter than expected for age   Insight:  limited   Judgment: limited   Gait/Station: normal gait/station, normal balance   Motor Activity: no abnormal movements     Progress Toward Goals:   Sammy continues to require inpatient psychiatric hospitalization for continued medication management and titration to optimize symptom reduction, improve sleep hygiene, and demonstrate adequate self-care.     Suicide/Homicide Risk Assessment:  Risk of Harm to Self:   Nursing Suicide Risk Assessment Last 24 hours: C-SSRS Risk (Since Last Contact)  Calculated C-SSRS Risk Score (Since Last Contact): No Risk Indicated    Risk of Harm to Others:  Nursing Homicide Risk Assessment: Violence Risk to Others: Denies within past 6 months    Risks / Benefits of Treatment:  Risks, benefits, and possible side effects of medications explained to patient. Patient has limited understanding of risks and benefits of treatment at this time, but agrees to take  medications as prescribed.    Counseling / Coordination of Care:  Patient's progress discussed with staff in treatment team meeting.  Medications, treatment progress and treatment plan reviewed with patient.   Educated on importance of medication and treatment compliance.  Reassurance and supportive therapy provided.   Encouraged participation in milieu and group therapy on the unit.    ANI Ritchie 01/18/25

## 2025-01-17 NOTE — PROGRESS NOTES
01/17/25 0810 01/17/25 1000 01/17/25 1330   Activity/Group Checklist   Group Other (Comment)  (review of relapse prevention plan) Community meeting Other (Comment)  (active leisure memory game.)   Attendance Attended Attended Attended   Attendance Duration (min) 0-15  (1-1) 31-45 46-60   Interactions Interacted appropriately Interacted appropriately Interacted appropriately   Affect/Mood Appropriate;Bright Appropriate;Normal range Appropriate;Bright;Normal range   Goals Achieved Discussed coping strategies Discussed coping strategies;Able to engage in interactions;Able to listen to others Discussed coping strategies;Able to engage in interactions

## 2025-01-18 PROCEDURE — 99232 SBSQ HOSP IP/OBS MODERATE 35: CPT

## 2025-01-18 RX ADMIN — THIAMINE HCL TAB 100 MG 100 MG: 100 TAB at 08:15

## 2025-01-18 RX ADMIN — BUSPIRONE HYDROCHLORIDE 20 MG: 10 TABLET ORAL at 08:15

## 2025-01-18 RX ADMIN — NALTREXONE HYDROCHLORIDE 50 MG: 50 TABLET, FILM COATED ORAL at 14:55

## 2025-01-18 RX ADMIN — BUSPIRONE HYDROCHLORIDE 20 MG: 10 TABLET ORAL at 17:01

## 2025-01-18 RX ADMIN — FLUOXETINE HYDROCHLORIDE 40 MG: 20 CAPSULE ORAL at 08:16

## 2025-01-18 RX ADMIN — TRAZODONE HYDROCHLORIDE 50 MG: 50 TABLET ORAL at 21:13

## 2025-01-18 RX ADMIN — Medication 400 MG: at 08:15

## 2025-01-18 RX ADMIN — PANTOPRAZOLE SODIUM 40 MG: 40 TABLET, DELAYED RELEASE ORAL at 06:07

## 2025-01-18 RX ADMIN — Medication 1000 UNITS: at 08:15

## 2025-01-18 RX ADMIN — MULTIPLE VITAMINS W/ MINERALS TAB 1 TABLET: TAB ORAL at 08:15

## 2025-01-18 RX ADMIN — LEVOTHYROXINE SODIUM 75 MCG: 75 TABLET ORAL at 06:07

## 2025-01-18 RX ADMIN — FOLIC ACID 1 MG: 1 TABLET ORAL at 08:15

## 2025-01-18 NOTE — ASSESSMENT & PLAN NOTE
Continue prozac 40 mg for depression and anxiety  Continue buspirone to 20 mg twice daily-started at low dose due to patients hesitance regarding new medication and fear of side effects

## 2025-01-18 NOTE — NURSING NOTE
Patient is visible on the unit. He is bright and social with peers and is seen engaging in many conversations. He denies all psych s/s currently but reports he feels slightly anxious upon waking in the morning. Patient is medication compliant. Encouraged to inform staff of any needs or concerns.

## 2025-01-18 NOTE — PROGRESS NOTES
Progress Note - Behavioral Health   Name: Sammy Mays 64 y.o. male I MRN: [id number]  Unit/Bed#: [facility] 605[facility]02 I Date of Admission: 1/6/2025   Date of Service: 1/17/2025 I Hospital Day: 11     Assessment & Plan  Severe episode of recurrent major depressive disorder, without psychotic features (HCC)  Continue prozac 40 mg for depression and anxiety  Continue buspirone to 20 mg twice daily-started at low dose due to patients hesitance regarding new medication and fear of side effects  Generalized anxiety disorder  See plan above  Alcohol abuse  Counseling cessation, patient minimizes - says only drinking 1 scotch per day. Per chart review he drinks 2-3 mixed drinks daily and has required alcohol detox in the past    Increase naltrexone to 50 mg daily for alcohol cravings  Homelessness  Recently evicted from his house  Mood insomnia (HCC)  Add trazodone 50 mg daily    Current medications:  Current Facility-Administered Medications   Medication Dose Route Frequency Provider Last Rate    acetaminophen  650 mg Oral Q4H PRN ANI Ritchie      acetaminophen  650 mg Oral Q4H PRN ANI Ritchie      acetaminophen  975 mg Oral Q6H PRN ANI Ritchie      aluminum-magnesium hydroxide-simethicone  30 mL Oral Q4H PRN ANI Ritchie      benztropine  1 mg Intramuscular Q4H PRN Max 6/day ANI Ritchie      benztropine  0.5 mg Oral Q4H PRN Max 6/day ANI Ritchie      bisacodyl  10 mg Rectal Daily PRN ANI Ritchie      busPIRone  20 mg Oral BID Heather Fang, DO      Cholecalciferol  1,000 Units Oral Daily ANI Whitlock      FLUoxetine  40 mg Oral Daily Heather Fang DO      folic acid  1 mg Oral Daily ANI Whitlock      hydrOXYzine HCL  25 mg Oral Q6H PRN Max 4/day ANI Ritchie      hydrOXYzine HCL  50 mg Oral Q6H PRN Max 4/day ANI Ritchie      levothyroxine  75 mcg Oral Early Morning ANI Whitlock      LORazepam  1 mg Intramuscular Q6H PRN Max  3/day ANI Ritchie      LORazepam  1 mg Oral Q6H PRN Max 3/day ANI Ritchie      magnesium Oxide  400 mg Oral Daily Regine Murphyflynn, ANI      multivitamin-minerals  1 tablet Oral Daily Margaux Hubbard, ANI      naltrexone  25 mg Oral After Lunch Heather Fang, DO      OLANZapine  5 mg Intramuscular Q3H PRN Max 3/day Trish Taylor, ANI      OLANZapine  2.5 mg Oral Q4H PRN Max 6/day Trish Taylor, ANI      OLANZapine  5 mg Oral Q4H PRN Max 3/day Trish Taylor, ANGELESNP      OLANZapine  5 mg Oral Q3H PRN Max 3/day Trish Taylor, ANI      pantoprazole  40 mg Oral Early Morning Margaux Hubbard, ANI      polyethylene glycol  17 g Oral Daily PRN Trish Taylor, ANI      propranolol  5 mg Oral Q8H PRN ANI Ritchie      senna-docusate sodium  1 tablet Oral Daily PRN ANI Ritchie      thiamine  100 mg Oral Daily Margaux Hubbard, ANI      traZODone  50 mg Oral HS Heather Fang,          Risks / Benefits of Treatment:    Risks, benefits, and possible side effects of medications explained to patient and patient verbalizes understanding and agreement for treatment.    Subjective:    Behavior over the last 24 hours: slowly improving.     The patient was evaluated this morning for continuity of care and no acute distress noted throughout the evaluation.  Over the past 24 hours staff noted the patient was eating and sleeping well, starting to ask about discharge.      Patient was visited on unit for continuing care; chart reviewed and discussed with multidisciplinary treatment team.  On approach, the patient was calm and cooperative. Sammy once again reports ruminating and worried thoughts about several things on his to-do list. States that he is worried about leaving the hospital without family support because he will return to drinking. Agreeable to increase of naltrexone.  No problem initiating and maintaining sleep.  Denied A/VH currently.  The patient consented for safety and agreed  to verbalize any frustration or concerns to the nursing staff, immediately.    Patient continues to be selectively interactive with staff and peers. No reports of aggression or self-injurious behavior on unit. No PRN medications used in the past 24 hours.    Patient accepted all offered medications and no adverse effects of medications noted or reported.    Sleep: slept better  Appetite: normal  Medication side effects: No   ROS: no complaints    Mental Status Examination:  Appearance:  age appropriate, casually dressed, looks stated age   Behavior:  pleasant, cooperative   Speech:  normal rate and volume   Mood:  depressed   Affect:  constricted   Thought Process:  logical, coherent, linear, negative thinking   Associations: intact associations   Thought Content:  no overt delusions, negative thoughts, ruminating thoughts   Perceptual Disturbances: no auditory hallucinations, no visual hallucinations, does not appear responding to internal stimuli   Risk Potential: Suicidal ideation - Yes, fleeting suicidal thoughts, would not feel safe if discharged  Homicidal ideation - None at present  Potential for aggression - No   Sensorium:  oriented to person, place, and time/date   Memory:  recent and remote memory grossly intact   Consciousness:  alert and awake   Attention/Concentration: attention span and concentration are age appropriate   Insight:  limited   Judgment: limited   Gait/Station: normal gait/station   Motor Activity: no abnormal movements        Vital signs in last 24 hours:    Temp:  [97.1 °F (36.2 °C)-97.2 °F (36.2 °C)] 97.2 °F (36.2 °C)  HR:  [71-82] 71  BP: (122-159)/(62-74) 159/74  Resp:  [18] 18  SpO2:  [95 %-99 %] 98 %  O2 Device: None (Room air)         Laboratory results: I have personally reviewed all pertinent laboratory/tests results    Most Recent Labs:   Lab Results   Component Value Date    WBC 3.97 (L) 01/07/2025    RBC 3.35 (L) 01/07/2025    HGB 11.3 (L) 01/07/2025    HCT 34.4 (L)  01/07/2025     (L) 01/07/2025    RDW 16.0 (H) 01/07/2025    NEUTROABS 2.20 01/07/2025    SODIUM 140 01/10/2025    K 4.2 01/10/2025     01/10/2025    CO2 27 01/10/2025    BUN 9 01/10/2025    CREATININE 0.58 (L) 01/10/2025    GLUC 133 01/10/2025    CALCIUM 8.6 01/10/2025    AST 25 01/10/2025    ALT 37 01/10/2025    ALKPHOS 76 01/10/2025    TP 5.8 (L) 01/10/2025    ALB 3.5 01/10/2025    TBILI 0.49 01/10/2025    CHOLESTEROL 127 01/07/2025    HDL 40 01/07/2025    TRIG 112 01/07/2025    LDLCALC 65 01/07/2025    NONHDLC 87 01/07/2025    AMMONIA 57 08/18/2024    [id number] 8.938 (H) 01/07/2025    FREET4 0.83 01/07/2025    HGBA1C 5.0 01/06/2025    EAG 97 01/06/2025       Progress Toward Goals: improving, still anxious and ruminating on all his to-do list items. He does not feel safe to discharge and feels that he will start to drink once stressed. Agreeable to increased naltrexone. Recent increase of buspirone    Counseling / Coordination of Care:    Patient's progress discussed with staff in treatment team meeting.  Medication changes reviewed with staff in treatment team meeting.  Patient's progress reviewed with nursing staff.  Medications, treatment progress and treatment plan reviewed with patient.  Medication changes discussed with patient.  Medication education provided to patient.  Patient's progress reviewed with case management staff.  Reassurance and supportive therapy provided.}    Heather Fang DO 01/17/25    This note was completed in part utilizing Dragon dictation Software. Grammatical, translation, syntax errors, random word insertions, spelling mistakes, and incomplete sentences may be an occasional consequence of this system secondary to software limitations with voice recognition, ambient noise, and hardware issues. If you have any questions or concerns about the content, text, or information contained within the body of this dictation, please contact the provider for clarification.

## 2025-01-18 NOTE — NURSING NOTE
Patient is social and visible in the small TV room. He states he prefers it to the larger dinning room for the quiet. He endorses depression d/t not having a place to live and is fearful he will end up on the streets. Emotional support given. Denies anxiety and SI at this time. No outward signs of distress and he is able to make his needs known. Safety checks ongoing.

## 2025-01-18 NOTE — ASSESSMENT & PLAN NOTE
Counseling cessation, patient minimizes - says only drinking 1 scotch per day. Per chart review he drinks 2-3 mixed drinks daily and has required alcohol detox in the past    Increase naltrexone to 50 mg daily for alcohol cravings

## 2025-01-19 PROCEDURE — 99232 SBSQ HOSP IP/OBS MODERATE 35: CPT

## 2025-01-19 RX ADMIN — TRAZODONE HYDROCHLORIDE 50 MG: 50 TABLET ORAL at 21:43

## 2025-01-19 RX ADMIN — Medication 1000 UNITS: at 08:18

## 2025-01-19 RX ADMIN — LEVOTHYROXINE SODIUM 75 MCG: 75 TABLET ORAL at 05:42

## 2025-01-19 RX ADMIN — Medication 400 MG: at 08:18

## 2025-01-19 RX ADMIN — BUSPIRONE HYDROCHLORIDE 20 MG: 10 TABLET ORAL at 08:18

## 2025-01-19 RX ADMIN — FOLIC ACID 1 MG: 1 TABLET ORAL at 08:18

## 2025-01-19 RX ADMIN — BUSPIRONE HYDROCHLORIDE 20 MG: 10 TABLET ORAL at 17:01

## 2025-01-19 RX ADMIN — NALTREXONE HYDROCHLORIDE 50 MG: 50 TABLET, FILM COATED ORAL at 15:00

## 2025-01-19 RX ADMIN — THIAMINE HCL TAB 100 MG 100 MG: 100 TAB at 08:18

## 2025-01-19 RX ADMIN — FLUOXETINE HYDROCHLORIDE 40 MG: 20 CAPSULE ORAL at 08:18

## 2025-01-19 RX ADMIN — PANTOPRAZOLE SODIUM 40 MG: 40 TABLET, DELAYED RELEASE ORAL at 05:42

## 2025-01-19 RX ADMIN — MULTIPLE VITAMINS W/ MINERALS TAB 1 TABLET: TAB ORAL at 08:18

## 2025-01-19 NOTE — ASSESSMENT & PLAN NOTE
See plan above    No associated orders from this encounter found during lookback period of 72 hours.

## 2025-01-19 NOTE — ASSESSMENT & PLAN NOTE
Add trazodone 50 mg daily    No associated orders from this encounter found during lookback period of 72 hours.

## 2025-01-19 NOTE — ASSESSMENT & PLAN NOTE
Counseling cessation, patient minimizes - says only drinking 1 scotch per day. Per chart review he drinks 2-3 mixed drinks daily and has required alcohol detox in the past    Increase naltrexone to 50 mg daily for alcohol cravings   None

## 2025-01-19 NOTE — ASSESSMENT & PLAN NOTE
Counseling cessation, patient minimizes - says only drinking 1 scotch per day. Per chart review he drinks 2-3 mixed drinks daily and has required alcohol detox in the past    Increase naltrexone to 50 mg daily for alcohol cravings    No associated orders from this encounter found during lookback period of 72 hours.

## 2025-01-19 NOTE — ASSESSMENT & PLAN NOTE
Continue prozac 40 mg for depression and anxiety  Continue buspirone to 20 mg twice daily-started at low dose due to patients hesitance regarding new medication and fear of side effects    No associated orders from this encounter found during lookback period of 72 hours.

## 2025-01-19 NOTE — NURSING NOTE
"Patient is visible on the unit and social with peers. He is bright during conversations. Patient denies all psych s/s currently but reports feeling anxious first thing in the morning. He states his anxiety is caused by \"thinking about packing\" and \"going back to that house.\" Patient reports his brother is going to fly in from [place] to help him with packing and then Sammy is going to move to [place] with his brother. Patient reports he is optimistic for the future but overwhelmed at the moment. He is medication compliant. Encouraged to inform staff of any needs or concerns.    "

## 2025-01-19 NOTE — PLAN OF CARE
Problem: Risk for Self Injury/Neglect  Goal: Treatment Goal: Remain safe during length of stay, learn and adopt new coping skills, and be free of self-injurious ideation, impulses and acts at the time of discharge  Outcome: Progressing  Goal: Verbalize thoughts and feelings  Description: Interventions:  - Assess and re-assess patient's lethality and potential for self-injury  - Engage patient in 1:1 interactions, daily, for a minimum of 15 minutes  - Encourage patient to express feelings, fears, frustrations, hopes  - Establish rapport/trust with patient   Outcome: Progressing  Goal: Refrain from harming self  Description: Interventions:  - Monitor patient closely, per order  - Develop a trusting relationship  - Supervise medication ingestion, monitor effects and side effects   Outcome: Progressing     Problem: Depression  Goal: Treatment Goal: Demonstrate behavioral control of depressive symptoms, verbalize feelings of improved mood/affect, and adopt new coping skills prior to discharge  Outcome: Progressing  Goal: Refrain from isolation  Description: Interventions:  - Develop a trusting relationship   - Encourage socialization   Outcome: Progressing     Problem: Anxiety  Goal: Anxiety is at manageable level  Description: Interventions:  - Assess and monitor patient's anxiety level.   - Monitor for signs and symptoms (heart palpitations, chest pain, shortness of breath, headaches, nausea, feeling jumpy, restlessness, irritable, apprehensive).   - Collaborate with interdisciplinary team and initiate plan and interventions as ordered.  - [place] patient to unit/surroundings  - Explain treatment plan  - Encourage participation in care  - Encourage verbalization of concerns/fears  - Identify coping mechanisms  - Assist in developing anxiety-reducing skills  - Administer/offer alternative therapies  - Limit or eliminate stimulants  Outcome: Progressing     Problem: DISCHARGE PLANNING  Goal: Discharge to home or other  facility with appropriate resources  Description: INTERVENTIONS:  - Identify barriers to discharge w/patient and caregiver  - Arrange for needed discharge resources and transportation as appropriate  - Identify discharge learning needs (meds, wound care, etc.)  - Arrange for interpretive services to assist at discharge as needed  - Refer to Case Management Department for coordinating discharge planning if the patient needs post-hospital services based on physician/advanced practitioner order or complex needs related to functional status, cognitive ability, or social support system  Outcome: Progressing     Problem: Ineffective Coping  Goal: Participates in unit activities  Description: Interventions:  - Provide therapeutic environment   - Provide required programming   - Redirect inappropriate behaviors   Outcome: Progressing

## 2025-01-19 NOTE — PROGRESS NOTES
Progress Note - Behavioral Health   Name: Sammy Mays 64 y.o. male I MRN: [id number]   Unit/Bed#: [facility]02 I Date of Admission: 1/6/2025   Date of Service: 1/19/2025 I Hospital Day: 13     Assessment & Plan  Severe episode of recurrent major depressive disorder, without psychotic features (HCC)  Continue prozac 40 mg for depression and anxiety  Continue buspirone to 20 mg twice daily-started at low dose due to patients hesitance regarding new medication and fear of side effects  Generalized anxiety disorder  See plan above  Alcohol abuse  Counseling cessation, patient minimizes - says only drinking 1 scotch per day. Per chart review he drinks 2-3 mixed drinks daily and has required alcohol detox in the past    Increase naltrexone to 50 mg daily for alcohol cravings  Homelessness  Recently evicted from his house  Mood insomnia (HCC)  Add trazodone 50 mg daily     Psychiatric Review of Systems:  Behavior over the last 24 hours: Unchanged  Sleep: sleeping okay throughout the night  Appetite: adequate  Medication side effects: none reported  ROS:no complaints    Patient was seen today for continuation of care, records reviewed and patient was discussed with the morning case review team.  No behavioral outbursts or acute events noted overnight and no significant changes in behaviors or clinical status over the last 24 hours.      On assessment today, Sammy was seen while sitting in the dayroom.  He is calm, pleasant, polite.  Still has anxiety about the future but is redirectable.  Sammy reports adequate daytime energy and denies any difficulties with initiating or staying asleep.  Oral appetite and hydration is adequate.   We reviewed once more the specific as-needed medications they can use going forward if they experience any insomnia or destabilization of their mood, they understood and were agreeable. Milieu visibility and group attendance encouraged to promote an active participation in  treatment.    Sammy denies acute suicidal/self-harm ideation/intent/plan upon direct inquiry at this time. Sammy is able to contract for safety while on the unit and would feel comfortable seeking staff support should suicidal symptoms or urges appear or worsen. Sammy remains behaviorally appropriate, no agitation or aggression noted on exam or in report. Sammy also denies HI/AH/VH, and does not appear overtly manic.  Patient does not verbalize any experiences that can be categorized as paranoid, persecutory, bizarre, or somatic delusions. Sammy remains adherent to his current psychotropic medication regimen and denies any side effects from medications, as well as none noted on exam.    Vitals:  Vitals:    01/19/25 0742   BP: 131/65   Pulse: 88   Resp: 17   Temp:    SpO2: 97%     Laboratory Results:  I have personally reviewed all pertinent laboratory/tests results.  Most Recent Labs:   Lab Results   Component Value Date    WBC 3.97 (L) 01/07/2025    RBC 3.35 (L) 01/07/2025    HGB 11.3 (L) 01/07/2025    HCT 34.4 (L) 01/07/2025     (L) 01/07/2025    RDW 16.0 (H) 01/07/2025    NEUTROABS 2.20 01/07/2025    SODIUM 140 01/10/2025    K 4.2 01/10/2025     01/10/2025    CO2 27 01/10/2025    BUN 9 01/10/2025    CREATININE 0.58 (L) 01/10/2025    GLUC 133 01/10/2025    GLUF 133 (H) 01/10/2025    CALCIUM 8.6 01/10/2025    AST 25 01/10/2025    ALT 37 01/10/2025    ALKPHOS 76 01/10/2025    TP 5.8 (L) 01/10/2025    ALB 3.5 01/10/2025    TBILI 0.49 01/10/2025    CHOLESTEROL 127 01/07/2025    HDL 40 01/07/2025    TRIG 112 01/07/2025    LDLCALC 65 01/07/2025    NONHDLC 87 01/07/2025    AMMONIA 57 08/18/2024    [id number] 8.938 (H) 01/07/2025    FREET4 0.83 01/07/2025    HGBA1C 5.0 01/06/2025    EAG 97 01/06/2025     Behavioral Health Medications: all current active meds have been reviewed and continue current psychiatric medications.  Current Facility-Administered Medications   Medication Dose Route Frequency  Provider Last Rate    acetaminophen  650 mg Oral Q4H PRN Trish Taylor, ANGELESNP      acetaminophen  650 mg Oral Q4H PRN ANGELES RitchieNP      acetaminophen  975 mg Oral Q6H PRN ANI Ritchie      aluminum-magnesium hydroxide-simethicone  30 mL Oral Q4H PRN Trish Taylor, ANI      benztropine  1 mg Intramuscular Q4H PRN Max 6/day Trish Taylor, ANI      benztropine  0.5 mg Oral Q4H PRN Max 6/day Trish Taylor, ANGELESNP      bisacodyl  10 mg Rectal Daily PRN Trish Taylor, ANI      busPIRone  20 mg Oral BID Heather Fang, DO      Cholecalciferol  1,000 Units Oral Daily Margaux Hubbard, ANGELESNP      FLUoxetine  40 mg Oral Daily Heather Fang, DO      folic acid  1 mg Oral Daily Margaux Hubbard, ANI      hydrOXYzine HCL  25 mg Oral Q6H PRN Max 4/day Trish Taylor, ANI      hydrOXYzine HCL  50 mg Oral Q6H PRN Max 4/day ANI Ritchie      levothyroxine  75 mcg Oral Early Morning Margaux Hubbard, ANGELESNP      LORazepam  1 mg Intramuscular Q6H PRN Max 3/day ANI Ritchie      LORazepam  1 mg Oral Q6H PRN Max 3/day Trish Taylor, ANI      magnesium Oxide  400 mg Oral Daily Regine Reid, ANGELESNP      multivitamin-minerals  1 tablet Oral Daily Margaux Hubbard, ANI      naltrexone  50 mg Oral After Lunch Heather Fang, DO      OLANZapine  5 mg Intramuscular Q3H PRN Max 3/day ANI Ritchie      OLANZapine  2.5 mg Oral Q4H PRN Max 6/day Trish Taylor, ANGELESNP      OLANZapine  5 mg Oral Q4H PRN Max 3/day Trish Taylor, ANI      OLANZapine  5 mg Oral Q3H PRN Max 3/day Trish Taylor, ANI      pantoprazole  40 mg Oral Early Morning Margaux Hubbard, ANGELESNP      polyethylene glycol  17 g Oral Daily PRN ANI Ritchie      propranolol  5 mg Oral Q8H PRN ANI Ritchie      senna-docusate sodium  1 tablet Oral Daily PRN Trish Taylor, ANGELESNP      thiamine  100 mg Oral Daily ANI Whitlock      traZODone  50 mg Oral HS Heather Fang, DO       Mental Status Evaluation:  Appearance:  age  appropriate, casually dressed   Behavior:  cooperative, calm   Speech:  normal rate   Mood:  depressed, anxious   Affect:  constricted   Thought Process:  linear   Associations: intact associations   Thought Content:  no overt delusions   Perceptual Disturbances: no auditory hallucinations, no visual hallucinations, denies when asked, does not appear responding to internal stimuli   Risk Potential: Suicidal ideation - None at present, contracts for safety on the unit, would talk to staff if not feeling safe on the unit  Homicidal ideation - None at present  Potential for aggression - Not at present   Sensorium:  oriented to person, place, and time/date   Memory:  recent memory intact   Consciousness:  alert and awake   Attention/Concentration: attention span and concentration appear shorter than expected for age   Insight:  limited   Judgment: limited   Gait/Station: normal gait/station, normal balance   Motor Activity: no abnormal movements     Progress Toward Goals:   Sammy continues to require inpatient psychiatric hospitalization for continued medication management and titration to optimize symptom reduction, improve sleep hygiene, and demonstrate adequate self-care.     Suicide/Homicide Risk Assessment:  Risk of Harm to Self:   Nursing Suicide Risk Assessment Last 24 hours: C-SSRS Risk (Since Last Contact)  Calculated C-SSRS Risk Score (Since Last Contact): No Risk Indicated    Risk of Harm to Others:  Nursing Homicide Risk Assessment: Violence Risk to Others: Denies within past 6 months    Risks / Benefits of Treatment:  Risks, benefits, and possible side effects of medications explained to patient. Patient has limited understanding of risks and benefits of treatment at this time, but agrees to take medications as prescribed.    Counseling / Coordination of Care:  Patient's progress discussed with staff in treatment team meeting.  Medications, treatment progress and treatment plan reviewed with patient.    Educated on importance of medication and treatment compliance.  Reassurance and supportive therapy provided.   Encouraged participation in milieu and group therapy on the unit.    ANI Ritchie 01/19/25

## 2025-01-19 NOTE — ASSESSMENT & PLAN NOTE
Recently evicted from his house    No associated orders from this encounter found during lookback period of 72 hours.

## 2025-01-20 PROCEDURE — 99232 SBSQ HOSP IP/OBS MODERATE 35: CPT | Performed by: PSYCHIATRY & NEUROLOGY

## 2025-01-20 RX ADMIN — LEVOTHYROXINE SODIUM 75 MCG: 75 TABLET ORAL at 05:05

## 2025-01-20 RX ADMIN — NALTREXONE HYDROCHLORIDE 50 MG: 50 TABLET, FILM COATED ORAL at 13:53

## 2025-01-20 RX ADMIN — TRAZODONE HYDROCHLORIDE 50 MG: 50 TABLET ORAL at 21:12

## 2025-01-20 RX ADMIN — MULTIPLE VITAMINS W/ MINERALS TAB 1 TABLET: TAB ORAL at 09:26

## 2025-01-20 RX ADMIN — Medication 400 MG: at 09:26

## 2025-01-20 RX ADMIN — THIAMINE HCL TAB 100 MG 100 MG: 100 TAB at 09:26

## 2025-01-20 RX ADMIN — PANTOPRAZOLE SODIUM 40 MG: 40 TABLET, DELAYED RELEASE ORAL at 05:05

## 2025-01-20 RX ADMIN — BUSPIRONE HYDROCHLORIDE 20 MG: 10 TABLET ORAL at 09:26

## 2025-01-20 RX ADMIN — Medication 1000 UNITS: at 09:26

## 2025-01-20 RX ADMIN — LORAZEPAM 1 MG: 1 TABLET ORAL at 13:58

## 2025-01-20 RX ADMIN — FLUOXETINE HYDROCHLORIDE 40 MG: 20 CAPSULE ORAL at 09:26

## 2025-01-20 RX ADMIN — ALUMINUM HYDROXIDE, MAGNESIUM HYDROXIDE, AND DIMETHICONE 30 ML: 200; 20; 200 SUSPENSION ORAL at 11:07

## 2025-01-20 RX ADMIN — BUSPIRONE HYDROCHLORIDE 20 MG: 10 TABLET ORAL at 17:00

## 2025-01-20 RX ADMIN — FOLIC ACID 1 MG: 1 TABLET ORAL at 09:26

## 2025-01-20 NOTE — CMS CERTIFICATION NOTE
Recertification: Based upon physical, mental and social evaluations, I certify that inpatient psychiatric services continue to be medically necessary for this patient for a duration of 30 midnights for the treatment of  Severe episode of recurrent major depressive disorder, without psychotic features (HCC) Available alternative community resources still do not meet the patient's mental health care needs. I further attest that an established written individualized plan of care has been updated and is outlined in the patient's medical records.

## 2025-01-20 NOTE — NURSING NOTE
"Patient AA&O x 4. Denies physical pain. Endorses depression, passive SI. Denies anxiety, HI/AVH. Patient states, I'm feeling good today. At night I get thoughts and stress about what I need to do and going to [place] with my brother.\" Patient also states, \"I feel safe here, but if I was at home I'd get a gun.\" Contracts for safety. Pleasant and cooperative with medications. Denies unmet needs at this time.  "

## 2025-01-20 NOTE — PROGRESS NOTES
01/20/25 0800   Team Meeting   Meeting Type Daily Rounds   Team Members Present   Team Members Present Physician;Nurse;   Physician Team Member Jaylan/Claudia/Annalisa   Nursing Team Member Nikole   Care Management Team Member Guru   Patient/Family Present   Patient Present No   Patient's Family Present No     Patient is endorsing anxiety in the morning but was bright and social after. Patient discharge is pending stabilization of mood and medications.

## 2025-01-20 NOTE — NURSING NOTE
Patient visible in day room, no longer tearful. Interacting appropriately with peers and staff. PRN Lorazepam given at 1358 was effective.

## 2025-01-20 NOTE — PROGRESS NOTES
Progress Note - Behavioral Health   Name: Sammy Mays 64 y.o. male I MRN: [id number]  Unit/Bed#: [facility] 607-01 I Date of Admission: 1/6/2025   Date of Service: 1/20/2025 I Hospital Day: 14     Assessment & Plan  Severe episode of recurrent major depressive disorder, without psychotic features (HCC)  Continue prozac 40 mg for depression and anxiety  Continue buspirone to 20 mg twice daily  Generalized anxiety disorder  See plan above  Alcohol abuse  Counseling cessation, patient minimizes - says only drinking 1 scotch per day. Per chart review he drinks 2-3 mixed drinks daily and has required alcohol detox in the past    Continue naltrexone to 50 mg daily for alcohol cravings  Homelessness  Recently evicted from his house  Mood insomnia (HCC)  Continue trazodone 50 mg daily    Current medications:  Current Facility-Administered Medications   Medication Dose Route Frequency Provider Last Rate    acetaminophen  650 mg Oral Q4H PRN ANI Ritchie      acetaminophen  650 mg Oral Q4H PRN ANI Ritchie      acetaminophen  975 mg Oral Q6H PRN ANI Ritchie      aluminum-magnesium hydroxide-simethicone  30 mL Oral Q4H PRN ANI Ritchie      benztropine  1 mg Intramuscular Q4H PRN Max 6/day ANI Ritchie      benztropine  0.5 mg Oral Q4H PRN Max 6/day ANI Ritchie      bisacodyl  10 mg Rectal Daily PRN ANI Ritchie      busPIRone  20 mg Oral BID Heather Fang,       Cholecalciferol  1,000 Units Oral Daily ANI Whitlock      FLUoxetine  40 mg Oral Daily Heather Fang,       folic acid  1 mg Oral Daily ANI Whitlock      hydrOXYzine HCL  25 mg Oral Q6H PRN Max 4/day ANI Ritchie      hydrOXYzine HCL  50 mg Oral Q6H PRN Max 4/day ANI Ritchie      levothyroxine  75 mcg Oral Early Morning ANI Whitlock      LORazepam  1 mg Intramuscular Q6H PRN Max 3/day ANI Ritchie      LORazepam  1 mg Oral Q6H PRN Max 3/day ANI Ritchie       "magnesium Oxide  400 mg Oral Daily Regine Reid, CRHUNG      multivitamin-minerals  1 tablet Oral Daily Margaux Hubbard, ANI      naltrexone  50 mg Oral After Lunch Heather Fang, DO      OLANZapine  5 mg Intramuscular Q3H PRN Max 3/day Trish Taylor, ANI      OLANZapine  2.5 mg Oral Q4H PRN Max 6/day Trish Taylor, ANI      OLANZapine  5 mg Oral Q4H PRN Max 3/day Trish Taylor, ANI      OLANZapine  5 mg Oral Q3H PRN Max 3/day Trish Taylor, ANI      pantoprazole  40 mg Oral Early Morning Margaux Hubbard, ANI      polyethylene glycol  17 g Oral Daily PRN ANI Ritchie      propranolol  5 mg Oral Q8H PRN ANI Ritchie      senna-docusate sodium  1 tablet Oral Daily PRN ANI Ritchie      thiamine  100 mg Oral Daily Margaux Hubbard, ANI      traZODone  50 mg Oral HS Heather Fang DO         Risks / Benefits of Treatment:    Risks, benefits, and possible side effects of medications explained to patient and patient verbalizes understanding and agreement for treatment.    Subjective:    Behavior over the last 24 hours: regressed.     The patient was evaluated this morning for continuity of care and no acute distress noted throughout the evaluation.  Over the past 24 hours staff noted the patient was dealing with anxiety throughout the weekend reporting worry about discharge plan, he has been compliant with care.      Patient was visited on unit for continuing care; chart reviewed and discussed with multidisciplinary treatment team.  On approach, the patient was calm but guarded and dismissive. Sammy  became upset when he learned his brother wanted him to attend alcohol rehab prior to living with him. Patient reports that \"I know my brother changed his mind.\" States he feels unsafe to return to the home he was evicted from, says \"I'd rather be dead than be on the street.. I'll jump in front of a truck.\" Patient at this time does not feel like he has adequate support outside the " hospital and does not feel safe to discharge. No problem initiating and maintaining sleep.  Denied A/VH currently.  The patient consented for safety and agreed to verbalize any frustration or concerns to the nursing staff, immediately.    Patient continues to be selectively interactive with staff and peers. No reports of aggression or self-injurious behavior on unit. No PRN medications used in the past 24 hours.    Patient accepted all offered medications and no adverse effects of medications noted or reported.    Sleep: normal  Appetite: normal  Medication side effects: No   ROS: no complaints    Mental Status Examination:  Appearance:  age appropriate, casually dressed, looks stated age   Behavior:  guarded, psychomotor retardation   Speech:  soft   Mood:  depressed   Affect:  constricted   Thought Process:  logical, coherent, linear, negative thinking   Associations: intact associations   Thought Content:  no overt delusions, negative thoughts, ruminating thoughts   Perceptual Disturbances: no auditory hallucinations, no visual hallucinations, does not appear responding to internal stimuli   Risk Potential: Suicidal ideation - Yes, with plan to get hit by a car  Homicidal ideation - None at present  Potential for aggression - No   Sensorium:  oriented to person, place, and time/date   Memory:  recent and remote memory grossly intact   Consciousness:  alert and awake   Attention/Concentration: attention span and concentration are age appropriate   Insight:  limited   Judgment: limited   Gait/Station: normal gait/station   Motor Activity: no abnormal movements        Vital signs in last 24 hours:    Temp:  [98 °F (36.7 °C)-98.5 °F (36.9 °C)] 98 °F (36.7 °C)  HR:  [74-78] 74  BP: (117-131)/(65-74) 117/65  Resp:  [16-18] 18  SpO2:  [98 %-99 %] 99 %  O2 Device: None (Room air)         Laboratory results: I have personally reviewed all pertinent laboratory/tests results    Most Recent Labs:   Lab Results   Component  Value Date    WBC 3.97 (L) 01/07/2025    RBC 3.35 (L) 01/07/2025    HGB 11.3 (L) 01/07/2025    HCT 34.4 (L) 01/07/2025     (L) 01/07/2025    RDW 16.0 (H) 01/07/2025    NEUTROABS 2.20 01/07/2025    SODIUM 140 01/10/2025    K 4.2 01/10/2025     01/10/2025    CO2 27 01/10/2025    BUN 9 01/10/2025    CREATININE 0.58 (L) 01/10/2025    GLUC 133 01/10/2025    CALCIUM 8.6 01/10/2025    AST 25 01/10/2025    ALT 37 01/10/2025    ALKPHOS 76 01/10/2025    TP 5.8 (L) 01/10/2025    ALB 3.5 01/10/2025    TBILI 0.49 01/10/2025    CHOLESTEROL 127 01/07/2025    HDL 40 01/07/2025    TRIG 112 01/07/2025    LDLCALC 65 01/07/2025    NONHDLC 87 01/07/2025    AMMONIA 57 08/18/2024    [id number] 8.938 (H) 01/07/2025    FREET4 0.83 01/07/2025    HGBA1C 5.0 01/06/2025    EAG 97 01/06/2025       Progress Toward Goals: Patient now reporting active SI due to limited family support outside of the hospital. Discussed inpatient alcohol rehab with patient to prevent relapse, he is unsure at this time.    Counseling / Coordination of Care:    Patient's progress discussed with staff in treatment team meeting.  Medication changes reviewed with staff in treatment team meeting.  Patient's progress reviewed with nursing staff.  Medications, treatment progress and treatment plan reviewed with patient.  Medication changes discussed with patient.  Medication education provided to patient.  Patient's progress reviewed with case management staff.  Reassurance and supportive therapy provided.}    Heather Fang DO 01/20/25    This note was completed in part utilizing Dragon dictation Software. Grammatical, translation, syntax errors, random word insertions, spelling mistakes, and incomplete sentences may be an occasional consequence of this system secondary to software limitations with voice recognition, ambient noise, and hardware issues. If you have any questions or concerns about the content, text, or information contained within the body of  this dictation, please contact the provider for clarification.

## 2025-01-20 NOTE — CASE MANAGEMENT
"CM called and spoke with brother about discharge planning and some concerns that he had over patient discharge. Patient's brother is reluctant as patient did not seem that he was ready to accept that he was an alcoholic and he is not wanting to take him back until he is ready. CM made brother aware that he can only be here for so long. Brother does not want to take him until he works through the first four steps. Brother stated that he needs to do some kind of \"step down\" and cannot come directly to him. Patient stated to brother that he still plans to smoke weed and drink and he can \"have a little\" and still be fine.      Angel Mays   [phone]  "

## 2025-01-20 NOTE — TREATMENT TEAM
01/20/25 1356   Henderson Anxiety Scale   Anxious Mood 4   Tension 4   Fears 4   Insomnia 0   Intellectual 3   Depressed Mood 3   Somatic Complaints: Muscular 0   Somatic Complaints: Sensory 0   Cardiovascular Symptoms 0   Respiratory Symptoms 0   Gastrointestinal Symptoms 0   Genitourinary Symptoms 0   Autonomic Symptoms 3   Behavior at Interview 4   Henderson Anxiety Score 25     Patient tearful and tremulous. Is concerned about recent conversation with CM and brother. PRN Lorazepam 1 mg given PO at 1358. Will reassess per unit protocol.

## 2025-01-20 NOTE — PLAN OF CARE
Problem: Risk for Self Injury/Neglect  Goal: Treatment Goal: Remain safe during length of stay, learn and adopt new coping skills, and be free of self-injurious ideation, impulses and acts at the time of discharge  Outcome: Progressing  Goal: Verbalize thoughts and feelings  Description: Interventions:  - Assess and re-assess patient's lethality and potential for self-injury  - Engage patient in 1:1 interactions, daily, for a minimum of 15 minutes  - Encourage patient to express feelings, fears, frustrations, hopes  - Establish rapport/trust with patient   Outcome: Progressing  Goal: Refrain from harming self  Description: Interventions:  - Monitor patient closely, per order  - Develop a trusting relationship  - Supervise medication ingestion, monitor effects and side effects   Outcome: Progressing     Problem: Depression  Goal: Treatment Goal: Demonstrate behavioral control of depressive symptoms, verbalize feelings of improved mood/affect, and adopt new coping skills prior to discharge  Outcome: Progressing  Goal: Refrain from isolation  Description: Interventions:  - Develop a trusting relationship   - Encourage socialization   Outcome: Progressing     Problem: Anxiety  Goal: Anxiety is at manageable level  Description: Interventions:  - Assess and monitor patient's anxiety level.   - Monitor for signs and symptoms (heart palpitations, chest pain, shortness of breath, headaches, nausea, feeling jumpy, restlessness, irritable, apprehensive).   - Collaborate with interdisciplinary team and initiate plan and interventions as ordered.  - [place] patient to unit/surroundings  - Explain treatment plan  - Encourage participation in care  - Encourage verbalization of concerns/fears  - Identify coping mechanisms  - Assist in developing anxiety-reducing skills  - Administer/offer alternative therapies  - Limit or eliminate stimulants  Outcome: Progressing

## 2025-01-20 NOTE — PROGRESS NOTES
01/20/25 1000 01/20/25 1100 01/20/25 1330   Activity/Group Checklist   Group Community meeting Exercise Life Skills  (drawing task on commnuication)   Attendance Attended Attended Attended   Attendance Duration (min) 31-45 31-45 46-60   Interactions Interacted appropriately Other (Comment)  (superficiall engaged in the moves.) Other (Comment)  (Pt initially tearful after recieving a phone call from his brother that he interpreted as not going well. Pt was advised that he may have to go to rehab first then go with his brother maybe.Pt. fearful about items being stolen from his home/bills .)   Affect/Mood Appropriate;Bright Other (Comment)  (Pt. side talking with peers and sitting with crossed arms at times.) Wide   Goals Achieved Able to engage in interactions Able to listen to others Identified feelings;Discussed coping strategies;Able to engage in interactions;Able to listen to others

## 2025-01-20 NOTE — ASSESSMENT & PLAN NOTE
Counseling cessation, patient minimizes - says only drinking 1 scotch per day. Per chart review he drinks 2-3 mixed drinks daily and has required alcohol detox in the past    Continue naltrexone to 50 mg daily for alcohol cravings

## 2025-01-21 PROCEDURE — 99232 SBSQ HOSP IP/OBS MODERATE 35: CPT | Performed by: PSYCHIATRY & NEUROLOGY

## 2025-01-21 RX ADMIN — THIAMINE HCL TAB 100 MG 100 MG: 100 TAB at 08:47

## 2025-01-21 RX ADMIN — MULTIPLE VITAMINS W/ MINERALS TAB 1 TABLET: TAB ORAL at 08:47

## 2025-01-21 RX ADMIN — BUSPIRONE HYDROCHLORIDE 20 MG: 10 TABLET ORAL at 08:47

## 2025-01-21 RX ADMIN — FOLIC ACID 1 MG: 1 TABLET ORAL at 08:47

## 2025-01-21 RX ADMIN — FLUOXETINE HYDROCHLORIDE 40 MG: 20 CAPSULE ORAL at 08:47

## 2025-01-21 RX ADMIN — Medication 400 MG: at 08:47

## 2025-01-21 RX ADMIN — PANTOPRAZOLE SODIUM 40 MG: 40 TABLET, DELAYED RELEASE ORAL at 05:31

## 2025-01-21 RX ADMIN — NALTREXONE HYDROCHLORIDE 50 MG: 50 TABLET, FILM COATED ORAL at 14:17

## 2025-01-21 RX ADMIN — LEVOTHYROXINE SODIUM 75 MCG: 75 TABLET ORAL at 05:31

## 2025-01-21 RX ADMIN — TRAZODONE HYDROCHLORIDE 50 MG: 50 TABLET ORAL at 21:30

## 2025-01-21 RX ADMIN — BUSPIRONE HYDROCHLORIDE 20 MG: 10 TABLET ORAL at 17:31

## 2025-01-21 RX ADMIN — Medication 1000 UNITS: at 08:48

## 2025-01-21 NOTE — PROGRESS NOTES
01/21/25 0800   Team Meeting   Meeting Type Daily Rounds   Team Members Present   Team Members Present Physician;Nurse;   Physician Team Member Jaylan/Claudia/Annalisa   Nursing Team Member Nikole   Care Management Team Member Guru   Patient/Family Present   Patient Present No   Patient's Family Present No     Patient is depressed and anxious. Eating and sleeping well. He is attending groups. Brother was willing to help but does not want to take patient on if he is not more serious about his alcohol treatment. Patient discharge is pending stabilization of mood and medications.

## 2025-01-21 NOTE — PLAN OF CARE
Pt. Attending all scheduled groups today with brighter affect and expressed that he is working on building a brighter future.  Problem: Ineffective Coping  Goal: Participates in unit activities  Description: Interventions:  - Provide therapeutic environment   - Provide required programming   - Redirect inappropriate behaviors   Outcome: Progressing

## 2025-01-21 NOTE — PROGRESS NOTES
Progress Note - Behavioral Health   Name: Sammy Mays 64 y.o. male I MRN: [id number]  Unit/Bed#: [facility] 607[facility]01 I Date of Admission: 1/6/2025   Date of Service: 1/21/2025 I Hospital Day: 15     Assessment & Plan  Severe episode of recurrent major depressive disorder, without psychotic features (HCC)  Continue prozac 40 mg for depression and anxiety  Continue buspirone to 20 mg twice daily  Generalized anxiety disorder  See plan above  Alcohol abuse  Counseling cessation, patient minimizes - says only drinking 1 scotch per day. Per chart review he drinks 2-3 mixed drinks daily and has required alcohol detox in the past    Continue naltrexone to 50 mg daily for alcohol cravings  Homelessness  Recently evicted from his house  Mood insomnia (HCC)  Continue trazodone 50 mg daily    Current medications:  Current Facility-Administered Medications   Medication Dose Route Frequency Provider Last Rate    acetaminophen  650 mg Oral Q4H PRN ANI Ritchie      acetaminophen  650 mg Oral Q4H PRN ANI Ritchie      acetaminophen  975 mg Oral Q6H PRN ANI Ritchie      aluminum-magnesium hydroxide-simethicone  30 mL Oral Q4H PRN ANI Ritchie      benztropine  1 mg Intramuscular Q4H PRN Max 6/day ANI Ritchie      benztropine  0.5 mg Oral Q4H PRN Max 6/day ANI Ritchie      bisacodyl  10 mg Rectal Daily PRN ANI Ritchie      busPIRone  20 mg Oral BID Heather Fang,       Cholecalciferol  1,000 Units Oral Daily ANI Whitlock      FLUoxetine  40 mg Oral Daily Heather Fang,       folic acid  1 mg Oral Daily ANI Whitlock      hydrOXYzine HCL  25 mg Oral Q6H PRN Max 4/day ANI Ritchie      hydrOXYzine HCL  50 mg Oral Q6H PRN Max 4/day ANI Ritchie      levothyroxine  75 mcg Oral Early Morning ANI Whitlock      LORazepam  1 mg Intramuscular Q6H PRN Max 3/day ANI Ritchie      LORazepam  1 mg Oral Q6H PRN Max 3/day ANI Ritchie       magnesium Oxide  400 mg Oral Daily Regine Reid, CRHUNG      multivitamin-minerals  1 tablet Oral Daily Margaux Hubbard, ANI      naltrexone  50 mg Oral After Lunch Heather Fang,       OLANZapine  5 mg Intramuscular Q3H PRN Max 3/day Trish Taylor, ANGELESNP      OLANZapine  2.5 mg Oral Q4H PRN Max 6/day Trish Taylor, ANGELESNP      OLANZapine  5 mg Oral Q4H PRN Max 3/day Trish Taylor, ANGELESNP      OLANZapine  5 mg Oral Q3H PRN Max 3/day Trish Taylor, ANI      pantoprazole  40 mg Oral Early Morning Margaux Hubbard, ANI      polyethylene glycol  17 g Oral Daily PRN Trish Taylor, ANI      propranolol  5 mg Oral Q8H PRN Trish Taylor, ANI      senna-docusate sodium  1 tablet Oral Daily PRN Trish Taylor, ANI      thiamine  100 mg Oral Daily Margaux Hubbard, ANI      traZODone  50 mg Oral HS Heather Fang DO         Risks / Benefits of Treatment:    Risks, benefits, and possible side effects of medications explained to patient. Patient has limited understanding of risks and benefits of treatment at this time, but agrees to take medications as prescribed.    Subjective:    Behavior over the last 24 hours: slowly improving.     The patient was evaluated this morning for continuity of care and no acute distress noted throughout the evaluation.  Over the past 24 hours staff noted the patient was reporting depression, was tearful with staff regarding his discharge plan.      Patient was visited on unit for continuing care; chart reviewed and discussed with multidisciplinary treatment team.  On approach, the patient was calm and cooperative.  Patient less frustrated than yesterday, starting to become future oriented, was tearful and hopeless yesterday but the symptoms are improving.  He is more open to outpatient alcohol rehab.  Endorsed better mood but continues to have negative thinking and ruminating thoughts. No problem initiating and maintaining sleep.  Denied A/VH currently.  The patient consented for  safety and agreed to verbalize any frustration or concerns to the nursing staff, immediately.    Patient continues to be selectively interactive with staff and peers. No reports of aggression or self-injurious behavior on unit. No PRN medications used in the past 24 hours.    Patient accepted all offered medications and no adverse effects of medications noted or reported.    Sleep: normal  Appetite: normal  Medication side effects: No   ROS: no complaints    Mental Status Examination:  Appearance:  age appropriate, casually dressed, looks stated age   Behavior:  more cooperative, better eye contact, psychomotor retardation   Speech:  soft, more talkative   Mood:  depressed   Affect:  constricted   Thought Process:  logical, coherent, linear, negative thinking   Associations: intact associations   Thought Content:  no overt delusions, negative thoughts   Perceptual Disturbances: no auditory hallucinations, no visual hallucinations, does not appear responding to internal stimuli   Risk Potential: Suicidal ideation - Yes, fleeting suicidal thoughts, but would not feel safe if discharged today  Homicidal ideation - None at present  Potential for aggression - No   Sensorium:  oriented to person, place, and time/date   Memory:  recent and remote memory grossly intact   Consciousness:  alert and awake   Attention/Concentration: attention span and concentration are age appropriate   Insight:  limited   Judgment: limited   Gait/Station: normal gait/station   Motor Activity: no abnormal movements        Vital signs in last 24 hours:    Temp:  [97 °F (36.1 °C)-99.4 °F (37.4 °C)] 99.4 °F (37.4 °C)  HR:  [72-81] 81  BP: (123-134)/(63-64) 123/63  Resp:  [16-18] 18  SpO2:  [95 %-100 %] 95 %  O2 Device: None (Room air)         Laboratory results: I have personally reviewed all pertinent laboratory/tests results    Most Recent Labs:   Lab Results   Component Value Date    WBC 3.97 (L) 01/07/2025    RBC 3.35 (L) 01/07/2025    HGB  11.3 (L) 01/07/2025    HCT 34.4 (L) 01/07/2025     (L) 01/07/2025    RDW 16.0 (H) 01/07/2025    NEUTROABS 2.20 01/07/2025    SODIUM 140 01/10/2025    K 4.2 01/10/2025     01/10/2025    CO2 27 01/10/2025    BUN 9 01/10/2025    CREATININE 0.58 (L) 01/10/2025    GLUC 133 01/10/2025    CALCIUM 8.6 01/10/2025    AST 25 01/10/2025    ALT 37 01/10/2025    ALKPHOS 76 01/10/2025    TP 5.8 (L) 01/10/2025    ALB 3.5 01/10/2025    TBILI 0.49 01/10/2025    CHOLESTEROL 127 01/07/2025    HDL 40 01/07/2025    TRIG 112 01/07/2025    LDLCALC 65 01/07/2025    NONHDLC 87 01/07/2025    AMMONIA 57 08/18/2024    [id number] 8.938 (H) 01/07/2025    FREET4 0.83 01/07/2025    HGBA1C 5.0 01/06/2025    EAG 97 01/06/2025       Progress Toward Goals: Patient feeling better than yesterday, he is starting to become future oriented and tolerating medications well.  We will monitor improvement of symptoms, possibly consider increasing Prozac.    Counseling / Coordination of Care:    Patient's progress discussed with staff in treatment team meeting.  Medication changes reviewed with staff in treatment team meeting.  Patient's progress reviewed with nursing staff.  Medications, treatment progress and treatment plan reviewed with patient.  Medication changes discussed with patient.  Medication education provided to patient.  Patient's progress reviewed with case management staff.  Reassurance and supportive therapy provided.}    Heather Fang DO 01/21/25    This note was completed in part utilizing Dragon dictation Software. Grammatical, translation, syntax errors, random word insertions, spelling mistakes, and incomplete sentences may be an occasional consequence of this system secondary to software limitations with voice recognition, ambient noise, and hardware issues. If you have any questions or concerns about the content, text, or information contained within the body of this dictation, please contact the provider for clarification.

## 2025-01-21 NOTE — PROGRESS NOTES
01/21/25 1000 01/21/25 1345   Activity/Group Checklist   Group Community meeting  (affirmations.) Pet therapy   Attendance Attended Attended   Attendance Duration (min) 46-60 31-45   Interactions Interacted appropriately  (Pt. able to state that he is working on a brighter future.Pt. upbeat and positive today.) Interacted appropriately   Affect/Mood Appropriate;Bright;Normal range;Calm Appropriate;Calm;Bright   Goals Achieved Discussed coping strategies;Able to listen to others;Able to engage in interactions Able to engage in interactions

## 2025-01-21 NOTE — NURSING NOTE
Patient is alert and oriented able to make his needs known. He is visible in the milieu interacts with select peers. attended groups. He is calm and pleasant on approach. Patient has good appetite, consumed 100 % for breakfast and lunch. He is medication and meal compliant. Denies all psych s/s. Safety checks ongoing.

## 2025-01-21 NOTE — TREATMENT TEAM
Pt cooperative and able to self express.  Pt was able to identify goal.  Pt did acknowledge how it impacts his goal setting and motivation.          01/21/25 1100   Activity/Group Checklist   Group Other (Comment)  ( Recovery: SMART goals)   Attendance Attended   Attendance Duration (min) 31-45   Interactions Interacted appropriately   Affect/Mood Appropriate   Goals Achieved Identified triggers;Identified feelings;Able to listen to others;Able to engage in interactions;Able to manage/cope with feelings;Able to reflect/comment on own behavior;Verbalized increased hopefulness;Able to self-disclose;Able to recieve feedback;Discussed coping strategies;Discussed self-esteem issues

## 2025-01-21 NOTE — PLAN OF CARE
Problem: Risk for Self Injury/Neglect  Goal: Treatment Goal: Remain safe during length of stay, learn and adopt new coping skills, and be free of self-injurious ideation, impulses and acts at the time of discharge  Outcome: Progressing  Goal: Verbalize thoughts and feelings  Description: Interventions:  - Assess and re-assess patient's lethality and potential for self-injury  - Engage patient in 1:1 interactions, daily, for a minimum of 15 minutes  - Encourage patient to express feelings, fears, frustrations, hopes  - Establish rapport/trust with patient   Outcome: Progressing  Goal: Refrain from harming self  Description: Interventions:  - Monitor patient closely, per order  - Develop a trusting relationship  - Supervise medication ingestion, monitor effects and side effects   Outcome: Progressing     Problem: Depression  Goal: Treatment Goal: Demonstrate behavioral control of depressive symptoms, verbalize feelings of improved mood/affect, and adopt new coping skills prior to discharge  Outcome: Progressing  Goal: Refrain from isolation  Description: Interventions:  - Develop a trusting relationship   - Encourage socialization   Outcome: Progressing     Problem: Anxiety  Goal: Anxiety is at manageable level  Description: Interventions:  - Assess and monitor patient's anxiety level.   - Monitor for signs and symptoms (heart palpitations, chest pain, shortness of breath, headaches, nausea, feeling jumpy, restlessness, irritable, apprehensive).   - Collaborate with interdisciplinary team and initiate plan and interventions as ordered.  - [place] patient to unit/surroundings  - Explain treatment plan  - Encourage participation in care  - Encourage verbalization of concerns/fears  - Identify coping mechanisms  - Assist in developing anxiety-reducing skills  - Administer/offer alternative therapies  - Limit or eliminate stimulants  Outcome: Progressing

## 2025-01-22 ENCOUNTER — TELEPHONE (OUTPATIENT)
Dept: GASTROENTEROLOGY | Facility: CLINIC | Age: 65
End: 2025-01-22

## 2025-01-22 PROCEDURE — 99232 SBSQ HOSP IP/OBS MODERATE 35: CPT | Performed by: PSYCHIATRY & NEUROLOGY

## 2025-01-22 RX ADMIN — FOLIC ACID 1 MG: 1 TABLET ORAL at 09:16

## 2025-01-22 RX ADMIN — THIAMINE HCL TAB 100 MG 100 MG: 100 TAB at 09:15

## 2025-01-22 RX ADMIN — PANTOPRAZOLE SODIUM 40 MG: 40 TABLET, DELAYED RELEASE ORAL at 05:52

## 2025-01-22 RX ADMIN — Medication 400 MG: at 09:15

## 2025-01-22 RX ADMIN — MULTIPLE VITAMINS W/ MINERALS TAB 1 TABLET: TAB ORAL at 09:15

## 2025-01-22 RX ADMIN — BUSPIRONE HYDROCHLORIDE 20 MG: 10 TABLET ORAL at 17:09

## 2025-01-22 RX ADMIN — LEVOTHYROXINE SODIUM 75 MCG: 75 TABLET ORAL at 05:52

## 2025-01-22 RX ADMIN — TRAZODONE HYDROCHLORIDE 50 MG: 50 TABLET ORAL at 21:19

## 2025-01-22 RX ADMIN — BUSPIRONE HYDROCHLORIDE 20 MG: 10 TABLET ORAL at 09:15

## 2025-01-22 RX ADMIN — Medication 1000 UNITS: at 09:14

## 2025-01-22 RX ADMIN — FLUOXETINE HYDROCHLORIDE 40 MG: 20 CAPSULE ORAL at 09:15

## 2025-01-22 RX ADMIN — NALTREXONE HYDROCHLORIDE 50 MG: 50 TABLET, FILM COATED ORAL at 15:02

## 2025-01-22 NOTE — NURSING NOTE
Patient is pleasant, cooperative and visible on the unit this shift.  He endorses anxiety regarding his discharge disposition but denies all other psych s/s.  Medication and meal compliant.  Safety precautions maintained.

## 2025-01-22 NOTE — PROGRESS NOTES
Progress Note - Behavioral Health   Name: aSmmy Mays 64 y.o. male I MRN: [id number]  Unit/Bed#: [facility] 607-01 I Date of Admission: 1/6/2025   Date of Service: 1/22/2025 I Hospital Day: 16     Assessment & Plan  Severe episode of recurrent major depressive disorder, without psychotic features (HCC)  Increase prozac to 60 mg for depression and anxiety  Continue buspirone to 20 mg twice daily  Generalized anxiety disorder  See plan above  Alcohol abuse  Counseling cessation, patient minimizes - says only drinking 1 scotch per day. Per chart review he drinks 2-3 mixed drinks daily and has required alcohol detox in the past    Continue naltrexone to 50 mg daily for alcohol cravings  Homelessness  Recently evicted from his house  Mood insomnia (HCC)  Continue trazodone 50 mg daily    Current medications:  Current Facility-Administered Medications   Medication Dose Route Frequency Provider Last Rate    acetaminophen  650 mg Oral Q4H PRN ANI Ritchie      acetaminophen  650 mg Oral Q4H PRN ANI Ritchie      acetaminophen  975 mg Oral Q6H PRN ANI Ritchie      aluminum-magnesium hydroxide-simethicone  30 mL Oral Q4H PRN ANI Ritchie      benztropine  1 mg Intramuscular Q4H PRN Max 6/day ANI Ritchie      benztropine  0.5 mg Oral Q4H PRN Max 6/day ANI Ritchie      bisacodyl  10 mg Rectal Daily PRN ANI Ritchie      busPIRone  20 mg Oral BID Heather Fang,       Cholecalciferol  1,000 Units Oral Daily ANI Whitlock      FLUoxetine  40 mg Oral Daily Heather Fang,       folic acid  1 mg Oral Daily ANI Whitlock      hydrOXYzine HCL  25 mg Oral Q6H PRN Max 4/day ANI Ritchie      hydrOXYzine HCL  50 mg Oral Q6H PRN Max 4/day ANI Ritchie      levothyroxine  75 mcg Oral Early Morning ANI Whitlock      LORazepam  1 mg Intramuscular Q6H PRN Max 3/day ANI Ritchie      LORazepam  1 mg Oral Q6H PRN Max 3/day ANI Ritchie       magnesium Oxide  400 mg Oral Daily Regine Reid, CRNP      multivitamin-minerals  1 tablet Oral Daily Margaux Hubbard, ANI      naltrexone  50 mg Oral After Lunch Heather Fang,       OLANZapine  5 mg Intramuscular Q3H PRN Max 3/day Trish Taylor, ANGELESNP      OLANZapine  2.5 mg Oral Q4H PRN Max 6/day Trish Taylor, ANGELESNP      OLANZapine  5 mg Oral Q4H PRN Max 3/day Trish Taylor, CRNP      OLANZapine  5 mg Oral Q3H PRN Max 3/day Trish Taylor, ANGELESNP      pantoprazole  40 mg Oral Early Morning Margaux Hubbard, CRNP      polyethylene glycol  17 g Oral Daily PRN Trish Taylor, ANI      propranolol  5 mg Oral Q8H PRN Trish Taylor, ANI      senna-docusate sodium  1 tablet Oral Daily PRN Trish Taylor, ANGELESNP      thiamine  100 mg Oral Daily Margaux Hubbard, ANGELESNP      traZODone  50 mg Oral HS Heather Fang DO         Risks / Benefits of Treatment:    Risks, benefits, and possible side effects of medications explained to patient and patient verbalizes understanding and agreement for treatment.    Subjective:    Behavior over the last 24 hours: slowly improving.     The patient was evaluated this morning for continuity of care and no acute distress noted throughout the evaluation.  Over the past 24 hours staff noted the patient was endorsing anxiety, compliant with care.      Patient was visited on unit for continuing care; chart reviewed and discussed with multidisciplinary treatment team.  On approach, the patient was calm and cooperative.  Patient states he is still worried about things, stating he is afraid he will be readmitted to the hospital if discharged at this time.  He is concerned about having follow-up with psychiatry, PCP and possibly an outpatient .  Patient has been unsuccessful in the past living alone and managing his own finances due to mental health and alcohol use disorder.  No problem initiating and maintaining sleep.  Denied A/VH currently.  Continues to report intrusive  thoughts and negative ruminations. The patient consented for safety and agreed to verbalize any frustration or concerns to the nursing staff, immediately.    Patient continues to be selectively interactive with staff and peers. No reports of aggression or self-injurious behavior on unit. No PRN medications used in the past 24 hours.    Patient accepted all offered medications and no adverse effects of medications noted or reported.    Sleep: normal  Appetite: normal  Medication side effects: No   ROS: no complaints    Mental Status Examination:  Appearance:  age appropriate, casually dressed, looks stated age   Behavior:  cooperative, psychomotor retardation   Speech:  normal rate and volume   Mood:  depressed   Affect:  constricted   Thought Process:  logical, coherent, linear, normal rate of thoughts, negative thinking   Associations: intact associations   Thought Content:  no overt delusions, negative thoughts, ruminating thoughts   Perceptual Disturbances: no auditory hallucinations, no visual hallucinations, does not appear responding to internal stimuli   Risk Potential: Suicidal ideation - Yes, fleeting suicidal thoughts, would not feel safe if discharged  Homicidal ideation - None at present  Potential for aggression - No   Sensorium:  oriented to person, place, and time/date   Memory:  recent and remote memory grossly intact   Consciousness:  alert and awake   Attention/Concentration: attention span and concentration are age appropriate   Insight:  limited   Judgment: limited   Gait/Station: normal gait/station   Motor Activity: no abnormal movements        Vital signs in last 24 hours:    Temp:  [96.8 °F (36 °C)-98.2 °F (36.8 °C)] 98.2 °F (36.8 °C)  HR:  [69-78] 78  BP: (111-135)/(61-90) 111/61  Resp:  [18-19] 18  SpO2:  [97 %-99 %] 97 %  O2 Device: None (Room air)         Laboratory results: I have personally reviewed all pertinent laboratory/tests results    Most Recent Labs:   Lab Results   Component  Value Date    WBC 3.97 (L) 01/07/2025    RBC 3.35 (L) 01/07/2025    HGB 11.3 (L) 01/07/2025    HCT 34.4 (L) 01/07/2025     (L) 01/07/2025    RDW 16.0 (H) 01/07/2025    NEUTROABS 2.20 01/07/2025    SODIUM 140 01/10/2025    K 4.2 01/10/2025     01/10/2025    CO2 27 01/10/2025    BUN 9 01/10/2025    CREATININE 0.58 (L) 01/10/2025    GLUC 133 01/10/2025    CALCIUM 8.6 01/10/2025    AST 25 01/10/2025    ALT 37 01/10/2025    ALKPHOS 76 01/10/2025    TP 5.8 (L) 01/10/2025    ALB 3.5 01/10/2025    TBILI 0.49 01/10/2025    CHOLESTEROL 127 01/07/2025    HDL 40 01/07/2025    TRIG 112 01/07/2025    LDLCALC 65 01/07/2025    NONHDLC 87 01/07/2025    AMMONIA 57 08/18/2024    TLT6EGHUPVZC 8.938 (H) 01/07/2025    FREET4 0.83 01/07/2025    HGBA1C 5.0 01/06/2025    EAG 97 01/06/2025       Progress Toward Goals: Plan at this time to increase Prozac, patient still endorsing anxiety, ruminating thoughts and concerned that he will be readmitted if discharged due to inability to function alone and manage his own finances.  Patient is also concerned about starting to drink after discharge.    Counseling / Coordination of Care:    Patient's progress discussed with staff in treatment team meeting.  Medication changes reviewed with staff in treatment team meeting.  Patient's progress reviewed with nursing staff.  Medications, treatment progress and treatment plan reviewed with patient.  Medication changes discussed with patient.  Medication education provided to patient.  Patient's progress reviewed with case management staff.  Reassurance and supportive therapy provided.}    Heather Fang DO 01/22/25    This note was completed in part utilizing Dragon dictation Software. Grammatical, translation, syntax errors, random word insertions, spelling mistakes, and incomplete sentences may be an occasional consequence of this system secondary to software limitations with voice recognition, ambient noise, and hardware issues. If you have  any questions or concerns about the content, text, or information contained within the body of this dictation, please contact the provider for clarification.

## 2025-01-22 NOTE — CASE MANAGEMENT
Cm called brother to speak with him about discharge and to ask wether or not he would be able to help patient once established with logistics. Left .     Angel Mays   [phone]

## 2025-01-22 NOTE — TELEPHONE ENCOUNTER
Reviewing Pathology       Notes say :RECOMMENDATION:  Await pathology results   Follow up with [facility]      Will need non-urgent outpatient EUS to evaluate submucosal nodule and can further evaluate pancreas at that time as well.           Vicki Byrd MD         When is EUS needed ?

## 2025-01-22 NOTE — PLAN OF CARE
Problem: Risk for Self Injury/Neglect  Goal: Treatment Goal: Remain safe during length of stay, learn and adopt new coping skills, and be free of self-injurious ideation, impulses and acts at the time of discharge  Outcome: Progressing  Goal: Verbalize thoughts and feelings  Description: Interventions:  - Assess and re-assess patient's lethality and potential for self-injury  - Engage patient in 1:1 interactions, daily, for a minimum of 15 minutes  - Encourage patient to express feelings, fears, frustrations, hopes  - Establish rapport/trust with patient   Outcome: Progressing  Goal: Refrain from harming self  Description: Interventions:  - Monitor patient closely, per order  - Develop a trusting relationship  - Supervise medication ingestion, monitor effects and side effects   Outcome: Progressing     Problem: Ineffective Coping  Goal: Participates in unit activities  Description: Interventions:  - Provide therapeutic environment   - Provide required programming   - Redirect inappropriate behaviors   Outcome: Progressing     Problem: Anxiety  Goal: Anxiety is at manageable level  Description: Interventions:  - Assess and monitor patient's anxiety level.   - Monitor for signs and symptoms (heart palpitations, chest pain, shortness of breath, headaches, nausea, feeling jumpy, restlessness, irritable, apprehensive).   - Collaborate with interdisciplinary team and initiate plan and interventions as ordered.  - [place] patient to unit/surroundings  - Explain treatment plan  - Encourage participation in care  - Encourage verbalization of concerns/fears  - Identify coping mechanisms  - Assist in developing anxiety-reducing skills  - Administer/offer alternative therapies  - Limit or eliminate stimulants  Outcome: Progressing

## 2025-01-22 NOTE — NURSING NOTE
"Patient visible in milieu. Social with peers. Pleasant and cooperative on approach. Endorses anxiety r/t discharge details but appears hopeful that things will work out. Currently denies SI/HI but states, \"If I went to live in that house again I probably would have those thoughts again. I don't want to go back there.\" Denies AH/VH and pain. Medication compliant.   "

## 2025-01-22 NOTE — CASE MANAGEMENT
CM spoke with patient about discharge planning. Patient had a plan in place in which he would be discharged to his car, for which he has a new battery and his keys at the facility. Patient would like to stay in [facility] [place] [facility] from where he was previously staying. He would like to remain in that area for the logistics as he needs to continue to try and gain entrance (through legal means) to the home he was living in to collect his clothes and other things. Client also plans to attend dual diagnosis outpatient and to work with brother on some of the logistical things that he still needs to complete. Patient also has [employer] in the chance that his car does not start. CM has VM in to brother Angel.

## 2025-01-22 NOTE — PROGRESS NOTES
Pt attends groups.  Pt pleasant and cooperative  Pt  displayed positive thinking pattens.   Pt more spontaneous in speech and  less anxious.      01/22/25 1100   Activity/Group Checklist   Group Other (Comment)  (Fulfilled feelings and self expression)   Attendance Attended   Attendance Duration (min) 31-45   Interactions Interacted appropriately   Affect/Mood Appropriate   Goals Achieved Identified feelings;Identified triggers;Able to listen to others;Able to reflect/comment on own behavior;Able to engage in interactions;Able to manage/cope with feelings;Verbalized increased hopefulness;Able to self-disclose;Discussed coping strategies;Discussed self-esteem issues

## 2025-01-22 NOTE — PROGRESS NOTES
01/22/25 0800   Team Meeting   Meeting Type Daily Rounds   Team Members Present   Team Members Present Physician;Nurse;   Physician Team Member Jaylan/Claudia/Annalisa   Nursing Team Member Nikole   Care Management Team Member Guru   Patient/Family Present   Patient Present No   Patient's Family Present No     Patient endorses anxiety. He showered yesterday. He is focused on details of discharge. Brother had originally stated that he would take him in but wants him to attend drug and alcohol treatment first. Patient discharge is pending stabilization of mood and medications.

## 2025-01-22 NOTE — PROGRESS NOTES
01/22/25 1000   Activity/Group Checklist   Group Community meeting  (topic coping with disappointments)   Attendance Attended   Attendance Duration (min) 46-60   Interactions Interacted appropriately   Affect/Mood Appropriate;Bright;Normal range   Goals Achieved Discussed coping strategies;Able to engage in interactions

## 2025-01-23 ENCOUNTER — TELEPHONE (OUTPATIENT)
Age: 65
End: 2025-01-23

## 2025-01-23 PROCEDURE — 99232 SBSQ HOSP IP/OBS MODERATE 35: CPT | Performed by: PSYCHIATRY & NEUROLOGY

## 2025-01-23 RX ADMIN — Medication 1000 UNITS: at 09:26

## 2025-01-23 RX ADMIN — NALTREXONE HYDROCHLORIDE 50 MG: 50 TABLET, FILM COATED ORAL at 13:24

## 2025-01-23 RX ADMIN — Medication 400 MG: at 09:26

## 2025-01-23 RX ADMIN — LEVOTHYROXINE SODIUM 75 MCG: 75 TABLET ORAL at 06:00

## 2025-01-23 RX ADMIN — BUSPIRONE HYDROCHLORIDE 20 MG: 10 TABLET ORAL at 09:26

## 2025-01-23 RX ADMIN — PANTOPRAZOLE SODIUM 40 MG: 40 TABLET, DELAYED RELEASE ORAL at 06:00

## 2025-01-23 RX ADMIN — THIAMINE HCL TAB 100 MG 100 MG: 100 TAB at 09:26

## 2025-01-23 RX ADMIN — FOLIC ACID 1 MG: 1 TABLET ORAL at 09:26

## 2025-01-23 RX ADMIN — TRAZODONE HYDROCHLORIDE 50 MG: 50 TABLET ORAL at 21:32

## 2025-01-23 RX ADMIN — MULTIPLE VITAMINS W/ MINERALS TAB 1 TABLET: TAB ORAL at 09:26

## 2025-01-23 RX ADMIN — FLUOXETINE HYDROCHLORIDE 60 MG: 20 CAPSULE ORAL at 09:26

## 2025-01-23 RX ADMIN — BUSPIRONE HYDROCHLORIDE 20 MG: 10 TABLET ORAL at 17:00

## 2025-01-23 NOTE — PROGRESS NOTES
01/23/25 0900   Team Meeting   Meeting Type Daily Rounds   Team Members Present   Team Members Present Physician;Nurse;   Physician Team Member Jaylan/Claudia/Annalisa   Nursing Team Member Nikole   Care Management Team Member Guru   Patient/Family Present   Patient Present No   Patient's Family Present No     Patient endorses anxiety although he has been brighter upon approach. He is eating and sleeping well. He has been attending groups. Discharge is pending continued improvement.

## 2025-01-23 NOTE — PLAN OF CARE
Problem: Risk for Self Injury/Neglect  Goal: Treatment Goal: Remain safe during length of stay, learn and adopt new coping skills, and be free of self-injurious ideation, impulses and acts at the time of discharge  Outcome: Progressing  Goal: Verbalize thoughts and feelings  Description: Interventions:  - Assess and re-assess patient's lethality and potential for self-injury  - Engage patient in 1:1 interactions, daily, for a minimum of 15 minutes  - Encourage patient to express feelings, fears, frustrations, hopes  - Establish rapport/trust with patient   Outcome: Progressing  Goal: Refrain from harming self  Description: Interventions:  - Monitor patient closely, per order  - Develop a trusting relationship  - Supervise medication ingestion, monitor effects and side effects   Outcome: Progressing     Problem: Depression  Goal: Treatment Goal: Demonstrate behavioral control of depressive symptoms, verbalize feelings of improved mood/affect, and adopt new coping skills prior to discharge  Outcome: Progressing  Goal: Refrain from isolation  Description: Interventions:  - Develop a trusting relationship   - Encourage socialization   Outcome: Progressing     Problem: Anxiety  Goal: Anxiety is at manageable level  Description: Interventions:  - Assess and monitor patient's anxiety level.   - Monitor for signs and symptoms (heart palpitations, chest pain, shortness of breath, headaches, nausea, feeling jumpy, restlessness, irritable, apprehensive).   - Collaborate with interdisciplinary team and initiate plan and interventions as ordered.  - [place] patient to unit/surroundings  - Explain treatment plan  - Encourage participation in care  - Encourage verbalization of concerns/fears  - Identify coping mechanisms  - Assist in developing anxiety-reducing skills  - Administer/offer alternative therapies  - Limit or eliminate stimulants  Outcome: Progressing

## 2025-01-23 NOTE — PROGRESS NOTES
Progress Note - Behavioral Health   Name: Sammy Mays 64 y.o. male I MRN: [id number]  Unit/Bed#: [facility] 607-01 I Date of Admission: 1/6/2025   Date of Service: 1/23/2025 I Hospital Day: 17     Assessment & Plan  Severe episode of recurrent major depressive disorder, without psychotic features (HCC)  Continue prozac to 60 mg for depression and anxiety  Continue buspirone to 20 mg twice daily  Generalized anxiety disorder  See plan above  Alcohol abuse  Counseling cessation, patient minimizes - says only drinking 1 scotch per day. Per chart review he drinks 2-3 mixed drinks daily and has required alcohol detox in the past    Continue naltrexone to 50 mg daily for alcohol cravings  Homelessness  Recently evicted from his house  Mood insomnia (HCC)  Continue trazodone 50 mg daily    Current medications:  Current Facility-Administered Medications   Medication Dose Route Frequency Provider Last Rate    acetaminophen  650 mg Oral Q4H PRN ANI Ritchie      acetaminophen  650 mg Oral Q4H PRN ANI Ritchie      acetaminophen  975 mg Oral Q6H PRN ANI Ritchie      aluminum-magnesium hydroxide-simethicone  30 mL Oral Q4H PRN ANI Ritchie      benztropine  1 mg Intramuscular Q4H PRN Max 6/day ANI Ritchie      benztropine  0.5 mg Oral Q4H PRN Max 6/day ANI Ritchie      bisacodyl  10 mg Rectal Daily PRN ANI Ritchie      busPIRone  20 mg Oral BID Heather Fang,       Cholecalciferol  1,000 Units Oral Daily ANI Whitlock      FLUoxetine  60 mg Oral Daily Heather Fang,       folic acid  1 mg Oral Daily ANI Whitlock      hydrOXYzine HCL  25 mg Oral Q6H PRN Max 4/day ANI Ritchie      hydrOXYzine HCL  50 mg Oral Q6H PRN Max 4/day ANI Ritchie      levothyroxine  75 mcg Oral Early Morning ANI Whitlock      LORazepam  1 mg Intramuscular Q6H PRN Max 3/day ANI Ritchie      LORazepam  1 mg Oral Q6H PRN Max 3/day NAI Ritchie       magnesium Oxide  400 mg Oral Daily Regine Reid, CRHUNG      multivitamin-minerals  1 tablet Oral Daily Margaux Hubbard, ANI      naltrexone  50 mg Oral After Lunch Heather Fang,       OLANZapine  5 mg Intramuscular Q3H PRN Max 3/day Trish Taylor, ANGELESNP      OLANZapine  2.5 mg Oral Q4H PRN Max 6/day Trish Taylor, ANGELESNP      OLANZapine  5 mg Oral Q4H PRN Max 3/day Trish Taylor, ANGELESNP      OLANZapine  5 mg Oral Q3H PRN Max 3/day Trish Taylor, ANI      pantoprazole  40 mg Oral Early Morning Margaux Hubbard, ANI      polyethylene glycol  17 g Oral Daily PRN Trish Taylor, ANI      propranolol  5 mg Oral Q8H PRN ANI Ritchie      senna-docusate sodium  1 tablet Oral Daily PRN Trish Taylor, ANI      thiamine  100 mg Oral Daily Margaux Hubbard, ANI      traZODone  50 mg Oral HS Heather Fang DO         Risks / Benefits of Treatment:    Risks, benefits, and possible side effects of medications explained to patient and patient verbalizes understanding and agreement for treatment.    Subjective:    Behavior over the last 24 hours: slowly improving.     The patient was evaluated this morning for continuity of care and no acute distress noted throughout the evaluation.  Over the past 24 hours staff noted the patient was compliant with medications, having regular bowel movements and taking regular showers.      Patient was visited on unit for continuing care; chart reviewed and discussed with multidisciplinary treatment team.  On approach, the patient was calm but guarded and dismissive.  Patient still seems nervous and unprepared for potential discharge.  Stating that he is unsure if his phone is still in service, states his car may not turn on and does not have inspection since August, states that his partner is living in a nursing home and has dementia, currently does not have any support in state.  All of these stressors are contributing to patient feeling overwhelmed and unsafe in the  community.  No problem initiating and maintaining sleep.  Denied A/VH currently.  The patient consented for safety and agreed to verbalize any frustration or concerns to the nursing staff, immediately.    Patient continues to be selectively interactive with staff and peers. No reports of aggression or self-injurious behavior on unit. No PRN medications used in the past 24 hours.    Patient accepted all offered medications and no adverse effects of medications noted or reported.    Sleep: normal  Appetite: normal  Medication side effects: No   ROS: no complaints    Mental Status Examination:  Appearance:  age appropriate, casually dressed, looks stated age   Behavior:  guarded, psychomotor retardation   Speech:  scant, soft   Mood:  depressed   Affect:  constricted   Thought Process:  logical, linear, negative thinking   Associations: intact associations   Thought Content:  no overt delusions, negative thoughts, ruminating thoughts   Perceptual Disturbances: no auditory hallucinations, no visual hallucinations, does not appear responding to internal stimuli   Risk Potential: Suicidal ideation - Yes, passive death wish, but would not feel safe if discharged today  Homicidal ideation - None at present  Potential for aggression - No   Sensorium:  oriented to person, place, and time/date   Memory:  recent and remote memory grossly intact   Consciousness:  alert and awake   Attention/Concentration: attention span and concentration are age appropriate   Insight:  limited   Judgment: limited   Gait/Station: normal gait/station   Motor Activity: no abnormal movements        Vital signs in last 24 hours:    Temp:  [97.6 °F (36.4 °C)-98.2 °F (36.8 °C)] 98.2 °F (36.8 °C)  HR:  [73-75] 74  BP: (122-144)/(63-79) 144/65  Resp:  [17-19] 18  SpO2:  [95 %-98 %] 98 %  O2 Device: None (Room air)         Laboratory results: I have personally reviewed all pertinent laboratory/tests results    Most Recent Labs:   Lab Results   Component  Value Date    WBC 3.97 (L) 01/07/2025    RBC 3.35 (L) 01/07/2025    HGB 11.3 (L) 01/07/2025    HCT 34.4 (L) 01/07/2025     (L) 01/07/2025    RDW 16.0 (H) 01/07/2025    NEUTROABS 2.20 01/07/2025    SODIUM 140 01/10/2025    K 4.2 01/10/2025     01/10/2025    CO2 27 01/10/2025    BUN 9 01/10/2025    CREATININE 0.58 (L) 01/10/2025    GLUC 133 01/10/2025    CALCIUM 8.6 01/10/2025    AST 25 01/10/2025    ALT 37 01/10/2025    ALKPHOS 76 01/10/2025    TP 5.8 (L) 01/10/2025    ALB 3.5 01/10/2025    TBILI 0.49 01/10/2025    CHOLESTEROL 127 01/07/2025    HDL 40 01/07/2025    TRIG 112 01/07/2025    LDLCALC 65 01/07/2025    NONHDLC 87 01/07/2025    AMMONIA 57 08/18/2024    [id number] 8.938 (H) 01/07/2025    FREET4 0.83 01/07/2025    HGBA1C 5.0 01/06/2025    EAG 97 01/06/2025       Progress Toward Goals: Patient tolerating increase in Prozac, reporting feeling overwhelmed by potential discharge due to multiple stressors and lack of support in the community.    Counseling / Coordination of Care:    Patient's progress discussed with staff in treatment team meeting.  Medication changes reviewed with staff in treatment team meeting.  Patient's progress reviewed with nursing staff.  Medications, treatment progress and treatment plan reviewed with patient.  Medication changes discussed with patient.  Medication education provided to patient.  Patient's progress reviewed with case management staff.  Reassurance and supportive therapy provided.}    Heather Fang DO 01/23/25    This note was completed in part utilizing Dragon dictation Software. Grammatical, translation, syntax errors, random word insertions, spelling mistakes, and incomplete sentences may be an occasional consequence of this system secondary to software limitations with voice recognition, ambient noise, and hardware issues. If you have any questions or concerns about the content, text, or information contained within the body of this dictation, please  contact the provider for clarification.

## 2025-01-23 NOTE — CASE MANAGEMENT
Cm completed patient pre screen for patient to attend [facility]. Patient is straight Medicare A and B. He will need to apply for [facility] funding once he arrives to do his assessment.

## 2025-01-23 NOTE — TELEPHONE ENCOUNTER
[facility] sent to     Michael Apgar Amber Kabrick  Awesome! Thank you!      ----- Message -----  From: Macrina Carrillo  Sent: 2025   2:26 PM EST  To: Michael Apgar  Subject: [facility]                                              Good Afternoon,    Referral received for patient. Patient is scheduled for appointment on 25 at 10am with Romel at the [facility].    Thank You

## 2025-01-23 NOTE — TREATMENT TEAM
"Pt attended group and stayed for duration.  Pt did note that he is \"coopertaive\"  Pt guarded, superficial and unable to elaborate to elaborate about family dynamics.  Pt noted sometimes people don't agree.  Pt not detailed about stressors/triggers.        01/23/25 1330   Activity/Group Checklist   Group Other (Comment)  (Positive traits and strength)   Attendance Attended   Attendance Duration (min) 46-60   Interactions Interacted appropriately   Affect/Mood Constricted;Incongruent   Goals Achieved Identified feelings;Identified triggers;Discussed coping strategies;Able to listen to others;Able to engage in interactions;Able to manage/cope with feelings;Able to reflect/comment on own behavior;Able to self-disclose;Verbalized increased hopefulness       "

## 2025-01-23 NOTE — PROGRESS NOTES
01/23/25 1000 01/23/25 1100   Activity/Group Checklist   Group Community meeting Impromptu group (Comment)  (active leisre coloring)   Attendance Attended Attended   Attendance Duration (min) 46-60 16-30   Interactions Interacted appropriately Interacted appropriately   Affect/Mood Appropriate;Bright;Normal range Appropriate;Bright;Normal range   Goals Achieved Discussed coping strategies Able to engage in interactions

## 2025-01-24 ENCOUNTER — TELEPHONE (OUTPATIENT)
Dept: PSYCHIATRY | Facility: CLINIC | Age: 65
End: 2025-01-24

## 2025-01-24 PROCEDURE — 99232 SBSQ HOSP IP/OBS MODERATE 35: CPT | Performed by: PSYCHIATRY & NEUROLOGY

## 2025-01-24 RX ADMIN — LEVOTHYROXINE SODIUM 75 MCG: 75 TABLET ORAL at 06:21

## 2025-01-24 RX ADMIN — THIAMINE HCL TAB 100 MG 100 MG: 100 TAB at 09:25

## 2025-01-24 RX ADMIN — PANTOPRAZOLE SODIUM 40 MG: 40 TABLET, DELAYED RELEASE ORAL at 06:21

## 2025-01-24 RX ADMIN — BUSPIRONE HYDROCHLORIDE 20 MG: 10 TABLET ORAL at 09:24

## 2025-01-24 RX ADMIN — FOLIC ACID 1 MG: 1 TABLET ORAL at 09:26

## 2025-01-24 RX ADMIN — SENNOSIDES AND DOCUSATE SODIUM 1 TABLET: 50; 8.6 TABLET ORAL at 19:49

## 2025-01-24 RX ADMIN — MULTIPLE VITAMINS W/ MINERALS TAB 1 TABLET: TAB ORAL at 09:25

## 2025-01-24 RX ADMIN — NALTREXONE HYDROCHLORIDE 50 MG: 50 TABLET, FILM COATED ORAL at 14:55

## 2025-01-24 RX ADMIN — BUSPIRONE HYDROCHLORIDE 25 MG: 10 TABLET ORAL at 17:43

## 2025-01-24 RX ADMIN — FLUOXETINE HYDROCHLORIDE 60 MG: 20 CAPSULE ORAL at 09:24

## 2025-01-24 RX ADMIN — TRAZODONE HYDROCHLORIDE 50 MG: 50 TABLET ORAL at 21:29

## 2025-01-24 RX ADMIN — Medication 400 MG: at 09:25

## 2025-01-24 RX ADMIN — Medication 1000 UNITS: at 09:25

## 2025-01-24 NOTE — CASE MANAGEMENT
CM spoke with brother Antony and he will be back Tuesday to assist his brother with all of the logistics once he is discharged. Brother and CM discussed discharge and he is comfortable with the plan.       Flakito Mays (Brother)   [phone] (M)

## 2025-01-24 NOTE — TELEPHONE ENCOUNTER
Called and left message notifying patient that his NP appt scheduled for 4/23 was cancelled due to provider will be out of the office. A call back was requested to reschedule.

## 2025-01-24 NOTE — NURSING NOTE
"Patient visible in the day room socializing with select peers. Pleasant and cooperative and brightens on approach. Endorses anxiety related to discharge as he states, \"I have no where to go.\" Currently , he denies SI/HIAVH. Medication compliant. Safety measures maintained.  "

## 2025-01-24 NOTE — PROGRESS NOTES
Progress Note - Behavioral Health   Name: Sammy Mays 64 y.o. male I MRN: [id number]  Unit/Bed#: [facility] 607[facility]01 I Date of Admission: 1/6/2025   Date of Service: 1/24/2025 I Hospital Day: 18     Assessment & Plan  Severe episode of recurrent major depressive disorder, without psychotic features (HCC)  Continue prozac to 60 mg for depression and anxiety  Increase buspirone to 25 mg twice daily  Generalized anxiety disorder  See plan above  Alcohol abuse  Counseling cessation, patient minimizes - says only drinking 1 scotch per day. Per chart review he drinks 2-3 mixed drinks daily and has required alcohol detox in the past    Continue naltrexone to 50 mg daily for alcohol cravings  Homelessness  Recently evicted from his house  Mood insomnia (HCC)  Continue trazodone 50 mg daily    Current medications:  Current Facility-Administered Medications   Medication Dose Route Frequency Provider Last Rate    acetaminophen  650 mg Oral Q4H PRN ANI Ritchie      acetaminophen  650 mg Oral Q4H PRN ANI Ritchie      acetaminophen  975 mg Oral Q6H PRN ANI Ritchie      aluminum-magnesium hydroxide-simethicone  30 mL Oral Q4H PRN ANI Ritchie      benztropine  1 mg Intramuscular Q4H PRN Max 6/day ANI Ritchie      benztropine  0.5 mg Oral Q4H PRN Max 6/day ANI Ritchie      bisacodyl  10 mg Rectal Daily PRN ANI Ritchie      busPIRone  20 mg Oral BID Heather Fang,       Cholecalciferol  1,000 Units Oral Daily ANI Whitlock      FLUoxetine  60 mg Oral Daily Heather Fang,       folic acid  1 mg Oral Daily ANI Whitlock      hydrOXYzine HCL  25 mg Oral Q6H PRN Max 4/day ANI Ritchie      hydrOXYzine HCL  50 mg Oral Q6H PRN Max 4/day ANI Ritchie      levothyroxine  75 mcg Oral Early Morning ANI Whitlock      LORazepam  1 mg Intramuscular Q6H PRN Max 3/day ANI Ritchie      LORazepam  1 mg Oral Q6H PRN Max 3/day ANI Ritchie       magnesium Oxide  400 mg Oral Daily Regine Reid, CRNP      multivitamin-minerals  1 tablet Oral Daily Margaux Hubbard, ANI      naltrexone  50 mg Oral After Lunch Heather Fang,       OLANZapine  5 mg Intramuscular Q3H PRN Max 3/day Trish Taylor, ANGELESNP      OLANZapine  2.5 mg Oral Q4H PRN Max 6/day Trish Taylor, ANGELESNP      OLANZapine  5 mg Oral Q4H PRN Max 3/day Trish Taylor, ANGELESNP      OLANZapine  5 mg Oral Q3H PRN Max 3/day Trish Taylor, ANGELESNP      pantoprazole  40 mg Oral Early Morning Margaux Hubbard, CRNP      polyethylene glycol  17 g Oral Daily PRN Trish Taylor, ANI      propranolol  5 mg Oral Q8H PRN Trish Taylor, ANI      senna-docusate sodium  1 tablet Oral Daily PRN Trish Taylor, ANI      thiamine  100 mg Oral Daily Margaux Hubbard, ANI      traZODone  50 mg Oral HS Heather Fang DO         Risks / Benefits of Treatment:    Risks, benefits, and possible side effects of medications explained to patient and patient verbalizes understanding and agreement for treatment.    Subjective:    Behavior over the last 24 hours: slowly improving.     The patient was evaluated this morning for continuity of care and no acute distress noted throughout the evaluation.  Over the past 24 hours staff noted the patient was cooperative with care, socializing with select peers, he has been preparing discharge action plan so that he will no longer feel hopeless about his discharge plan.      Patient was visited on unit for continuing care; chart reviewed and discussed with multidisciplinary treatment team.  On approach, the patient was calm but guarded and dismissive.  Patient feeling slightly more hopeful after speaking with his brother.  He is working on an action plan for when he is ready for discharge.  He is requesting increase of buspirone to better control anxiety.  Patient has concerns that in this controlled environment his symptoms are well-managed, however with the stress of being discharged  and returning to the community he has fears that he will return to alcohol.  No problem initiating and maintaining sleep.  Denied A/VH currently.  The patient consented for safety and agreed to verbalize any frustration or concerns to the nursing staff, immediately.    Patient continues to be selectively interactive with staff and peers. No reports of aggression or self-injurious behavior on unit. No PRN medications used in the past 24 hours.    Patient accepted all offered medications and no adverse effects of medications noted or reported.    Sleep: normal  Appetite: normal  Medication side effects: No   ROS: no complaints    Mental Status Examination:  Appearance:  age appropriate, casually dressed, looks stated age   Behavior:  guarded   Speech:  normal rate and volume, soft   Mood:  depressed   Affect:  constricted   Thought Process:  logical, coherent, perseverative, negative thinking   Associations: intact associations   Thought Content:  no overt delusions, negative thoughts, ruminating thoughts   Perceptual Disturbances: no auditory hallucinations, no visual hallucinations, does not appear responding to internal stimuli   Risk Potential: Suicidal ideation - Yes, passive death wish, would not feel safe if discharged  Homicidal ideation - None at present  Potential for aggression - No   Sensorium:  oriented to person, place, and time/date   Memory:  recent and remote memory grossly intact   Consciousness:  alert and awake   Attention/Concentration: attention span and concentration are age appropriate   Insight:  limited   Judgment: limited   Gait/Station: normal gait/station   Motor Activity: no abnormal movements        Vital signs in last 24 hours:    Temp:  [96.9 °F (36.1 °C)-97.5 °F (36.4 °C)] 96.9 °F (36.1 °C)  HR:  [77-83] 79  BP: (147-165)/(72-92) 147/92  Resp:  [18] 18  SpO2:  [95 %-96 %] 96 %  O2 Device: None (Room air)         Laboratory results: I have personally reviewed all pertinent  laboratory/tests results    Most Recent Labs:   Lab Results   Component Value Date    WBC 3.97 (L) 01/07/2025    RBC 3.35 (L) 01/07/2025    HGB 11.3 (L) 01/07/2025    HCT 34.4 (L) 01/07/2025     (L) 01/07/2025    RDW 16.0 (H) 01/07/2025    NEUTROABS 2.20 01/07/2025    SODIUM 140 01/10/2025    K 4.2 01/10/2025     01/10/2025    CO2 27 01/10/2025    BUN 9 01/10/2025    CREATININE 0.58 (L) 01/10/2025    GLUC 133 01/10/2025    CALCIUM 8.6 01/10/2025    AST 25 01/10/2025    ALT 37 01/10/2025    ALKPHOS 76 01/10/2025    TP 5.8 (L) 01/10/2025    ALB 3.5 01/10/2025    TBILI 0.49 01/10/2025    CHOLESTEROL 127 01/07/2025    HDL 40 01/07/2025    TRIG 112 01/07/2025    LDLCALC 65 01/07/2025    NONHDLC 87 01/07/2025    AMMONIA 57 08/18/2024    [id number] 8.938 (H) 01/07/2025    FREET4 0.83 01/07/2025    HGBA1C 5.0 01/06/2025    EAG 97 01/06/2025       Progress Toward Goals: Continues to improve, he is becoming more hopeful after speaking with his brother who is a support for him.  His brother will be returning from [place] next week and has offered to help Sammy reacclimate to the community.  Patient did request increase of buspirone for better control of anxiety.    Counseling / Coordination of Care:    Patient's progress discussed with staff in treatment team meeting.  Medication changes reviewed with staff in treatment team meeting.  Patient's progress reviewed with nursing staff.  Medications, treatment progress and treatment plan reviewed with patient.  Medication changes discussed with patient.  Medication education provided to patient.  Patient's progress reviewed with case management staff.  Reassurance and supportive therapy provided.}    Heather Fang DO 01/24/25    This note was completed in part utilizing Dragon dictation Software. Grammatical, translation, syntax errors, random word insertions, spelling mistakes, and incomplete sentences may be an occasional consequence of this system secondary to  software limitations with voice recognition, ambient noise, and hardware issues. If you have any questions or concerns about the content, text, or information contained within the body of this dictation, please contact the provider for clarification.

## 2025-01-24 NOTE — PROGRESS NOTES
01/24/25 1000   Activity/Group Checklist   Group Community meeting   Attendance Attended   Attendance Duration (min) 46-60   Interactions Interacted appropriately  (Pt. excited about seing his brother next week.)   Affect/Mood Appropriate;Bright   Goals Achieved Able to engage in interactions

## 2025-01-24 NOTE — PLAN OF CARE
Problem: Risk for Self Injury/Neglect  Goal: Treatment Goal: Remain safe during length of stay, learn and adopt new coping skills, and be free of self-injurious ideation, impulses and acts at the time of discharge  Outcome: Progressing  Goal: Verbalize thoughts and feelings  Description: Interventions:  - Assess and re-assess patient's lethality and potential for self-injury  - Engage patient in 1:1 interactions, daily, for a minimum of 15 minutes  - Encourage patient to express feelings, fears, frustrations, hopes  - Establish rapport/trust with patient   Outcome: Progressing  Goal: Refrain from harming self  Description: Interventions:  - Monitor patient closely, per order  - Develop a trusting relationship  - Supervise medication ingestion, monitor effects and side effects   Outcome: Progressing     Problem: Depression  Goal: Treatment Goal: Demonstrate behavioral control of depressive symptoms, verbalize feelings of improved mood/affect, and adopt new coping skills prior to discharge  Outcome: Progressing  Goal: Refrain from isolation  Description: Interventions:  - Develop a trusting relationship   - Encourage socialization   Outcome: Progressing     Problem: Anxiety  Goal: Anxiety is at manageable level  Description: Interventions:  - Assess and monitor patient's anxiety level.   - Monitor for signs and symptoms (heart palpitations, chest pain, shortness of breath, headaches, nausea, feeling jumpy, restlessness, irritable, apprehensive).   - Collaborate with interdisciplinary team and initiate plan and interventions as ordered.  - [place] patient to unit/surroundings  - Explain treatment plan  - Encourage participation in care  - Encourage verbalization of concerns/fears  - Identify coping mechanisms  - Assist in developing anxiety-reducing skills  - Administer/offer alternative therapies  - Limit or eliminate stimulants  Outcome: Progressing     Problem: DISCHARGE PLANNING  Goal: Discharge to home or other  facility with appropriate resources  Description: INTERVENTIONS:  - Identify barriers to discharge w/patient and caregiver  - Arrange for needed discharge resources and transportation as appropriate  - Identify discharge learning needs (meds, wound care, etc.)  - Arrange for interpretive services to assist at discharge as needed  - Refer to Case Management Department for coordinating discharge planning if the patient needs post-hospital services based on physician/advanced practitioner order or complex needs related to functional status, cognitive ability, or social support system  Outcome: Progressing     Problem: Ineffective Coping  Goal: Participates in unit activities  Description: Interventions:  - Provide therapeutic environment   - Provide required programming   - Redirect inappropriate behaviors   Outcome: Progressing

## 2025-01-24 NOTE — PLAN OF CARE
Pt. Engaging in scheduled groups.  Problem: Ineffective Coping  Goal: Participates in unit activities  Description: Interventions:  - Provide therapeutic environment   - Provide required programming   - Redirect inappropriate behaviors   Outcome: Progressing

## 2025-01-24 NOTE — PROGRESS NOTES
01/24/25 0800   Team Meeting   Meeting Type Daily Rounds   Team Members Present   Team Members Present Physician;Nurse;   Physician Team Member Jaylan/Claudia/Annalisa   Nursing Team Member Nikole   Care Management Team Member Guru   Patient/Family Present   Patient Present No   Patient's Family Present No     Patient is denying all signs and symptoms. Patient is for possible discharge for Tuesday. Brother is able to help with logistics for patient discharge. Patient discharge is pending stabilization of mood and medications.

## 2025-01-24 NOTE — NURSING NOTE
Pt calm and cooperative. Visible and social w/peers. Attends groups. Makes needs known. + meals and meds. Good appetite. Compliant w/ mouth checks. On assessment endorses baseline anxiety related to DC plans. Describes elevated mood after finding out brother will be here in [place] to help w/ DC and aftercare. Pt denies all other psych S+S's. Denies unmet needs. Q 15 safety checks maintained. Will cont top provide support as needed.

## 2025-01-24 NOTE — PROGRESS NOTES
Pt not able to focus on group discussion.  Anxious and not able to follow informal discussion (football) and review progress in MH recovery.   Pt asking what the questions were and what was being discussed.   Pt preoccupied with other thoughts.     01/24/25 1100   Activity/Group Checklist   Group Other (Comment)  (MH Recovery: evaluating and monitoring progress)   Attendance Attended   Attendance Duration (min) 31-45   Interactions Other (Comment)  (lacked focus)   Affect/Mood Constricted;Other (Comment)  (anxious)   Goals Achieved Identified feelings;Identified triggers;Identified relapse prevention strategies;Able to listen to others;Able to engage in interactions        .

## 2025-01-25 PROCEDURE — 99232 SBSQ HOSP IP/OBS MODERATE 35: CPT

## 2025-01-25 RX ADMIN — FOLIC ACID 1 MG: 1 TABLET ORAL at 08:55

## 2025-01-25 RX ADMIN — PANTOPRAZOLE SODIUM 40 MG: 40 TABLET, DELAYED RELEASE ORAL at 05:47

## 2025-01-25 RX ADMIN — MULTIPLE VITAMINS W/ MINERALS TAB 1 TABLET: TAB ORAL at 08:56

## 2025-01-25 RX ADMIN — LEVOTHYROXINE SODIUM 75 MCG: 75 TABLET ORAL at 05:47

## 2025-01-25 RX ADMIN — BUSPIRONE HYDROCHLORIDE 25 MG: 10 TABLET ORAL at 17:15

## 2025-01-25 RX ADMIN — BUSPIRONE HYDROCHLORIDE 25 MG: 10 TABLET ORAL at 08:55

## 2025-01-25 RX ADMIN — FLUOXETINE HYDROCHLORIDE 60 MG: 20 CAPSULE ORAL at 08:55

## 2025-01-25 RX ADMIN — NALTREXONE HYDROCHLORIDE 50 MG: 50 TABLET, FILM COATED ORAL at 14:14

## 2025-01-25 RX ADMIN — TRAZODONE HYDROCHLORIDE 50 MG: 50 TABLET ORAL at 21:29

## 2025-01-25 RX ADMIN — THIAMINE HCL TAB 100 MG 100 MG: 100 TAB at 08:56

## 2025-01-25 RX ADMIN — Medication 400 MG: at 08:56

## 2025-01-25 RX ADMIN — Medication 1000 UNITS: at 08:55

## 2025-01-25 NOTE — NURSING NOTE
Pt calm and cooperative. Mild anxiety related to discharging and paper work he needs to do. Present in milieu interacting with peers appropriately. Medication and meal compliant. Denies SI/HI. Q 15 minute checks maintained.

## 2025-01-25 NOTE — PROGRESS NOTES
Progress Note - Behavioral Health   Name: Sammy Mays 64 y.o. male I MRN: [id number]  Unit/Bed#: [facility] 607-01 I Date of Admission: 1/6/2025   Date of Service: 1/25/2025 I Hospital Day: 19     Assessment & Plan  Severe episode of recurrent major depressive disorder, without psychotic features (HCC)  Continue prozac to 60 mg for depression and anxiety  Continue buspirone to 25 mg twice daily  Generalized anxiety disorder  See plan above  Alcohol abuse  Counseling cessation, patient minimizes - says only drinking 1 scotch per day. Per chart review he drinks 2-3 mixed drinks daily and has required alcohol detox in the past    Continue naltrexone to 50 mg daily for alcohol cravings  Homelessness  Recently evicted from his house  Mood insomnia (HCC)  Continue trazodone 50 mg daily    Planned medication and treatment changes:    All current active medications have been reviewed  Encourage group therapy, milieu therapy and occupational therapy  Behavioral Health checks for safety monitoring  Continue current medications:    Behavior over the last 24 hours: unchanged.     Sammy is seen today for psychiatric follow up. Per nursing notes, calm and cooperative, visible, social, med and meal compliant, endorses baseline anxiety. Denies other psych symptoms.   Patient remains in behavioral control. No psych prns in last 24 hours.     Today Sammy is calm and cooperative and pleasant. He reports some depression/anxiety related to housing issues and discharge, however notes he is excited to discharge and see his brother. He appears forward thinking and states that he wants to treat his brother to dinner for helping him out. He denies suicidal and homicidal ideations. He appears in good spirits today. If continues to improve possible discharge Tuesday. Denies auditory and visual hallucinations. Tolerating recent Buspar increase.    Sleep:  adequate  Appetite:  adequate  Medication side effects: No   ROS: no complaints    Mental  Status Evaluation:    Appearance:  age appropriate, casually dressed, adequate grooming   Behavior:  cooperative, calm, moreso superficial   Speech:  normal rate and volume   Mood:  Some anxiety/depression   Affect:  constricted, slightly brighter   Thought Process:  logical, goal directed, linear   Associations: intact associations   Thought Content:  no overt delusions, negative thinking, ruminating thoughts   Perceptual Disturbances: denies auditory hallucinations when asked, does not appear responding to internal stimuli, denies visual hallucinations when asked   Risk Potential: Suicidal ideation - None at present, contracts for safety on the unit, would talk to staff if not feeling safe on the unit  Homicidal ideation - None  Potential for aggression - No   Sensorium:  oriented to person, place, and time/date   Memory:  recent and remote memory grossly intact   Consciousness:  alert and awake   Attention/Concentration: attention span and concentration are age appropriate   Insight:  limited   Judgment: limited   Gait/Station: normal gait/station   Motor Activity: no abnormal movements     Vital signs in last 24 hours:    Temp:  [97.5 °F (36.4 °C)-98.1 °F (36.7 °C)] 97.5 °F (36.4 °C)  HR:  [67-79] 79  BP: (122-147)/(72) 122/72  Resp:  [16-17] 16  SpO2:  [98 %-99 %] 99 %  O2 Device: None (Room air)    Laboratory results: I have personally reviewed all pertinent laboratory/tests results    Results from the past 24 hours: No results found for this or any previous visit (from the past 24 hours).    Progress Toward Goals: Reports some anxiety related to his house and discharge but appears in good spirits and excited to see his brother. Tolerating medication without issue.    Assessment & Plan   Principal Problem:    Severe episode of recurrent major depressive disorder, without psychotic features (HCC)  Active Problems:    Hypothyroidism    Habitual alcohol use    Medical clearance for psychiatric admission     Gastroesophageal reflux disease without esophagitis    Generalized anxiety disorder    Alcohol abuse    Hypomagnesemia    Elevated bilirubin    Noncompliance    Homelessness    Mood insomnia (HCC)        Current Facility-Administered Medications   Medication Dose Route Frequency Provider Last Rate    acetaminophen  650 mg Oral Q4H PRN ANI Ritchie      acetaminophen  650 mg Oral Q4H PRN ANI Ritchie      acetaminophen  975 mg Oral Q6H PRN ANI Ritchie      aluminum-magnesium hydroxide-simethicone  30 mL Oral Q4H PRN ANI Ritchie      benztropine  1 mg Intramuscular Q4H PRN Max 6/day ANI Ritchie      benztropine  0.5 mg Oral Q4H PRN Max 6/day ANI Ritchie      bisacodyl  10 mg Rectal Daily PRN ANI Ritchie      busPIRone  25 mg Oral BID Heather Fang, DO      Cholecalciferol  1,000 Units Oral Daily Margaux Hubbard, NAI      FLUoxetine  60 mg Oral Daily Heather Fang, DO      folic acid  1 mg Oral Daily ANI Whitlock      hydrOXYzine HCL  25 mg Oral Q6H PRN Max 4/day Trish Taylor, ANI      hydrOXYzine HCL  50 mg Oral Q6H PRN Max 4/day ANI Ritchie      levothyroxine  75 mcg Oral Early Morning Margaux Hubbard, ANI      LORazepam  1 mg Intramuscular Q6H PRN Max 3/day ANI Ritchie      LORazepam  1 mg Oral Q6H PRN Max 3/day ANI Ritchie      magnesium Oxide  400 mg Oral Daily Regine Reid, ANI      multivitamin-minerals  1 tablet Oral Daily Margaux Hubbard, ANI      naltrexone  50 mg Oral After Lunch Heather Fang, DO      OLANZapine  5 mg Intramuscular Q3H PRN Max 3/day ANI Ritchie      OLANZapine  2.5 mg Oral Q4H PRN Max 6/day ANI Ritchie      OLANZapine  5 mg Oral Q4H PRN Max 3/day ANI Ritchie      OLANZapine  5 mg Oral Q3H PRN Max 3/day ANI Ritchie      pantoprazole  40 mg Oral Early Morning Margaux Hubbard, ANI      polyethylene glycol  17 g Oral Daily PRN Trish Taylor, ANI       propranolol  5 mg Oral Q8H PRN ANI Ritchie      senna-docusate sodium  1 tablet Oral Daily PRN ANI Ritchie      thiamine  100 mg Oral Daily ANI Whitlock      traZODone  50 mg Oral HS Heather Fang DO       Risks / Benefits of Treatment:    Risks, benefits, and possible side effects of medications explained to patient and patient verbalizes understanding and agreement for treatment.    Counseling / Coordination of Care:    Patient's progress reviewed with nursing staff.  Medication education provided to patient.  Educated on importance of medication and treatment compliance.  Reassurance and supportive therapy provided.    Alden Mccullough PA-C 01/25/25

## 2025-01-25 NOTE — PLAN OF CARE
Problem: Depression  Goal: Treatment Goal: Demonstrate behavioral control of depressive symptoms, verbalize feelings of improved mood/affect, and adopt new coping skills prior to discharge  Outcome: Progressing  Goal: Refrain from isolation  Description: Interventions:  - Develop a trusting relationship   - Encourage socialization   Outcome: Progressing     Problem: Anxiety  Goal: Anxiety is at manageable level  Description: Interventions:  - Assess and monitor patient's anxiety level.   - Monitor for signs and symptoms (heart palpitations, chest pain, shortness of breath, headaches, nausea, feeling jumpy, restlessness, irritable, apprehensive).   - Collaborate with interdisciplinary team and initiate plan and interventions as ordered.  - [place] patient to unit/surroundings  - Explain treatment plan  - Encourage participation in care  - Encourage verbalization of concerns/fears  - Identify coping mechanisms  - Assist in developing anxiety-reducing skills  - Administer/offer alternative therapies  - Limit or eliminate stimulants  Outcome: Progressing

## 2025-01-25 NOTE — NURSING NOTE
Patient visible in the milieu. He is pleasant and cooperative . Denies SI/HI/AVH. Medication compliant. Will continue to monitor.

## 2025-01-26 PROCEDURE — 99232 SBSQ HOSP IP/OBS MODERATE 35: CPT

## 2025-01-26 RX ADMIN — TRAZODONE HYDROCHLORIDE 50 MG: 50 TABLET ORAL at 21:06

## 2025-01-26 RX ADMIN — BUSPIRONE HYDROCHLORIDE 25 MG: 10 TABLET ORAL at 08:51

## 2025-01-26 RX ADMIN — Medication 1000 UNITS: at 08:51

## 2025-01-26 RX ADMIN — PANTOPRAZOLE SODIUM 40 MG: 40 TABLET, DELAYED RELEASE ORAL at 05:36

## 2025-01-26 RX ADMIN — BUSPIRONE HYDROCHLORIDE 25 MG: 10 TABLET ORAL at 17:09

## 2025-01-26 RX ADMIN — SENNOSIDES AND DOCUSATE SODIUM 1 TABLET: 50; 8.6 TABLET ORAL at 11:16

## 2025-01-26 RX ADMIN — FOLIC ACID 1 MG: 1 TABLET ORAL at 08:51

## 2025-01-26 RX ADMIN — LEVOTHYROXINE SODIUM 75 MCG: 75 TABLET ORAL at 05:36

## 2025-01-26 RX ADMIN — NALTREXONE HYDROCHLORIDE 50 MG: 50 TABLET, FILM COATED ORAL at 14:21

## 2025-01-26 RX ADMIN — MULTIPLE VITAMINS W/ MINERALS TAB 1 TABLET: TAB ORAL at 08:51

## 2025-01-26 RX ADMIN — Medication 400 MG: at 08:51

## 2025-01-26 RX ADMIN — FLUOXETINE HYDROCHLORIDE 60 MG: 20 CAPSULE ORAL at 08:51

## 2025-01-26 RX ADMIN — THIAMINE HCL TAB 100 MG 100 MG: 100 TAB at 08:51

## 2025-01-26 NOTE — PROGRESS NOTES
Progress Note - Behavioral Health   Name: Sammy Mays 64 y.o. male I MRN: [id number]  Unit/Bed#: [facility] 607[facility]01 I Date of Admission: 1/6/2025   Date of Service: 1/26/2025 I Hospital Day: 20     Assessment & Plan  Severe episode of recurrent major depressive disorder, without psychotic features (HCC)  Continue prozac to 60 mg for depression and anxiety  Continue buspirone to 25 mg twice daily  Generalized anxiety disorder  See plan above  Alcohol abuse  Counseling cessation, patient minimizes - says only drinking 1 scotch per day. Per chart review he drinks 2-3 mixed drinks daily and has required alcohol detox in the past    Continue naltrexone to 50 mg daily for alcohol cravings  Homelessness  Recently evicted from his house  Mood insomnia (HCC)  Continue trazodone 50 mg daily    Planned medication and treatment changes:    All current active medications have been reviewed  Encourage group therapy, milieu therapy and occupational therapy  Behavioral Health checks for safety monitoring  Continue current medications:  Possible discharge Tuesday if continues to improve.    Behavior over the last 24 hours: unchanged.     Sammy is seen today for psychiatric follow up. Per nursing notes, calm and cooperative, mild anxiety related to discharge, denies SI/HI.  Patient remains in behavioral control. No psych prns in last 24 hours.     Today Sammy remains calm cooperative and pleasant. Smiling most of assessment, and stating he slept well and is feeling well today. Notes the increase of Prozac and Buspar have significantly helped him feel better. Notes mild anxiety related to discharge and the future, however notes he is excited and ready for discharge. Denies SI/HI/AVH. Denies medication side effects. Denies further questions/concerns/issues when asked. Possible discharge Tuesday if continues to improve.    Sleep:  adequate  Appetite:  adequate  Medication side effects: No   ROS: no complaints    Mental Status  Evaluation:    Appearance:  age appropriate, casually dressed, adequate grooming   Behavior:  cooperative, calm, smiling most of conversation.   Speech:  normal rate and volume   Mood:  Mildly anxious   Affect:  appropriate, brighter   Thought Process:  goal directed, linear   Associations: intact associations   Thought Content:  no overt delusions, ruminating thoughts   Perceptual Disturbances: denies auditory hallucinations when asked, does not appear responding to internal stimuli, denies visual hallucinations when asked   Risk Potential: Suicidal ideation - None at present, contracts for safety on the unit, would talk to staff if not feeling safe on the unit  Homicidal ideation - None  Potential for aggression - No   Sensorium:  oriented to person, place, and time/date   Memory:  recent and remote memory grossly intact   Consciousness:  alert and awake   Attention/Concentration: attention span and concentration are age appropriate   Insight:  limited   Judgment: limited   Gait/Station: normal gait/station   Motor Activity: no abnormal movements     Vital signs in last 24 hours:    Temp:  [97 °F (36.1 °C)-97.8 °F (36.6 °C)] 97.5 °F (36.4 °C)  HR:  [67-75] 75  BP: (127-157)/(65-85) 127/66  Resp:  [16-19] 16  SpO2:  [92 %-98 %] 97 %  O2 Device: None (Room air)    Laboratory results: I have personally reviewed all pertinent laboratory/tests results    Results from the past 24 hours: No results found for this or any previous visit (from the past 24 hours).    Progress Toward Goals: Reports mild anxiety related to discharge, otherwise denies psych symptoms. Possible discharge Tuesday if continues to improve.     Assessment & Plan   Principal Problem:    Severe episode of recurrent major depressive disorder, without psychotic features (HCC)  Active Problems:    Hypothyroidism    Habitual alcohol use    Medical clearance for psychiatric admission    Gastroesophageal reflux disease without esophagitis    Generalized  anxiety disorder    Alcohol abuse    Hypomagnesemia    Elevated bilirubin    Noncompliance    Homelessness    Mood insomnia (HCC)        Current Facility-Administered Medications   Medication Dose Route Frequency Provider Last Rate    acetaminophen  650 mg Oral Q4H PRN ANI Ritchie      acetaminophen  650 mg Oral Q4H PRN ANI Ritchie      acetaminophen  975 mg Oral Q6H PRN ANI Ritchie      aluminum-magnesium hydroxide-simethicone  30 mL Oral Q4H PRN ANI Ritchie      benztropine  1 mg Intramuscular Q4H PRN Max 6/day ANI Ritchie      benztropine  0.5 mg Oral Q4H PRN Max 6/day ANI Ritchie      bisacodyl  10 mg Rectal Daily PRN ANI Ritchie      busPIRone  25 mg Oral BID Heather Fang,       Cholecalciferol  1,000 Units Oral Daily ANI Whitlock      FLUoxetine  60 mg Oral Daily Heather Fang,       folic acid  1 mg Oral Daily ANI Whitlock      hydrOXYzine HCL  25 mg Oral Q6H PRN Max 4/day ANI Ritchie      hydrOXYzine HCL  50 mg Oral Q6H PRN Max 4/day ANI Ritchie      levothyroxine  75 mcg Oral Early Morning ANI Whitlock      LORazepam  1 mg Intramuscular Q6H PRN Max 3/day ANI Ritchie      LORazepam  1 mg Oral Q6H PRN Max 3/day ANI Ritchie      magnesium Oxide  400 mg Oral Daily ANI Hunter      multivitamin-minerals  1 tablet Oral Daily ANI Whitlock      naltrexone  50 mg Oral After Lunch Heather Fang,       OLANZapine  5 mg Intramuscular Q3H PRN Max 3/day ANI Ritchie      OLANZapine  2.5 mg Oral Q4H PRN Max 6/day ANI Ritchie      OLANZapine  5 mg Oral Q4H PRN Max 3/day ANI Ritchie      OLANZapine  5 mg Oral Q3H PRN Max 3/day ANI Ritchie      pantoprazole  40 mg Oral Early Morning ANI Whitlock      polyethylene glycol  17 g Oral Daily PRN ANI Ritchie      propranolol  5 mg Oral Q8H PRN ANI Ritchie-docusate  sodium  1 tablet Oral Daily PRN ANI Ritchie      thiamine  100 mg Oral Daily ANI Whitlock      traZODone  50 mg Oral HS Heather Fang DO       Risks / Benefits of Treatment:    Risks, benefits, and possible side effects of medications explained to patient and patient verbalizes understanding and agreement for treatment.    Counseling / Coordination of Care:    Patient's progress reviewed with nursing staff.  Medication education provided to patient.  Educated on importance of medication and treatment compliance.  Reassurance and supportive therapy provided.    Alden Mccullough PA-C 01/26/25

## 2025-01-26 NOTE — PLAN OF CARE
Problem: Risk for Self Injury/Neglect  Goal: Treatment Goal: Remain safe during length of stay, learn and adopt new coping skills, and be free of self-injurious ideation, impulses and acts at the time of discharge  Outcome: Progressing  Goal: Verbalize thoughts and feelings  Description: Interventions:  - Assess and re-assess patient's lethality and potential for self-injury  - Engage patient in 1:1 interactions, daily, for a minimum of 15 minutes  - Encourage patient to express feelings, fears, frustrations, hopes  - Establish rapport/trust with patient   Outcome: Progressing  Goal: Refrain from harming self  Description: Interventions:  - Monitor patient closely, per order  - Develop a trusting relationship  - Supervise medication ingestion, monitor effects and side effects   Outcome: Progressing     Problem: Depression  Goal: Treatment Goal: Demonstrate behavioral control of depressive symptoms, verbalize feelings of improved mood/affect, and adopt new coping skills prior to discharge  Outcome: Progressing  Goal: Refrain from isolation  Description: Interventions:  - Develop a trusting relationship   - Encourage socialization   Outcome: Progressing     Problem: Anxiety  Goal: Anxiety is at manageable level  Description: Interventions:  - Assess and monitor patient's anxiety level.   - Monitor for signs and symptoms (heart palpitations, chest pain, shortness of breath, headaches, nausea, feeling jumpy, restlessness, irritable, apprehensive).   - Collaborate with interdisciplinary team and initiate plan and interventions as ordered.  - [place] patient to unit/surroundings  - Explain treatment plan  - Encourage participation in care  - Encourage verbalization of concerns/fears  - Identify coping mechanisms  - Assist in developing anxiety-reducing skills  - Administer/offer alternative therapies  - Limit or eliminate stimulants  Outcome: Progressing     Problem: Ineffective Coping  Goal: Participates in unit  activities  Description: Interventions:  - Provide therapeutic environment   - Provide required programming   - Redirect inappropriate behaviors   Outcome: Progressing     Problem: DISCHARGE PLANNING  Goal: Discharge to home or other facility with appropriate resources  Description: INTERVENTIONS:  - Identify barriers to discharge w/patient and caregiver  - Arrange for needed discharge resources and transportation as appropriate  - Identify discharge learning needs (meds, wound care, etc.)  - Arrange for interpretive services to assist at discharge as needed  - Refer to Case Management Department for coordinating discharge planning if the patient needs post-hospital services based on physician/advanced practitioner order or complex needs related to functional status, cognitive ability, or social support system  Outcome: Progressing

## 2025-01-26 NOTE — NURSING NOTE
Patient calm and cooperative. Present in milieu interacting with peers and staff appropriately. Brightens on approach. Medication and meal compliant. Denies SI/HI. Q 15 minute checks maintained.

## 2025-01-27 PROBLEM — E55.9 VITAMIN D DEFICIENCY: Status: ACTIVE | Noted: 2025-01-27

## 2025-01-27 PROCEDURE — 99232 SBSQ HOSP IP/OBS MODERATE 35: CPT | Performed by: PSYCHIATRY & NEUROLOGY

## 2025-01-27 RX ORDER — TRAZODONE HYDROCHLORIDE 50 MG/1
50 TABLET, FILM COATED ORAL
Qty: 30 TABLET | Refills: 1 | Status: SHIPPED | OUTPATIENT
Start: 2025-01-27 | End: 2025-03-28

## 2025-01-27 RX ORDER — TRAZODONE HYDROCHLORIDE 50 MG/1
50 TABLET, FILM COATED ORAL
Status: DISCONTINUED | OUTPATIENT
Start: 2025-01-27 | End: 2025-01-28 | Stop reason: HOSPADM

## 2025-01-27 RX ORDER — NALTREXONE HYDROCHLORIDE 50 MG/1
50 TABLET, FILM COATED ORAL
Qty: 30 TABLET | Refills: 1 | Status: SHIPPED | OUTPATIENT
Start: 2025-01-28 | End: 2025-03-29

## 2025-01-27 RX ORDER — PANTOPRAZOLE SODIUM 40 MG/1
40 TABLET, DELAYED RELEASE ORAL
Qty: 30 TABLET | Refills: 0 | Status: SHIPPED | OUTPATIENT
Start: 2025-01-27 | End: 2025-02-26

## 2025-01-27 RX ORDER — BUSPIRONE HYDROCHLORIDE 15 MG/1
TABLET ORAL
Qty: 30 TABLET | Refills: 1 | Status: SHIPPED | OUTPATIENT
Start: 2025-01-27

## 2025-01-27 RX ORDER — LEVOTHYROXINE SODIUM 75 UG/1
75 TABLET ORAL EVERY MORNING
Qty: 30 TABLET | Refills: 0 | Status: SHIPPED | OUTPATIENT
Start: 2025-01-27

## 2025-01-27 RX ORDER — LANOLIN ALCOHOL/MO/W.PET/CERES
100 CREAM (GRAM) TOPICAL DAILY
Qty: 30 TABLET | Refills: 0 | Status: SHIPPED | OUTPATIENT
Start: 2025-01-27 | End: 2025-02-26

## 2025-01-27 RX ORDER — LANOLIN ALCOHOL/MO/W.PET/CERES
400 CREAM (GRAM) TOPICAL DAILY
Qty: 30 TABLET | Refills: 0 | Status: SHIPPED | OUTPATIENT
Start: 2025-01-28

## 2025-01-27 RX ORDER — FOLIC ACID 1 MG/1
1 TABLET ORAL DAILY
Qty: 30 TABLET | Refills: 0 | Status: SHIPPED | OUTPATIENT
Start: 2025-01-27 | End: 2025-02-26

## 2025-01-27 RX ORDER — BUSPIRONE HYDROCHLORIDE 10 MG/1
TABLET ORAL
Qty: 30 TABLET | Refills: 1 | Status: SHIPPED | OUTPATIENT
Start: 2025-01-27

## 2025-01-27 RX ADMIN — BUSPIRONE HYDROCHLORIDE 25 MG: 10 TABLET ORAL at 09:56

## 2025-01-27 RX ADMIN — FOLIC ACID 1 MG: 1 TABLET ORAL at 09:56

## 2025-01-27 RX ADMIN — Medication 1000 UNITS: at 09:56

## 2025-01-27 RX ADMIN — FLUOXETINE HYDROCHLORIDE 60 MG: 20 CAPSULE ORAL at 09:56

## 2025-01-27 RX ADMIN — Medication 400 MG: at 09:56

## 2025-01-27 RX ADMIN — NALTREXONE HYDROCHLORIDE 50 MG: 50 TABLET, FILM COATED ORAL at 13:45

## 2025-01-27 RX ADMIN — MULTIPLE VITAMINS W/ MINERALS TAB 1 TABLET: TAB ORAL at 09:56

## 2025-01-27 RX ADMIN — PANTOPRAZOLE SODIUM 40 MG: 40 TABLET, DELAYED RELEASE ORAL at 06:08

## 2025-01-27 RX ADMIN — TRAZODONE HYDROCHLORIDE 50 MG: 50 TABLET ORAL at 21:26

## 2025-01-27 RX ADMIN — THIAMINE HCL TAB 100 MG 100 MG: 100 TAB at 09:56

## 2025-01-27 RX ADMIN — LEVOTHYROXINE SODIUM 75 MCG: 75 TABLET ORAL at 06:08

## 2025-01-27 RX ADMIN — BUSPIRONE HYDROCHLORIDE 25 MG: 10 TABLET ORAL at 17:09

## 2025-01-27 NOTE — PLAN OF CARE
Pt. Attending all offered groups spontaneously and is social with select peers.  Problem: Ineffective Coping  Goal: Participates in unit activities  Description: Interventions:  - Provide therapeutic environment   - Provide required programming   - Redirect inappropriate behaviors   Outcome: Progressing

## 2025-01-27 NOTE — TREATMENT TEAM
01/27/25 1000 01/27/25 1100 01/27/25 1330   Activity/Group Checklist   Group Community meeting Exercise Life Skills  (task personal support booklet.)   Attendance Attended Attended Attended   Attendance Duration (min) 31-45 31-45 46-60   Interactions Interacted appropriately Interacted appropriately Interacted appropriately   Affect/Mood Bright;Appropriate Appropriate;Bright;Normal range Appropriate;Bright;Normal range   Goals Achieved Discussed coping strategies Discussed coping strategies Able to engage in interactions;Discussed coping strategies

## 2025-01-27 NOTE — DISCHARGE INSTR - OTHER ORDERS
If you are experiencing a mental health emergency, you may call the [facility] Crisis Intervention Office 24 hours a day, 7 days per week at [phone].     In [facility], call [phone].     When you need someone to listen, the Warmline is available for 16 hours a day, 7 days a week, from the time of 7-10am and 2pm-2am.  It is not available from the hours of 2am-6am and 10am-2pm. A representative can be reached at [phone].

## 2025-01-27 NOTE — NURSING NOTE
Patient was visible in the milieu sitting in the hallway reading with no peer interaction. Endorses anxiety and depression, denies all other psych s/s. No behaviors noted. No c/o pain. No needs expressed. Took his HS medications. Safety checks ongoing.

## 2025-01-27 NOTE — TREATMENT TEAM
01/27/25 0800   Team Meeting   Meeting Type Daily Rounds   Team Members Present   Team Members Present Physician;Nurse;   Physician Team Member Dr Tian, Dr Fang, Trish LAW   Nursing Team Member Nikole Velasquez   Care Management Team Member Stefanie KELLY Team Member Bhavesh   Patient/Family Present   Patient Present No   Patient's Family Present No     Anxious, depressed, OOB, visible, social, plan d/c tomorrow

## 2025-01-27 NOTE — PROGRESS NOTES
Progress Note - Behavioral Health   Name: Sammy Mays 64 y.o. male I MRN: [id number]  Unit/Bed#: [facility] 607-01 I Date of Admission: 1/6/2025   Date of Service: 1/27/2025 I Hospital Day: 21     Assessment & Plan  Severe episode of recurrent major depressive disorder, without psychotic features (HCC)  Continue prozac to 60 mg for depression and anxiety  Continue buspirone to 25 mg twice daily  Generalized anxiety disorder  See plan above  Alcohol abuse  Counseling cessation, patient minimizes - says only drinking 1 scotch per day. Per chart review he drinks 2-3 mixed drinks daily and has required alcohol detox in the past    Continue naltrexone to 50 mg daily for alcohol cravings  Homelessness  Recently evicted from his house  Mood insomnia (HCC)  Continue trazodone 50 mg daily  Vitamin D deficiency      Current medications:  Current Facility-Administered Medications   Medication Dose Route Frequency Provider Last Rate    acetaminophen  650 mg Oral Q4H PRN ANI Ritchie      acetaminophen  650 mg Oral Q4H PRN ANI Ritchie      acetaminophen  975 mg Oral Q6H PRN ANI Ritchie      aluminum-magnesium hydroxide-simethicone  30 mL Oral Q4H PRN ANI Ritchie      benztropine  1 mg Intramuscular Q4H PRN Max 6/day ANI Ritchie      benztropine  0.5 mg Oral Q4H PRN Max 6/day ANI Ritchie      bisacodyl  10 mg Rectal Daily PRN ANI Ritchie      busPIRone  25 mg Oral BID Heather Fang,       Cholecalciferol  1,000 Units Oral Daily ANI Whitlock      FLUoxetine  60 mg Oral Daily Heather Fang,       folic acid  1 mg Oral Daily ANI Whitlock      hydrOXYzine HCL  25 mg Oral Q6H PRN Max 4/day ANI Ritchie      hydrOXYzine HCL  50 mg Oral Q6H PRN Max 4/day ANI Ritchie      levothyroxine  75 mcg Oral Early Morning ANI Whitlock      LORazepam  1 mg Intramuscular Q6H PRN Max 3/day ANI Ritchie      LORazepam  1 mg Oral Q6H PRN Max 3/day  Trish Taylor, ANI      magnesium Oxide  400 mg Oral Daily Regine Reid, ANGELESNP      multivitamin-minerals  1 tablet Oral Daily Margaux Hubbard, ANI      naltrexone  50 mg Oral After Lunch Heather Fang, DO      OLANZapine  5 mg Intramuscular Q3H PRN Max 3/day Trish Taylor, ANI      OLANZapine  2.5 mg Oral Q4H PRN Max 6/day Trish Taylor, ANI      OLANZapine  5 mg Oral Q4H PRN Max 3/day Trish Taylor, ANI      OLANZapine  5 mg Oral Q3H PRN Max 3/day Trish Taylor, ANI      pantoprazole  40 mg Oral Early Morning Margaux Hubbard, ANI      polyethylene glycol  17 g Oral Daily PRN ANI Ritchie      propranolol  5 mg Oral Q8H PRN ANI Ritchie      senna-docusate sodium  1 tablet Oral Daily PRN ANI Ritchie      thiamine  100 mg Oral Daily Margaux Hubbard, ANI      traZODone  50 mg Oral HS Heather Fang DO         Risks / Benefits of Treatment:    Risks, benefits, and possible side effects of medications explained to patient and patient verbalizes understanding and agreement for treatment.    Subjective:    Behavior over the last 24 hours: improving.     The patient was evaluated this morning for continuity of care and no acute distress noted throughout the evaluation.  Over the past 24 hours staff noted the patient was reporting anxiety, depression but remains visible and social with staff.      Patient was visited on unit for continuing care; chart reviewed and discussed with multidisciplinary treatment team.  On approach, the patient was anxious appearing.  Reports he wrote a list of goals that he needs to accomplish when he is discharged.  States he is feeling anxious about discharge and requested a prn medication for sleep.. Endorsed better mood and less anxiety sxs, but continues to have negative thinking and ruminating thoughts. No problem initiating and maintaining sleep.  Denied A/VH currently.  Denied SI/HI, intent or plan upon direct inquiry at this time.    Patient  continues to be selectively interactive with staff and peers. No reports of aggression or self-injurious behavior on unit. No PRN medications used in the past 24 hours.    Patient accepted all offered medications and no adverse effects of medications noted or reported.    Sleep: slept better  Appetite: normal  Medication side effects: No   ROS: no complaints    Mental Status Examination:  Appearance:  age appropriate, casually dressed, looks stated age   Behavior:  cooperative, anxious appearing   Speech:  normal rate and volume, soft   Mood:  depressed   Affect:  constricted   Thought Process:  logical, coherent, linear, perseverative, negative thinking   Associations: intact associations   Thought Content:  no overt delusions, negative thoughts, ruminating thoughts   Perceptual Disturbances: no auditory hallucinations, no visual hallucinations, does not appear responding to internal stimuli   Risk Potential: Suicidal ideation - None at present  Homicidal ideation - None at present  Potential for aggression - No   Sensorium:  oriented to person, place, and time/date   Memory:  recent and remote memory grossly intact   Consciousness:  alert and awake   Attention/Concentration: attention span and concentration are age appropriate   Insight:  limited   Judgment: limited   Gait/Station: normal gait/station   Motor Activity: no abnormal movements        Vital signs in last 24 hours:    Temp:  [96.9 °F (36.1 °C)-98.3 °F (36.8 °C)] 98.3 °F (36.8 °C)  HR:  [66-70] 69  BP: (116-139)/(67-71) 116/70  Resp:  [16-18] 16  SpO2:  [94 %-100 %] 94 %  O2 Device: None (Room air)         Laboratory results: I have personally reviewed all pertinent laboratory/tests results    Improving, still anxious andMost Recent Labs:   Lab Results   Component Value Date    WBC 3.97 (L) 01/07/2025    RBC 3.35 (L) 01/07/2025    HGB 11.3 (L) 01/07/2025    HCT 34.4 (L) 01/07/2025     (L) 01/07/2025    RDW 16.0 (H) 01/07/2025    NEUTROABS 2.20  01/07/2025    SODIUM 140 01/10/2025    K 4.2 01/10/2025     01/10/2025    CO2 27 01/10/2025    BUN 9 01/10/2025    CREATININE 0.58 (L) 01/10/2025    GLUC 133 01/10/2025    CALCIUM 8.6 01/10/2025    AST 25 01/10/2025    ALT 37 01/10/2025    ALKPHOS 76 01/10/2025    TP 5.8 (L) 01/10/2025    ALB 3.5 01/10/2025    TBILI 0.49 01/10/2025    CHOLESTEROL 127 01/07/2025    HDL 40 01/07/2025    TRIG 112 01/07/2025    LDLCALC 65 01/07/2025    NONHDLC 87 01/07/2025    AMMONIA 57 08/18/2024    [id number] 8.938 (H) 01/07/2025    FREET4 0.83 01/07/2025    HGBA1C 5.0 01/06/2025    EAG 97 01/06/2025       Progress Toward Goals: Worried about discharge, remains anxious, has tolerated medication increase, plan to discharge tomorrow if symptoms continue to improve.    Counseling / Coordination of Care:    Patient's progress discussed with staff in treatment team meeting.  Medication changes reviewed with staff in treatment team meeting.  Patient's progress reviewed with nursing staff.  Medications, treatment progress and treatment plan reviewed with patient.  Medication changes discussed with patient.  Medication education provided to patient.  Patient's progress reviewed with case management staff.  Reassurance and supportive therapy provided.}    Heather Fang DO 01/27/25    This note was completed in part utilizing Dragon dictation Software. Grammatical, translation, syntax errors, random word insertions, spelling mistakes, and incomplete sentences may be an occasional consequence of this system secondary to software limitations with voice recognition, ambient noise, and hardware issues. If you have any questions or concerns about the content, text, or information contained within the body of this dictation, please contact the provider for clarification.

## 2025-01-27 NOTE — DISCHARGE INSTR - APPOINTMENTS
Behavioral Health Nurse Navigator, Bekah or Malgorzata will be calling you after your discharge, on the phone number that you provided.  They will be available as an additional support, if needed.   If you wish to speak with Bekah, you may contact her at [phone].

## 2025-01-27 NOTE — NURSING NOTE
Patient AA&O x 4. Denies physical pain. Endorses mild anxiety. Denies depression, SI/HI, AVH. Pleasant and cooperative with medications. Visible in day room and interacting appropriately with peers and staff. Denies unmet needs at this time.

## 2025-01-28 VITALS
WEIGHT: 202.2 LBS | RESPIRATION RATE: 19 BRPM | HEIGHT: 68 IN | DIASTOLIC BLOOD PRESSURE: 82 MMHG | BODY MASS INDEX: 30.65 KG/M2 | HEART RATE: 82 BPM | TEMPERATURE: 97.8 F | SYSTOLIC BLOOD PRESSURE: 136 MMHG | OXYGEN SATURATION: 99 %

## 2025-01-28 PROBLEM — Z91.199 NONCOMPLIANCE: Status: RESOLVED | Noted: 2025-01-07 | Resolved: 2025-01-28

## 2025-01-28 PROBLEM — F10.90 HABITUAL ALCOHOL USE: Status: RESOLVED | Noted: 2018-03-28 | Resolved: 2025-01-28

## 2025-01-28 PROBLEM — Z00.8 MEDICAL CLEARANCE FOR PSYCHIATRIC ADMISSION: Status: RESOLVED | Noted: 2018-03-28 | Resolved: 2025-01-28

## 2025-01-28 PROCEDURE — 99238 HOSP IP/OBS DSCHRG MGMT 30/<: CPT | Performed by: PSYCHIATRY & NEUROLOGY

## 2025-01-28 RX ADMIN — THIAMINE HCL TAB 100 MG 100 MG: 100 TAB at 09:24

## 2025-01-28 RX ADMIN — LEVOTHYROXINE SODIUM 75 MCG: 75 TABLET ORAL at 06:19

## 2025-01-28 RX ADMIN — FLUOXETINE HYDROCHLORIDE 60 MG: 20 CAPSULE ORAL at 09:24

## 2025-01-28 RX ADMIN — Medication 1000 UNITS: at 09:24

## 2025-01-28 RX ADMIN — PANTOPRAZOLE SODIUM 40 MG: 40 TABLET, DELAYED RELEASE ORAL at 06:19

## 2025-01-28 RX ADMIN — BUSPIRONE HYDROCHLORIDE 25 MG: 10 TABLET ORAL at 09:24

## 2025-01-28 RX ADMIN — Medication 400 MG: at 09:24

## 2025-01-28 RX ADMIN — FOLIC ACID 1 MG: 1 TABLET ORAL at 09:24

## 2025-01-28 RX ADMIN — MULTIPLE VITAMINS W/ MINERALS TAB 1 TABLET: TAB ORAL at 09:24

## 2025-01-28 NOTE — PLAN OF CARE
Problem: Risk for Self Injury/Neglect  Goal: Treatment Goal: Remain safe during length of stay, learn and adopt new coping skills, and be free of self-injurious ideation, impulses and acts at the time of discharge  Outcome: Adequate for Discharge  Goal: Verbalize thoughts and feelings  Description: Interventions:  - Assess and re-assess patient's lethality and potential for self-injury  - Engage patient in 1:1 interactions, daily, for a minimum of 15 minutes  - Encourage patient to express feelings, fears, frustrations, hopes  - Establish rapport/trust with patient   Outcome: Adequate for Discharge  Goal: Refrain from harming self  Description: Interventions:  - Monitor patient closely, per order  - Develop a trusting relationship  - Supervise medication ingestion, monitor effects and side effects   Outcome: Adequate for Discharge     Problem: Risk for Self Injury/Neglect  Goal: Treatment Goal: Remain safe during length of stay, learn and adopt new coping skills, and be free of self-injurious ideation, impulses and acts at the time of discharge  Outcome: Adequate for Discharge     Problem: Risk for Self Injury/Neglect  Goal: Treatment Goal: Remain safe during length of stay, learn and adopt new coping skills, and be free of self-injurious ideation, impulses and acts at the time of discharge  Outcome: Adequate for Discharge  Goal: Verbalize thoughts and feelings  Description: Interventions:  - Assess and re-assess patient's lethality and potential for self-injury  - Engage patient in 1:1 interactions, daily, for a minimum of 15 minutes  - Encourage patient to express feelings, fears, frustrations, hopes  - Establish rapport/trust with patient   Outcome: Adequate for Discharge  Goal: Refrain from harming self  Description: Interventions:  - Monitor patient closely, per order  - Develop a trusting relationship  - Supervise medication ingestion, monitor effects and side effects   Outcome: Adequate for Discharge

## 2025-01-28 NOTE — NURSING NOTE
Patient discharged to home at 1307 via lyft. Orders and discharged instructions reviewed with patient and was able to verbalize understanding. All home meds and belongings returned to patient.

## 2025-01-28 NOTE — DISCHARGE SUMMARY
Discharge Summary - Behavioral Health   Sammy Mays 64 y.o. male MRN: [id number]  Unit/Bed#: [facility] 607[facility]01 Encounter: [id number]     Assessment & Plan  Severe episode of recurrent major depressive disorder, without psychotic features (HCC)  Continue prozac to 60 mg for depression and anxiety  Continue buspirone to 25 mg twice daily  Generalized anxiety disorder  See plan above  Alcohol abuse  Counseling cessation, patient minimizes - says only drinking 1 scotch per day. Per chart review he drinks 2-3 mixed drinks daily and has required alcohol detox in the past    Continue naltrexone to 50 mg daily for alcohol cravings  Homelessness  Recently evicted from his house  Mood insomnia (HCC)  Continue trazodone 50 mg daily  Vitamin D deficiency      Admission Date:   Admission Orders (From admission, onward)       Ordered        01/06/25 1823  ED TO DIFFERENT CAMPUS [facility] UNIT or INPATIENT MEDICAL UNIT to [facility] UNIT (using Discharge Readmit Navigator) - Admit Patient to  Behavioral Health Unit  Once                                Discharge Date: 01/28/25     Attending Psychiatrist: Heather Fang DO     Admission Diagnosis:    Principal Problem:    Severe episode of recurrent major depressive disorder, without psychotic features (HCC)  Active Problems:    Hypothyroidism    Habitual alcohol use    Medical clearance for psychiatric admission    Gastroesophageal reflux disease without esophagitis    Generalized anxiety disorder    Alcohol abuse    Hypomagnesemia    Elevated bilirubin    Noncompliance    Homelessness    Mood insomnia (HCC)    Vitamin D deficiency      Discharge Diagnosis:     Principal Problem:    Severe episode of recurrent major depressive disorder, without psychotic features (HCC)  Active Problems:    Hypothyroidism    Habitual alcohol use    Medical clearance for psychiatric admission    Gastroesophageal reflux disease without esophagitis    Generalized anxiety disorder    Alcohol abuse     "Hypomagnesemia    Elevated bilirubin    Noncompliance    Homelessness    Mood insomnia (HCC)    Vitamin D deficiency  Resolved Problems:    * No resolved hospital problems. *      Reason for Admission/HPI: Per H&P by Dr. Fang on 2025: \"Reason for Admission: depressive symptoms, worsening depression, anxiety symptoms, worsening anxiety, insomnia, difficulty with ADL's, suicidal ideation with plan to buy a gun to shoot himself, and signs and symptoms of alcohol abuse  Patient is a 64 y.o. male with a history of Major Depressive Disorder and Generalized Anxiety Disorder who  was admitted to the psychiatric service on 2025 due to worsening depression and active suicidal thoughts in the context of being evicted from his current housing, also current alcohol misuse with history of needing detox. Psychiatric symptoms prior to consultation included alcohol abuse, anxiety, depression worse, difficulty sleeping, feeling suicidal, and financial problems. Onset of symptoms was gradual starting several weeks ago with rapidly worsening course since that time. Psychosocial stressors included alcohol use problems, death of sister, financial problems, housing issues, being homeless, limited support, ongoing anxiety, and chronic mental illness.     Patient reports that over the last few weeks he has been dealing with lack of motivation, lack of caring for his ADLs, over thinking, loss of appetite and weight loss, low energy and anhedonia.  Recently he was evicted from his friend's home due to missed real estate taxes.  Once he realized he was homeless and had no place to go began contemplating buying a gun so that he could shoot himself.  He recently lost his sister and missed the  due to having no clean clothes.\"      Past Medical History:   Diagnosis Date    Medical history unknown     Pancreatitis      Past Surgical History:   Procedure Laterality Date    CHOLECYSTECTOMY LAPAROSCOPIC      REPAIR / REINSERT BICEPS " TENDON AT ELBOW      Last Assessed: 1/12/2016        Medications:    All current active medications have been reviewed.    Allergies:     Allergies   Allergen Reactions    Amoxicillin Swelling and Fever     Other reaction(s): vertigo    Escitalopram      Other reaction(s): sweaty palms/hands, felt faint, passed out for a short time, had anxiety/panic attack    Gemfibrozil      Other reaction(s): Nausea and/or vomiting  Other reaction(s): Nausea and/or vomiting    Other Allergic Rhinitis     Other reaction(s): Other (See Comments)  rhinitis, itchy eyes       Please refer to the initial H&P for full details.      Vital signs in last 24 hours:    Temp:  [97.5 °F (36.4 °C)-97.8 °F (36.6 °C)] 97.8 °F (36.6 °C)  HR:  [68-82] 82  BP: (122-136)/(64-82) 136/82  Resp:  [17-19] 19  SpO2:  [95 %-99 %] 99 %  O2 Device: None (Room air)      Intake/Output Summary (Last 24 hours) at 1/28/2025 0909  Last data filed at 1/27/2025 1721  Gross per 24 hour   Intake 1440 ml   Output --   Net 1440 ml         Hospital Course:   On admission, Sammy was admitted to the inpatient psychiatric unit and started on Behavioral Health checks for safety monitoring. During the hospitalization he was attending individual therapy, group therapy, milieu therapy and occupational therapy..  Upon admission Sammy was seen by medical service for medical clearance for inpatient treatment and medical follow up.    Sammy was restarted on Prozac. Sammy was also started on buspirone and trazodone. Sammy's medications were titrated as appropriate until discharge, including:    Prozac 60 mg daily for depression and anxiety  Buspirone 25 mg twice daily for anxiety  Trazodone 50 mg nightly for insomnia    Prior to beginning of treatment medications risks and benefits and possible side effects  were reviewed with Sammy. Sammy verbalized understanding and agreement for treatment.  Sammy tolerated these medications with no acute side effects.  Initially  "patient was reporting hopelessness and poor self-care prior to admission.  After several weeks patient reported improvement from medication, was bright, attending all groups, more hopeful and future oriented.  The patient's mood brightened over the course of treatment, and he was seen in [facility] interacting appropriately with peers. Sammy did not demonstrate dangerous behavior to self or others during his inpatient stay.     On the day of discharge, Sammy denied suicidal ideation, intent or plan at the time of discharge and denied homicidal ideation, intent or plan at the time of discharge. Behavior was appropriate on the unit at the time of discharge. Sleep and appetite were improved.  Sammy was positive today states he is motivated to be discharged and \"happy\" about his future.  He is looking forward to his brother visiting him tomorrow and helping him put his belongings into storage.  Sammy will be staying at a hotel for several days and potentially moving to [place] with his brother.  He states he wrote out an action plan with 17 to do items on it.  He describes his mood as good and verbalizes understanding of medication and aftercare appointments.    Since Sammy was doing well at the end of the hospitalization, treatment team felt that he could be safely discharged to outpatient care. Prior to discharge  spoke with Sammy's brothers to address support and his readiness for discharge. The outpatient follow up with family physician, a psychiatrist, and outpatient drug and alcohol counseling was arranged by the unit  upon discharge.    I reviewed with Sammy the importance of compliance with medications and outpatient treatment after discharge., I discussed the medication regimen and possible side effects of the medications with Sammy prior to discharge. At the time of discharge he was tolerating psychiatric medications., I discussed outpatient follow up with Sammy., I " "reviewed with Sammy crisis plan and safety plan upon discharge., Sammy was competent to understand risks and benefits of withholding information and risks and benefits of his actions., Sammy agreed to abstain from drug and alcohol use after discharge., and I discussed with Sammy recommendation to follow up with outpatient drug and alcohol counseling and AA meetings.      Behavioral Health Medications: all current active meds have been reviewed and current meds: No current facility-administered medications for this encounter..  Discharge on Two Antipsychotic Medications : No    Labs/Imaging:   I have personally reviewed all pertinent laboratory/tests results.  Most Recent Labs:   Lab Results   Component Value Date    WBC 3.97 (L) 01/07/2025    RBC 3.35 (L) 01/07/2025    HGB 11.3 (L) 01/07/2025    HCT 34.4 (L) 01/07/2025     (L) 01/07/2025    RDW 16.0 (H) 01/07/2025    NEUTROABS 2.20 01/07/2025    SODIUM 140 01/10/2025    K 4.2 01/10/2025     01/10/2025    CO2 27 01/10/2025    BUN 9 01/10/2025    CREATININE 0.58 (L) 01/10/2025    GLUC 133 01/10/2025    GLUF 133 (H) 01/10/2025    CALCIUM 8.6 01/10/2025    AST 25 01/10/2025    ALT 37 01/10/2025    ALKPHOS 76 01/10/2025    TP 5.8 (L) 01/10/2025    ALB 3.5 01/10/2025    TBILI 0.49 01/10/2025    CHOLESTEROL 127 01/07/2025    HDL 40 01/07/2025    TRIG 112 01/07/2025    LDLCALC 65 01/07/2025    NONHDLC 87 01/07/2025    AMMONIA 57 08/18/2024    [id number] 8.938 (H) 01/07/2025    FREET4 0.83 01/07/2025    HGBA1C 5.0 01/06/2025    EAG 97 01/06/2025       Mental Status at time of Discharge:   Appearance:  age appropriate, dressed appropriately, looks stated age, sitting comfortably in chair, adequate hygiene and grooming, cooperative with interview, good eye contact    Behavior:  cooperative   Speech:  normal rate, normal volume, normal pitch, fluent, clear, and coherent   Mood:  \"Happy\"   Affect:  mood-congruent   Language Within normal limits   Thought " Process:  organized, logical, goal directed, normal rate of thoughts   Associations: intact associations      Thought Content:  No verbalized delusions   Perceptual Disturbances: Denies auditory or visual hallucinations and Does not appear to be responding to internal stimuli   Risk Potential: Denies suicidal or homicidal ideation, plan, or intent   Sensorium:  person, place, time, and current situation   Cognition:  Grossly intact   Consciousness:  alert and awake   Attention: attention span and concentration were age appropriate   Insight:  fair   Judgment: fair   Intellect appears to be of average intelligence   Gait/Station: normal gait/station   Motor Activity: no abnormal movements     Suicide/Homicide Risk Assessment:  Risk of Harm to Self:   The following ratings are based on assessment at the time of discharge and observation over the last several weeks  Demographic risk factors include: , never , age: over 50 or older  Historical Risk Factors include: chronic depression, substance use  Current Specific Risk Factors include: recent inpatient psychiatric admission - being discharged today, diagnosis of depression, chronic anxiety symptoms, alcohol use  Protective Factors: no current suicidal ideation, no current depressive symptoms, improved anxiety symptoms, ability to make plans for the future, no current suicidal plan or intent, outpatient psychiatric follow up established, outpatient D&A follow up established, compliant with medications, compliant with mental health treatment, sense of determination, sense of importance of health and wellness, supportive family  Weapons/Firearms: none. The following steps have been taken to ensure weapons are properly secured: not applicable  Based on today's assessment, Sammy presents the following risk of harm to self: low    Risk of Harm to Others:  The following ratings are based on assessment at the time of discharge and observation over the last  several weeks  Demographic Risk Factors include: male.  Historical Risk Factors include: none.  Current Specific Risk Factors include: none  Protective Factors: no current homicidal ideation, stable mood, compliant with medications, compliant with treatment, willing to continue psychiatric treatment, willing to remain free from substance use, outpatient follow up established, outpatient D&A follow up established, access to mental health treatment  Weapons/Firearms: none. The following steps have been taken to ensure weapons are properly secured: not applicable  Based on today's assessment, Sammy presents the following risk of harm to others: minimal    The following interventions are recommended: outpatient follow up with a psychiatrist, outpatient follow up with Drug and Alcohol counseling    Discharge Medications:  See list above, as well as the after visit summary for all reconciled discharge medications provided to patient and family.      Discharge instructions/Information to patient and family:   See after visit summary for information provided to patient and family.      Provisions for Follow-Up Care:  See after visit summary for information related to follow-up care and any pertinent home health orders.      This note has been constructed using a voice recognition system. There may be translation, syntax, or grammatical errors. If you have any questions, please contact the dictating provider.    Heather Fang, DO

## 2025-01-28 NOTE — BH TRANSITION RECORD
Contact Information: If you have any questions, concerns, pended studies, tests and/or procedures, or emergencies regarding your inpatient behavioral health visit. Please contact [facility] [phone] and ask to speak to a , nurse or physician. A contact is available 24 hours/ 7 days a week at this number.     Summary of Procedures Performed During your Stay:  Below is a list of major procedures performed during your hospital stay and a summary of results:  - Cardiac Procedures/Studies: EKG on 1/7/25.   Normal sinus rhythm with sinus arrhythmia     Pending Studies (From admission, onward)      None          Please follow up on the above pending studies with your PCP and/or referring provider.

## 2025-01-28 NOTE — PLAN OF CARE
Problem: Risk for Self Injury/Neglect  Goal: Treatment Goal: Remain safe during length of stay, learn and adopt new coping skills, and be free of self-injurious ideation, impulses and acts at the time of discharge  Outcome: Progressing  Goal: Verbalize thoughts and feelings  Description: Interventions:  - Assess and re-assess patient's lethality and potential for self-injury  - Engage patient in 1:1 interactions, daily, for a minimum of 15 minutes  - Encourage patient to express feelings, fears, frustrations, hopes  - Establish rapport/trust with patient   Outcome: Progressing  Goal: Refrain from harming self  Description: Interventions:  - Monitor patient closely, per order  - Develop a trusting relationship  - Supervise medication ingestion, monitor effects and side effects   Outcome: Progressing     Problem: Depression  Goal: Treatment Goal: Demonstrate behavioral control of depressive symptoms, verbalize feelings of improved mood/affect, and adopt new coping skills prior to discharge  Outcome: Progressing  Goal: Refrain from isolation  Description: Interventions:  - Develop a trusting relationship   - Encourage socialization   Outcome: Progressing     Problem: Anxiety  Goal: Anxiety is at manageable level  Description: Interventions:  - Assess and monitor patient's anxiety level.   - Monitor for signs and symptoms (heart palpitations, chest pain, shortness of breath, headaches, nausea, feeling jumpy, restlessness, irritable, apprehensive).   - Collaborate with interdisciplinary team and initiate plan and interventions as ordered.  - [place] patient to unit/surroundings  - Explain treatment plan  - Encourage participation in care  - Encourage verbalization of concerns/fears  - Identify coping mechanisms  - Assist in developing anxiety-reducing skills  - Administer/offer alternative therapies  - Limit or eliminate stimulants  Outcome: Progressing     Problem: DISCHARGE PLANNING  Goal: Discharge to home or other  facility with appropriate resources  Description: INTERVENTIONS:  - Identify barriers to discharge w/patient and caregiver  - Arrange for needed discharge resources and transportation as appropriate  - Identify discharge learning needs (meds, wound care, etc.)  - Arrange for interpretive services to assist at discharge as needed  - Refer to Case Management Department for coordinating discharge planning if the patient needs post-hospital services based on physician/advanced practitioner order or complex needs related to functional status, cognitive ability, or social support system  Outcome: Progressing     Problem: Ineffective Coping  Goal: Participates in unit activities  Description: Interventions:  - Provide therapeutic environment   - Provide required programming   - Redirect inappropriate behaviors   Outcome: Progressing

## 2025-01-28 NOTE — PLAN OF CARE
Problem: DISCHARGE PLANNING  Goal: Discharge to home or other facility with appropriate resources  Description: INTERVENTIONS:  - Identify barriers to discharge w/patient and caregiver  - Arrange for needed discharge resources and transportation as appropriate  - Identify discharge learning needs (meds, wound care, etc.)  - Arrange for interpretive services to assist at discharge as needed  - Refer to Case Management Department for coordinating discharge planning if the patient needs post-hospital services based on physician/advanced practitioner order or complex needs related to functional status, cognitive ability, or social support system    Outcome: Adequate for Discharge  Pt to D/C today. Pt denies SI/HI/AVH. Pt oriented x3. Pt to d/c to the [facility] and [employer] will  upon discharge. Pt to follow up with [facility] and his PCP. Scripts were filled at [facility].     Discharge Address: [address] [place] [address]  Phone: [phone]

## 2025-01-28 NOTE — NURSING NOTE
Patient is awake and visible in the milieu. Bright, social with peers and able to make needs known. Reports anxiety, denies all other psych s/s. Medication compliant and cooperative with care. Safety precautions maintained.

## 2025-01-28 NOTE — PROGRESS NOTES
01/28/25 1000   Activity/Group Checklist   Group Life Skills  (topic pacing self and frustrations.)   Attendance Attended   Attendance Duration (min) 31-45   Interactions Other (Comment)  (Pt. minimized future stressors and expressed well wishes to his peers.)   Affect/Mood Normal range   Goals Achieved Increased hopefulness;Identified feelings

## 2025-01-28 NOTE — PROGRESS NOTES
01/28/25 0900   Team Meeting   Meeting Type Daily Rounds   Team Members Present   Team Members Present Physician;Nurse;   Physician Team Member Jaylan/Claudia/Annalisa   Nursing Team Member Nikole   Care Management Team Member Guru   Patient/Family Present   Patient Present No   Patient's Family Present No     Patient endorses anxiety. He is visible and social. Compliant with medications. 27th for his BM and his shower. He is attending groups. Discharge for 1pm today.
